# Patient Record
Sex: MALE | Race: WHITE | NOT HISPANIC OR LATINO
[De-identification: names, ages, dates, MRNs, and addresses within clinical notes are randomized per-mention and may not be internally consistent; named-entity substitution may affect disease eponyms.]

---

## 2017-09-08 PROBLEM — Z00.00 ENCOUNTER FOR PREVENTIVE HEALTH EXAMINATION: Status: ACTIVE | Noted: 2017-09-08

## 2017-09-20 ENCOUNTER — APPOINTMENT (OUTPATIENT)
Dept: UROLOGY | Facility: CLINIC | Age: 52
End: 2017-09-20
Payer: MEDICAID

## 2017-09-20 VITALS
BODY MASS INDEX: 26.64 KG/M2 | OXYGEN SATURATION: 98 % | HEIGHT: 73 IN | SYSTOLIC BLOOD PRESSURE: 130 MMHG | DIASTOLIC BLOOD PRESSURE: 74 MMHG | WEIGHT: 201 LBS | HEART RATE: 82 BPM

## 2017-09-20 DIAGNOSIS — N52.9 MALE ERECTILE DYSFUNCTION, UNSPECIFIED: ICD-10-CM

## 2017-09-20 DIAGNOSIS — R35.1 NOCTURIA: ICD-10-CM

## 2017-09-20 DIAGNOSIS — R39.12 POOR URINARY STREAM: ICD-10-CM

## 2017-09-20 PROCEDURE — 36415 COLL VENOUS BLD VENIPUNCTURE: CPT

## 2017-09-20 PROCEDURE — 99204 OFFICE O/P NEW MOD 45 MIN: CPT

## 2017-09-20 RX ORDER — ACETAMINOPHEN 325 MG/1
325 TABLET ORAL
Refills: 0 | Status: ACTIVE | COMMUNITY

## 2017-09-20 RX ORDER — MELOXICAM 15 MG/1
15 TABLET ORAL
Refills: 0 | Status: ACTIVE | COMMUNITY

## 2017-09-22 LAB
ANION GAP SERPL CALC-SCNC: 21 MMOL/L
APPEARANCE: CLEAR
BACTERIA: NEGATIVE
BILIRUBIN URINE: NEGATIVE
BLOOD URINE: NEGATIVE
BUN SERPL-MCNC: 16 MG/DL
CALCIUM SERPL-MCNC: 10.1 MG/DL
CHLORIDE SERPL-SCNC: 98 MMOL/L
CHOLEST SERPL-MCNC: 150 MG/DL
CHOLEST/HDLC SERPL: 3.2 RATIO
CO2 SERPL-SCNC: 20 MMOL/L
COLOR: YELLOW
CORE LAB FLUID CYTOLOGY: NORMAL
CREAT SERPL-MCNC: 0.61 MG/DL
GLUCOSE QUALITATIVE U: 100 MG/DL
GLUCOSE SERPL-MCNC: 102 MG/DL
HBA1C MFR BLD HPLC: 6.2 %
HDLC SERPL-MCNC: 47 MG/DL
KETONES URINE: NEGATIVE
LDLC SERPL CALC-MCNC: 78 MG/DL
LEUKOCYTE ESTERASE URINE: NEGATIVE
LH SERPL-ACNC: 18.1 IU/L
MICROSCOPIC-UA: NORMAL
NITRITE URINE: NEGATIVE
PH URINE: 5.5
POTASSIUM SERPL-SCNC: 5.7 MMOL/L
PROTEIN URINE: NEGATIVE MG/DL
RED BLOOD CELLS URINE: 2 /HPF
SODIUM SERPL-SCNC: 139 MMOL/L
SPECIFIC GRAVITY URINE: 1.02
SQUAMOUS EPITHELIAL CELLS: 0 /HPF
TESTOST SERPL-MCNC: 245.3 NG/DL
TRIGL SERPL-MCNC: 127 MG/DL
UROBILINOGEN URINE: NORMAL MG/DL
WHITE BLOOD CELLS URINE: 1 /HPF

## 2017-12-06 PROBLEM — N52.9 ERECTILE DYSFUNCTION, UNSPECIFIED ERECTILE DYSFUNCTION TYPE: Status: ACTIVE | Noted: 2017-12-06

## 2018-11-29 ENCOUNTER — APPOINTMENT (OUTPATIENT)
Dept: ORTHOPEDIC SURGERY | Facility: CLINIC | Age: 53
End: 2018-11-29
Payer: MEDICAID

## 2018-11-29 VITALS
SYSTOLIC BLOOD PRESSURE: 162 MMHG | BODY MASS INDEX: 26.64 KG/M2 | HEART RATE: 83 BPM | DIASTOLIC BLOOD PRESSURE: 82 MMHG | HEIGHT: 73 IN | WEIGHT: 201 LBS

## 2018-11-29 DIAGNOSIS — M25.642 STIFFNESS OF LEFT HAND, NOT ELSEWHERE CLASSIFIED: ICD-10-CM

## 2018-11-29 DIAGNOSIS — M19.032 PRIMARY OSTEOARTHRITIS, LEFT WRIST: ICD-10-CM

## 2018-11-29 PROCEDURE — 99203 OFFICE O/P NEW LOW 30 MIN: CPT

## 2018-11-29 PROCEDURE — 73110 X-RAY EXAM OF WRIST: CPT | Mod: LT

## 2020-07-01 ENCOUNTER — OUTPATIENT (OUTPATIENT)
Dept: OUTPATIENT SERVICES | Facility: HOSPITAL | Age: 55
LOS: 1 days | End: 2020-07-01
Payer: MEDICAID

## 2020-07-27 ENCOUNTER — INPATIENT (INPATIENT)
Facility: HOSPITAL | Age: 55
LOS: 0 days | Discharge: LEFT BEFORE DISCHARGE | End: 2020-07-27
Attending: STUDENT IN AN ORGANIZED HEALTH CARE EDUCATION/TRAINING PROGRAM | Admitting: STUDENT IN AN ORGANIZED HEALTH CARE EDUCATION/TRAINING PROGRAM
Payer: MEDICAID

## 2020-07-27 VITALS
HEART RATE: 110 BPM | SYSTOLIC BLOOD PRESSURE: 160 MMHG | TEMPERATURE: 98 F | OXYGEN SATURATION: 97 % | RESPIRATION RATE: 17 BRPM | DIASTOLIC BLOOD PRESSURE: 96 MMHG

## 2020-07-27 VITALS
TEMPERATURE: 98 F | SYSTOLIC BLOOD PRESSURE: 160 MMHG | RESPIRATION RATE: 18 BRPM | OXYGEN SATURATION: 97 % | DIASTOLIC BLOOD PRESSURE: 81 MMHG | HEART RATE: 93 BPM

## 2020-07-27 DIAGNOSIS — K92.2 GASTROINTESTINAL HEMORRHAGE, UNSPECIFIED: ICD-10-CM

## 2020-07-27 LAB
ALBUMIN SERPL ELPH-MCNC: 4.1 G/DL — SIGNIFICANT CHANGE UP (ref 3.3–5)
ALP SERPL-CCNC: 128 U/L — HIGH (ref 40–120)
ALT FLD-CCNC: 15 U/L — SIGNIFICANT CHANGE UP (ref 4–41)
ANION GAP SERPL CALC-SCNC: 12 MMO/L — SIGNIFICANT CHANGE UP (ref 7–14)
ANISOCYTOSIS BLD QL: SLIGHT — SIGNIFICANT CHANGE UP
APTT BLD: 32.2 SEC — SIGNIFICANT CHANGE UP (ref 27–36.3)
AST SERPL-CCNC: 16 U/L — SIGNIFICANT CHANGE UP (ref 4–40)
BASOPHILS # BLD AUTO: 0.67 K/UL — HIGH (ref 0–0.2)
BASOPHILS NFR BLD AUTO: 4.1 % — HIGH (ref 0–2)
BASOPHILS NFR SPEC: 1.8 % — SIGNIFICANT CHANGE UP (ref 0–2)
BILIRUB SERPL-MCNC: 0.7 MG/DL — SIGNIFICANT CHANGE UP (ref 0.2–1.2)
BLASTS # FLD: 0 % — SIGNIFICANT CHANGE UP (ref 0–0)
BLD GP AB SCN SERPL QL: NEGATIVE — SIGNIFICANT CHANGE UP
BUN SERPL-MCNC: 19 MG/DL — SIGNIFICANT CHANGE UP (ref 7–23)
CALCIUM SERPL-MCNC: 9.3 MG/DL — SIGNIFICANT CHANGE UP (ref 8.4–10.5)
CHLORIDE SERPL-SCNC: 97 MMOL/L — LOW (ref 98–107)
CO2 SERPL-SCNC: 26 MMOL/L — SIGNIFICANT CHANGE UP (ref 22–31)
CREAT SERPL-MCNC: 0.52 MG/DL — SIGNIFICANT CHANGE UP (ref 0.5–1.3)
DACRYOCYTES BLD QL SMEAR: SLIGHT — SIGNIFICANT CHANGE UP
ELLIPTOCYTES BLD QL SMEAR: SLIGHT — SIGNIFICANT CHANGE UP
EOSINOPHIL # BLD AUTO: 0.6 K/UL — HIGH (ref 0–0.5)
EOSINOPHIL NFR BLD AUTO: 3.7 % — SIGNIFICANT CHANGE UP (ref 0–6)
EOSINOPHIL NFR FLD: 1.8 % — SIGNIFICANT CHANGE UP (ref 0–6)
GIANT PLATELETS BLD QL SMEAR: PRESENT — SIGNIFICANT CHANGE UP
GLUCOSE SERPL-MCNC: 501 MG/DL — CRITICAL HIGH (ref 70–99)
HBA1C BLD-MCNC: 11.1 % — HIGH (ref 4–5.6)
HCT VFR BLD CALC: 44.9 % — SIGNIFICANT CHANGE UP (ref 39–50)
HGB BLD-MCNC: 13.8 G/DL — SIGNIFICANT CHANGE UP (ref 13–17)
IMM GRANULOCYTES NFR BLD AUTO: 9.1 % — HIGH (ref 0–1.5)
INR BLD: 1.08 — SIGNIFICANT CHANGE UP (ref 0.88–1.17)
LYMPHOCYTES # BLD AUTO: 1.47 K/UL — SIGNIFICANT CHANGE UP (ref 1–3.3)
LYMPHOCYTES # BLD AUTO: 9 % — LOW (ref 13–44)
LYMPHOCYTES NFR SPEC AUTO: 6.2 % — LOW (ref 13–44)
MACROCYTES BLD QL: SIGNIFICANT CHANGE UP
MCHC RBC-ENTMCNC: 27.2 PG — SIGNIFICANT CHANGE UP (ref 27–34)
MCHC RBC-ENTMCNC: 30.7 % — LOW (ref 32–36)
MCV RBC AUTO: 88.6 FL — SIGNIFICANT CHANGE UP (ref 80–100)
METAMYELOCYTES # FLD: 1.8 % — HIGH (ref 0–1)
MICROCYTES BLD QL: SLIGHT — SIGNIFICANT CHANGE UP
MONOCYTES # BLD AUTO: 1.02 K/UL — HIGH (ref 0–0.9)
MONOCYTES NFR BLD AUTO: 6.2 % — SIGNIFICANT CHANGE UP (ref 2–14)
MONOCYTES NFR BLD: 2.7 % — SIGNIFICANT CHANGE UP (ref 2–9)
MYELOCYTES NFR BLD: 0 % — SIGNIFICANT CHANGE UP (ref 0–0)
NEUTROPHIL AB SER-ACNC: 79.4 % — HIGH (ref 43–77)
NEUTROPHILS # BLD AUTO: 11.13 K/UL — HIGH (ref 1.8–7.4)
NEUTROPHILS NFR BLD AUTO: 67.9 % — SIGNIFICANT CHANGE UP (ref 43–77)
NEUTS BAND # BLD: 1.8 % — SIGNIFICANT CHANGE UP (ref 0–6)
NRBC # BLD: 11 /100WBC — SIGNIFICANT CHANGE UP
NRBC # FLD: 0.89 K/UL — SIGNIFICANT CHANGE UP (ref 0–0)
NRBC FLD-RTO: 5.4 — SIGNIFICANT CHANGE UP
OB PNL STL: POSITIVE — SIGNIFICANT CHANGE UP
OTHER - HEMATOLOGY %: 0 — SIGNIFICANT CHANGE UP
PLATELET # BLD AUTO: 238 K/UL — SIGNIFICANT CHANGE UP (ref 150–400)
PLATELET COUNT - ESTIMATE: NORMAL — SIGNIFICANT CHANGE UP
PMV BLD: SIGNIFICANT CHANGE UP FL (ref 7–13)
POIKILOCYTOSIS BLD QL AUTO: SIGNIFICANT CHANGE UP
POLYCHROMASIA BLD QL SMEAR: SLIGHT — SIGNIFICANT CHANGE UP
POTASSIUM SERPL-MCNC: 4 MMOL/L — SIGNIFICANT CHANGE UP (ref 3.5–5.3)
POTASSIUM SERPL-SCNC: 4 MMOL/L — SIGNIFICANT CHANGE UP (ref 3.5–5.3)
PROMYELOCYTES # FLD: 0 % — SIGNIFICANT CHANGE UP (ref 0–0)
PROT SERPL-MCNC: 6.5 G/DL — SIGNIFICANT CHANGE UP (ref 6–8.3)
PROTHROM AB SERPL-ACNC: 12.4 SEC — SIGNIFICANT CHANGE UP (ref 9.8–13.1)
RBC # BLD: 5.07 M/UL — SIGNIFICANT CHANGE UP (ref 4.2–5.8)
RBC # FLD: 24.6 % — HIGH (ref 10.3–14.5)
REVIEW TO FOLLOW: YES — SIGNIFICANT CHANGE UP
RH IG SCN BLD-IMP: POSITIVE — SIGNIFICANT CHANGE UP
SODIUM SERPL-SCNC: 135 MMOL/L — SIGNIFICANT CHANGE UP (ref 135–145)
SPHEROCYTES BLD QL SMEAR: SLIGHT — SIGNIFICANT CHANGE UP
VARIANT LYMPHS # BLD: 4.5 % — SIGNIFICANT CHANGE UP
WBC # BLD: 16.38 K/UL — HIGH (ref 3.8–10.5)
WBC # FLD AUTO: 16.38 K/UL — HIGH (ref 3.8–10.5)

## 2020-07-27 PROCEDURE — 99285 EMERGENCY DEPT VISIT HI MDM: CPT | Mod: 25

## 2020-07-27 PROCEDURE — 76705 ECHO EXAM OF ABDOMEN: CPT | Mod: 26

## 2020-07-27 RX ORDER — NICOTINE POLACRILEX 2 MG
4 GUM BUCCAL ONCE
Refills: 0 | Status: DISCONTINUED | OUTPATIENT
Start: 2020-07-27 | End: 2020-07-27

## 2020-07-27 RX ORDER — INSULIN LISPRO 100/ML
8 VIAL (ML) SUBCUTANEOUS ONCE
Refills: 0 | Status: COMPLETED | OUTPATIENT
Start: 2020-07-27 | End: 2020-07-27

## 2020-07-27 RX ORDER — SODIUM CHLORIDE 9 MG/ML
1000 INJECTION INTRAMUSCULAR; INTRAVENOUS; SUBCUTANEOUS ONCE
Refills: 0 | Status: COMPLETED | OUTPATIENT
Start: 2020-07-27 | End: 2020-07-27

## 2020-07-27 RX ORDER — INSULIN LISPRO 100/ML
VIAL (ML) SUBCUTANEOUS EVERY 6 HOURS
Refills: 0 | Status: DISCONTINUED | OUTPATIENT
Start: 2020-07-27 | End: 2020-07-27

## 2020-07-27 RX ORDER — SODIUM CHLORIDE 9 MG/ML
1000 INJECTION, SOLUTION INTRAVENOUS
Refills: 0 | Status: DISCONTINUED | OUTPATIENT
Start: 2020-07-27 | End: 2020-07-27

## 2020-07-27 RX ORDER — DEXTROSE 50 % IN WATER 50 %
25 SYRINGE (ML) INTRAVENOUS ONCE
Refills: 0 | Status: DISCONTINUED | OUTPATIENT
Start: 2020-07-27 | End: 2020-07-27

## 2020-07-27 RX ORDER — DEXTROSE 50 % IN WATER 50 %
12.5 SYRINGE (ML) INTRAVENOUS ONCE
Refills: 0 | Status: DISCONTINUED | OUTPATIENT
Start: 2020-07-27 | End: 2020-07-27

## 2020-07-27 RX ORDER — GLUCAGON INJECTION, SOLUTION 0.5 MG/.1ML
1 INJECTION, SOLUTION SUBCUTANEOUS ONCE
Refills: 0 | Status: DISCONTINUED | OUTPATIENT
Start: 2020-07-27 | End: 2020-07-27

## 2020-07-27 RX ORDER — NICOTINE POLACRILEX 2 MG
1 GUM BUCCAL DAILY
Refills: 0 | Status: DISCONTINUED | OUTPATIENT
Start: 2020-07-27 | End: 2020-07-27

## 2020-07-27 RX ORDER — DEXTROSE 50 % IN WATER 50 %
15 SYRINGE (ML) INTRAVENOUS ONCE
Refills: 0 | Status: DISCONTINUED | OUTPATIENT
Start: 2020-07-27 | End: 2020-07-27

## 2020-07-27 RX ADMIN — SODIUM CHLORIDE 1000 MILLILITER(S): 9 INJECTION INTRAMUSCULAR; INTRAVENOUS; SUBCUTANEOUS at 15:14

## 2020-07-27 RX ADMIN — Medication 8 UNIT(S): at 15:30

## 2020-07-27 RX ADMIN — SODIUM CHLORIDE 1000 MILLILITER(S): 9 INJECTION INTRAMUSCULAR; INTRAVENOUS; SUBCUTANEOUS at 16:14

## 2020-07-27 NOTE — ED PROVIDER NOTE - PHYSICAL EXAMINATION
General: Well developed, well nourished  HEENT: Normocephalic and atraumatic, No conjunctival pallor. Trachea midline.   Cardiac: Normal S1 and S2 w/ RRR. No MRG.  Pulmonary: CTA bilaterally. No increased WOB.   Abdominal: Soft, NT. Mild distension  Neurologic: No focal sensory or motor deficits.  Musculoskeletal: No limited ROM.  Vascular: Warm and well perfused  Skin: Color appropriate for race.   Psychiatric: Appropriate mood and affect. No apparent risk to self or others.  Dong Armenta M.D. PGY-3 General: Well developed, well nourished  HEENT: Normocephalic and atraumatic, No conjunctival pallor. Trachea midline.   Cardiac: Normal S1 and S2 w/ RRR. No MRG.  Pulmonary: CTA bilaterally. No increased WOB.   Abdominal: Soft, NT. Mild distension  Rectal: No hemorrhoids, no gross blood in vault.   Neurologic: No focal sensory or motor deficits.  Musculoskeletal: No limited ROM.  Vascular: Warm and well perfused  Skin: Color appropriate for race.   Psychiatric: Appropriate mood and affect. No apparent risk to self or others.  Dong Armenta M.D. PGY-3

## 2020-07-27 NOTE — ED PROVIDER NOTE - ATTENDING CONTRIBUTION TO CARE
I performed a face-to-face evaluation of the patient and performed a history and physical examination. I agree with the history and physical examination.    54 M, 2 days ago BRBPR, no melena, significant weight loss. Heavy smoker. Sent from PCP for w/u and concern for cancer. Takes NSAID. Consider UGIB 2/2 NSAIDS vs. cirrhosis or GI malignancy. Labs, PPI, IVF, admit.

## 2020-07-27 NOTE — ED PROVIDER NOTE - CLINICAL SUMMARY MEDICAL DECISION MAKING FREE TEXT BOX
Albert: 54 M, 2 days ago BRBPR, no melena, significant weight loss. Heavy smoker. Sent from PCP for w/u and concern for cancer. Takes NSAID. Consider UGIB 2/2 NSAIDS vs. cirrhosis or GI malignancy. Labs, PPI, IVF, admit.

## 2020-07-27 NOTE — ED ADULT NURSE NOTE - OBJECTIVE STATEMENT
Pt presents to ED from home A&Ox4, skin warm dry unremarkable. Pt relates 1 epsiode of bright red stool 2 days ago, then no BM since. Pt was seen at PMD office and sent to ED for further eval. Pt denies abdominal pain, chest pain, shortness of breath, N/V/D, cough, fever or chills. Pt has hx of HTN, and DM, and is compliant with medications. Pt denies any complaints of pain or any other symptoms.

## 2020-07-27 NOTE — ED PROVIDER NOTE - PROGRESS NOTE DETAILS
Pt admitted to medicine for further workup. Pt asked to be ama'd as he wanted to smoke cigarettes. Discussed alternative options. Pt agreed to stay with nicotine patch and lozenges. When medicine team came down to admit pt his was no longer in his room with IV on his stretched and was unable to be found. Presumably eloped. On previous AMA discussion I discussed importance of staying in hospital due to possibility of life threatening GI bleed and associated morbidity/mortality

## 2020-07-27 NOTE — ED ADULT NURSE NOTE - CHIEF COMPLAINT
Addended by: KING MEGA BAUTISTA on: 1/10/2020 09:52 AM     Modules accepted: Level of Service     The patient is a 54y Male complaining of

## 2020-07-27 NOTE — ED ADULT NURSE NOTE - NSIMPLEMENTINTERV_GEN_ALL_ED
Implemented All Universal Safety Interventions:  [place] to call system. Call bell, personal items and telephone within reach. Instruct patient to call for assistance. Room bathroom lighting operational. Non-slip footwear when patient is off stretcher. Physically safe environment: no spills, clutter or unnecessary equipment. Stretcher in lowest position, wheels locked, appropriate side rails in place.

## 2020-07-27 NOTE — CONSULT NOTE ADULT - ASSESSMENT
Impression:  1) BRBPR - with hemoglobin of 13, HD stable but 18 pound weight loss DDx includes malignancy, hemorrhoids, diverticulosis, colitis, anal fissure    Recommendations:   - monitor hemoglobin   - transfuse as needed   - two active IVs at all times   - active type and screen   - send iron studies including retic count, LDH, haptoglobin    Carolin Henson  Gastroenterology Fellow  Pager x [phone] or [phone]  (After 5 pm or on weekends please page GI on call) Impression:  1) BRBPR - with hemoglobin of 13, HD stable but 18 pound weight loss DDx includes malignancy, hemorrhoids, diverticulosis, colitis, anal fissure    Recommendations:   - monitor hemoglobin   - transfuse as needed   - two active IVs at all times   - active type and screen   - send iron studies including retic count, LDH, haptoglobin  - can schedule for outpatient EGD/colon with Dr. Ray Henson  Gastroenterology Fellow  Pager x [phone] or [phone]  (After 5 pm or on weekends please page GI on call)

## 2020-07-27 NOTE — CONSULT NOTE ADULT - SUBJECTIVE AND OBJECTIVE BOX
Chief Complaint:  Patient is a 54y old  Male who presents with a chief complaint of     HPI:  The patient is a 54M presents with rectal bleeding. Pt states on Saturday he had a BM with a lot of bright red blood in the toilet and he has not had BM since. States did not notice any dark/tarry stools. Otherwise feels in USOH. Takes baby aspirin and meloxicam daily for arthritis. Went to PMD today who noted 18lbs weight loss in past month and referred him to ER.     In the ED the VSS labs significant for a hemoglobin of 13.    Allergies:  No Known Allergies      Home Medications:    Hospital Medications:  sodium chloride 0.9% Bolus 1000 milliLiter(s) IV Bolus once      PMHX/PSHX:  Arthritis  DM (diabetes mellitus)  HTN (hypertension)      Family history:      Social History:     ROS:     General:  No wt loss, fevers, chills, night sweats, fatigue,   Eyes:  Good vision, no reported pain  ENT:  No sore throat, pain, runny nose, dysphagia  CV:  No pain, palpitations, hypo/hypertension  Resp:  No dyspnea, cough, tachypnea, wheezing  GI:  See HPI  :  No pain, bleeding, incontinence, nocturia  Muscle:  No pain, weakness  Neuro:  No weakness, tingling, memory problems  Psych:  No fatigue, insomnia, mood problems, depression  Endocrine:  No polyuria, polydipsia, cold/heat intolerance  Heme:  No petechiae, ecchymosis, easy bruisability  Skin:  No rash, edema      PHYSICAL EXAM:     GENERAL:  NAD  CHEST:  Full & symmetric excursion  HEART:  Regular rhythm, no abdominal bruit, no edema  ABDOMEN:  Soft, non-tender, non-distended, normoactive bowel sounds,  no masses , no hepatosplenomegaly  EXTREMITIES:  no cyanosis,clubbing or edema  SKIN:  No rash/erythema/ecchymoses/petechiae/wounds/abscess/warm/dry  NEURO:  Alert, oriented    Vital Signs:  Vital Signs Last 24 Hrs  T(C): 36.7 (27 Jul 2020 13:04), Max: 36.7 (27 Jul 2020 13:04)  T(F): 98 (27 Jul 2020 13:04), Max: 98 (27 Jul 2020 13:04)  HR: 110 (27 Jul 2020 13:04) (110 - 110)  BP: 160/96 (27 Jul 2020 13:04) (160/96 - 160/96)  BP(mean): --  RR: 17 (27 Jul 2020 13:04) (17 - 17)  SpO2: 97% (27 Jul 2020 13:04) (97% - 97%)  Daily     Daily     LABS:                        13.8   16.38 )-----------( 238      ( 27 Jul 2020 14:15 )             44.9             PT/INR - ( 27 Jul 2020 14:15 )   PT: 12.4 SEC;   INR: 1.08          PTT - ( 27 Jul 2020 14:15 )  PTT:32.2 SEC        Imaging: Chief Complaint:  Patient is a 54y old  Male who presents with a chief complaint of     HPI:  The patient is a 54M presents with rectal bleeding. Pt states on Saturday he had a BM with a lot of bright red blood in the toilet and he has not had BM since. States did not notice any dark/tarry stools. Otherwise feels in USOH. Takes baby aspirin and meloxicam daily for arthritis. Went to PMD today who noted 18lbs weight loss in past month and referred him to ER.     In the ED the VSS labs significant for a hemoglobin of 13.    Allergies:  No Known Allergies      Home Medications:    Hospital Medications:  sodium chloride 0.9% Bolus 1000 milliLiter(s) IV Bolus once      PMHX/PSHX:  Arthritis  DM (diabetes mellitus)  HTN (hypertension)      Family history:      Social History:     ROS:     General:  No wt loss, fevers, chills, night sweats, fatigue,   Eyes:  Good vision, no reported pain  ENT:  No sore throat, pain, runny nose, dysphagia  CV:  No pain, palpitations, hypo/hypertension  Resp:  No dyspnea, cough, tachypnea, wheezing  GI:  See HPI  :  No pain, bleeding, incontinence, nocturia  Muscle:  No pain, weakness  Neuro:  No weakness, tingling, memory problems  Psych:  No fatigue, insomnia, mood problems, depression  Endocrine:  No polyuria, polydipsia, cold/heat intolerance  Heme:  No petechiae, ecchymosis, easy bruisability  Skin:  No rash, edema      PHYSICAL EXAM:     GENERAL:  NAD  CHEST:  Full & symmetric excursion  HEART:  Regular rhythm, no abdominal bruit, no edema  ABDOMEN:  Soft, non-tender, non-distended, normoactive bowel sounds,  no masses , no hepatosplenomegaly  EXTREMITIES:  no cyanosis,clubbing or edema  SKIN:  No rash/erythema/ecchymoses/petechiae/wounds/abscess/warm/dry  NEURO:  Alert, oriented  Rectal: brown stool    Vital Signs:  Vital Signs Last 24 Hrs  T(C): 36.7 (27 Jul 2020 13:04), Max: 36.7 (27 Jul 2020 13:04)  T(F): 98 (27 Jul 2020 13:04), Max: 98 (27 Jul 2020 13:04)  HR: 110 (27 Jul 2020 13:04) (110 - 110)  BP: 160/96 (27 Jul 2020 13:04) (160/96 - 160/96)  BP(mean): --  RR: 17 (27 Jul 2020 13:04) (17 - 17)  SpO2: 97% (27 Jul 2020 13:04) (97% - 97%)  Daily     Daily     LABS:                        13.8   16.38 )-----------( 238      ( 27 Jul 2020 14:15 )             44.9             PT/INR - ( 27 Jul 2020 14:15 )   PT: 12.4 SEC;   INR: 1.08          PTT - ( 27 Jul 2020 14:15 )  PTT:32.2 SEC        Imaging:

## 2020-07-27 NOTE — ED PROVIDER NOTE - OBJECTIVE STATEMENT
54M presents with rectal bleeding. Pt states on Saturday he had a BM with a lot of bright red blood in the toilet. Has not had BM since. States did not notice any dark/tarry stools. Otherwise feels in USOH. Takes baby aspirin and meloxicam daily for arthritis. Went to PMD today who noted 18lbs weight loss in past month and referred him to ER.   PMH: IDDM, HTN, arthritis.  40+ pack yr smoker.

## 2020-07-27 NOTE — ED ADULT NURSE REASSESSMENT NOTE - NS ED NURSE REASSESS COMMENT FT1
Med resident approached rn stating pt IV cath located on bed, on further eval pt IV cath found on patient bed, RACHELE Armenta aware, RACHELE states pt was requesting to leave 2/2 being unable to leave department to smoke, ADM MD aware, RACHELE aware, as per RACHELE pt was A&Ox3, had capacity to leave.

## 2020-07-27 NOTE — ED PROVIDER NOTE - NS ED ROS FT
REVIEW OF SYSTEMS:  General:  no fever, no chills  HEENT: no headache, no vision changes  Cardiac: no chest pain, no palpitations  Respiratory: no cough, no shortness of breath  Gastrointestinal: +BRBPR. no abdominal pain, no nausea, no vomiting, no diarrhea  Genitourinary: no hematuria, no dysuria, no urinary frequency  Extremities: no extremity swelling, no extremity pain  Neuro: no focal weakness, no numbness/tingling of the extremities, no decreased sensation  Heme: no easy bleeding, no easy bruising  Skin: no jaundice,  no rashes, no lesions  All other ROS as documented in HPI  -Dong Armenta, PGY-3

## 2020-07-28 LAB — SARS-COV-2 RNA SPEC QL NAA+PROBE: SIGNIFICANT CHANGE UP

## 2020-07-29 ENCOUNTER — INPATIENT (INPATIENT)
Facility: HOSPITAL | Age: 55
LOS: 1 days | Discharge: HOME | End: 2020-07-31
Attending: HOSPITALIST | Admitting: HOSPITALIST
Payer: MEDICAID

## 2020-07-29 VITALS
OXYGEN SATURATION: 94 % | HEART RATE: 95 BPM | RESPIRATION RATE: 16 BRPM | TEMPERATURE: 98 F | DIASTOLIC BLOOD PRESSURE: 91 MMHG | SYSTOLIC BLOOD PRESSURE: 140 MMHG

## 2020-07-29 DIAGNOSIS — Z02.9 ENCOUNTER FOR ADMINISTRATIVE EXAMINATIONS, UNSPECIFIED: ICD-10-CM

## 2020-07-29 DIAGNOSIS — K62.5 HEMORRHAGE OF ANUS AND RECTUM: ICD-10-CM

## 2020-07-29 DIAGNOSIS — F17.200 NICOTINE DEPENDENCE, UNSPECIFIED, UNCOMPLICATED: ICD-10-CM

## 2020-07-29 DIAGNOSIS — Z98.890 OTHER SPECIFIED POSTPROCEDURAL STATES: Chronic | ICD-10-CM

## 2020-07-29 DIAGNOSIS — D72.829 ELEVATED WHITE BLOOD CELL COUNT, UNSPECIFIED: ICD-10-CM

## 2020-07-29 DIAGNOSIS — D73.5 INFARCTION OF SPLEEN: ICD-10-CM

## 2020-07-29 DIAGNOSIS — Z29.9 ENCOUNTER FOR PROPHYLACTIC MEASURES, UNSPECIFIED: ICD-10-CM

## 2020-07-29 DIAGNOSIS — I10 ESSENTIAL (PRIMARY) HYPERTENSION: ICD-10-CM

## 2020-07-29 DIAGNOSIS — E11.65 TYPE 2 DIABETES MELLITUS WITH HYPERGLYCEMIA: ICD-10-CM

## 2020-07-29 DIAGNOSIS — R65.10 SYSTEMIC INFLAMMATORY RESPONSE SYNDROME (SIRS) OF NON-INFECTIOUS ORIGIN WITHOUT ACUTE ORGAN DYSFUNCTION: ICD-10-CM

## 2020-07-29 PROBLEM — E11.9 TYPE 2 DIABETES MELLITUS WITHOUT COMPLICATIONS: Chronic | Status: ACTIVE | Noted: 2020-07-27

## 2020-07-29 PROBLEM — M19.90 UNSPECIFIED OSTEOARTHRITIS, UNSPECIFIED SITE: Chronic | Status: ACTIVE | Noted: 2020-07-27

## 2020-07-29 LAB
ALBUMIN SERPL ELPH-MCNC: 4.7 G/DL — SIGNIFICANT CHANGE UP (ref 3.3–5)
ALP SERPL-CCNC: 149 U/L — HIGH (ref 40–120)
ALT FLD-CCNC: 14 U/L — SIGNIFICANT CHANGE UP (ref 4–41)
ANION GAP SERPL CALC-SCNC: 14 MMO/L — SIGNIFICANT CHANGE UP (ref 7–14)
APPEARANCE UR: CLEAR — SIGNIFICANT CHANGE UP
APTT BLD: 31.1 SEC — SIGNIFICANT CHANGE UP (ref 27–36.3)
AST SERPL-CCNC: 28 U/L — SIGNIFICANT CHANGE UP (ref 4–40)
B-OH-BUTYR SERPL-SCNC: < 0 MMOL/L — LOW (ref 0–0.4)
BASE EXCESS BLDV CALC-SCNC: 0.8 MMOL/L — SIGNIFICANT CHANGE UP
BASOPHILS # BLD AUTO: 0.83 K/UL — HIGH (ref 0–0.2)
BASOPHILS NFR BLD AUTO: 4.1 % — HIGH (ref 0–2)
BILIRUB SERPL-MCNC: 0.9 MG/DL — SIGNIFICANT CHANGE UP (ref 0.2–1.2)
BILIRUB UR-MCNC: NEGATIVE — SIGNIFICANT CHANGE UP
BLD GP AB SCN SERPL QL: NEGATIVE — SIGNIFICANT CHANGE UP
BLOOD GAS VENOUS - CREATININE: 0.49 MG/DL — LOW (ref 0.5–1.3)
BLOOD GAS VENOUS - FIO2: 21 — SIGNIFICANT CHANGE UP
BLOOD UR QL VISUAL: NEGATIVE — SIGNIFICANT CHANGE UP
BUN SERPL-MCNC: 15 MG/DL — SIGNIFICANT CHANGE UP (ref 7–23)
CALCIUM SERPL-MCNC: 9.9 MG/DL — SIGNIFICANT CHANGE UP (ref 8.4–10.5)
CHLORIDE BLDV-SCNC: 106 MMOL/L — SIGNIFICANT CHANGE UP (ref 96–108)
CHLORIDE SERPL-SCNC: 99 MMOL/L — SIGNIFICANT CHANGE UP (ref 98–107)
CO2 SERPL-SCNC: 24 MMOL/L — SIGNIFICANT CHANGE UP (ref 22–31)
COLOR SPEC: SIGNIFICANT CHANGE UP
CREAT SERPL-MCNC: 0.47 MG/DL — LOW (ref 0.5–1.3)
EOSINOPHIL # BLD AUTO: 0.8 K/UL — HIGH (ref 0–0.5)
EOSINOPHIL NFR BLD AUTO: 4 % — SIGNIFICANT CHANGE UP (ref 0–6)
GAS PNL BLDV: 135 MMOL/L — LOW (ref 136–146)
GLUCOSE BLDC GLUCOMTR-MCNC: 481 MG/DL — CRITICAL HIGH (ref 70–99)
GLUCOSE BLDV-MCNC: 364 MG/DL — HIGH (ref 70–99)
GLUCOSE SERPL-MCNC: 364 MG/DL — HIGH (ref 70–99)
GLUCOSE UR-MCNC: >1000 — HIGH
HCO3 BLDV-SCNC: 23 MMOL/L — SIGNIFICANT CHANGE UP (ref 20–27)
HCT VFR BLD CALC: 47.7 % — SIGNIFICANT CHANGE UP (ref 39–50)
HCT VFR BLDV CALC: 46.3 % — SIGNIFICANT CHANGE UP (ref 39–51)
HGB BLD-MCNC: 14.5 G/DL — SIGNIFICANT CHANGE UP (ref 13–17)
HGB BLDV-MCNC: 15.1 G/DL — SIGNIFICANT CHANGE UP (ref 13–17)
HIV COMBO RESULT: SIGNIFICANT CHANGE UP
HIV1+2 AB SPEC QL: SIGNIFICANT CHANGE UP
IMM GRANULOCYTES NFR BLD AUTO: 9.6 % — HIGH (ref 0–1.5)
INR BLD: 0.99 — SIGNIFICANT CHANGE UP (ref 0.88–1.17)
KETONES UR-MCNC: NEGATIVE — SIGNIFICANT CHANGE UP
LACTATE BLDV-MCNC: 2.3 MMOL/L — HIGH (ref 0.5–2)
LEUKOCYTE ESTERASE UR-ACNC: NEGATIVE — SIGNIFICANT CHANGE UP
LYMPHOCYTES # BLD AUTO: 1.8 K/UL — SIGNIFICANT CHANGE UP (ref 1–3.3)
LYMPHOCYTES # BLD AUTO: 8.9 % — LOW (ref 13–44)
MANUAL SMEAR VERIFICATION: SIGNIFICANT CHANGE UP
MCHC RBC-ENTMCNC: 27.2 PG — SIGNIFICANT CHANGE UP (ref 27–34)
MCHC RBC-ENTMCNC: 30.4 % — LOW (ref 32–36)
MCV RBC AUTO: 89.3 FL — SIGNIFICANT CHANGE UP (ref 80–100)
MONOCYTES # BLD AUTO: 1.18 K/UL — HIGH (ref 0–0.9)
MONOCYTES NFR BLD AUTO: 5.9 % — SIGNIFICANT CHANGE UP (ref 2–14)
NEUTROPHILS # BLD AUTO: 13.58 K/UL — HIGH (ref 1.8–7.4)
NEUTROPHILS NFR BLD AUTO: 67.5 % — SIGNIFICANT CHANGE UP (ref 43–77)
NITRITE UR-MCNC: NEGATIVE — SIGNIFICANT CHANGE UP
NRBC # FLD: 1.17 K/UL — SIGNIFICANT CHANGE UP (ref 0–0)
NRBC FLD-RTO: 5.8 — SIGNIFICANT CHANGE UP
OB PNL STL: NEGATIVE — SIGNIFICANT CHANGE UP
PCO2 BLDV: 52 MMHG — HIGH (ref 41–51)
PH BLDV: 7.32 PH — SIGNIFICANT CHANGE UP (ref 7.32–7.43)
PH UR: 5.5 — SIGNIFICANT CHANGE UP (ref 5–8)
PLATELET # BLD AUTO: 263 K/UL — SIGNIFICANT CHANGE UP (ref 150–400)
PMV BLD: SIGNIFICANT CHANGE UP FL (ref 7–13)
PO2 BLDV: 27 MMHG — LOW (ref 35–40)
POTASSIUM BLDV-SCNC: 4.2 MMOL/L — SIGNIFICANT CHANGE UP (ref 3.4–4.5)
POTASSIUM SERPL-MCNC: 4.5 MMOL/L — SIGNIFICANT CHANGE UP (ref 3.5–5.3)
POTASSIUM SERPL-SCNC: 4.5 MMOL/L — SIGNIFICANT CHANGE UP (ref 3.5–5.3)
PROT SERPL-MCNC: 7.3 G/DL — SIGNIFICANT CHANGE UP (ref 6–8.3)
PROT UR-MCNC: NEGATIVE — SIGNIFICANT CHANGE UP
PROTHROM AB SERPL-ACNC: 11.3 SEC — SIGNIFICANT CHANGE UP (ref 9.8–13.1)
RBC # BLD: 5.34 M/UL — SIGNIFICANT CHANGE UP (ref 4.2–5.8)
RBC # FLD: 24.8 % — HIGH (ref 10.3–14.5)
RH IG SCN BLD-IMP: POSITIVE — SIGNIFICANT CHANGE UP
SAO2 % BLDV: 52.2 % — LOW (ref 60–85)
SODIUM SERPL-SCNC: 137 MMOL/L — SIGNIFICANT CHANGE UP (ref 135–145)
SP GR SPEC: 1.03 — SIGNIFICANT CHANGE UP (ref 1–1.04)
UROBILINOGEN FLD QL: NORMAL — SIGNIFICANT CHANGE UP
WBC # BLD: 20.13 K/UL — HIGH (ref 3.8–10.5)
WBC # FLD AUTO: 20.13 K/UL — HIGH (ref 3.8–10.5)

## 2020-07-29 PROCEDURE — 74178 CT ABD&PLV WO CNTR FLWD CNTR: CPT | Mod: 26

## 2020-07-29 PROCEDURE — 99223 1ST HOSP IP/OBS HIGH 75: CPT

## 2020-07-29 PROCEDURE — 99285 EMERGENCY DEPT VISIT HI MDM: CPT

## 2020-07-29 PROCEDURE — 99223 1ST HOSP IP/OBS HIGH 75: CPT | Mod: GC

## 2020-07-29 PROCEDURE — 71046 X-RAY EXAM CHEST 2 VIEWS: CPT | Mod: 26

## 2020-07-29 RX ORDER — SODIUM CHLORIDE 9 MG/ML
1000 INJECTION, SOLUTION INTRAVENOUS
Refills: 0 | Status: DISCONTINUED | OUTPATIENT
Start: 2020-07-29 | End: 2020-07-31

## 2020-07-29 RX ORDER — GABAPENTIN 400 MG/1
300 CAPSULE ORAL AT BEDTIME
Refills: 0 | Status: DISCONTINUED | OUTPATIENT
Start: 2020-07-29 | End: 2020-07-31

## 2020-07-29 RX ORDER — INSULIN GLARGINE 100 [IU]/ML
15 INJECTION, SOLUTION SUBCUTANEOUS ONCE
Refills: 0 | Status: COMPLETED | OUTPATIENT
Start: 2020-07-29 | End: 2020-07-29

## 2020-07-29 RX ORDER — DEXTROSE 50 % IN WATER 50 %
12.5 SYRINGE (ML) INTRAVENOUS ONCE
Refills: 0 | Status: DISCONTINUED | OUTPATIENT
Start: 2020-07-29 | End: 2020-07-31

## 2020-07-29 RX ORDER — DEXTROSE 50 % IN WATER 50 %
15 SYRINGE (ML) INTRAVENOUS ONCE
Refills: 0 | Status: DISCONTINUED | OUTPATIENT
Start: 2020-07-29 | End: 2020-07-31

## 2020-07-29 RX ORDER — MELOXICAM 15 MG/1
0 TABLET ORAL
Qty: 30 | Refills: 0 | DISCHARGE

## 2020-07-29 RX ORDER — DEXTROSE 50 % IN WATER 50 %
25 SYRINGE (ML) INTRAVENOUS ONCE
Refills: 0 | Status: DISCONTINUED | OUTPATIENT
Start: 2020-07-29 | End: 2020-07-31

## 2020-07-29 RX ORDER — LISINOPRIL 2.5 MG/1
20 TABLET ORAL DAILY
Refills: 0 | Status: DISCONTINUED | OUTPATIENT
Start: 2020-07-29 | End: 2020-07-31

## 2020-07-29 RX ORDER — ASPIRIN/CALCIUM CARB/MAGNESIUM 324 MG
0 TABLET ORAL
Qty: 30 | Refills: 0 | DISCHARGE

## 2020-07-29 RX ORDER — INSULIN GLARGINE 100 [IU]/ML
0 INJECTION, SOLUTION SUBCUTANEOUS
Qty: 30 | Refills: 0 | DISCHARGE

## 2020-07-29 RX ORDER — INSULIN LISPRO 100/ML
VIAL (ML) SUBCUTANEOUS AT BEDTIME
Refills: 0 | Status: DISCONTINUED | OUTPATIENT
Start: 2020-07-29 | End: 2020-07-31

## 2020-07-29 RX ORDER — LISINOPRIL/HYDROCHLOROTHIAZIDE 10-12.5 MG
0 TABLET ORAL
Qty: 30 | Refills: 0 | DISCHARGE

## 2020-07-29 RX ORDER — NICOTINE POLACRILEX 2 MG
1 GUM BUCCAL DAILY
Refills: 0 | Status: DISCONTINUED | OUTPATIENT
Start: 2020-07-29 | End: 2020-07-31

## 2020-07-29 RX ORDER — INSULIN GLARGINE 100 [IU]/ML
15 INJECTION, SOLUTION SUBCUTANEOUS AT BEDTIME
Refills: 0 | Status: DISCONTINUED | OUTPATIENT
Start: 2020-07-30 | End: 2020-07-30

## 2020-07-29 RX ORDER — GLUCAGON INJECTION, SOLUTION 0.5 MG/.1ML
1 INJECTION, SOLUTION SUBCUTANEOUS ONCE
Refills: 0 | Status: DISCONTINUED | OUTPATIENT
Start: 2020-07-29 | End: 2020-07-31

## 2020-07-29 RX ORDER — SODIUM CHLORIDE 9 MG/ML
3 INJECTION INTRAMUSCULAR; INTRAVENOUS; SUBCUTANEOUS EVERY 8 HOURS
Refills: 0 | Status: DISCONTINUED | OUTPATIENT
Start: 2020-07-29 | End: 2020-07-31

## 2020-07-29 RX ORDER — INSULIN LISPRO 100/ML
VIAL (ML) SUBCUTANEOUS
Refills: 0 | Status: DISCONTINUED | OUTPATIENT
Start: 2020-07-29 | End: 2020-07-31

## 2020-07-29 RX ORDER — SODIUM CHLORIDE 9 MG/ML
1000 INJECTION, SOLUTION INTRAVENOUS ONCE
Refills: 0 | Status: COMPLETED | OUTPATIENT
Start: 2020-07-29 | End: 2020-07-29

## 2020-07-29 RX ADMIN — SODIUM CHLORIDE 1000 MILLILITER(S): 9 INJECTION, SOLUTION INTRAVENOUS at 12:39

## 2020-07-29 RX ADMIN — Medication 4: at 23:55

## 2020-07-29 RX ADMIN — GABAPENTIN 300 MILLIGRAM(S): 400 CAPSULE ORAL at 23:55

## 2020-07-29 RX ADMIN — SODIUM CHLORIDE 3 MILLILITER(S): 9 INJECTION INTRAMUSCULAR; INTRAVENOUS; SUBCUTANEOUS at 21:59

## 2020-07-29 RX ADMIN — Medication 1 PATCH: at 23:55

## 2020-07-29 NOTE — ED PROVIDER NOTE - ATTENDING CONTRIBUTION TO CARE
54M DM HTN was seen here for BRBPR, hgb was stable, occult pos; GI saw pt, recc scope, pt eloped.  Pt had another episode of BRBPR this morning, ready to stay for eval.  Pt does endorse 18lbs weight loss over 2 weeks.  Appetite is normal.  FS is also 375.  Pt took his insulin long acting last night; recent dx of DM req insulin.  Pt also c/o foul odor to urine; will check labs, r/o DKA, check urine eval for infection, admit.  Nontender abd.  Rectal exam brown stool with blood flecks.  Pt in no distress.  Pt left previously due to smoking, pt prepared today and is wearing a nicotine patch. 54M DM HTN was seen here for BRBPR, hgb was stable, occult pos; GI saw pt, recc scope, pt eloped.  Pt had another episode of BRBPR this morning, ready to stay for eval.  Pt does endorse 18lbs weight loss over 2 weeks.  Appetite is normal.  FS is also 375.  Pt took his insulin long acting last night; recent dx of DM req insulin.  Pt also c/o foul odor to urine; will check labs, r/o DKA, check urine eval for infection, admit.  Nontender abd.  Rectal exam brown stool with blood flecks.  Pt in no distress.  Pt left previously due to smoking, pt prepared today and is wearing a nicotine patch.  VS:  unremarkable    GEN - NAD;  malaise;   A+O x3   HEAD - NC/AT     ENT - PEERL, EOMI, mucous membranes   dry, no discharge      NECK: Neck supple, non-tender without lymphadenopathy, no masses, no JVD  PULM - CTA b/l,  symmetric breath sounds  COR -  normal heart sounds    ABD - , ND, NT, soft,  Rectal exam by DIXON Shaver in my presence - brown stool with flecks of blood.  BACK - no CVA tenderness, nontender spine     EXTREMS - no edema, no deformity, warm and well perfused    SKIN - no rash    or bruising      NEUROLOGIC - alert, face symmetric, speech fluent, sensation nl, motor no focal deficit.

## 2020-07-29 NOTE — H&P ADULT - NSHPLABSRESULTS_GEN_ALL_CORE
14.5   20.13 )-----------( 263      ( 29 Jul 2020 11:30 )             47.7     07-29    137  |  99  |  15  ----------------------------<  364<H>  4.5   |  24  |  0.47<L>    Ca    9.9      29 Jul 2020 11:30    TPro  7.3  /  Alb  4.7  /  TBili  0.9  /  DBili  x   /  AST  28  /  ALT  14  /  AlkPhos  149<H>  07-29    < from: CT Abdomen and Pelvis w/wo IV Cont (07.29.20 @ 15:47) >    No evidence for GI bleed.  Splenomegaly with a small splenic infarct.    < end of copied text > CXR clear lungs, no pleural effusions - my reading     CT ABDOMEN AND PELVIS WAW IC   PROCEDURE DATE: Jul 29 2020   INTERPRETATION: CLINICAL INFORMATION: Rectal bleeding with leukocytosis.   FINDINGS:   LOWER CHEST: Within normal limits.   LIVER: The caudate lobe is enlarged. The hepatic contour is smooth.   BILE DUCTS: Normal caliber.   GALLBLADDER: Within normal limits.   SPLEEN: Splenomegaly with a linear hyperdensity along the medial aspect. 20 cm in AP dimension.   PANCREAS: Within normal limits.   ADRENALS: Within normal limits.   KIDNEYS/URETERS: Subcentimeter hypodense foci within the right kidney too small to characterize.   BLADDER: Within normal limits.   REPRODUCTIVE ORGANS: Prostate within normal limits.   BOWEL: No bowel obstruction. Appendix is normal. Small bowel lipoma measuring approximately 2.1 x 1.6 cm (5:68)   PERITONEUM: No ascites.   VESSELS: Atherosclerotic changes.   RETROPERITONEUM/LYMPH NODES: No lymphadenopathy.   ABDOMINAL WALL: Within normal limits.   BONES: Degenerative changes.     IMPRESSION:   No evidence for GI bleed.   Splenomegaly with a small splenic infarct.           14.5   20.13 )-----------( 263      ( 29 Jul 2020 11:30 )             47.7     07-29    137  |  99  |  15  ----------------------------<  364<H>  4.5   |  24  |  0.47<L>    Ca    9.9      29 Jul 2020 11:30    TPro  7.3  /  Alb  4.7  /  TBili  0.9  /  DBili  x   /  AST  28  /  ALT  14  /  AlkPhos  149<H>  07-29

## 2020-07-29 NOTE — ED PROVIDER NOTE - OBJECTIVE STATEMENT
54 y.o male smoker pmhx of HTN and DM recently started on Basaglar last month coming in with episode of rectal bleeding on Saturday that occurred again on Monday, seen in the ED at that time , hemoglobin stable, had positive occult blood and was recommended for admission but ama'ed from hospital. Returning today states had an episode when he wiped today during BM had bright blood. He denies any fever or abdominal pain. Endorses foul smelling urine, no dysuria. No abdominal surgeries.   Pt had colonoscopy 1.5 years ago showing polyp. Saw PMD and was told had 18 pound weight loss over 2 weeks. States has normal appetite and feels in otherwise state of health.

## 2020-07-29 NOTE — H&P ADULT - PROBLEM SELECTOR PLAN 2
Patient meets SIRS criteria on admission: leukocytosis and HR >90  patient is afebrile and no signs of infection. Leukocytosis is concern for CML.   Monitor WBC. Will hold off abx Patient meets SIRS criteria on admission: leukocytosis, WBC 20K, and HR >90s  Leukocytosis is concern for CML  Monitor WBC  Will hold off abx  Monitor for fever  UA not c/w UTI Patient meets SIRS criteria on admission: leukocytosis, WBC 20K, and HR >90s  concern for CML  Monitor WBC  Will hold off abx  Monitor for fever  UA not c/w UTI  CXR - no evidence of PNA

## 2020-07-29 NOTE — ED ADULT NURSE NOTE - CHIEF COMPLAINT QUOTE
Pt c/o rectal bleeding since Saturday. Pt was seen at [facility] Monday for similar symptoms and left AMA. Pt states bleeding continues. Pt denies dizziness/ sob/ cp/ AC usage.     When reviewing chart pt noted to have blood glucose over 500. Pt denied PMH

## 2020-07-29 NOTE — H&P ADULT - PROBLEM SELECTOR PLAN 10
Discussed the importance of smoking cessation  nicotine patch ordered smoking cessation counseling provided at bedside   nicotine patch ordered

## 2020-07-29 NOTE — H&P ADULT - CONSTITUTIONAL
[facility], [place]  Progress Note - Maria 1 Service   Date of Admission:  3/6/2019    Assessment & Plan   Clarke Moreira is a 66 year old male with PMH of afib on warfarin, HFpEF, T2DM, and recent dental work (2/26/19) who is admitted on 3/6/2019 with severe sepsis and MICHELLE. Likely 2/2 sinusitis and possible contribution of excessive diuresis/poor PO intake.  Infection resolved.  Remains inpatient due to arrhythmia and intermittent hypotension.       Today:  EP consulted, comfortable with current rates  No additional intervention at this time: continue PT  14 day zio patch at discharge and follow up with Dr. Willoughby in [facility] clinic  Increased K to 40 BID  Nearing ready for discharge    Afib RVR: Resolved  Afib s/p ablation 2008 on warfarin: Now with improved rate control and improved hemodynamic stability.  Esau in HR are thought to be due to deconditioning and are improving with physical therapy.  Also likely not as high as they appear on the monitor as patient would be more symptomatic if sustained HRs of 200BPM.     - warfarin dosing per pharmacy  - Consulted EP  - metoprolol succinate to 100mg   - 14 day zio patch at discharge  - Follow up with Dr. Willoughby to review  - PT/OT consults, strength and ADLs, and lymphedema    Orthostatic hypotension: Improved  - Reduced home BP meds  - Continue with leg wraps    Left temple lesion:   Hx BCC: Site of prior skin biopsy/exisino.  Per patient has become chronic, never fully heals and bleeds easily.  Path from 2014 consistent with BCC.  Concerning for incomplete resection/marjolin's ulcer?    - Outpatient follow up with PCP or dermatology    Severe sepsis: Resolved  Recent dental procedures with tooth extraction 2/26/19:  Left Maxillary sinusitis: Sepsis resolved.  Suspect oral/sinusitus as source given timing and CT findings.  Completed course of augmentin.    MICHELLE: Creatinine >4 on admission.  Resolved with fluids.  Likely over  "diuresis with sepsis.      Chronic Issues:  Hx HFpEF:  Follows in [facility] clinic  Hx HTN:   - Torsemide 75mg BID  - Holding spironolactone and lisinopril  - Potassium 40 AM BID  Diabetic foot ulcers:  Hx PAD:  S/p bedside debridement by podiatry.    - Daily dressing changes, keep clean/dry  - WBAT w/ protective footwear for transfers and PT  - Follow up in podiatry clinic 1-2 weeks after discharge   WBAT  - Offloading boots  Prior CVA:  CT head, MRI/MRA brain with no acute infarct  THONG:  CPAP used at home, patient declines CPAP here in hospital, does not like our masks  DMII:  Last A1c 6.1 on 2/26/19.  Will plan to resume PTA metformin at discharge  Hypothyroidism: PTA levothyroxine  Depression/anxiety: NTD  Urinary retention: Continue PTA tamsulosin     Diet: Consistent Carbohydrate Diet 5480-6645 Calories: Moderate Consistent CHO (4-6 CHO units/meal)    Fluids: IVF boluses PRN  Lines: PIV  DVT Prophylaxis: PTA Warfarin, pharmacy to dose  Bazzi Catheter: not present  Code Status: DNR/DNI  patient would like to discuss pressors if he needs this, if he is unable to discuss pressors because he is so altered/hypotensive, then he would NOT want pressors and the medical team should \"just let me go\"    Disposition Plan   Expected discharge: TCU pending EP evaluation and BPs/HRs stable for 24 hours.    Entered: Jesús Olivas MD 03/17/2019, 5:31 AM     Jesús Olivas MD  Internal Medicine PGY1   ______________________________________________________________________    Interval History    No overnight events  Nursing notes reviewed  Episode of Afib w/rvr yesterday with HRs to 200  Resolved on transfer to bed  Had some lightheadedness and sweating that resolved with reduced HR  Otherwise 4-point ROS negative.      Data reviewed today: I reviewed all medications, new labs and imaging results over the last 24 hours.    Physical Exam   Vital Signs: Temp: 96.3  F (35.7  C) Temp src: Oral BP: 122/75 Pulse: 90 Heart Rate: " 88 Resp: 18 SpO2: 100 % O2 Device: Nasal cannula Oxygen Delivery: 2 LPM  Weight: 378 lbs 12 oz    GEN: Adult male, resting supine in bed. NAD  HEENT: No discharge visible at the nares, left paranasal tenderness has resolved  CV: Normal rate, irregularly irregular, NMRG. Warm, well-perfused extremities, unable to appreciate JVD  RESP: CTAB from the anterior, possibly slightly decreased at the bases, normal WOB  MSK: Bilateral legs wrapped, edema improved from prior with wraps  Skin: Warm, dry. Left temple lesion with depressed center and raised borders unchanged     detailed exam

## 2020-07-29 NOTE — ED ADULT NURSE REASSESSMENT NOTE - NS ED NURSE REASSESS COMMENT FT1
Received report from CALLIE Pat. Patient A&OX4, ambulatory. Patient vital signs as noted. Patient offers no complaints at this time. patient admitted, report to be given to [facility] when ready. Patient in no acute distress, respirations even and unlabored. Will continue to monitor.

## 2020-07-29 NOTE — ED PROVIDER NOTE - CLINICAL SUMMARY MEDICAL DECISION MAKING FREE TEXT BOX
54 y.o male smoker pmhx of HTN and DM recently started on Basaglar last month coming in with episode of rectal bleeding on Saturday that occurred again on Monday, seen in the ED at that time , hemoglobin stable, had positive occult blood and was recommended for admission but ama'ed from hospital. Returning today states had an episode when he wiped today during BM had bright blood. Will check labs to eval hemoglobin, type and screen, labs to r.o DKA, ua, reassess.

## 2020-07-29 NOTE — H&P ADULT - PROBLEM SELECTOR PLAN 5
cont lisinopril/HCTZ patient is afebrile and no signs of infection  Monitor WBC. Will hold off abx Patient is non compliant with medications and diet. GX=217; Home -500 range;  Advised the importance of diet and medication compliance.   -cont Basaglar as lantus 15 units  -IHSS  -A1c = 11.1  Humalog 5u TID pre-meal   -Endocrinology c/s in AM

## 2020-07-29 NOTE — H&P ADULT - NEGATIVE GASTROINTESTINAL SYMPTOMS
no nausea/no vomiting/no constipation/no diarrhea no melena/no nausea/no abdominal pain/no vomiting/no diarrhea/no constipation

## 2020-07-29 NOTE — H&P ADULT - PROBLEM SELECTOR PLAN 1
Admit to medicine  check cbc, bmp, a1c, flp, tsh  H/H stable  GI consult appreciated  Clear liquid diet  Plan for EGD/Colonoscopy on Friday  Hold asa Admit to medicine  check cbc, bmp, a1c, flp, tsh  H/H stable  GI consult appreciated  Clear liquid diet  Plan for EGD/Colonoscopy on Friday  Hold asa  Discussed with Dr. Eisenberg

## 2020-07-29 NOTE — H&P ADULT - PROBLEM SELECTOR PLAN 4
Patient is non compliant with medications and diet.   Advised the importance of diet and medication compliance.   Patient also started insulin recently. He was instructed to take Basaglar 16 units daily. He states he takes it once or twice a week upto 20 units if sugars are elevated.  cont basaglar as lantus  ISS  hold oral hypoglycemics Plan as above

## 2020-07-29 NOTE — ED ADULT NURSE NOTE - CHPI ED NUR SYMPTOMS NEG
no diaphoresis/no vomiting/no congestion/no dizziness/no nausea/no chest pain/no chills/no syncope/no back pain/no fever

## 2020-07-29 NOTE — H&P ADULT - PROBLEM SELECTOR PROBLEM 6
Discharge planning issues Need for prophylactic measure Type 2 diabetes mellitus with hyperglycemia, unspecified whether long term insulin use HTN (hypertension)

## 2020-07-29 NOTE — CONSULT NOTE ADULT - ASSESSMENT
Impression:  # Hematochezia: Hb stable (increased from 13 to 14.5). Suspect bleeding related to hemorrhoids. Differential diagnosis includes diverticulosis, angioectasias, colon cancer, inflammatory bowel disease. Patient also with significant weight loss concerning for potential malignancy    Recommendations:  - EGD/Colonoscopy with Dr. Bell Friday  - Moviprep to be ordered Thursday night  - Clear liquid diet  - Trend CBC and transfuse for < 7.0  - Supportive care per primary team    Cate Farr MD  Gastroenterology Fellow  [phone]  Available on Microsoft Teams  Please page on call fellow on weekends and after 5pm on weekdays: [phone]

## 2020-07-29 NOTE — H&P ADULT - NSICDXFAMILYHX_GEN_ALL_CORE_FT
FAMILY HISTORY:  Family history of heart disease, Early family history of heart disease in Father, and siblings

## 2020-07-29 NOTE — ED ADULT NURSE NOTE - OBJECTIVE STATEMENT
54 year old male AMA from ED 7/27/2020 for rectal bleed, patient returned to ED for the same complaint. Patient states that he left because he was not prepared to stay, did not have his nicotine (patches) or phone . Patient reports having continued rectal bleeding but is prepared to stay with his supplies. Patient denies chest pain, dizziness, lightheadedness, or trouble breathing except during HS and when he keeps his mask on for too long. Patient denies any pain, will continue to monitor.

## 2020-07-29 NOTE — ED ADULT TRIAGE NOTE - CHIEF COMPLAINT QUOTE
Pt c/o rectal bleeding since Saturday. Pt was seen at [facility] Monday for similar symptoms and left AMA. Pt states bleeding continues. Pt denies dizziness/ sob/ cp Pt c/o rectal bleeding since Saturday. Pt was seen at [facility] Monday for similar symptoms and left AMA. Pt states bleeding continues. Pt denies dizziness/ sob/ cp/ AC usage.     When reviewing chart pt noted to have blood glucose over 500. Pt denied PMH

## 2020-07-29 NOTE — H&P ADULT - PROBLEM SELECTOR PROBLEM 4
Splenic infarct Type 2 diabetes mellitus with hyperglycemia, unspecified whether long term insulin use

## 2020-07-29 NOTE — H&P ADULT - NSHPSOCIALHISTORY_GEN_ALL_CORE
Denies alcohol or drug use  Smokes 2 PPD x for man y years    works as a osei Denies alcohol or drug use  Smokes  tobacco, 2 PPD x 30 years  Works as a osei

## 2020-07-29 NOTE — H&P ADULT - ASSESSMENT
53 y/o M smoker, PMHx of DM, HTN presents with BRBPR since Saturday 55 y/o M smoker, PMHx of DM, HTN presents with BRBPR since Saturday, r/o CML

## 2020-07-29 NOTE — H&P ADULT - PROBLEM SELECTOR PROBLEM 5
Need for prophylactic measure HTN (hypertension) Leukocytosis Type 2 diabetes mellitus with hyperglycemia, unspecified whether long term insulin use

## 2020-07-29 NOTE — ED PROVIDER NOTE - PROGRESS NOTE DETAILS
richard casper; pt with leukocytosis , CT abdomen ordered. richard casper: pt stable, seen by GI who recommend admission for inpatient scope. pt sitting comfortably, no complaints at this time. no BMs during his stay. Spoke with hospitalist who accepted pt, requested xray chest and will follow results. pt agrees with admission.

## 2020-07-29 NOTE — H&P ADULT - PROBLEM SELECTOR PLAN 8
Discussed the importance of smoking cessation  nicotine patch ordered LE stockings and encourage ambulation for VTE px given patient with GIB

## 2020-07-29 NOTE — H&P ADULT - PROBLEM SELECTOR PLAN 3
patient is afebrile and no signs of infection  Monitor WBC. Will hold off abx Patient noted to have leukocytosis with increased immature granulocytes.   Patient's CT abdomen also incidentally found:  Splenomegaly with a linear hyperdensity along the medial aspect. 20 cm in AP dimension.  Hem/Onc consulted for further evaluation        Hem/Onc cons Patient noted to have leukocytosis with increased immature granulocytes.   Patient's CT abdomen also incidentally found:  Splenomegaly with a linear hyperdensity along the medial aspect. 20 cm in AP dimension.  Hem/Onc consulted for further evaluation Patient noted to have leukocytosis with ~10% immature granulocytes.   Patient's CT abdomen also incidentally found:  Splenomegaly with a linear hyperdensity along the medial aspect. 20 cm in AP dimension.  -Hem/Onc consulted for further evaluation Noted to have leukocytosis with ~10% immature granulocytes.   CT abdomen: Splenomegaly with a linear hyperdensity along the medial aspect. 20 cm in AP dimension.  -Hem/Onc consulted for further evaluation and potential BM

## 2020-07-29 NOTE — H&P ADULT - PROBLEM SELECTOR PROBLEM 2
Type 2 diabetes mellitus with hyperglycemia, unspecified whether long term insulin use Splenic infarct SIRS (systemic inflammatory response syndrome)

## 2020-07-29 NOTE — H&P ADULT - PROBLEM SELECTOR PLAN 6
LE stockings and encourage ambulation for VTE px given patient with GIB Patient is non compliant with medications and diet.   Advised the importance of diet and medication compliance.   Patient also started insulin recently. He was instructed to take Basaglar 16 units daily. He states he takes it once or twice a week upto 20 units if sugars are elevated.  cont basaglar as lantus  ISS  hold oral hypoglycemics Patient is non compliant with medications and diet.   Advised the importance of diet and medication compliance.   Patient also started insulin recently. He was instructed to take Basaglar 16 units daily. He states he takes it once or twice a week upto 20 units if sugars are elevated.  cont basaglar as lantus 15 units  ISS  hold oral hypoglycemics cont lisinopril  DASH Diet  will hold HCTZ for now

## 2020-07-29 NOTE — H&P ADULT - PROBLEM SELECTOR PLAN 7
1.  Name of PCP:  2.  PCP Contacted on Admission: [ ] Y    [ ] N    3.  PCP contacted at Discharge: [ ] Y    [ ] N    [ ] N/A  4.  Post-Discharge Appointment Date and Location:  5.  Summary of Handoff given to PCP: cont lisinopril  DASH Diet  will hold HCTZ for now smoking cessation counseling provided at bedside   nicotine patch ordered

## 2020-07-29 NOTE — H&P ADULT - HISTORY OF PRESENT ILLNESS
53 y/o M smoker, PMHx of DM, HTN presents with BRBPR since Saturday. Patient states he had three episodes of BRBPR since Saturday. He initially presented to the ED on Monday with the same complaints. He was recommended admission, but AMA'ed. He returned today because he saw blood again after BM. Denies fever, chills, cough, falls, LOC, chest pain, SOB, nausea, vomiting ,melena, LE edema, diarrhea, constipation or calf tenderness. He also states he lost about 18 lbs when he was seen at his PMD office recently. He normally weighs around 210, but was 185 lbs when he was PCP office. 55 y/o M smoker, PMHx of DM, HTN, psoriatic arthritis presents with BRBPR since Saturday. Patient states he had three episodes of BRBPR since Saturday. He initially presented to the ED on Monday with the same complaints. He was recommended admission, but AMA'ed. He returned today because he saw blood again after BM. Denies fever, chills, cough, falls, LOC, chest pain, SOB, nausea, vomiting ,melena, LE edema, diarrhea, constipation or calf tenderness. He also states he lost about 18 lbs when he was seen at his PMD office recently. He normally weighs around 210, but was 185 lbs when he was PCP office. He states he also takes meloxicam PRN, but was not taking many doses recently. 53 y/o Male smoker, MHx of DM Type 2, HTN, psoriatic arthritis presents with BRBPR since Saturday. Patient states he had three episodes of BRBPR since Saturday. He initially presented to the ED on Monday with the same complaints. He was recommended admission, but AMA'ed. He returned today because he saw blood again after BM. Reports no fever, chills, cough, falls, LOC, chest pain, SOB, nausea, vomiting, LE edema, diarrhea, constipation or calf tenderness. He also states he lost about 18 lbs when he was seen at his PMD office recently. He normally weighs around 210, but was 185 lbs when he was PCP office. He states he also takes meloxicam PRN, but was not taking many doses recently. 55 y/o Male smoker, MHx of DM Type 2, HTN, psoriatic arthritis presents with BRBPR since Saturday. Patient states he had three episodes of BRBPR since Saturday. He initially presented to the ED on Monday with the same complaints. He was recommended admission, but AMA'ed. He returned today because he saw blood again after BM. Reports no fever, chills, cough, falls, LOC, chest pain, SOB, nausea, vomiting, LE edema, diarrhea, constipation or calf tenderness. He also states he lost about 18 lbs when he was seen at his PMD office recently. He normally weighs around 210, but was 185 lbs when he was PCP office. He states he also takes meloxicam PRN, but was not taking many doses recently.  Of note, patient started insulin recently, says that was instructed to take Basaglar 16 units daily. He states that takes it once or twice a week up to 20 units if "sugars are elevated". Checks blood glucose once a week and says that is usually in 400-500s range;

## 2020-07-29 NOTE — H&P ADULT - NEUROLOGICAL DETAILS
alert and oriented x 3/cranial nerves intact normal strength/alert and oriented x 3/cranial nerves intact

## 2020-07-29 NOTE — CONSULT NOTE ADULT - SUBJECTIVE AND OBJECTIVE BOX
Chief Complaint:  Patient is a 54y old  Male who presents with a chief complaint of hematochezia    HPI:  54 year old man with hx of Hypertension, DM presents with continuing hematochezia. Patient recently came to the ED 2 days ago for hematochezia. At that time was reportedly recommended for admission, but refused. Today he returns for continuing hematochezia. Patient reports since his initial episode 2 days ago he has had multiple blood bowel movements, but with decreasing amounts of blood. He reports light brown stool surrounded by blood. He denies abdominal pain, fevers, chills, chest pain, shortness of breath, nausea, vomiting, diarrhea, melena, hematemesis and cough. He takes baby aspirin and meloxicam daily for arthritis. Reports a 20lb weight loss within the past month    Allergies:  No Known Allergies    Home Medications:  Reviewed    Hospital Medications:  Reviewed      PMHX/PSHX:  Arthritis  DM (diabetes mellitus)  HTN (hypertension)  No significant past surgical history    Family history:  No pertinent family history in first degree relatives    Social History:     ROS:   General:  No fevers, chills or  ENT:  No sore throat or dysphagia  CV:  No pain or palpitations  Resp:  No dyspnea, cough, wheezing  GI:  No pain, No nausea, No vomiting,  + rectal bleeding, No tarry stools  Skin:  No rash or edema      PHYSICAL EXAM:   GENERAL:  NAD, Appears stated age  HEENT:  NC/AT,  conjunctivae clear and pink, sclera -anicteric  CHEST:  CTA B/L, Normal effort  HEART:  RRR S1/S2, No murmurs  ABDOMEN:  Soft, non-tender, non-distended, BS+  EXTEREMITIES:  No cyanosis  SKIN:  Warm & Dry.  NEURO:  Alert, oriented    Vital Signs:  Vital Signs Last 24 Hrs  T(C): 37 (29 Jul 2020 15:37), Max: 37 (29 Jul 2020 15:37)  T(F): 98.6 (29 Jul 2020 15:37), Max: 98.6 (29 Jul 2020 15:37)  HR: 97 (29 Jul 2020 15:37) (94 - 97)  BP: 157/82 (29 Jul 2020 15:37) (133/76 - 157/82)  BP(mean): --  RR: 16 (29 Jul 2020 15:37) (16 - 16)  SpO2: 96% (29 Jul 2020 15:37) (94% - 96%)  Daily     Daily     LABS:                        14.5   20.13 )-----------( 263      ( 29 Jul 2020 11:30 )             47.7     Mean Cell Volume: 89.3 fL (07-29-20 @ 11:30)    07-29    137  |  99  |  15  ----------------------------<  364<H>  4.5   |  24  |  0.47<L>    Ca    9.9      29 Jul 2020 11:30    TPro  7.3  /  Alb  4.7  /  TBili  0.9  /  DBili  x   /  AST  28  /  ALT  14  /  AlkPhos  149<H>  07-29    LIVER FUNCTIONS - ( 29 Jul 2020 11:30 )  Alb: 4.7 g/dL / Pro: 7.3 g/dL / ALK PHOS: 149 u/L / ALT: 14 u/L / AST: 28 u/L / GGT: x           PT/INR - ( 29 Jul 2020 11:30 )   PT: 11.3 SEC;   INR: 0.99          PTT - ( 29 Jul 2020 11:30 )  PTT:31.1 SEC                            14.5   20.13 )-----------( 263      ( 29 Jul 2020 11:30 )             47.7                         13.8   16.38 )-----------( 238      ( 27 Jul 2020 14:15 )             44.9     Imaging:

## 2020-07-30 ENCOUNTER — RESULT REVIEW (OUTPATIENT)
Age: 55
End: 2020-07-30

## 2020-07-30 ENCOUNTER — TRANSCRIPTION ENCOUNTER (OUTPATIENT)
Age: 55
End: 2020-07-30

## 2020-07-30 DIAGNOSIS — E11.42 TYPE 2 DIABETES MELLITUS WITH DIABETIC POLYNEUROPATHY: ICD-10-CM

## 2020-07-30 DIAGNOSIS — I10 ESSENTIAL (PRIMARY) HYPERTENSION: ICD-10-CM

## 2020-07-30 LAB
ANION GAP SERPL CALC-SCNC: 14 MMO/L — SIGNIFICANT CHANGE UP (ref 7–14)
BASOPHILS # BLD AUTO: 0.59 K/UL — HIGH (ref 0–0.2)
BASOPHILS NFR BLD AUTO: 3.9 % — HIGH (ref 0–2)
BUN SERPL-MCNC: 16 MG/DL — SIGNIFICANT CHANGE UP (ref 7–23)
CALCIUM SERPL-MCNC: 9.1 MG/DL — SIGNIFICANT CHANGE UP (ref 8.4–10.5)
CHLORIDE SERPL-SCNC: 97 MMOL/L — LOW (ref 98–107)
CHOLEST SERPL-MCNC: 118 MG/DL — LOW (ref 120–199)
CO2 SERPL-SCNC: 24 MMOL/L — SIGNIFICANT CHANGE UP (ref 22–31)
CREAT SERPL-MCNC: 0.45 MG/DL — LOW (ref 0.5–1.3)
EOSINOPHIL # BLD AUTO: 0.81 K/UL — HIGH (ref 0–0.5)
EOSINOPHIL NFR BLD AUTO: 5.4 % — SIGNIFICANT CHANGE UP (ref 0–6)
GLUCOSE BLDC GLUCOMTR-MCNC: 230 MG/DL — HIGH (ref 70–99)
GLUCOSE BLDC GLUCOMTR-MCNC: 250 MG/DL — HIGH (ref 70–99)
GLUCOSE BLDC GLUCOMTR-MCNC: 291 MG/DL — HIGH (ref 70–99)
GLUCOSE BLDC GLUCOMTR-MCNC: 356 MG/DL — HIGH (ref 70–99)
GLUCOSE SERPL-MCNC: 286 MG/DL — HIGH (ref 70–99)
HBA1C BLD-MCNC: 11.2 % — HIGH (ref 4–5.6)
HCT VFR BLD CALC: 41 % — SIGNIFICANT CHANGE UP (ref 39–50)
HCV AB S/CO SERPL IA: 0.13 S/CO — SIGNIFICANT CHANGE UP (ref 0–0.99)
HCV AB SERPL-IMP: SIGNIFICANT CHANGE UP
HDLC SERPL-MCNC: 30 MG/DL — LOW (ref 35–55)
HGB BLD-MCNC: 12.5 G/DL — LOW (ref 13–17)
IMM GRANULOCYTES NFR BLD AUTO: 9.6 % — HIGH (ref 0–1.5)
LIPID PNL WITH DIRECT LDL SERPL: 69 MG/DL — SIGNIFICANT CHANGE UP
LYMPHOCYTES # BLD AUTO: 1.81 K/UL — SIGNIFICANT CHANGE UP (ref 1–3.3)
LYMPHOCYTES # BLD AUTO: 12 % — LOW (ref 13–44)
MAGNESIUM SERPL-MCNC: 1.9 MG/DL — SIGNIFICANT CHANGE UP (ref 1.6–2.6)
MCHC RBC-ENTMCNC: 27.3 PG — SIGNIFICANT CHANGE UP (ref 27–34)
MCHC RBC-ENTMCNC: 30.5 % — LOW (ref 32–36)
MCV RBC AUTO: 89.5 FL — SIGNIFICANT CHANGE UP (ref 80–100)
MONOCYTES # BLD AUTO: 0.85 K/UL — SIGNIFICANT CHANGE UP (ref 0–0.9)
MONOCYTES NFR BLD AUTO: 5.7 % — SIGNIFICANT CHANGE UP (ref 2–14)
NEUTROPHILS # BLD AUTO: 9.52 K/UL — HIGH (ref 1.8–7.4)
NEUTROPHILS NFR BLD AUTO: 63.4 % — SIGNIFICANT CHANGE UP (ref 43–77)
NRBC # FLD: 0.69 K/UL — SIGNIFICANT CHANGE UP (ref 0–0)
NRBC FLD-RTO: 4.6 — SIGNIFICANT CHANGE UP
PHOSPHATE SERPL-MCNC: 3.3 MG/DL — SIGNIFICANT CHANGE UP (ref 2.5–4.5)
PLATELET # BLD AUTO: 230 K/UL — SIGNIFICANT CHANGE UP (ref 150–400)
PMV BLD: SIGNIFICANT CHANGE UP FL (ref 7–13)
POTASSIUM SERPL-MCNC: 3.8 MMOL/L — SIGNIFICANT CHANGE UP (ref 3.5–5.3)
POTASSIUM SERPL-SCNC: 3.8 MMOL/L — SIGNIFICANT CHANGE UP (ref 3.5–5.3)
RBC # BLD: 4.58 M/UL — SIGNIFICANT CHANGE UP (ref 4.2–5.8)
RBC # FLD: 24.6 % — HIGH (ref 10.3–14.5)
SARS-COV-2 IGG SERPL QL IA: NEGATIVE — SIGNIFICANT CHANGE UP
SARS-COV-2 IGM SERPL IA-ACNC: <0.1 INDEX — SIGNIFICANT CHANGE UP
SARS-COV-2 RNA SPEC QL NAA+PROBE: SIGNIFICANT CHANGE UP
SODIUM SERPL-SCNC: 135 MMOL/L — SIGNIFICANT CHANGE UP (ref 135–145)
TRIGL SERPL-MCNC: 126 MG/DL — SIGNIFICANT CHANGE UP (ref 10–149)
TSH SERPL-MCNC: 3.27 UIU/ML — SIGNIFICANT CHANGE UP (ref 0.27–4.2)
WBC # BLD: 15.03 K/UL — HIGH (ref 3.8–10.5)
WBC # FLD AUTO: 15.03 K/UL — HIGH (ref 3.8–10.5)

## 2020-07-30 PROCEDURE — 88305 TISSUE EXAM BY PATHOLOGIST: CPT | Mod: 26

## 2020-07-30 PROCEDURE — 99223 1ST HOSP IP/OBS HIGH 75: CPT

## 2020-07-30 PROCEDURE — 88341 IMHCHEM/IMCYTCHM EA ADD ANTB: CPT | Mod: 26,59

## 2020-07-30 PROCEDURE — 99233 SBSQ HOSP IP/OBS HIGH 50: CPT | Mod: GC

## 2020-07-30 PROCEDURE — 85097 BONE MARROW INTERPRETATION: CPT

## 2020-07-30 PROCEDURE — 88342 IMHCHEM/IMCYTCHM 1ST ANTB: CPT | Mod: 26,59

## 2020-07-30 PROCEDURE — 88189 FLOWCYTOMETRY/READ 16 & >: CPT

## 2020-07-30 PROCEDURE — 88313 SPECIAL STAINS GROUP 2: CPT | Mod: 26

## 2020-07-30 PROCEDURE — 88360 TUMOR IMMUNOHISTOCHEM/MANUAL: CPT | Mod: 26

## 2020-07-30 PROCEDURE — 99232 SBSQ HOSP IP/OBS MODERATE 35: CPT | Mod: GC

## 2020-07-30 RX ORDER — LANOLIN ALCOHOL/MO/W.PET/CERES
6 CREAM (GRAM) TOPICAL AT BEDTIME
Refills: 0 | Status: DISCONTINUED | OUTPATIENT
Start: 2020-07-30 | End: 2020-07-31

## 2020-07-30 RX ORDER — INSULIN GLARGINE 100 [IU]/ML
17 INJECTION, SOLUTION SUBCUTANEOUS AT BEDTIME
Refills: 0 | Status: DISCONTINUED | OUTPATIENT
Start: 2020-07-30 | End: 2020-07-31

## 2020-07-30 RX ORDER — LIDOCAINE HCL 20 MG/ML
20 VIAL (ML) INJECTION ONCE
Refills: 0 | Status: COMPLETED | OUTPATIENT
Start: 2020-07-30 | End: 2020-07-30

## 2020-07-30 RX ORDER — INSULIN LISPRO 100/ML
5 VIAL (ML) SUBCUTANEOUS ONCE
Refills: 0 | Status: COMPLETED | OUTPATIENT
Start: 2020-07-30 | End: 2020-07-30

## 2020-07-30 RX ORDER — INSULIN LISPRO 100/ML
5 VIAL (ML) SUBCUTANEOUS
Refills: 0 | Status: DISCONTINUED | OUTPATIENT
Start: 2020-07-30 | End: 2020-07-30

## 2020-07-30 RX ORDER — SOD SULF/SODIUM/NAHCO3/KCL/PEG
1000 SOLUTION, RECONSTITUTED, ORAL ORAL EVERY 4 HOURS
Refills: 0 | Status: COMPLETED | OUTPATIENT
Start: 2020-07-30 | End: 2020-07-30

## 2020-07-30 RX ORDER — INSULIN LISPRO 100/ML
6 VIAL (ML) SUBCUTANEOUS
Refills: 0 | Status: DISCONTINUED | OUTPATIENT
Start: 2020-07-30 | End: 2020-07-31

## 2020-07-30 RX ORDER — HEPARIN SODIUM 5000 [USP'U]/ML
5000 INJECTION INTRAVENOUS; SUBCUTANEOUS ONCE
Refills: 0 | Status: COMPLETED | OUTPATIENT
Start: 2020-07-30 | End: 2020-07-30

## 2020-07-30 RX ORDER — ACETAMINOPHEN 500 MG
650 TABLET ORAL EVERY 6 HOURS
Refills: 0 | Status: DISCONTINUED | OUTPATIENT
Start: 2020-07-30 | End: 2020-07-31

## 2020-07-30 RX ADMIN — Medication 5 UNIT(S): at 02:35

## 2020-07-30 RX ADMIN — SODIUM CHLORIDE 3 MILLILITER(S): 9 INJECTION INTRAMUSCULAR; INTRAVENOUS; SUBCUTANEOUS at 13:10

## 2020-07-30 RX ADMIN — Medication 3: at 09:08

## 2020-07-30 RX ADMIN — INSULIN GLARGINE 17 UNIT(S): 100 INJECTION, SOLUTION SUBCUTANEOUS at 22:06

## 2020-07-30 RX ADMIN — Medication 1000 MILLILITER(S): at 22:08

## 2020-07-30 RX ADMIN — Medication 2: at 18:17

## 2020-07-30 RX ADMIN — SODIUM CHLORIDE 3 MILLILITER(S): 9 INJECTION INTRAMUSCULAR; INTRAVENOUS; SUBCUTANEOUS at 22:09

## 2020-07-30 RX ADMIN — HEPARIN SODIUM 5000 UNIT(S): 5000 INJECTION INTRAVENOUS; SUBCUTANEOUS at 16:19

## 2020-07-30 RX ADMIN — Medication 1 PATCH: at 18:19

## 2020-07-30 RX ADMIN — Medication 650 MILLIGRAM(S): at 19:10

## 2020-07-30 RX ADMIN — LISINOPRIL 20 MILLIGRAM(S): 2.5 TABLET ORAL at 06:42

## 2020-07-30 RX ADMIN — GABAPENTIN 300 MILLIGRAM(S): 400 CAPSULE ORAL at 22:05

## 2020-07-30 RX ADMIN — Medication 6 UNIT(S): at 18:17

## 2020-07-30 RX ADMIN — Medication 5 UNIT(S): at 09:07

## 2020-07-30 RX ADMIN — Medication 1 PATCH: at 06:43

## 2020-07-30 RX ADMIN — Medication 5 UNIT(S): at 12:53

## 2020-07-30 RX ADMIN — SODIUM CHLORIDE 3 MILLILITER(S): 9 INJECTION INTRAMUSCULAR; INTRAVENOUS; SUBCUTANEOUS at 06:42

## 2020-07-30 RX ADMIN — Medication 20 MILLILITER(S): at 16:21

## 2020-07-30 RX ADMIN — INSULIN GLARGINE 15 UNIT(S): 100 INJECTION, SOLUTION SUBCUTANEOUS at 00:32

## 2020-07-30 RX ADMIN — Medication 2: at 12:53

## 2020-07-30 RX ADMIN — Medication 1000 MILLILITER(S): at 18:16

## 2020-07-30 RX ADMIN — Medication 6 MILLIGRAM(S): at 22:07

## 2020-07-30 RX ADMIN — Medication 1 PATCH: at 12:54

## 2020-07-30 RX ADMIN — Medication 650 MILLIGRAM(S): at 18:17

## 2020-07-30 NOTE — DISCHARGE NOTE PROVIDER - NSDCFUSCHEDAPPT_GEN_ALL_CORE_FT
DINAH TILLEY ; 09/16/2020 ; NPP Gastro [address] [facility]  DINAH TILLEY ; 11/02/2020 ; NPP Med Endocr [address] [facility] DINAH TILLEY ; 09/16/2020 ; NPP Gastro [address] [facility]  DINAH TILLEY ; 11/02/2020 ; NPP Med Endocr [address] [facility]

## 2020-07-30 NOTE — PROGRESS NOTE ADULT - PROBLEM SELECTOR PLAN 1
Patient complaining of painless brbpr since Saturday, came to ED on Monday but left AMA to get nicotine patches. CBC stable.  - GI to perform colonscopy tomorrow  - CLD, NPO at midnight  - Moviprep at 6pm  - Hold ASA  - Perform SUSY  - Daily cbc Patient complaining of painless brbpr since Saturday, came to ED on Monday but left AMA to get nicotine patches. CBC stable.  - GI to perform colonscopy/EGD tomorrow  - CLD, NPO at midnight  - Moviprep at 6pm  - Hold ASA  - Daily cbc

## 2020-07-30 NOTE — DISCHARGE NOTE PROVIDER - NSDCMRMEDTOKEN_GEN_ALL_CORE_FT
ASPIRIN LOW  CHW 81M  orally once a day  BASAGLAR     INJ 100UNIT: 16 subcutaneous once a day but non-compliant  gabapentin 300 mg oral capsule: 1 cap(s) orally once a day (at bedtime)  GLIPIZIDE ER TAB 5M  orally once a day  LISINOP/HCTZ TAB 20-25M  orally once a day  MELOXICAM    TAB 7.5M  orally once a day ASPIRIN LOW  CHW 81M  orally once a day  BASAGLAR     INJ 100UNIT: 16 subcutaneous once a day but non-compliant  gabapentin 300 mg oral capsule: 1 cap(s) orally once a day (at bedtime)  LISINOP/HCTZ TAB 20-25M  orally once a day  MELOXICAM    TAB 7.5M  orally once a day ASPIRIN LOW  CHW 81M  orally once a day  Basaglar KwikPen 100 units/mL subcutaneous solution: 20 unit(s) subcutaneous once a day (at bedtime)   gabapentin 300 mg oral capsule: 1 cap(s) orally once a day (at bedtime)  HumaLOG KwikPen 200 units/mL (Concentrated) subcutaneous solution: 5 unit(s) subcutaneous 3 times a day (before meals)   LISINOP/HCTZ TAB 20-25M  orally once a day  MELOXICAM    TAB 7.5M  orally once a day  nicotine 21 mg/24 hr transdermal film, extended release: 1 patch transdermal once a day

## 2020-07-30 NOTE — PROGRESS NOTE ADULT - SUBJECTIVE AND OBJECTIVE BOX
~~~~~~~~~~~~~~~~~~~~~~~~~~~~~~~~~~  Ashish Shay, PGY1  Pager [phone] (NS) [phone] ([facility])  After 7: Night Float Pager  ~~~~~~~~~~~~~~~~~~~~~~~~~~~~~~~~~~    PROGRESS NOTE:     Patient is a 54y old  Male who presents with a chief complaint of BRBPR (2020 07:40)      SUBJECTIVE / OVERNIGHT EVENTS: No acute events overnight. Patient reports no further BMs since yesterday. Denies fever, chills, melena and abdominal pain. Asking for stool softener.    MEDICATIONS  (STANDING):  dextrose 5%. 1000 milliLiter(s) (50 mL/Hr) IV Continuous <Continuous>  dextrose 50% Injectable 12.5 Gram(s) IV Push once  dextrose 50% Injectable 25 Gram(s) IV Push once  dextrose 50% Injectable 25 Gram(s) IV Push once  gabapentin 300 milliGRAM(s) Oral at bedtime  insulin glargine Injectable (LANTUS) 15 Unit(s) SubCutaneous at bedtime  insulin lispro (HumaLOG) corrective regimen sliding scale   SubCutaneous three times a day before meals  insulin lispro (HumaLOG) corrective regimen sliding scale   SubCutaneous at bedtime  insulin lispro Injectable (HumaLOG) 5 Unit(s) SubCutaneous three times a day before meals  lisinopril 20 milliGRAM(s) Oral daily  nicotine - 21 mG/24Hr(s) Patch 1 Patch Transdermal daily  polyethylene glycol/electrolyte Solution 1000 milliLiter(s) Oral every 4 hours  sodium chloride 0.9% lock flush 3 milliLiter(s) IV Push every 8 hours    MEDICATIONS  (PRN):  dextrose 40% Gel 15 Gram(s) Oral once PRN Blood Glucose LESS THAN 70 milliGRAM(s)/deciliter  glucagon  Injectable 1 milliGRAM(s) IntraMuscular once PRN Glucose LESS THAN 70 milligrams/deciliter  melatonin 6 milliGRAM(s) Oral at bedtime PRN Sleep      CAPILLARY BLOOD GLUCOSE      POCT Blood Glucose.: 291 mg/dL (2020 08:49)  POCT Blood Glucose.: 356 mg/dL (2020 02:29)  POCT Blood Glucose.: 481 mg/dL (2020 23:36)  POCT Blood Glucose.: 375 mg/dL (2020 10:35)    I&O's Summary      PHYSICAL EXAM:  Vital Signs Last 24 Hrs  T(C): 36.8 (2020 06:38), Max: 37.2 (2020 20:27)  T(F): 98.3 (2020 06:38), Max: 98.9 (2020 20:27)  HR: 79 (2020 06:38) (79 - 97)  BP: 133/73 (2020 06:38) (133/73 - 157/82)  BP(mean): --  RR: 17 (2020 06:38) (16 - 18)  SpO2: 97% (2020 06:38) (94% - 97%)    CONSTITUTIONAL: No acute distress  RESPIRATORY: Normal respiratory effort; lungs are clear to auscultation bilaterally  CARDIOVASCULAR: Regular rate and rhythm, normal S1 and S2, no murmur/rub/gallop; Peripheral pulses are 2+ bilaterally  ABDOMEN: Nontender to palpation, normoactive bowel sounds, no rebound/guarding  MUSCLOSKELETAL: no clubbing or cyanosis of digits; no joint swelling or tenderness to palpation  PSYCH: A+O to person, place, and time; affect appropriate    LABS:                        12.5   15.03 )-----------( 230      ( 2020 05:00 )             41.0     07-30    135  |  97<L>  |  16  ----------------------------<  286<H>  3.8   |  24  |  0.45<L>    Ca    9.1      2020 05:00  Phos  3.3     07-30  Mg     1.9     07-30    TPro  7.3  /  Alb  4.7  /  TBili  0.9  /  DBili  x   /  AST  28  /  ALT  14  /  AlkPhos  149<H>  07-29    PT/INR - ( 2020 11:30 )   PT: 11.3 SEC;   INR: 0.99          PTT - ( 2020 11:30 )  PTT:31.1 SEC      Urinalysis Basic - ( 2020 17:23 )    Color: LIGHT YELLOW / Appearance: CLEAR / S.029 / pH: 5.5  Gluc: >1000 / Ketone: NEGATIVE  / Bili: NEGATIVE / Urobili: NORMAL   Blood: NEGATIVE / Protein: NEGATIVE / Nitrite: NEGATIVE   Leuk Esterase: NEGATIVE / RBC: x / WBC x   Sq Epi: x / Non Sq Epi: x / Bacteria: x

## 2020-07-30 NOTE — CONSULT NOTE ADULT - SUBJECTIVE AND OBJECTIVE BOX
HPI:  53 y/o Male smoker, MHx of DM Type 2, HTN, psoriatic arthritis presents with BRBPR since Saturday. Patient states he had three episodes of BRBPR since Saturday. He initially presented to the ED on Monday with the same complaints. He was recommended admission, but AMA'ed. He returned today because he saw blood again after BM. Reports no fever, chills, cough, falls, LOC, chest pain, SOB, nausea, vomiting, LE edema, diarrhea, constipation or calf tenderness. He also states he lost about 18 lbs when he was seen at his PMD office recently. He normally weighs around 210, but was 185 lbs when he was PCP office. He states he also takes meloxicam PRN, but was not taking many doses recently.  Of note, patient started insulin recently, says that was instructed to take Basaglar 16 units daily. He states that takes it once or twice a week up to 20 units if "sugars are elevated". Checks blood glucose once a week and says that is usually in 400-500s range.    Endocrine history:  DM2 x 10-15 years. Used to take metformin but this was stopped by his PCP, he is not sure why, denies side effects on it.  Recently he has been taking Glipizide xL unknown dose but ran out 1 week ago.  He is also prescribed Basaglar pen 16 units qhs, but reports self increasing dose to 20 units and sometimes taking it daily and sometimes taking it once per week when he doesn't feel like taking it. He states there is no exact reason for his noncompliance other than that he doesn't have a wife or girlfriend to tell him to take it. He reports he is bad with compliance when it comes to his medications but was very compliant with illicit drugs when he was addicted in the past (but is no longer using these).  Glucose range at home is 400-500, lowest is 370.  He states "the insulin doesn't work."  His diet is poor and very high is carbohydrates.  Breakfast - pop tarts (he gets the unfrosted ones) or sugary cereal because unsweetened "tastes gross."   Lunch - fast food  Dinner - pasta and veggies. Will only eat white past, will not eat whole wheat pasta.  Drinks mostly water and milk. Denies juice intake.  + eye problem, blurry vision, unsure if retinopathy.  + lower extremity neuropathy.  Has not followed with endocrinologist before.    PAST MEDICAL & SURGICAL HISTORY:  Arthritis  DM (diabetes mellitus)  HTN (hypertension)  H/O eye surgery      FAMILY HISTORY:  Family history of heart disease: Early family history of heart disease in Father, and siblings      Social History: former drug addict  + smoker    Outpatient Medications:  · 	BASAGLAR     INJ 100UNIT: Last Dose Taken:  , 16 subcutaneous once a day but non-compliant (takes 20 units, but often misses doses).  · 	ASPIRIN LOW  CHW 81MG: Last Dose Taken:  , 1  orally once a day  · 	GLIPIZIDE ER TAB 5MG: Last Dose Taken:  , 1  orally once a day  · 	LISINOP/HCTZ TAB 20-25MG: Last Dose Taken:  , 1  orally once a day  · 	MELOXICAM    TAB 7.5MG: Last Dose Taken:  , 1  orally once a day  · 	gabapentin 300 mg oral capsule: Last Dose Taken:  , 1 cap(s) orally once a day (at bedtime)    MEDICATIONS  (STANDING):  dextrose 5%. 1000 milliLiter(s) (50 mL/Hr) IV Continuous <Continuous>  dextrose 50% Injectable 12.5 Gram(s) IV Push once  dextrose 50% Injectable 25 Gram(s) IV Push once  dextrose 50% Injectable 25 Gram(s) IV Push once  gabapentin 300 milliGRAM(s) Oral at bedtime  insulin glargine Injectable (LANTUS) 15 Unit(s) SubCutaneous at bedtime  insulin lispro (HumaLOG) corrective regimen sliding scale   SubCutaneous three times a day before meals  insulin lispro (HumaLOG) corrective regimen sliding scale   SubCutaneous at bedtime  insulin lispro Injectable (HumaLOG) 5 Unit(s) SubCutaneous three times a day before meals  lisinopril 20 milliGRAM(s) Oral daily  nicotine - 21 mG/24Hr(s) Patch 1 Patch Transdermal daily  polyethylene glycol/electrolyte Solution 1000 milliLiter(s) Oral every 4 hours  sodium chloride 0.9% lock flush 3 milliLiter(s) IV Push every 8 hours    MEDICATIONS  (PRN):  dextrose 40% Gel 15 Gram(s) Oral once PRN Blood Glucose LESS THAN 70 milliGRAM(s)/deciliter  glucagon  Injectable 1 milliGRAM(s) IntraMuscular once PRN Glucose LESS THAN 70 milligrams/deciliter  melatonin 6 milliGRAM(s) Oral at bedtime PRN Sleep      Allergies    No Known Allergies    Intolerances      Review of Systems:  Constitutional: No fever  Eyes: No blurry vision  Neuro: No tremors  HEENT: No pain  Cardiovascular: No chest pain, palpitations  Respiratory: No SOB, no cough  GI: BRBPR on admission  : No dysuria  Skin: no rash  Psych: no depression  Endocrine: no polyuria, polydipsia  Hem/lymph: no swelling  Osteoporosis: no fractures    ALL OTHER SYSTEMS REVIEWED AND NEGATIVE    PHYSICAL EXAM:  VITALS: T(C): 36.8 (07-30-20 @ 11:00)  T(F): 98.2 (07-30-20 @ 11:00), Max: 98.9 (07-29-20 @ 20:27)  HR: 70 (07-30-20 @ 11:00) (70 - 97)  BP: 130/59 (07-30-20 @ 11:00) (130/59 - 157/82)  RR:  (16 - 18)  SpO2:  (95% - 99%)  Wt(kg): --  GENERAL: NAD, well-groomed, well-developed  EYES: No proptosis, no lid lag, anicteric  HEENT:  Atraumatic, Normocephalic, moist mucous membranes  THYROID: Normal size, no palpable nodules  RESPIRATORY: Clear to auscultation bilaterally; No rales, rhonchi, wheezing  CARDIOVASCULAR: Regular rate and rhythm; No murmurs; no peripheral edema  GI: Soft, nontender, non distended, normal bowel sounds  SKIN: Dry, intact, No rashes or lesions  MUSCULOSKELETAL: Full range of motion, normal strength  NEURO: sensation intact, extraocular movements intact, no tremor  PSYCH: Alert and oriented x 3, normal affect, normal mood  CUSHING'S SIGNS: no striae      CAPILLARY BLOOD GLUCOSE      POCT Blood Glucose.: 250 mg/dL (30 Jul 2020 12:18)  POCT Blood Glucose.: 291 mg/dL (30 Jul 2020 08:49)  POCT Blood Glucose.: 356 mg/dL (30 Jul 2020 02:29)  POCT Blood Glucose.: 481 mg/dL (29 Jul 2020 23:36)                            12.5   15.03 )-----------( 230      ( 30 Jul 2020 05:00 )             41.0       07-30    135  |  97<L>  |  16  ----------------------------<  286<H>  3.8   |  24  |  0.45<L>    EGFR if : 147  EGFR if non : 127    Ca    9.1      07-30  Mg     1.9     07-30  Phos  3.3     07-30    TPro  7.3  /  Alb  4.7  /  TBili  0.9  /  DBili  x   /  AST  28  /  ALT  14  /  AlkPhos  149<H>  07-29      Thyroid Function Tests:  07-30 @ 05:00 TSH 3.27 FreeT4 -- T3 -- Anti TPO -- Anti Thyroglobulin Ab -- TSI --      A1C with Estimated Average Glucose: 11.2 % (07-30-20 @ 05:00)  A1C with Estimated Average Glucose: 11.1 % (07-27-20 @ 14:06)      07-30 Chol 118<L> LDL 69 HDL 30<L> Trig 126    Radiology: HPI:  55 y/o Male smoker, MHx of DM Type 2, HTN, psoriatic arthritis presents with BRBPR since Saturday. Patient states he had three episodes of BRBPR since Saturday. He initially presented to the ED on Monday with the same complaints. He was recommended admission, but AMA'ed. He returned today because he saw blood again after BM. Reports no fever, chills, cough, falls, LOC, chest pain, SOB, nausea, vomiting, LE edema, diarrhea, constipation or calf tenderness. He also states he lost about 18 lbs when he was seen at his PMD office recently. He normally weighs around 210, but was 185 lbs when he was PCP office. He states he also takes meloxicam PRN, but was not taking many doses recently.  Of note, patient started insulin recently, says that was instructed to take Basaglar 16 units daily. He states that takes it once or twice a week up to 20 units if "sugars are elevated". Checks blood glucose once a week and says that is usually in 400-500s range.    Endocrine history:  DM2 x 10-15 years. Used to take metformin but this was stopped by his PCP, he is not sure why, denies side effects on it.  Recently he has been taking Glipizide xL unknown dose but ran out 1 week ago.  He is also prescribed Basaglar pen 16 units qhs, but reports self increasing dose to 20 units and sometimes taking it daily and sometimes taking it once per week when he doesn't feel like taking it. He states there is no exact reason for his noncompliance other than that he doesn't have a wife or girlfriend to tell him to take it. He reports he is bad with compliance when it comes to his medications but was very compliant with illicit drugs when he was addicted in the past (but is no longer using these).  Glucose range at home is 400-500, lowest is 370.  He states "the insulin doesn't work."  His diet is poor and very high is carbohydrates.  Breakfast - pop tarts (he gets the unfrosted ones) or sugary cereal because unsweetened "tastes gross."   Lunch - fast food  Dinner - pasta and veggies. Will only eat white past, will not eat whole wheat pasta.  Drinks mostly water and milk. Denies juice intake.  He reports cutting down on candy intake and used to eat one [employer] cake per week.  + eye problem, blurry vision, unsure if retinopathy.  + lower extremity neuropathy.  Has not followed with endocrinologist before.    PAST MEDICAL & SURGICAL HISTORY:  Arthritis  DM (diabetes mellitus)  HTN (hypertension)  H/O eye surgery      FAMILY HISTORY:  Family history of heart disease: Early family history of heart disease in Father, and siblings      Social History: former drug addict  + smoker    Outpatient Medications:  · 	BASAGLAR     INJ 100UNIT: Last Dose Taken:  , 16 subcutaneous once a day but non-compliant (takes 20 units, but often misses doses).  · 	ASPIRIN LOW  CHW 81MG: Last Dose Taken:  , 1  orally once a day  · 	GLIPIZIDE ER TAB 5MG: Last Dose Taken:  , 1  orally once a day  · 	LISINOP/HCTZ TAB 20-25MG: Last Dose Taken:  , 1  orally once a day  · 	MELOXICAM    TAB 7.5MG: Last Dose Taken:  , 1  orally once a day  · 	gabapentin 300 mg oral capsule: Last Dose Taken:  , 1 cap(s) orally once a day (at bedtime)    MEDICATIONS  (STANDING):  dextrose 5%. 1000 milliLiter(s) (50 mL/Hr) IV Continuous <Continuous>  dextrose 50% Injectable 12.5 Gram(s) IV Push once  dextrose 50% Injectable 25 Gram(s) IV Push once  dextrose 50% Injectable 25 Gram(s) IV Push once  gabapentin 300 milliGRAM(s) Oral at bedtime  insulin glargine Injectable (LANTUS) 15 Unit(s) SubCutaneous at bedtime  insulin lispro (HumaLOG) corrective regimen sliding scale   SubCutaneous three times a day before meals  insulin lispro (HumaLOG) corrective regimen sliding scale   SubCutaneous at bedtime  insulin lispro Injectable (HumaLOG) 5 Unit(s) SubCutaneous three times a day before meals  lisinopril 20 milliGRAM(s) Oral daily  nicotine - 21 mG/24Hr(s) Patch 1 Patch Transdermal daily  polyethylene glycol/electrolyte Solution 1000 milliLiter(s) Oral every 4 hours  sodium chloride 0.9% lock flush 3 milliLiter(s) IV Push every 8 hours    MEDICATIONS  (PRN):  dextrose 40% Gel 15 Gram(s) Oral once PRN Blood Glucose LESS THAN 70 milliGRAM(s)/deciliter  glucagon  Injectable 1 milliGRAM(s) IntraMuscular once PRN Glucose LESS THAN 70 milligrams/deciliter  melatonin 6 milliGRAM(s) Oral at bedtime PRN Sleep      Allergies    No Known Allergies    Intolerances      Review of Systems:  Constitutional: No fever  Eyes: No blurry vision  Neuro: No tremors  HEENT: No pain  Cardiovascular: No chest pain, palpitations  Respiratory: No SOB, no cough  GI: BRBPR on admission  : No dysuria  Skin: no rash  Psych: no depression  Endocrine: no polyuria, polydipsia  Hem/lymph: no swelling  Osteoporosis: no fractures    ALL OTHER SYSTEMS REVIEWED AND NEGATIVE    PHYSICAL EXAM:  VITALS: T(C): 36.8 (07-30-20 @ 11:00)  T(F): 98.2 (07-30-20 @ 11:00), Max: 98.9 (07-29-20 @ 20:27)  HR: 70 (07-30-20 @ 11:00) (70 - 97)  BP: 130/59 (07-30-20 @ 11:00) (130/59 - 157/82)  RR:  (16 - 18)  SpO2:  (95% - 99%)  Wt(kg): --  GENERAL: NAD, well-groomed, well-developed  EYES: No proptosis, no lid lag, anicteric  HEENT:  Atraumatic, Normocephalic, moist mucous membranes  THYROID: Normal size, no palpable nodules  RESPIRATORY: Clear to auscultation bilaterally; No rales, rhonchi, wheezing  CARDIOVASCULAR: Regular rate and rhythm; No murmurs; no peripheral edema  GI: Soft, nontender, non distended, normal bowel sounds  SKIN: Dry, intact, No rashes or lesions  MUSCULOSKELETAL: Full range of motion, normal strength  NEURO: sensation intact, extraocular movements intact, no tremor  PSYCH: Alert and oriented x 3, normal affect, normal mood  CUSHING'S SIGNS: no striae      CAPILLARY BLOOD GLUCOSE      POCT Blood Glucose.: 250 mg/dL (30 Jul 2020 12:18)  POCT Blood Glucose.: 291 mg/dL (30 Jul 2020 08:49)  POCT Blood Glucose.: 356 mg/dL (30 Jul 2020 02:29)  POCT Blood Glucose.: 481 mg/dL (29 Jul 2020 23:36)                            12.5   15.03 )-----------( 230      ( 30 Jul 2020 05:00 )             41.0       07-30    135  |  97<L>  |  16  ----------------------------<  286<H>  3.8   |  24  |  0.45<L>    EGFR if : 147  EGFR if non : 127    Ca    9.1      07-30  Mg     1.9     07-30  Phos  3.3     07-30    TPro  7.3  /  Alb  4.7  /  TBili  0.9  /  DBili  x   /  AST  28  /  ALT  14  /  AlkPhos  149<H>  07-29      Thyroid Function Tests:  07-30 @ 05:00 TSH 3.27 FreeT4 -- T3 -- Anti TPO -- Anti Thyroglobulin Ab -- TSI --      A1C with Estimated Average Glucose: 11.2 % (07-30-20 @ 05:00)  A1C with Estimated Average Glucose: 11.1 % (07-27-20 @ 14:06)      07-30 Chol 118<L> LDL 69 HDL 30<L> Trig 126    Radiology:

## 2020-07-30 NOTE — DISCHARGE NOTE PROVIDER - CARE PROVIDER_API CALL
Blanca Dumont  HEMATOLOGY  [address] [place] [address]  Phone: [phone]  Fax: [phone]  Follow Up Time:     Noe Montalvo  GASTROENTEROLOGY  [address] [place] [address]  Phone: [phone]  Fax: [phone]  Follow Up Time: Blanca Dumont  HEMATOLOGY  [address] [place] [address]  Phone: [phone]  Fax: [phone]  Follow Up Time:     Noe Montalvo  GASTROENTEROLOGY  [address] [place] [address]  Phone: [phone]  Fax: [phone]  Follow Up Time:     SALVATORE MCINTYRE  Geriatric Medicine-Internal Medicine  Phone: [phone]  Fax: [phone]  Follow Up Time:

## 2020-07-30 NOTE — DISCHARGE NOTE PROVIDER - NSDCCPCAREPLAN_GEN_ALL_CORE_FT
PRINCIPAL DISCHARGE DIAGNOSIS  Diagnosis: Rectal bleeding  Assessment and Plan of Treatment: You came to the hospital with bloody stools. You were seen by the gastroenterologists and had and endoscopy/colonoscopy done. Please follow up with the gastroenterologists after discharge.      SECONDARY DISCHARGE DIAGNOSES  Diagnosis: Type 2 diabetes mellitus with hyperglycemia, unspecified whether long term insulin use  Assessment and Plan of Treatment: You had elevated blood sugars. You were seen by the endocrinologists (diabetes specialits). Please continue using insulin and checking your blood sugars at home. Please follow up with endocrine.    Diagnosis: Leukocytosis  Assessment and Plan of Treatment: You had an elevated white blood cell count. You had a bone marrow biopsy done here. Please follow up with the hematologist for the results.

## 2020-07-30 NOTE — PROGRESS NOTE ADULT - SUBJECTIVE AND OBJECTIVE BOX
Chief Complaint:  Patient is a 54y old  Male who presents with a chief complaint of BRBPR (2020 21:57)    Interval Events:   No acute overnight events  No nausea, vomiting, diarrhea, melena, hematochezia, hematemesis    Allergies:  No Known Allergies    Hospital Medications:  dextrose 40% Gel 15 Gram(s) Oral once PRN  dextrose 5%. 1000 milliLiter(s) IV Continuous <Continuous>  dextrose 50% Injectable 12.5 Gram(s) IV Push once  dextrose 50% Injectable 25 Gram(s) IV Push once  dextrose 50% Injectable 25 Gram(s) IV Push once  gabapentin 300 milliGRAM(s) Oral at bedtime  glucagon  Injectable 1 milliGRAM(s) IntraMuscular once PRN  insulin glargine Injectable (LANTUS) 15 Unit(s) SubCutaneous at bedtime  insulin lispro (HumaLOG) corrective regimen sliding scale   SubCutaneous three times a day before meals  insulin lispro (HumaLOG) corrective regimen sliding scale   SubCutaneous at bedtime  insulin lispro Injectable (HumaLOG) 5 Unit(s) SubCutaneous three times a day before meals  lisinopril 20 milliGRAM(s) Oral daily  melatonin 6 milliGRAM(s) Oral at bedtime PRN  nicotine - 21 mG/24Hr(s) Patch 1 Patch Transdermal daily  sodium chloride 0.9% lock flush 3 milliLiter(s) IV Push every 8 hours    PMHX/PSHX:  Arthritis  DM (diabetes mellitus)  HTN (hypertension)  H/O eye surgery  No significant past surgical history    ROS:   General:  No fevers, chills  ENT:  No sore throat or dysphagia  CV:  No pain or palpitations  Resp:  No dyspnea, cough or  wheezing  GI:  No pain, No nausea, No vomiting, No diarrhea, No rectal bleeding, No tarry stools,  Skin:  No rash or edema    PHYSICAL EXAM:   Vital Signs:  Vital Signs Last 24 Hrs  T(C): 36.8 (2020 06:38), Max: 37.2 (2020 20:27)  T(F): 98.3 (2020 06:38), Max: 98.9 (2020 20:27)  HR: 79 (2020 06:38) (79 - 97)  BP: 133/73 (2020 06:38) (133/73 - 157/82)  BP(mean): --  RR: 17 (2020 06:38) (16 - 18)  SpO2: 97% (2020 06:38) (94% - 97%)  Daily Height in cm: 185.42 (2020 04:22)    Daily     GENERAL:  NAD, Appears stated age  HEENT:  NC/AT,  conjunctivae clear and pink, sclera -anicteric  CHEST:  Normal Effort, Breath sounds clear  HEART:  RRR, S1 + S2, no murmurs  ABDOMEN:  Soft, non-tender, non-distended, BS+  EXTEREMITIES:  no cyanosis  SKIN:  Warm & Dry.  NEURO:  Alert, oriented    LABS:                        12.5   15.03 )-----------( 230      ( 2020 05:00 )             41.0     Mean Cell Volume: 89.5 fL (- @ 05:00)        135  |  97<L>  |  16  ----------------------------<  286<H>  3.8   |  24  |  0.45<L>    Ca    9.1      2020 05:00  Phos  3.3     30  Mg     1.9         TPro  7.3  /  Alb  4.7  /  TBili  0.9  /  DBili  x   /  AST  28  /  ALT  14  /  AlkPhos  149<H>  29    LIVER FUNCTIONS - ( 2020 11:30 )  Alb: 4.7 g/dL / Pro: 7.3 g/dL / ALK PHOS: 149 u/L / ALT: 14 u/L / AST: 28 u/L / GGT: x           PT/INR - ( 2020 11:30 )   PT: 11.3 SEC;   INR: 0.99          PTT - ( 2020 11:30 )  PTT:31.1 SEC  Urinalysis Basic - ( 2020 17:23 )    Color: LIGHT YELLOW / Appearance: CLEAR / S.029 / pH: 5.5  Gluc: >1000 / Ketone: NEGATIVE  / Bili: NEGATIVE / Urobili: NORMAL   Blood: NEGATIVE / Protein: NEGATIVE / Nitrite: NEGATIVE   Leuk Esterase: NEGATIVE / RBC: x / WBC x   Sq Epi: x / Non Sq Epi: x / Bacteria: x                        12.5   15.03 )-----------( 230      ( 2020 05:00 )             41.0                         14.5   20.13 )-----------( 263      ( 2020 11:30 )             47.7                         13.8   16.38 )-----------( 238      ( 2020 14:15 )             44.9       Imaging:

## 2020-07-30 NOTE — DISCHARGE NOTE PROVIDER - PROVIDER TOKENS
PROVIDER:[TOKEN:[[id number]]],PROVIDER:[TOKEN:[[id number]]] PROVIDER:[TOKEN:[[id number]]],PROVIDER:[TOKEN:[[id number]]],PROVIDER:[TOKEN:[[id number]]]

## 2020-07-30 NOTE — PROGRESS NOTE ADULT - ASSESSMENT
Impression:  # Hematochezia: Hb stable (increased from 13 to 14.5). Suspect bleeding related to hemorrhoids. Differential diagnosis includes diverticulosis, angioectasias, colon cancer, inflammatory bowel disease. Patient also with significant weight loss concerning for potential malignancy    Recommendations:  - EGD/Colonoscopy with Dr. Bell Friday  - Moviprep ordered for 6PM  - Clear liquid diet today, NPO @ MN  - Trend CBC and transfuse for < 7.0  - Supportive care per primary team    Cate Farr MD  Gastroenterology Fellow  [phone]  Available on Microsoft Teams  Please page on call fellow on weekends and after 5pm on weekdays: [phone]

## 2020-07-30 NOTE — CONSULT NOTE ADULT - ASSESSMENT
55 y/o Male smoker, MHx of DM Type 2, HTN, psoriatic arthritis presents with BRBPR since Saturday, a/f GI eval, however found to have leukocytosis with left shift (immature granulocytes, metamyelocytes), inc eos and basophils in setting of splenomegaly, weight loss, concerning for CML. Hematology consulted for eval:     #leukocytosis   -clinical history and cbc w/ diff presentation concerning for possible CML  -will review peripheral smear    -bone marrow bx pending    -no blasts seen on diff, wbc count today 20 and will continue to monitor      #anemia  -mild normocytic anemia in setting of GIB. pending GI eval   -check iron studies     d/w primary team     Justin Parker Heme/onc PGY4   [id number] 55 y/o Male smoker, MHx of DM Type 2, HTN, psoriatic arthritis presents with BRBPR since Saturday, a/f GI eval, however found to have leukocytosis (neutrophil predominant with immature granulocytes, metamyelocytes), inc eos and basophils in setting of splenomegaly, weight loss, concerning for CML. Hematology consulted for eval:     #leukocytosis   -clinical history and cbc w/ diff presentation concerning for possible CML  -will review peripheral smear    -bone marrow bx today    #anemia  -mild normocytic anemia in setting of GIB. pending GI eval   -check iron studies     d/w primary team     Justin Parker Heme/onc PGY4   [id number]

## 2020-07-30 NOTE — PROGRESS NOTE ADULT - PROBLEM SELECTOR PLAN 2
Patient meets SIRS criteria on admission: leukocytosis, WBC 20K, and HR >90s  concern for CML, as well as CT findings of splenic infarct. No infectious symptoms. Possible leukocytosis from splenic infarct? UA with glucosuria but without bacteria or wbcs.  - Monitor off abx  - Daily cbc, trend wbc  - CXR - no evidence of PNA

## 2020-07-30 NOTE — DISCHARGE NOTE PROVIDER - NSFOLLOWUPCLINICS_GEN_ALL_ED_FT
[facility] Endocrinology  Endocrinology  [address] [place] [address]  Phone: [phone]  Fax:   Follow Up Time:

## 2020-07-30 NOTE — PROGRESS NOTE ADULT - PROBLEM SELECTOR PLAN 3
Noted to have leukocytosis with ~10% immature granulocytes. CT abd/pelvis showing Splenomegaly with a linear hyperdensity along the medial aspect. 20 cm in AP dimension.  - Hem/Onc consulted for further evaluation and potential BM Noted to have leukocytosis with ~10% immature granulocytes. CT abd/pelvis showing Splenomegaly with a linear hyperdensity along the medial aspect ?infarct vs shadow. 20 cm in AP dimension. Plan was to have outpt BM bx at [facility] in 1-2 weeks.  - Hem/Onc consulted, appreciate recs

## 2020-07-30 NOTE — DISCHARGE NOTE PROVIDER - CARE PROVIDERS DIRECT ADDRESSES
,[email or web address],DirectAddress_Unknown ,[email or web address],DirectAddress_Unknown,DirectAddress_Unknown

## 2020-07-30 NOTE — PATIENT PROFILE ADULT - NSTOBACCOWITHDRW_GEN_A_CORE_SD
Chief Complaint   Patient presents with     Ear Problem     ear irrigation     Patient identified using two patient identifiers.  Ear exam showing wax occlusion completed by RN.  Solution: warm water was placed in the right ear(s) via irrigation tool: elephant ear.    RN checked before and after and stated it was good and clear.    We received a verbal order for Dr Mae to do ear irrigation.    Marisa Berg MA 9/4/2018    
depression

## 2020-07-30 NOTE — CONSULT NOTE ADULT - SUBJECTIVE AND OBJECTIVE BOX
HPI:  55 y/o Male smoker, MHx of DM Type 2, HTN, psoriatic arthritis presents with BRBPR since Saturday. Patient states he had three episodes of BRBPR since Saturday. He initially presented to the ED on Monday with the same complaints. He was recommended admission, but AMA'ed. He returned today because he saw blood again after BM. Reports no fever, chills, cough, falls, LOC, chest pain, SOB, nausea, vomiting, LE edema, diarrhea, constipation or calf tenderness. He also states he lost about 18 lbs when he was seen at his PMD office recently. He normally weighs around 210, but was 185 lbs when he was PCP office. He states he also takes meloxicam PRN, but was not taking many doses recently.  Of note, patient started insulin recently, says that was instructed to take Basaglar 16 units daily. He states that takes it once or twice a week up to 20 units if "sugars are elevated". Checks blood glucose once a week and says that is usually in 400-500s range; (29 Jul 2020 21:57)      Allergies  No Known Allergies    Intolerances    MEDICATIONS  (STANDING):  dextrose 5%. 1000 milliLiter(s) (50 mL/Hr) IV Continuous <Continuous>  dextrose 50% Injectable 12.5 Gram(s) IV Push once  dextrose 50% Injectable 25 Gram(s) IV Push once  dextrose 50% Injectable 25 Gram(s) IV Push once  gabapentin 300 milliGRAM(s) Oral at bedtime  insulin glargine Injectable (LANTUS) 15 Unit(s) SubCutaneous at bedtime  insulin lispro (HumaLOG) corrective regimen sliding scale   SubCutaneous three times a day before meals  insulin lispro (HumaLOG) corrective regimen sliding scale   SubCutaneous at bedtime  insulin lispro Injectable (HumaLOG) 5 Unit(s) SubCutaneous three times a day before meals  lisinopril 20 milliGRAM(s) Oral daily  nicotine - 21 mG/24Hr(s) Patch 1 Patch Transdermal daily  polyethylene glycol/electrolyte Solution 1000 milliLiter(s) Oral every 4 hours  sodium chloride 0.9% lock flush 3 milliLiter(s) IV Push every 8 hours    MEDICATIONS  (PRN):  dextrose 40% Gel 15 Gram(s) Oral once PRN Blood Glucose LESS THAN 70 milliGRAM(s)/deciliter  glucagon  Injectable 1 milliGRAM(s) IntraMuscular once PRN Glucose LESS THAN 70 milligrams/deciliter  melatonin 6 milliGRAM(s) Oral at bedtime PRN Sleep      PAST MEDICAL & SURGICAL HISTORY:  Arthritis  DM (diabetes mellitus)  HTN (hypertension)  H/O eye surgery      FAMILY HISTORY:  Family history of heart disease: Early family history of heart disease in Father, and siblings      SOCIAL HISTORY: No EtOH, no tobacco    REVIEW OF SYSTEMS:    CONSTITUTIONAL: No weakness, fevers or chills. (+) weight loss  EYES/ENT: No visual changes;  No vertigo or throat pain   NECK: No pain or stiffness  RESPIRATORY: No cough, wheezing, hemoptysis; No shortness of breath  CARDIOVASCULAR: No chest pain or palpitations  GASTROINTESTINAL: (+) BRBPR   GENITOURINARY: No dysuria, frequency or hematuria  NEUROLOGICAL: No numbness or weakness  SKIN: No itching, burning, rashes, or lesions       Height (cm): 185.4 (07-30 @ 04:22)  Weight (kg): 86.3 (07-30 @ 04:22)  BMI (kg/m2): 25.1 (07-30 @ 04:22)  BSA (m2): 2.11 (07-30 @ 04:22)    T(F): 98.2 (07-30-20 @ 11:00), Max: 98.9 (07-29-20 @ 20:27)  HR: 70 (07-30-20 @ 11:00)  BP: 130/59 (07-30-20 @ 11:00)  RR: 16 (07-30-20 @ 11:00)  SpO2: 99% (07-30-20 @ 11:00)  Wt(kg): --    GENERAL: NAD, well-developed  HEAD:  Atraumatic, Normocephalic  EYES: EOMI, conjunctiva and sclera clear  NECK: Supple,   CHEST/LUNG: Clear to auscultation bilaterally; No wheeze  HEART: Regular rate and rhythm; No murmurs, rubs, or gallops  ABDOMEN: Soft, Nontender, Nondistended; Bowel sounds present. (+) splenomegaly   EXTREMITIES:   No clubbing, cyanosis, or edema  NEUROLOGY: non-focal  SKIN: No rashes or lesions                          12.5   15.03 )-----------( 230      ( 30 Jul 2020 05:00 )             41.0       07-30    135  |  97<L>  |  16  ----------------------------<  286<H>  3.8   |  24  |  0.45<L>    Ca    9.1      30 Jul 2020 05:00  Phos  3.3     07-30  Mg     1.9     07-30    TPro  7.3  /  Alb  4.7  /  TBili  0.9  /  DBili  x   /  AST  28  /  ALT  14  /  AlkPhos  149<H>  07-29      Phosphorus Level, Serum: 3.3 mg/dL (07-30 @ 05:00)  Magnesium, Serum: 1.9 mg/dL (07-30 @ 05:00)      < from: Xray Chest 2 Views PA/Lat (07.29.20 @ 19:03) >  enlarged and there is no pleural effusion or pneumothorax.  There is no acute bone pathology.        COMPARISON: No prior exams available.        IMPRESSION: Normalchest      < end of copied text >    < from: CT Abdomen and Pelvis w/wo IV Cont (07.29.20 @ 15:47) >  PROCEDURE:  CT of the Abdomen and Pelvis was performed with and without intravenous contrast.  Precontrast, Arterial and Delayed phases were performed.  Intravenous contrast: 90 ml Omnipaque 350. 10 ml discarded.  Oral contrast: None.  Sagittal and coronal reformats were performed.    FINDINGS:  LOWER CHEST: Within normal limits.    LIVER: The caudate lobe is enlarged. The hepatic contour is smooth.  BILE DUCTS: Normal caliber.  GALLBLADDER: Within normal limits.  SPLEEN: Splenomegaly with a linear hyperdensity along the medial aspect. 20 cm in AP dimension.  PANCREAS: Within normal limits.  ADRENALS: Within normal limits.  KIDNEYS/URETERS: Subcentimeter hypodense foci within the right kidney too small to characterize.    BLADDER: Within normal limits.  REPRODUCTIVE ORGANS: Prostate within normal limits.    BOWEL: No bowel obstruction. Appendix is normal. Small bowel lipoma measuring approximately 2.1 x 1.6 cm (5:68)  PERITONEUM: No ascites.  VESSELS: Atherosclerotic changes.  RETROPERITONEUM/LYMPH NODES: No lymphadenopathy.  ABDOMINAL WALL: Within normal limits.  BONES: Degenerative changes.    IMPRESSION:    No evidence for GI bleed.  Splenomegaly with a small splenic infarct.        < end of copied text >

## 2020-07-30 NOTE — CONSULT NOTE ADULT - ASSESSMENT
54M uncontrolled DM2 HbA1c 11.2% with severe hyperglycemia at home and upon admission. Patient reports poor adherence to medications including his basal insulin. DM is complicated by neuropathy and possible retinopathy. Admitted for BRBPR.    1) DM2 with hyperglycemia  Inpatient BG target 100-180 mg/dl  Increase Lantus to 17 units qhs and ok to give full dose for NPO past midnight tonight.  Increase Humalog to 6/6/6 premeal   Continue low Humalog scale premeal and low bedtime scale  His home diet is very poor - spent time counselling on this and advice given on dietary substitutions, but patient reporting he doesn't like a lot of foods and can't eat them, which means he will likely continue on a high carb diet.  RD consult  Reviewed morbidities/organ dysfunctions association with long term uncontrolled DM.  He will need basal bolus insulin plan for dc and stop glipizide.  Reinforced importance of compliance. He has Basaglar pen already and will prescribe rapid acting pen for dc, doses to be determined.  Recommend outpatient endocrine follow up [phone], discussed telehealth option as he states he misses many doctor appointments.    2) DM neuropathy  continue with gabapentin 300mg qhs    3) Tobacco cessation recommended  on nicotine patch    4) HTN  Bp goal < 130/80  continue Lisinopril

## 2020-07-30 NOTE — CONSULT NOTE ADULT - ATTENDING COMMENTS
Hematochezia and weight loss. Will perform colonoscopy and EGD
55 yo male with leukocytosis and increased Basophils.  R/o MPD.  Agree with BCR/ABL and JAK2 peripheral blood.  BMB.
Maykel Christian MD  Division of Endocrinology  Pager: [phone]    If after 6PM or before 9AM, or on weekends/holidays, please call endocrine answering service for assistance ([phone]).  For nonurgent matters email [email or web address] for assistance.

## 2020-07-30 NOTE — PROGRESS NOTE ADULT - PROBLEM SELECTOR PLAN 5
Patient is non compliant with medications and diet. XJ=510; Home -500 range;  Advised the importance of diet and medication compliance.   -cont Basaglar as lantus 15 units  -IHSS  -A1c = 11.1  Humalog 5u TID pre-meal   -Endocrinology c/s in AM Patient is non compliant with medications and diet. VZ=018; Home -500 range;  Advised the importance of diet and medication compliance.   -cont Basaglar as lantus 15 units  -IHSS  -A1c = 11.1  Humalog 5u TID pre-meal   -Endocrinology consult Patient is non compliant with medications and diet. FF=803; Home -500 range;  Advised the importance of diet and medication compliance.   -cont Basaglar as lantus 15 units  -IH  -A1c = 11.1  Humalog 5u TID pre-meal   -Endocrinology consult, appreciate recommendations

## 2020-07-30 NOTE — DISCHARGE NOTE PROVIDER - HOSPITAL COURSE
55 y/o Male smoker, MHx of DM Type 2, HTN, psoriatic arthritis presents with BRBPR since Saturday. Patient states he had three episodes of BRBPR since Saturday. He initially presented to the ED on Monday with the same complaints. He was recommended admission, but AMA'ed. He returned today because he saw blood again after BM. Reports no fever, chills, cough, falls, LOC, chest pain, SOB, nausea, vomiting, LE edema, diarrhea, constipation or calf tenderness. He also states he lost about 18 lbs when he was seen at his PMD office recently. He normally weighs around 210, but was 185 lbs when he was PCP office. He states he also takes meloxicam PRN, but was not taking many doses recently.        Of note, patient started insulin recently, says that was instructed to take Basaglar 16 units daily. He states that takes it once or twice a week up to 20 units if "sugars are elevated". Checks blood glucose once a week and says that is usually in 400-500s range;         Hospital Course: Gastroenterology, Endocrine and Hematology were consulted for assistance with BRBPR, diabetes, and possible CML respectively. Hematology performed bone marrow biopsy on hospital day one with findings of xxxx. GI performed EGD and colonscopy on hospital day 2 with findings of xxxx. Endocrine provided recommendations on inpatient and outpatient diabetes management. 53 y/o Male smoker, MHx of DM Type 2, HTN, psoriatic arthritis presents with BRBPR since Saturday. Patient states he had three episodes of BRBPR since Saturday. He initially presented to the ED on Monday with the same complaints. He was recommended admission, but AMA'ed. He returned today because he saw blood again after BM. Reports no fever, chills, cough, falls, LOC, chest pain, SOB, nausea, vomiting, LE edema, diarrhea, constipation or calf tenderness. He also states he lost about 18 lbs when he was seen at his PMD office recently. He normally weighs around 210, but was 185 lbs when he was PCP office. He states he also takes meloxicam PRN, but was not taking many doses recently.        Of note, patient started insulin recently, says that was instructed to take Basaglar 16 units daily. He states that takes it once or twice a week up to 20 units if "sugars are elevated". Checks blood glucose once a week and says that is usually in 400-500s range;         Hospital Course: Gastroenterology, Endocrine and Hematology were consulted for assistance with BRBPR, diabetes, and possible CML respectively. Hematology performed bone marrow biopsy on hospital day one with findings of xxxx. GI performed EGD and colonoscopy on hospital day 2 with findings of xxxx. Endocrine provided recommendations on inpatient and outpatient diabetes management. 53 y/o Male smoker, MHx of DM Type 2, HTN, psoriatic arthritis presents with BRBPR since Saturday. Patient states he had three episodes of BRBPR since Saturday. He initially presented to the ED on Monday with the same complaints. He was recommended admission, but AMA'ed. He returned today because he saw blood again after BM. Reports no fever, chills, cough, falls, LOC, chest pain, SOB, nausea, vomiting, LE edema, diarrhea, constipation or calf tenderness. He also states he lost about 18 lbs when he was seen at his PMD office recently. He normally weighs around 210, but was 185 lbs when he was PCP office. He states he also takes meloxicam PRN, but was not taking many doses recently.        Of note, patient started insulin recently, says that was instructed to take Basaglar 16 units daily. He states that takes it once or twice a week up to 20 units if "sugars are elevated". Checks blood glucose once a week and says that is usually in 400-500s range;         Hospital Course: Gastroenterology, Endocrine and Hematology were consulted for assistance with BRBPR, diabetes, and possible CML respectively. Hematology performed bone marrow biopsy on hospital day one with plans to follow-up with Dr. Blanca Dumont as outpatient. GI performed EGD and colonoscopy on hospital day 2, pending endoscopy report. Endocrine provided recommendations on inpatient and outpatient diabetes management. They would like patient to stop glipizide and use basal insulin and short acting insulin with meals. 55 y/o Male smoker, MHx of DM Type 2, HTN, psoriatic arthritis presents with BRBPR since Saturday. Patient states he had three episodes of BRBPR since Saturday. He initially presented to the ED on Monday with the same complaints. He was recommended admission, but AMA'ed. He returned today because he saw blood again after BM. Reports no fever, chills, cough, falls, LOC, chest pain, SOB, nausea, vomiting, LE edema, diarrhea, constipation or calf tenderness. He also states he lost about 18 lbs when he was seen at his PMD office recently. He normally weighs around 210, but was 185 lbs when he was PCP office. He states he also takes meloxicam PRN, but was not taking many doses recently.        Of note, patient started insulin recently, says that was instructed to take Basaglar 16 units daily. He states that takes it once or twice a week up to 20 units if "sugars are elevated". Checks blood glucose once a week and says that is usually in 400-500s range;         Hospital Course: Gastroenterology, Endocrine and Hematology were consulted for assistance with BRBPR, diabetes, and possible CML respectively. Hematology performed bone marrow biopsy on hospital day one with plans to follow-up with Dr. Blanca Dumont as outpatient. GI performed EGD and colonoscopy on hospital day 2, pending final report. Endocrine provided recommendations on inpatient and outpatient diabetes management. They would like patient to stop glipizide and use basal insulin and short acting insulin with meals. 55 y/o Male smoker, MHx of DM Type 2, HTN, psoriatic arthritis presents with BRBPR since Saturday. Patient states he had three episodes of BRBPR since Saturday. He initially presented to the ED on Monday with the same complaints. He was recommended admission, but left against medical advise. He returned today because he saw blood again after BM. Reports no fever, chills, cough, falls, LOC, chest pain, SOB, nausea, vomiting, LE edema, diarrhea, constipation or calf tenderness. He also states he lost about 18 lbs when he was seen at his PMD office recently. He normally weighs around 210, but was 185 lbs when he was PCP office. He states he also takes meloxicam PRN, but was not taking many doses recently.        Of note, patient started insulin recently, says that was instructed to take Basaglar 16 units daily. He states that takes it once or twice a week up to 20 units if "sugars are elevated". Checks blood glucose once a week and says that is usually in 400-500s range;         Hospital Course: Gastroenterology, Endocrine and Hematology were consulted for assistance with BRBPR, diabetes, and possible CML respectively. Hematology performed bone marrow biopsy on hospital day one with plans to follow-up with Dr. Blanca Dumont as outpatient. GI performed EGD and colonoscopy on hospital day 2. Patient was stable for discharge from GI perspective. Endocrine provided recommendations on inpatient and outpatient diabetes management. They would like patient to stop glipizide and use basal insulin and short acting insulin with meals. 55 y/o Male smoker, MHx of DM Type 2, HTN, psoriatic arthritis presents with BRBPR since Saturday. Patient states he had three episodes of BRBPR since Saturday. He initially presented to the ED on Monday with the same complaints. He was recommended admission, but left against medical advise. He returned today because he saw blood again after BM. Reports no fever, chills, cough, falls, LOC, chest pain, SOB, nausea, vomiting, LE edema, diarrhea, constipation or calf tenderness. He also states he lost about 18 lbs when he was seen at his PMD office recently. He normally weighs around 210, but was 185 lbs when he was PCP office. He states he also takes meloxicam PRN, but was not taking many doses recently.        Of note, patient started insulin recently, says that was instructed to take Basaglar 16 units daily. He states that takes it once or twice a week up to 20 units if "sugars are elevated". Checks blood glucose once a week and says that is usually in 400-500s range;         Hospital Course: Gastroenterology, Endocrine and Hematology were consulted for assistance with BRBPR, diabetes, and possible CML respectively. Hematology performed bone marrow biopsy on hospital day one with plans to follow-up with Dr. Blanca Dumont as outpatient. GI performed EGD and colonoscopy on hospital day 2. EGD showed duodenitis and colonoscopy showed polyps and internal hemorrhoids. Patient was stable for discharge from GI perspective. Endocrine provided recommendations on inpatient and outpatient diabetes management. They would like patient to stop glipizide and use basal insulin and short acting insulin with meals.

## 2020-07-30 NOTE — DISCHARGE NOTE PROVIDER - NSDCCPTREATMENT_GEN_ALL_CORE_FT
PRINCIPAL PROCEDURE  Procedure: Colonoscopy  Findings and Treatment: You underwent a colonoscopy with the stomach doctors in which they looked in your colon with a camera. They visualized xxxxx.      SECONDARY PROCEDURE  Procedure: Bone marrow biopsy  Findings and Treatment: You underwent a bone marrow biopsy with the Hematology team to further evaluate discrepencies in your lab. It showed xxxx. Please continue to follow with Hematology to guide further treatment.    Procedure: EGD  Findings and Treatment: You had an endoscopy with GI where they took a look at your esophagus and stomach with a camera. They saw xxxx. PRINCIPAL PROCEDURE  Procedure: Colonoscopy  Findings and Treatment: You underwent a colonoscopy with the stomach doctors in which they looked in your colon with a camera. They visualized xxxxx.      SECONDARY PROCEDURE  Procedure: Bone marrow biopsy  Findings and Treatment: You underwent a bone marrow biopsy with the Hematology team to further evaluate discrepencies in your lab.  Please follow up with Hematology to discuss the findings.    Procedure: EGD  Findings and Treatment: You had an endoscopy with GI where they took a look at your esophagus and stomach with a camera. They saw xxxx. PRINCIPAL PROCEDURE  Procedure: Colonoscopy  Findings and Treatment: You underwent a colonoscopy with the stomach doctors in which they looked in your colon with a camera. They visualized hemorrhoids and a polyp that was removed. Please follow-up with them to discuss findings.      SECONDARY PROCEDURE  Procedure: Bone marrow biopsy  Findings and Treatment: You underwent a bone marrow biopsy with the Hematology team to further evaluate discrepencies in your lab.  Please follow up with Hematology to discuss the findings.    Procedure: EGD  Findings and Treatment: You had an endoscopy with GI where they took a look at your esophagus and stomach with a camera. Please follow-up with them to discuss findings.

## 2020-07-31 ENCOUNTER — TRANSCRIPTION ENCOUNTER (OUTPATIENT)
Age: 55
End: 2020-07-31

## 2020-07-31 ENCOUNTER — RESULT REVIEW (OUTPATIENT)
Age: 55
End: 2020-07-31

## 2020-07-31 VITALS
HEART RATE: 96 BPM | DIASTOLIC BLOOD PRESSURE: 76 MMHG | TEMPERATURE: 98 F | SYSTOLIC BLOOD PRESSURE: 126 MMHG | OXYGEN SATURATION: 96 % | RESPIRATION RATE: 18 BRPM

## 2020-07-31 LAB
ALBUMIN SERPL ELPH-MCNC: 3.6 G/DL — SIGNIFICANT CHANGE UP (ref 3.3–5)
ALP SERPL-CCNC: 99 U/L — SIGNIFICANT CHANGE UP (ref 40–120)
ALT FLD-CCNC: 11 U/L — SIGNIFICANT CHANGE UP (ref 4–41)
ANION GAP SERPL CALC-SCNC: 11 MMO/L — SIGNIFICANT CHANGE UP (ref 7–14)
AST SERPL-CCNC: 23 U/L — SIGNIFICANT CHANGE UP (ref 4–40)
BASOPHILS # BLD AUTO: 0.57 K/UL — HIGH (ref 0–0.2)
BASOPHILS NFR BLD AUTO: 4.1 % — HIGH (ref 0–2)
BILIRUB SERPL-MCNC: 0.7 MG/DL — SIGNIFICANT CHANGE UP (ref 0.2–1.2)
BUN SERPL-MCNC: 7 MG/DL — SIGNIFICANT CHANGE UP (ref 7–23)
CALCIUM SERPL-MCNC: 8.7 MG/DL — SIGNIFICANT CHANGE UP (ref 8.4–10.5)
CHLORIDE SERPL-SCNC: 102 MMOL/L — SIGNIFICANT CHANGE UP (ref 98–107)
CO2 SERPL-SCNC: 24 MMOL/L — SIGNIFICANT CHANGE UP (ref 22–31)
CREAT SERPL-MCNC: 0.36 MG/DL — LOW (ref 0.5–1.3)
CULTURE RESULTS: NO GROWTH — SIGNIFICANT CHANGE UP
EOSINOPHIL # BLD AUTO: 0.76 K/UL — HIGH (ref 0–0.5)
EOSINOPHIL NFR BLD AUTO: 5.5 % — SIGNIFICANT CHANGE UP (ref 0–6)
FERRITIN SERPL-MCNC: 203.6 NG/ML — SIGNIFICANT CHANGE UP (ref 30–400)
GLUCOSE BLDC GLUCOMTR-MCNC: 207 MG/DL — HIGH (ref 70–99)
GLUCOSE BLDC GLUCOMTR-MCNC: 209 MG/DL — HIGH (ref 70–99)
GLUCOSE BLDC GLUCOMTR-MCNC: 211 MG/DL — HIGH (ref 70–99)
GLUCOSE BLDC GLUCOMTR-MCNC: 223 MG/DL — HIGH (ref 70–99)
GLUCOSE BLDC GLUCOMTR-MCNC: 437 MG/DL — HIGH (ref 70–99)
GLUCOSE SERPL-MCNC: 181 MG/DL — HIGH (ref 70–99)
HCT VFR BLD CALC: 41.5 % — SIGNIFICANT CHANGE UP (ref 39–50)
HGB BLD-MCNC: 12.8 G/DL — LOW (ref 13–17)
IMM GRANULOCYTES NFR BLD AUTO: 9.9 % — HIGH (ref 0–1.5)
IRON SATN MFR SERPL: 343 UG/DL — SIGNIFICANT CHANGE UP (ref 155–535)
IRON SATN MFR SERPL: 80 UG/DL — SIGNIFICANT CHANGE UP (ref 45–165)
LYMPHOCYTES # BLD AUTO: 1.73 K/UL — SIGNIFICANT CHANGE UP (ref 1–3.3)
LYMPHOCYTES # BLD AUTO: 12.5 % — LOW (ref 13–44)
MAGNESIUM SERPL-MCNC: 2 MG/DL — SIGNIFICANT CHANGE UP (ref 1.6–2.6)
MCHC RBC-ENTMCNC: 27.8 PG — SIGNIFICANT CHANGE UP (ref 27–34)
MCHC RBC-ENTMCNC: 30.8 % — LOW (ref 32–36)
MCV RBC AUTO: 90.2 FL — SIGNIFICANT CHANGE UP (ref 80–100)
MONOCYTES # BLD AUTO: 0.71 K/UL — SIGNIFICANT CHANGE UP (ref 0–0.9)
MONOCYTES NFR BLD AUTO: 5.1 % — SIGNIFICANT CHANGE UP (ref 2–14)
NEUTROPHILS # BLD AUTO: 8.72 K/UL — HIGH (ref 1.8–7.4)
NEUTROPHILS NFR BLD AUTO: 62.9 % — SIGNIFICANT CHANGE UP (ref 43–77)
NRBC # FLD: 0.52 K/UL — SIGNIFICANT CHANGE UP (ref 0–0)
NRBC FLD-RTO: 3.8 — SIGNIFICANT CHANGE UP
PHOSPHATE SERPL-MCNC: 3.4 MG/DL — SIGNIFICANT CHANGE UP (ref 2.5–4.5)
PLATELET # BLD AUTO: 243 K/UL — SIGNIFICANT CHANGE UP (ref 150–400)
PMV BLD: SIGNIFICANT CHANGE UP FL (ref 7–13)
POTASSIUM SERPL-MCNC: 3.8 MMOL/L — SIGNIFICANT CHANGE UP (ref 3.5–5.3)
POTASSIUM SERPL-SCNC: 3.8 MMOL/L — SIGNIFICANT CHANGE UP (ref 3.5–5.3)
PROT SERPL-MCNC: 5.8 G/DL — LOW (ref 6–8.3)
RBC # BLD: 4.6 M/UL — SIGNIFICANT CHANGE UP (ref 4.2–5.8)
RBC # FLD: 24.6 % — HIGH (ref 10.3–14.5)
SODIUM SERPL-SCNC: 137 MMOL/L — SIGNIFICANT CHANGE UP (ref 135–145)
SPECIMEN SOURCE: SIGNIFICANT CHANGE UP
UIBC SERPL-MCNC: 262.7 UG/DL — SIGNIFICANT CHANGE UP (ref 110–370)
WBC # BLD: 13.86 K/UL — HIGH (ref 3.8–10.5)
WBC # FLD AUTO: 13.86 K/UL — HIGH (ref 3.8–10.5)

## 2020-07-31 PROCEDURE — 88312 SPECIAL STAINS GROUP 1: CPT | Mod: 26

## 2020-07-31 PROCEDURE — 99239 HOSP IP/OBS DSCHRG MGMT >30: CPT

## 2020-07-31 PROCEDURE — 43239 EGD BIOPSY SINGLE/MULTIPLE: CPT | Mod: GC

## 2020-07-31 PROCEDURE — 45385 COLONOSCOPY W/LESION REMOVAL: CPT | Mod: GC

## 2020-07-31 PROCEDURE — 88305 TISSUE EXAM BY PATHOLOGIST: CPT | Mod: 26

## 2020-07-31 RX ORDER — INSULIN GLARGINE 100 [IU]/ML
30 INJECTION, SOLUTION SUBCUTANEOUS
Qty: 0 | Refills: 0 | DISCHARGE
Start: 2020-07-31 | End: 2020-08-29

## 2020-07-31 RX ORDER — INSULIN GLARGINE 100 [IU]/ML
20 INJECTION, SOLUTION SUBCUTANEOUS
Qty: 2 | Refills: 0
Start: 2020-07-31 | End: 2020-08-29

## 2020-07-31 RX ORDER — INSULIN GLARGINE 100 [IU]/ML
20 INJECTION, SOLUTION SUBCUTANEOUS AT BEDTIME
Refills: 0 | Status: DISCONTINUED | OUTPATIENT
Start: 2020-07-31 | End: 2020-07-31

## 2020-07-31 RX ORDER — INSULIN GLARGINE 100 [IU]/ML
20 INJECTION, SOLUTION SUBCUTANEOUS
Qty: 0 | Refills: 0 | DISCHARGE

## 2020-07-31 RX ORDER — INSULIN LISPRO 100/ML
20 VIAL (ML) SUBCUTANEOUS
Qty: 0 | Refills: 0 | DISCHARGE
Start: 2020-07-31 | End: 2020-08-29

## 2020-07-31 RX ORDER — NICOTINE POLACRILEX 2 MG
1 GUM BUCCAL
Qty: 1 | Refills: 0
Start: 2020-07-31 | End: 2020-08-29

## 2020-07-31 RX ORDER — INSULIN LISPRO 100/ML
5 VIAL (ML) SUBCUTANEOUS
Qty: 2 | Refills: 0
Start: 2020-07-31 | End: 2020-08-29

## 2020-07-31 RX ADMIN — Medication 1 PATCH: at 06:19

## 2020-07-31 RX ADMIN — Medication 4: at 21:57

## 2020-07-31 RX ADMIN — Medication 6 UNIT(S): at 18:33

## 2020-07-31 RX ADMIN — Medication 1 PATCH: at 18:37

## 2020-07-31 RX ADMIN — SODIUM CHLORIDE 3 MILLILITER(S): 9 INJECTION INTRAMUSCULAR; INTRAVENOUS; SUBCUTANEOUS at 06:17

## 2020-07-31 RX ADMIN — Medication 1 PATCH: at 17:28

## 2020-07-31 RX ADMIN — Medication 2: at 18:33

## 2020-07-31 RX ADMIN — LISINOPRIL 20 MILLIGRAM(S): 2.5 TABLET ORAL at 06:19

## 2020-07-31 RX ADMIN — GABAPENTIN 300 MILLIGRAM(S): 400 CAPSULE ORAL at 21:58

## 2020-07-31 RX ADMIN — Medication 1 PATCH: at 06:18

## 2020-07-31 RX ADMIN — Medication 2: at 06:23

## 2020-07-31 RX ADMIN — INSULIN GLARGINE 20 UNIT(S): 100 INJECTION, SOLUTION SUBCUTANEOUS at 21:58

## 2020-07-31 NOTE — DIETITIAN INITIAL EVALUATION ADULT. - PERTINENT LABORATORY DATA
07-31 @ 05:50: Na 137, BUN 7, Cr 0.36<L>, <H>, K+ 3.8, Phos 3.4, Mg 2.0, Alk Phos 99, ALT/SGPT 11, AST/SGOT 23, HbA1c --11.2

## 2020-07-31 NOTE — PROGRESS NOTE ADULT - PROBLEM SELECTOR PLAN 3
Noted to have leukocytosis with ~10% immature granulocytes. CT abd/pelvis showing Splenomegaly with a linear hyperdensity along the medial aspect ?infarct vs shadow. 20 cm in AP dimension. Plan was to have outpt BM bx at [facility] in 1-2 weeks. S/p BM biopsy with hematology 7/31  - Hem/Onc consulted, appreciate recs  - F/U BM biopsy

## 2020-07-31 NOTE — DIETITIAN INITIAL EVALUATION ADULT. - PROBLEM SELECTOR PLAN 5
Patient is non compliant with medications and diet. AB=074; Home -500 range;  Advised the importance of diet and medication compliance.   -cont Basaglar as lantus 15 units  -IHSS  -A1c = 11.1  Humalog 5u TID pre-meal   -Endocrinology c/s in AM

## 2020-07-31 NOTE — PROGRESS NOTE ADULT - ATTENDING COMMENTS
Patient seen and examined with the GI fellow. I agree with the above assessment and plan. Thank you for allowing us to care for your patient.    Pt with hematochezia. Plan for EGD/colon tomorrow.
Patient seen and examined.  Case discussed with house staff.  Agree with above as edited.   Patient is a 54yoM with insulin dependent uncontrolled DM type II, HTN a/w hematochezia.  GI bleed - appreciate GI eval. Going for EGD/Colon today. Also with documented weight loss. H/H stable  Leukocytosis - was being evaluated for CML outpatient. Now s/p BMBx yesterday. Monitor CBC and f/u BMBx  Uncontrolled DM II. - A1C >11%. Endocrine consult appreciated. On lantus and glipizide at home. Will d/c glipizide. Basal/premeal/ISS.  Diabetic PN - c/w gabapentin as ordered  Case d/w Case management, social work and nursing on IDRs  d/c planning pending endoscopic results.  D/C time: 40 minutes
Patient seen and examined.  Case discussed with house staff.  Agree with above as edited.   Patient is a 54yoM with insulin dependent uncontrolled DM type II, HTN a/w hematochezia.  GI bleed - appreciate GI eval. Also with documented weight loss. Plan for EGD/colon tomorrow.  Leukocytosis - was being evaluated for CML outpatient with plans for BMBx. Heme consult appreciated. Monitor CBC and f/u BMBx  Uncontrolled DM II. - A1C >11%. Endocrine consult appreciated. On lantus and glipizide at home. Will d/c glipizide. Basal/premeal/ISS.  Diabetic PN - c/w gabapentin  Case d/w Case management, social work and nursing on IDRs

## 2020-07-31 NOTE — DIETITIAN INITIAL EVALUATION ADULT. - PROBLEM SELECTOR PLAN 2
Patient meets SIRS criteria on admission: leukocytosis, WBC 20K, and HR >90s  concern for CML  Monitor WBC  Will hold off abx  Monitor for fever  UA not c/w UTI  CXR - no evidence of PNA

## 2020-07-31 NOTE — PROGRESS NOTE ADULT - PROBLEM SELECTOR PLAN 5
Patient is non compliant with medications and diet. IB=853; Home -500 range. Advised the importance of diet and medication compliance. Uncontrolled, A1c 11.1.  - Lantus qhs 17 units  - Humalog 6u TID pre-meal   - Endocrinology consult, appreciate recommendations  - Stop glipizide as outpt

## 2020-07-31 NOTE — DIETITIAN INITIAL EVALUATION ADULT. - PROBLEM SELECTOR PLAN 3
Noted to have leukocytosis with ~10% immature granulocytes.   CT abdomen: Splenomegaly with a linear hyperdensity along the medial aspect. 20 cm in AP dimension.  -Hem/Onc consulted for further evaluation and potential BM

## 2020-07-31 NOTE — DIETITIAN INITIAL EVALUATION ADULT. - ADD RECOMMEND
1. Suggest to advance diet to Low Na/CSTCHOSN when medically able.  2. Monitor po intakes, weight, labs. 3. Nutrition remains available.

## 2020-07-31 NOTE — DIETITIAN INITIAL EVALUATION ADULT. - OTHER INFO
55 y/o M smoker, PMHx of DM, HTN presents with BRBPR since Saturday, r/o CML. S/p BM bx with hematology on 7/30.      Pt is NPO for Endoscopy, states "I am hungry". Denies chewing, swallowing difficulties or any nausea, vomiting, diarrhea, constipation. Reports good appetite at present and with good po intakes prior to admission. Chart review notes weight change 210-185# at last Physician's appointment, however Pt feels it might not be accurate and later states weight loss was within ?? 2 weeks. Question accuracy of reported weight hx.     Pt is admitted with high HgA1C level, Pt confirms his Glucose levels are always in the 300's at home, and not taking medications as prescribed. Reviewed some components of therapeutic diet with Pt, however, Pt does not seem ready to make needed changes at present.  HIE tab reviewed, last documented weight was 91 kg in 2018. Admit weight 86 kg.

## 2020-07-31 NOTE — CHART NOTE - NSCHARTNOTEFT_GEN_A_CORE
Chart reviewed. Patient off the floor at endoscopy.  Hyperglycemia noted.  Recommend increase Lantus to 20 units qhs  Continue Humalog 6/6/6 as dose was recently increased and more data is needed.  Continue low scale premeal and low bedtime scale.  Will follow.    Maykel Christian MD  Division of Endocrinology  Pager: [phone]    If after 6PM or before 9AM, or on weekends/holidays, please call endocrine answering service for assistance ([phone]).  For nonurgent matters email [email or web address] for assistance.

## 2020-07-31 NOTE — PROGRESS NOTE ADULT - ASSESSMENT
53 y/o M smoker, PMHx of DM, HTN presents with BRBPR since Saturday, r/o CML. S/p BM bx with hematology on 7/30. Plan for EGD and colonoscopy 7/31.

## 2020-07-31 NOTE — DIETITIAN INITIAL EVALUATION ADULT. - PERTINENT MEDS FT
MEDICATIONS  (STANDING):  dextrose 5%. 1000 milliLiter(s) (50 mL/Hr) IV Continuous <Continuous>  dextrose 50% Injectable 12.5 Gram(s) IV Push once  dextrose 50% Injectable 25 Gram(s) IV Push once  dextrose 50% Injectable 25 Gram(s) IV Push once  gabapentin 300 milliGRAM(s) Oral at bedtime  insulin glargine Injectable (LANTUS) 17 Unit(s) SubCutaneous at bedtime  insulin lispro (HumaLOG) corrective regimen sliding scale   SubCutaneous three times a day before meals  insulin lispro (HumaLOG) corrective regimen sliding scale   SubCutaneous at bedtime  insulin lispro Injectable (HumaLOG) 6 Unit(s) SubCutaneous three times a day before meals  lisinopril 20 milliGRAM(s) Oral daily  nicotine - 21 mG/24Hr(s) Patch 1 Patch Transdermal daily  sodium chloride 0.9% lock flush 3 milliLiter(s) IV Push every 8 hours

## 2020-07-31 NOTE — PROGRESS NOTE ADULT - SUBJECTIVE AND OBJECTIVE BOX
~~~~~~~~~~~~~~~~~~~~~~~~~~~~~~~~~~  Ashish Shay, PGY1  Pager [phone] (NS) [phone] ([facility])  After 7: Night Float Pager  ~~~~~~~~~~~~~~~~~~~~~~~~~~~~~~~~~~    PROGRESS NOTE:     Patient is a 54y old  Male who presents with a chief complaint of BRBPR (2020 16:30)      SUBJECTIVE / OVERNIGHT EVENTS: No acute events overnight Had BM biopsy yesterday. Patient reports soft non-bloody BM. He is endorsing hunger and asking when endoscopy will happen. Denies fever, chills, chest pain, abdominal pain and hematochezia.    MEDICATIONS  (STANDING):  dextrose 5%. 1000 milliLiter(s) (50 mL/Hr) IV Continuous <Continuous>  dextrose 50% Injectable 12.5 Gram(s) IV Push once  dextrose 50% Injectable 25 Gram(s) IV Push once  dextrose 50% Injectable 25 Gram(s) IV Push once  gabapentin 300 milliGRAM(s) Oral at bedtime  insulin glargine Injectable (LANTUS) 17 Unit(s) SubCutaneous at bedtime  insulin lispro (HumaLOG) corrective regimen sliding scale   SubCutaneous three times a day before meals  insulin lispro (HumaLOG) corrective regimen sliding scale   SubCutaneous at bedtime  insulin lispro Injectable (HumaLOG) 6 Unit(s) SubCutaneous three times a day before meals  lisinopril 20 milliGRAM(s) Oral daily  nicotine - 21 mG/24Hr(s) Patch 1 Patch Transdermal daily  sodium chloride 0.9% lock flush 3 milliLiter(s) IV Push every 8 hours    MEDICATIONS  (PRN):  acetaminophen   Tablet .. 650 milliGRAM(s) Oral every 6 hours PRN Temp greater or equal to 38C (100.4F), Mild Pain (1 - 3), Moderate Pain (4 - 6), Severe Pain (7 - 10)  dextrose 40% Gel 15 Gram(s) Oral once PRN Blood Glucose LESS THAN 70 milliGRAM(s)/deciliter  glucagon  Injectable 1 milliGRAM(s) IntraMuscular once PRN Glucose LESS THAN 70 milligrams/deciliter  melatonin 6 milliGRAM(s) Oral at bedtime PRN Sleep      CAPILLARY BLOOD GLUCOSE      POCT Blood Glucose.: 207 mg/dL (2020 06:17)  POCT Blood Glucose.: 230 mg/dL (2020 22:00)  POCT Blood Glucose.: 223 mg/dL (2020 17:39)  POCT Blood Glucose.: 250 mg/dL (2020 12:18)  POCT Blood Glucose.: 291 mg/dL (2020 08:49)    I&O's Summary      PHYSICAL EXAM:  Vital Signs Last 24 Hrs  T(C): 36.3 (2020 06:15), Max: 36.8 (2020 11:00)  T(F): 97.3 (2020 06:15), Max: 98.2 (2020 11:00)  HR: 69 (2020 06:15) (69 - 75)  BP: 105/55 (2020 06:15) (105/55 - 130/59)  BP(mean): --  RR: 17 (2020 06:15) (16 - 18)  SpO2: 97% (2020 06:15) (95% - 99%)    CONSTITUTIONAL: NAD, well-developed  RESPIRATORY: Normal respiratory effort; lungs are clear to auscultation bilaterally  CARDIOVASCULAR: Regular rate and rhythm, normal S1 and S2, no murmur/rub/gallop; No lower extremity edema; Peripheral pulses are 2+ bilaterally  ABDOMEN: Nontender to palpation, normoactive bowel sounds, no rebound/guarding  MUSCLOSKELETAL: no clubbing or cyanosis of digits; no joint swelling or tenderness to palpation  PSYCH: A+O to person, place, and time; affect appropriate    LABS:                        12.8   13.86 )-----------( 243      ( 2020 05:50 )             41.5     07-31    137  |  102  |  7   ----------------------------<  181<H>  3.8   |  24  |  0.36<L>    Ca    8.7      2020 05:50  Phos  3.4     07-31  Mg     2.0     07-31    TPro  5.8<L>  /  Alb  3.6  /  TBili  0.7  /  DBili  x   /  AST  23  /  ALT  11  /  AlkPhos  99  07-31    PT/INR - ( 2020 11:30 )   PT: 11.3 SEC;   INR: 0.99          PTT - ( 2020 11:30 )  PTT:31.1 SEC      Urinalysis Basic - ( 2020 17:23 )    Color: LIGHT YELLOW / Appearance: CLEAR / S.029 / pH: 5.5  Gluc: >1000 / Ketone: NEGATIVE  / Bili: NEGATIVE / Urobili: NORMAL   Blood: NEGATIVE / Protein: NEGATIVE / Nitrite: NEGATIVE   Leuk Esterase: NEGATIVE / RBC: x / WBC x   Sq Epi: x / Non Sq Epi: x / Bacteria: x        Culture - Urine (collected 2020 07:18)  Source: .Urine  Final Report (2020 06:26):    No growth ~~~~~~~~~~~~~~~~~~~~~~~~~~~~~~~~~~  Ashish Shay, PGY1  Pager [phone] (NS) [phone] ([facility])  After 7: Night Float Pager  ~~~~~~~~~~~~~~~~~~~~~~~~~~~~~~~~~~    PROGRESS NOTE:     Patient is a 54y old  Male who presents with a chief complaint of BRBPR (2020 16:30)      SUBJECTIVE / OVERNIGHT EVENTS: No acute events overnight Had BM biopsy yesterday. Patient reports soft non-bloody BM. He is endorsing hunger and asking when endoscopy will happen. Denies fever, chills, chest pain, abdominal pain and hematochezia.    MEDICATIONS  (STANDING):  dextrose 5%. 1000 milliLiter(s) (50 mL/Hr) IV Continuous <Continuous>  dextrose 50% Injectable 12.5 Gram(s) IV Push once  dextrose 50% Injectable 25 Gram(s) IV Push once  dextrose 50% Injectable 25 Gram(s) IV Push once  gabapentin 300 milliGRAM(s) Oral at bedtime  insulin glargine Injectable (LANTUS) 17 Unit(s) SubCutaneous at bedtime  insulin lispro (HumaLOG) corrective regimen sliding scale   SubCutaneous three times a day before meals  insulin lispro (HumaLOG) corrective regimen sliding scale   SubCutaneous at bedtime  insulin lispro Injectable (HumaLOG) 6 Unit(s) SubCutaneous three times a day before meals  lisinopril 20 milliGRAM(s) Oral daily  nicotine - 21 mG/24Hr(s) Patch 1 Patch Transdermal daily  sodium chloride 0.9% lock flush 3 milliLiter(s) IV Push every 8 hours    MEDICATIONS  (PRN):  acetaminophen   Tablet .. 650 milliGRAM(s) Oral every 6 hours PRN Temp greater or equal to 38C (100.4F), Mild Pain (1 - 3), Moderate Pain (4 - 6), Severe Pain (7 - 10)  dextrose 40% Gel 15 Gram(s) Oral once PRN Blood Glucose LESS THAN 70 milliGRAM(s)/deciliter  glucagon  Injectable 1 milliGRAM(s) IntraMuscular once PRN Glucose LESS THAN 70 milligrams/deciliter  melatonin 6 milliGRAM(s) Oral at bedtime PRN Sleep      CAPILLARY BLOOD GLUCOSE    POCT Blood Glucose.: 207 mg/dL (2020 06:17)  POCT Blood Glucose.: 230 mg/dL (2020 22:00)  POCT Blood Glucose.: 223 mg/dL (2020 17:39)  POCT Blood Glucose.: 250 mg/dL (2020 12:18)  POCT Blood Glucose.: 291 mg/dL (2020 08:49)      PHYSICAL EXAM:  Vital Signs Last 24 Hrs  T(C): 36.3 (2020 06:15), Max: 36.8 (2020 11:00)  T(F): 97.3 (2020 06:15), Max: 98.2 (2020 11:00)  HR: 69 (2020 06:15) (69 - 75)  BP: 105/55 (2020 06:15) (105/55 - 130/59)  BP(mean): --  RR: 17 (2020 06:15) (16 - 18)  SpO2: 97% (2020 06:15) (95% - 99%)    CONSTITUTIONAL: NAD, well-developed  RESPIRATORY: Normal respiratory effort; lungs are clear to auscultation bilaterally  CARDIOVASCULAR: Regular rate and rhythm, normal S1 and S2, no murmur/rub/gallop; No lower extremity edema; Peripheral pulses are 2+ bilaterally  ABDOMEN: Nontender to palpation, normoactive bowel sounds, no rebound/guarding  MUSCLOSKELETAL: no clubbing or cyanosis of digits; no joint swelling or tenderness to palpation  PSYCH: A+O to person, place, and time; affect appropriate    LABS:                        12.8   13.86 )-----------( 243      ( 2020 05:50 )             41.5     07-31    137  |  102  |  7   ----------------------------<  181<H>  3.8   |  24  |  0.36<L>    Ca    8.7      2020 05:50  Phos  3.4     07-31  Mg     2.0     07-31    TPro  5.8<L>  /  Alb  3.6  /  TBili  0.7  /  DBili  x   /  AST  23  /  ALT  11  /  AlkPhos  99  07-31    PT/INR - ( 2020 11:30 )   PT: 11.3 SEC;   INR: 0.99          PTT - ( 2020 11:30 )  PTT:31.1 SEC      Urinalysis Basic - ( 2020 17:23 )    Color: LIGHT YELLOW / Appearance: CLEAR / S.029 / pH: 5.5  Gluc: >1000 / Ketone: NEGATIVE  / Bili: NEGATIVE / Urobili: NORMAL   Blood: NEGATIVE / Protein: NEGATIVE / Nitrite: NEGATIVE   Leuk Esterase: NEGATIVE / RBC: x / WBC x   Sq Epi: x / Non Sq Epi: x / Bacteria: x        Culture - Urine (collected 2020 07:18)  Source: .Urine  Final Report (2020 06:26):    No growth

## 2020-07-31 NOTE — DISCHARGE NOTE NURSING/CASE MANAGEMENT/SOCIAL WORK - PATIENT PORTAL LINK FT
You can access the FollowMyHealth Patient Portal offered by [facility] by registering at the following website: [email or web address]. By joining [facility]’s FollowMyHealth portal, you will also be able to view your health information using other applications (apps) compatible with our system.

## 2020-07-31 NOTE — PROGRESS NOTE ADULT - PROBLEM SELECTOR PLAN 1
Patient complaining of painless brbpr since Saturday, came to ED on Monday but left AMA to get nicotine patches. CBC stable.  - GI to perform colonscopy/EGD today 7/31  - NPO for scope  - Hold ASA  - Daily cbc Patient complaining of painless brbpr since Saturday, came to ED on Monday but left AMA to get nicotine patches. CBC stable.  - GI to perform colonoscopy/EGD today 7/31  - NPO for scope  - Hold ASA  - Daily cbc

## 2020-07-31 NOTE — DIETITIAN INITIAL EVALUATION ADULT. - PROBLEM SELECTOR PLAN 1
Admit to medicine  check cbc, bmp, a1c, flp, tsh  H/H stable  GI consult appreciated  Clear liquid diet  Plan for EGD/Colonoscopy on Friday  Hold asa  Discussed with Dr. Eisenberg

## 2020-08-03 DIAGNOSIS — Z71.89 OTHER SPECIFIED COUNSELING: ICD-10-CM

## 2020-08-04 LAB — TM INTERPRETATION: SIGNIFICANT CHANGE UP

## 2020-08-04 RX ORDER — INSULIN LISPRO 100 U/ML
100 INJECTION, SOLUTION SUBCUTANEOUS
Qty: 1 | Refills: 0 | Status: ACTIVE | COMMUNITY
Start: 2020-08-04

## 2020-08-06 LAB — SURGICAL PATHOLOGY STUDY: SIGNIFICANT CHANGE UP

## 2020-08-13 ENCOUNTER — OUTPATIENT (OUTPATIENT)
Dept: OUTPATIENT SERVICES | Facility: HOSPITAL | Age: 55
LOS: 1 days | Discharge: HOME | End: 2020-08-13

## 2020-08-13 DIAGNOSIS — D47.1 CHRONIC MYELOPROLIFERATIVE DISEASE: ICD-10-CM

## 2020-08-13 DIAGNOSIS — Z98.890 OTHER SPECIFIED POSTPROCEDURAL STATES: Chronic | ICD-10-CM

## 2020-08-14 ENCOUNTER — APPOINTMENT (OUTPATIENT)
Dept: HEMATOLOGY ONCOLOGY | Facility: CLINIC | Age: 55
End: 2020-08-14

## 2020-08-21 ENCOUNTER — APPOINTMENT (OUTPATIENT)
Dept: ENDOCRINOLOGY | Facility: CLINIC | Age: 55
End: 2020-08-21

## 2020-09-16 ENCOUNTER — APPOINTMENT (OUTPATIENT)
Dept: GASTROENTEROLOGY | Facility: CLINIC | Age: 55
End: 2020-09-16

## 2020-11-02 ENCOUNTER — APPOINTMENT (OUTPATIENT)
Dept: ENDOCRINOLOGY | Facility: CLINIC | Age: 55
End: 2020-11-02

## 2021-01-01 PROCEDURE — G9005: CPT

## 2021-08-17 ENCOUNTER — APPOINTMENT (OUTPATIENT)
Dept: ENDOCRINOLOGY | Facility: CLINIC | Age: 56
End: 2021-08-17

## 2022-01-01 ENCOUNTER — APPOINTMENT (OUTPATIENT)
Dept: HEMATOLOGY ONCOLOGY | Facility: CLINIC | Age: 57
End: 2022-01-01
Payer: MEDICAID

## 2022-01-01 ENCOUNTER — APPOINTMENT (OUTPATIENT)
Dept: CARDIOLOGY | Facility: CLINIC | Age: 57
End: 2022-01-01

## 2022-01-01 ENCOUNTER — RESULT REVIEW (OUTPATIENT)
Age: 57
End: 2022-01-01

## 2022-01-01 ENCOUNTER — OUTPATIENT (OUTPATIENT)
Dept: OUTPATIENT SERVICES | Facility: HOSPITAL | Age: 57
LOS: 1 days | Discharge: HOME | End: 2022-01-01

## 2022-01-01 ENCOUNTER — LABORATORY RESULT (OUTPATIENT)
Age: 57
End: 2022-01-01

## 2022-01-01 ENCOUNTER — APPOINTMENT (OUTPATIENT)
Dept: NUCLEAR MEDICINE | Facility: HOSPITAL | Age: 57
End: 2022-01-01

## 2022-01-01 ENCOUNTER — APPOINTMENT (OUTPATIENT)
Dept: ULTRASOUND IMAGING | Facility: IMAGING CENTER | Age: 57
End: 2022-01-01

## 2022-01-01 ENCOUNTER — TRANSCRIPTION ENCOUNTER (OUTPATIENT)
Age: 57
End: 2022-01-01

## 2022-01-01 ENCOUNTER — NON-APPOINTMENT (OUTPATIENT)
Age: 57
End: 2022-01-01

## 2022-01-01 ENCOUNTER — EMERGENCY (EMERGENCY)
Facility: HOSPITAL | Age: 57
LOS: 1 days | Discharge: HOME | End: 2022-01-01
Attending: EMERGENCY MEDICINE | Admitting: EMERGENCY MEDICINE
Payer: MEDICAID

## 2022-01-01 ENCOUNTER — APPOINTMENT (OUTPATIENT)
Dept: INFUSION THERAPY | Facility: HOSPITAL | Age: 57
End: 2022-01-01

## 2022-01-01 ENCOUNTER — APPOINTMENT (OUTPATIENT)
Dept: HEMATOLOGY ONCOLOGY | Facility: CLINIC | Age: 57
End: 2022-01-01

## 2022-01-01 ENCOUNTER — INPATIENT (INPATIENT)
Facility: HOSPITAL | Age: 57
LOS: 1 days | Discharge: HOME | DRG: 842 | End: 2022-10-27
Attending: INTERNAL MEDICINE | Admitting: INTERNAL MEDICINE
Payer: MEDICAID

## 2022-01-01 ENCOUNTER — INPATIENT (INPATIENT)
Facility: HOSPITAL | Age: 57
LOS: 0 days | End: 2022-12-31
Attending: HOSPITALIST | Admitting: HOSPITALIST
Payer: MEDICAID

## 2022-01-01 ENCOUNTER — EMERGENCY (EMERGENCY)
Facility: HOSPITAL | Age: 57
LOS: 1 days | Discharge: HOME | End: 2022-01-01
Attending: STUDENT IN AN ORGANIZED HEALTH CARE EDUCATION/TRAINING PROGRAM | Admitting: STUDENT IN AN ORGANIZED HEALTH CARE EDUCATION/TRAINING PROGRAM

## 2022-01-01 ENCOUNTER — EMERGENCY (EMERGENCY)
Facility: HOSPITAL | Age: 57
LOS: 0 days | Discharge: ANOTHER HOSPITAL | End: 2022-12-30
Attending: STUDENT IN AN ORGANIZED HEALTH CARE EDUCATION/TRAINING PROGRAM
Payer: MEDICAID

## 2022-01-01 ENCOUNTER — OUTPATIENT (OUTPATIENT)
Dept: OUTPATIENT SERVICES | Facility: HOSPITAL | Age: 57
LOS: 1 days | End: 2022-01-01
Payer: MEDICAID

## 2022-01-01 ENCOUNTER — APPOINTMENT (OUTPATIENT)
Dept: VASCULAR SURGERY | Facility: CLINIC | Age: 57
End: 2022-01-01

## 2022-01-01 ENCOUNTER — APPOINTMENT (OUTPATIENT)
Dept: VASCULAR SURGERY | Facility: CLINIC | Age: 57
End: 2022-01-01
Payer: MEDICAID

## 2022-01-01 ENCOUNTER — APPOINTMENT (OUTPATIENT)
Dept: PULMONOLOGY | Facility: CLINIC | Age: 57
End: 2022-01-01

## 2022-01-01 ENCOUNTER — INPATIENT (INPATIENT)
Facility: HOSPITAL | Age: 57
LOS: 0 days | Discharge: LEFT BEFORE DISCHARGE | End: 2022-12-25
Attending: STUDENT IN AN ORGANIZED HEALTH CARE EDUCATION/TRAINING PROGRAM | Admitting: STUDENT IN AN ORGANIZED HEALTH CARE EDUCATION/TRAINING PROGRAM
Payer: MEDICAID

## 2022-01-01 ENCOUNTER — APPOINTMENT (OUTPATIENT)
Dept: INTERNAL MEDICINE | Facility: CLINIC | Age: 57
End: 2022-01-01

## 2022-01-01 ENCOUNTER — APPOINTMENT (OUTPATIENT)
Dept: CV DIAGNOSITCS | Facility: HOSPITAL | Age: 57
End: 2022-01-01

## 2022-01-01 ENCOUNTER — INPATIENT (INPATIENT)
Facility: HOSPITAL | Age: 57
LOS: 3 days | Discharge: HOME HEALTH | End: 2022-02-22
Attending: INTERNAL MEDICINE | Admitting: INTERNAL MEDICINE
Payer: MEDICAID

## 2022-01-01 ENCOUNTER — APPOINTMENT (OUTPATIENT)
Dept: NEUROLOGY | Facility: CLINIC | Age: 57
End: 2022-01-01

## 2022-01-01 ENCOUNTER — INPATIENT (INPATIENT)
Facility: HOSPITAL | Age: 57
LOS: 0 days | Discharge: HOME | End: 2022-10-25
Attending: INTERNAL MEDICINE | Admitting: INTERNAL MEDICINE

## 2022-01-01 ENCOUNTER — APPOINTMENT (OUTPATIENT)
Dept: CT IMAGING | Facility: IMAGING CENTER | Age: 57
End: 2022-01-01

## 2022-01-01 VITALS
WEIGHT: 187.61 LBS | OXYGEN SATURATION: 98 % | RESPIRATION RATE: 16 BRPM | DIASTOLIC BLOOD PRESSURE: 73 MMHG | BODY MASS INDEX: 25.44 KG/M2 | TEMPERATURE: 97.2 F | HEART RATE: 80 BPM | SYSTOLIC BLOOD PRESSURE: 126 MMHG

## 2022-01-01 VITALS
WEIGHT: 192 LBS | DIASTOLIC BLOOD PRESSURE: 73 MMHG | HEART RATE: 100 BPM | OXYGEN SATURATION: 94 % | SYSTOLIC BLOOD PRESSURE: 128 MMHG | RESPIRATION RATE: 16 BRPM | TEMPERATURE: 96.8 F | BODY MASS INDEX: 26.06 KG/M2

## 2022-01-01 VITALS
DIASTOLIC BLOOD PRESSURE: 88 MMHG | RESPIRATION RATE: 18 BRPM | HEART RATE: 82 BPM | OXYGEN SATURATION: 100 % | SYSTOLIC BLOOD PRESSURE: 123 MMHG | TEMPERATURE: 98 F

## 2022-01-01 VITALS
SYSTOLIC BLOOD PRESSURE: 123 MMHG | WEIGHT: 190.04 LBS | HEART RATE: 103 BPM | OXYGEN SATURATION: 95 % | DIASTOLIC BLOOD PRESSURE: 65 MMHG | HEIGHT: 72 IN | RESPIRATION RATE: 17 BRPM

## 2022-01-01 VITALS
TEMPERATURE: 97 F | SYSTOLIC BLOOD PRESSURE: 155 MMHG | HEART RATE: 98 BPM | HEIGHT: 73 IN | DIASTOLIC BLOOD PRESSURE: 80 MMHG | RESPIRATION RATE: 18 BRPM | OXYGEN SATURATION: 100 %

## 2022-01-01 VITALS
RESPIRATION RATE: 18 BRPM | HEART RATE: 100 BPM | DIASTOLIC BLOOD PRESSURE: 83 MMHG | OXYGEN SATURATION: 92 % | TEMPERATURE: 100 F | SYSTOLIC BLOOD PRESSURE: 165 MMHG

## 2022-01-01 VITALS
HEART RATE: 88 BPM | DIASTOLIC BLOOD PRESSURE: 68 MMHG | TEMPERATURE: 98 F | RESPIRATION RATE: 17 BRPM | SYSTOLIC BLOOD PRESSURE: 127 MMHG | OXYGEN SATURATION: 97 %

## 2022-01-01 VITALS
TEMPERATURE: 98 F | RESPIRATION RATE: 16 BRPM | OXYGEN SATURATION: 100 % | HEART RATE: 77 BPM | SYSTOLIC BLOOD PRESSURE: 134 MMHG | DIASTOLIC BLOOD PRESSURE: 61 MMHG

## 2022-01-01 VITALS
RESPIRATION RATE: 22 BRPM | SYSTOLIC BLOOD PRESSURE: 108 MMHG | TEMPERATURE: 99 F | HEART RATE: 106 BPM | OXYGEN SATURATION: 95 % | HEIGHT: 72 IN | DIASTOLIC BLOOD PRESSURE: 64 MMHG

## 2022-01-01 VITALS
TEMPERATURE: 96.7 F | DIASTOLIC BLOOD PRESSURE: 78 MMHG | WEIGHT: 193.98 LBS | OXYGEN SATURATION: 95 % | HEART RATE: 94 BPM | RESPIRATION RATE: 16 BRPM | BODY MASS INDEX: 26.31 KG/M2 | SYSTOLIC BLOOD PRESSURE: 133 MMHG

## 2022-01-01 VITALS
OXYGEN SATURATION: 95 % | TEMPERATURE: 98 F | DIASTOLIC BLOOD PRESSURE: 51 MMHG | HEART RATE: 72 BPM | SYSTOLIC BLOOD PRESSURE: 92 MMHG | RESPIRATION RATE: 17 BRPM

## 2022-01-01 VITALS
HEART RATE: 101 BPM | SYSTOLIC BLOOD PRESSURE: 161 MMHG | HEIGHT: 73 IN | TEMPERATURE: 98 F | DIASTOLIC BLOOD PRESSURE: 92 MMHG | OXYGEN SATURATION: 97 % | RESPIRATION RATE: 18 BRPM

## 2022-01-01 VITALS
SYSTOLIC BLOOD PRESSURE: 123 MMHG | HEART RATE: 81 BPM | RESPIRATION RATE: 20 BRPM | DIASTOLIC BLOOD PRESSURE: 54 MMHG | OXYGEN SATURATION: 100 % | WEIGHT: 190.04 LBS | SYSTOLIC BLOOD PRESSURE: 106 MMHG | TEMPERATURE: 97 F | HEART RATE: 103 BPM | HEIGHT: 72 IN | OXYGEN SATURATION: 95 % | DIASTOLIC BLOOD PRESSURE: 65 MMHG | HEIGHT: 72 IN | RESPIRATION RATE: 17 BRPM

## 2022-01-01 VITALS
HEART RATE: 81 BPM | OXYGEN SATURATION: 100 % | TEMPERATURE: 98 F | RESPIRATION RATE: 16 BRPM | DIASTOLIC BLOOD PRESSURE: 87 MMHG | HEIGHT: 73 IN | SYSTOLIC BLOOD PRESSURE: 145 MMHG

## 2022-01-01 VITALS
HEART RATE: 102 BPM | TEMPERATURE: 98 F | SYSTOLIC BLOOD PRESSURE: 138 MMHG | RESPIRATION RATE: 18 BRPM | OXYGEN SATURATION: 94 % | DIASTOLIC BLOOD PRESSURE: 84 MMHG

## 2022-01-01 VITALS
BODY MASS INDEX: 24.31 KG/M2 | OXYGEN SATURATION: 97 % | WEIGHT: 181.44 LBS | TEMPERATURE: 97.7 F | HEART RATE: 84 BPM | HEIGHT: 72.48 IN | DIASTOLIC BLOOD PRESSURE: 68 MMHG | SYSTOLIC BLOOD PRESSURE: 110 MMHG | RESPIRATION RATE: 16 BRPM

## 2022-01-01 VITALS
DIASTOLIC BLOOD PRESSURE: 80 MMHG | WEIGHT: 185.19 LBS | HEART RATE: 99 BPM | SYSTOLIC BLOOD PRESSURE: 126 MMHG | OXYGEN SATURATION: 94 % | TEMPERATURE: 97.5 F | RESPIRATION RATE: 17 BRPM | BODY MASS INDEX: 25.12 KG/M2

## 2022-01-01 VITALS
HEART RATE: 94 BPM | OXYGEN SATURATION: 97 % | WEIGHT: 193.76 LBS | SYSTOLIC BLOOD PRESSURE: 143 MMHG | TEMPERATURE: 97.5 F | BODY MASS INDEX: 26.28 KG/M2 | RESPIRATION RATE: 16 BRPM | DIASTOLIC BLOOD PRESSURE: 83 MMHG

## 2022-01-01 VITALS
TEMPERATURE: 98 F | DIASTOLIC BLOOD PRESSURE: 68 MMHG | RESPIRATION RATE: 18 BRPM | OXYGEN SATURATION: 96 % | SYSTOLIC BLOOD PRESSURE: 134 MMHG | HEART RATE: 102 BPM

## 2022-01-01 VITALS
HEIGHT: 71 IN | OXYGEN SATURATION: 92 % | SYSTOLIC BLOOD PRESSURE: 115 MMHG | DIASTOLIC BLOOD PRESSURE: 70 MMHG | TEMPERATURE: 98 F | HEART RATE: 101 BPM | RESPIRATION RATE: 16 BRPM

## 2022-01-01 VITALS
DIASTOLIC BLOOD PRESSURE: 75 MMHG | TEMPERATURE: 97.2 F | SYSTOLIC BLOOD PRESSURE: 124 MMHG | HEART RATE: 98 BPM | WEIGHT: 190 LBS | HEIGHT: 72 IN | BODY MASS INDEX: 25.73 KG/M2

## 2022-01-01 VITALS
DIASTOLIC BLOOD PRESSURE: 81 MMHG | RESPIRATION RATE: 18 BRPM | OXYGEN SATURATION: 94 % | WEIGHT: 182.98 LBS | SYSTOLIC BLOOD PRESSURE: 125 MMHG | HEART RATE: 96 BPM | HEIGHT: 72 IN | TEMPERATURE: 97.2 F | BODY MASS INDEX: 24.78 KG/M2

## 2022-01-01 VITALS
HEIGHT: 71.97 IN | HEART RATE: 85 BPM | WEIGHT: 192.02 LBS | TEMPERATURE: 97.3 F | OXYGEN SATURATION: 93 % | SYSTOLIC BLOOD PRESSURE: 106 MMHG | DIASTOLIC BLOOD PRESSURE: 69 MMHG | BODY MASS INDEX: 26.01 KG/M2 | RESPIRATION RATE: 16 BRPM

## 2022-01-01 VITALS
WEIGHT: 196.21 LBS | OXYGEN SATURATION: 97 % | HEART RATE: 97 BPM | DIASTOLIC BLOOD PRESSURE: 76 MMHG | RESPIRATION RATE: 17 BRPM | SYSTOLIC BLOOD PRESSURE: 150 MMHG | TEMPERATURE: 97.3 F | BODY MASS INDEX: 26.61 KG/M2

## 2022-01-01 VITALS
HEART RATE: 94 BPM | OXYGEN SATURATION: 98 % | WEIGHT: 192.88 LBS | TEMPERATURE: 97.1 F | BODY MASS INDEX: 25.82 KG/M2 | DIASTOLIC BLOOD PRESSURE: 73 MMHG | RESPIRATION RATE: 16 BRPM | SYSTOLIC BLOOD PRESSURE: 117 MMHG

## 2022-01-01 VITALS
SYSTOLIC BLOOD PRESSURE: 104 MMHG | TEMPERATURE: 99 F | HEART RATE: 105 BPM | RESPIRATION RATE: 20 BRPM | OXYGEN SATURATION: 92 % | DIASTOLIC BLOOD PRESSURE: 53 MMHG

## 2022-01-01 VITALS
DIASTOLIC BLOOD PRESSURE: 69 MMHG | HEIGHT: 72 IN | SYSTOLIC BLOOD PRESSURE: 124 MMHG | OXYGEN SATURATION: 100 % | RESPIRATION RATE: 16 BRPM | HEART RATE: 96 BPM | TEMPERATURE: 98 F

## 2022-01-01 VITALS
WEIGHT: 100.31 LBS | DIASTOLIC BLOOD PRESSURE: 67 MMHG | OXYGEN SATURATION: 98 % | HEIGHT: 72 IN | RESPIRATION RATE: 20 BRPM | HEART RATE: 104 BPM | SYSTOLIC BLOOD PRESSURE: 118 MMHG

## 2022-01-01 VITALS
DIASTOLIC BLOOD PRESSURE: 70 MMHG | HEIGHT: 73 IN | OXYGEN SATURATION: 97 % | HEART RATE: 96 BPM | RESPIRATION RATE: 17 BRPM | TEMPERATURE: 98 F | SYSTOLIC BLOOD PRESSURE: 139 MMHG

## 2022-01-01 VITALS
DIASTOLIC BLOOD PRESSURE: 78 MMHG | RESPIRATION RATE: 21 BRPM | HEART RATE: 96 BPM | SYSTOLIC BLOOD PRESSURE: 135 MMHG | TEMPERATURE: 98 F | OXYGEN SATURATION: 98 %

## 2022-01-01 VITALS
DIASTOLIC BLOOD PRESSURE: 66 MMHG | SYSTOLIC BLOOD PRESSURE: 112 MMHG | OXYGEN SATURATION: 96 % | HEART RATE: 93 BPM | TEMPERATURE: 98 F | RESPIRATION RATE: 18 BRPM

## 2022-01-01 VITALS — OXYGEN SATURATION: 95 %

## 2022-01-01 DIAGNOSIS — R42 DIZZINESS AND GIDDINESS: ICD-10-CM

## 2022-01-01 DIAGNOSIS — E78.5 HYPERLIPIDEMIA, UNSPECIFIED: ICD-10-CM

## 2022-01-01 DIAGNOSIS — E11.9 TYPE 2 DIABETES MELLITUS WITHOUT COMPLICATIONS: ICD-10-CM

## 2022-01-01 DIAGNOSIS — Z01.818 ENCOUNTER FOR OTHER PREPROCEDURAL EXAMINATION: ICD-10-CM

## 2022-01-01 DIAGNOSIS — I50.22 CHRONIC SYSTOLIC (CONGESTIVE) HEART FAILURE: ICD-10-CM

## 2022-01-01 DIAGNOSIS — Z87.39 PERSONAL HISTORY OF OTHER DISEASES OF THE MUSCULOSKELETAL SYSTEM AND CONNECTIVE TISSUE: ICD-10-CM

## 2022-01-01 DIAGNOSIS — D47.1 CHRONIC MYELOPROLIFERATIVE DISEASE: ICD-10-CM

## 2022-01-01 DIAGNOSIS — Z98.890 OTHER SPECIFIED POSTPROCEDURAL STATES: Chronic | ICD-10-CM

## 2022-01-01 DIAGNOSIS — Z29.9 ENCOUNTER FOR PROPHYLACTIC MEASURES, UNSPECIFIED: ICD-10-CM

## 2022-01-01 DIAGNOSIS — K81.0 ACUTE CHOLECYSTITIS: ICD-10-CM

## 2022-01-01 DIAGNOSIS — C94.6 MYELODYSPLASTIC DISEASE, NOT ELSEWHERE CLASSIFIED: ICD-10-CM

## 2022-01-01 DIAGNOSIS — I74.09 OTHER ARTERIAL EMBOLISM AND THROMBOSIS OF ABDOMINAL AORTA: ICD-10-CM

## 2022-01-01 DIAGNOSIS — I10 ESSENTIAL (PRIMARY) HYPERTENSION: ICD-10-CM

## 2022-01-01 DIAGNOSIS — Z83.3 FAMILY HISTORY OF DIABETES MELLITUS: ICD-10-CM

## 2022-01-01 DIAGNOSIS — F17.200 NICOTINE DEPENDENCE, UNSPECIFIED, UNCOMPLICATED: ICD-10-CM

## 2022-01-01 DIAGNOSIS — I73.9 PERIPHERAL VASCULAR DISEASE, UNSPECIFIED: ICD-10-CM

## 2022-01-01 DIAGNOSIS — R16.1 SPLENOMEGALY, NOT ELSEWHERE CLASSIFIED: ICD-10-CM

## 2022-01-01 DIAGNOSIS — Z00.8 ENCOUNTER FOR OTHER GENERAL EXAMINATION: ICD-10-CM

## 2022-01-01 DIAGNOSIS — D46.9 MYELODYSPLASTIC SYNDROME, UNSPECIFIED: ICD-10-CM

## 2022-01-01 DIAGNOSIS — C95.90 LEUKEMIA, UNSPECIFIED NOT HAVING ACHIEVED REMISSION: ICD-10-CM

## 2022-01-01 DIAGNOSIS — R06.02 SHORTNESS OF BREATH: ICD-10-CM

## 2022-01-01 DIAGNOSIS — Z51.11 ENCOUNTER FOR ANTINEOPLASTIC CHEMOTHERAPY: ICD-10-CM

## 2022-01-01 DIAGNOSIS — E11.649 TYPE 2 DIABETES MELLITUS WITH HYPOGLYCEMIA WITHOUT COMA: ICD-10-CM

## 2022-01-01 DIAGNOSIS — D64.3 OTHER SIDEROBLASTIC ANEMIAS: ICD-10-CM

## 2022-01-01 DIAGNOSIS — B99.9 UNSPECIFIED INFECTIOUS DISEASE: ICD-10-CM

## 2022-01-01 DIAGNOSIS — C95.00 ACUTE LEUKEMIA OF UNSPECIFIED CELL TYPE NOT HAVING ACHIEVED REMISSION: ICD-10-CM

## 2022-01-01 DIAGNOSIS — I50.9 HEART FAILURE, UNSPECIFIED: ICD-10-CM

## 2022-01-01 DIAGNOSIS — D72.829 ELEVATED WHITE BLOOD CELL COUNT, UNSPECIFIED: ICD-10-CM

## 2022-01-01 DIAGNOSIS — Z87.891 PERSONAL HISTORY OF NICOTINE DEPENDENCE: ICD-10-CM

## 2022-01-01 DIAGNOSIS — M19.90 UNSPECIFIED OSTEOARTHRITIS, UNSPECIFIED SITE: ICD-10-CM

## 2022-01-01 DIAGNOSIS — R00.0 TACHYCARDIA, UNSPECIFIED: ICD-10-CM

## 2022-01-01 DIAGNOSIS — Z86.39 PERSONAL HISTORY OF OTHER ENDOCRINE, NUTRITIONAL AND METABOLIC DISEASE: ICD-10-CM

## 2022-01-01 DIAGNOSIS — I44.7 LEFT BUNDLE-BRANCH BLOCK, UNSPECIFIED: ICD-10-CM

## 2022-01-01 DIAGNOSIS — Z11.52 ENCOUNTER FOR SCREENING FOR COVID-19: ICD-10-CM

## 2022-01-01 DIAGNOSIS — R60.0 LOCALIZED EDEMA: ICD-10-CM

## 2022-01-01 DIAGNOSIS — R10.11 RIGHT UPPER QUADRANT PAIN: ICD-10-CM

## 2022-01-01 DIAGNOSIS — E11.9 TYPE 2 DIABETES MELLITUS W/OUT COMPLICATIONS: ICD-10-CM

## 2022-01-01 DIAGNOSIS — R11.2 NAUSEA WITH VOMITING, UNSPECIFIED: ICD-10-CM

## 2022-01-01 DIAGNOSIS — Z86.79 PERSONAL HISTORY OF OTHER DISEASES OF THE CIRCULATORY SYSTEM: ICD-10-CM

## 2022-01-01 DIAGNOSIS — Z20.822 CONTACT WITH AND (SUSPECTED) EXPOSURE TO COVID-19: ICD-10-CM

## 2022-01-01 DIAGNOSIS — B35.3 TINEA PEDIS: ICD-10-CM

## 2022-01-01 DIAGNOSIS — R10.9 UNSPECIFIED ABDOMINAL PAIN: ICD-10-CM

## 2022-01-01 DIAGNOSIS — D75.839 THROMBOCYTOSIS, UNSPECIFIED: ICD-10-CM

## 2022-01-01 DIAGNOSIS — Z45.2 ENCOUNTER FOR ADJUSTMENT AND MANAGEMENT OF VASCULAR ACCESS DEVICE: ICD-10-CM

## 2022-01-01 DIAGNOSIS — K81.9 CHOLECYSTITIS, UNSPECIFIED: ICD-10-CM

## 2022-01-01 DIAGNOSIS — Z79.84 LONG TERM (CURRENT) USE OF ORAL HYPOGLYCEMIC DRUGS: ICD-10-CM

## 2022-01-01 LAB
A1C WITH ESTIMATED AVERAGE GLUCOSE RESULT: 4 % — SIGNIFICANT CHANGE UP (ref 4–5.6)
A1C WITH ESTIMATED AVERAGE GLUCOSE RESULT: 4 % — SIGNIFICANT CHANGE UP (ref 4–5.6)
A1C WITH ESTIMATED AVERAGE GLUCOSE RESULT: 4.1 % — SIGNIFICANT CHANGE UP (ref 4–5.6)
A1C WITH ESTIMATED AVERAGE GLUCOSE RESULT: 5.2 % — SIGNIFICANT CHANGE UP (ref 4–5.6)
ALBUMIN SERPL ELPH-MCNC: 2.4 G/DL — LOW (ref 3.3–5)
ALBUMIN SERPL ELPH-MCNC: 2.4 G/DL — LOW (ref 3.3–5)
ALBUMIN SERPL ELPH-MCNC: 2.5 G/DL — LOW (ref 3.3–5)
ALBUMIN SERPL ELPH-MCNC: 2.6 G/DL — LOW (ref 3.3–5)
ALBUMIN SERPL ELPH-MCNC: 3 G/DL — LOW (ref 3.3–5)
ALBUMIN SERPL ELPH-MCNC: 3.2 G/DL — LOW (ref 3.3–5)
ALBUMIN SERPL ELPH-MCNC: 3.3 G/DL — SIGNIFICANT CHANGE UP (ref 3.3–5)
ALBUMIN SERPL ELPH-MCNC: 3.4 G/DL
ALBUMIN SERPL ELPH-MCNC: 3.4 G/DL — SIGNIFICANT CHANGE UP (ref 3.3–5)
ALBUMIN SERPL ELPH-MCNC: 3.4 G/DL — SIGNIFICANT CHANGE UP (ref 3.3–5)
ALBUMIN SERPL ELPH-MCNC: 3.5 G/DL — SIGNIFICANT CHANGE UP (ref 3.3–5)
ALBUMIN SERPL ELPH-MCNC: 3.6 G/DL — SIGNIFICANT CHANGE UP (ref 3.3–5)
ALBUMIN SERPL ELPH-MCNC: 3.7 G/DL
ALBUMIN SERPL ELPH-MCNC: 3.7 G/DL — SIGNIFICANT CHANGE UP (ref 3.3–5)
ALBUMIN SERPL ELPH-MCNC: 3.7 G/DL — SIGNIFICANT CHANGE UP (ref 3.3–5)
ALBUMIN SERPL ELPH-MCNC: 3.8 G/DL
ALBUMIN SERPL ELPH-MCNC: 3.8 G/DL — SIGNIFICANT CHANGE UP (ref 3.3–5)
ALBUMIN SERPL ELPH-MCNC: 3.9 G/DL — SIGNIFICANT CHANGE UP (ref 3.3–5)
ALBUMIN SERPL ELPH-MCNC: 4 G/DL — SIGNIFICANT CHANGE UP (ref 3.3–5)
ALBUMIN SERPL ELPH-MCNC: 4.1 G/DL
ALBUMIN SERPL ELPH-MCNC: 4.1 G/DL — SIGNIFICANT CHANGE UP (ref 3.3–5)
ALBUMIN SERPL ELPH-MCNC: 4.4 G/DL
ALP BLD-CCNC: 158 U/L
ALP BLD-CCNC: 207 U/L
ALP BLD-CCNC: 286 U/L
ALP BLD-CCNC: 293 U/L
ALP BLD-CCNC: 313 U/L
ALP SERPL-CCNC: 161 U/L — HIGH (ref 40–120)
ALP SERPL-CCNC: 180 U/L — HIGH (ref 40–120)
ALP SERPL-CCNC: 184 U/L — HIGH (ref 40–120)
ALP SERPL-CCNC: 203 U/L — HIGH (ref 40–120)
ALP SERPL-CCNC: 210 U/L — HIGH (ref 40–120)
ALP SERPL-CCNC: 219 U/L — HIGH (ref 40–120)
ALP SERPL-CCNC: 224 U/L — HIGH (ref 40–120)
ALP SERPL-CCNC: 235 U/L — HIGH (ref 40–120)
ALP SERPL-CCNC: 236 U/L — HIGH (ref 40–120)
ALP SERPL-CCNC: 245 U/L — HIGH (ref 40–120)
ALP SERPL-CCNC: 250 U/L — HIGH (ref 40–120)
ALP SERPL-CCNC: 251 U/L — HIGH (ref 40–120)
ALP SERPL-CCNC: 253 U/L — HIGH (ref 40–120)
ALP SERPL-CCNC: 267 U/L — HIGH (ref 40–120)
ALP SERPL-CCNC: 270 U/L — HIGH (ref 40–120)
ALP SERPL-CCNC: 278 U/L — HIGH (ref 40–120)
ALP SERPL-CCNC: 283 U/L — HIGH (ref 40–120)
ALP SERPL-CCNC: 296 U/L — HIGH (ref 40–120)
ALP SERPL-CCNC: 317 U/L — HIGH (ref 40–120)
ALT FLD-CCNC: 12 U/L — SIGNIFICANT CHANGE UP (ref 4–41)
ALT FLD-CCNC: 13 U/L — SIGNIFICANT CHANGE UP (ref 10–45)
ALT FLD-CCNC: 13 U/L — SIGNIFICANT CHANGE UP (ref 10–45)
ALT FLD-CCNC: 13 U/L — SIGNIFICANT CHANGE UP (ref 4–41)
ALT FLD-CCNC: 14 U/L — SIGNIFICANT CHANGE UP (ref 10–45)
ALT FLD-CCNC: 14 U/L — SIGNIFICANT CHANGE UP (ref 4–41)
ALT FLD-CCNC: 15 U/L — SIGNIFICANT CHANGE UP (ref 4–41)
ALT FLD-CCNC: 16 U/L — SIGNIFICANT CHANGE UP (ref 4–41)
ALT FLD-CCNC: 18 U/L — SIGNIFICANT CHANGE UP (ref 12–78)
ALT FLD-CCNC: 19 U/L — SIGNIFICANT CHANGE UP (ref 10–45)
ALT FLD-CCNC: 19 U/L — SIGNIFICANT CHANGE UP (ref 4–41)
ALT FLD-CCNC: 21 U/L — SIGNIFICANT CHANGE UP (ref 4–41)
ALT FLD-CCNC: 5 U/L — SIGNIFICANT CHANGE UP (ref 4–41)
ALT FLD-CCNC: 7 U/L — SIGNIFICANT CHANGE UP (ref 4–41)
ALT FLD-CCNC: 8 U/L — SIGNIFICANT CHANGE UP (ref 4–41)
ALT FLD-CCNC: 8 U/L — SIGNIFICANT CHANGE UP (ref 4–41)
ALT FLD-CCNC: <5 U/L — LOW (ref 4–41)
ALT SERPL-CCNC: 12 U/L
ALT SERPL-CCNC: 12 U/L
ALT SERPL-CCNC: 13 U/L
ALT SERPL-CCNC: 13 U/L
ALT SERPL-CCNC: 19 U/L
ANION GAP SERPL CALC-SCNC: 10 MMOL/L
ANION GAP SERPL CALC-SCNC: 10 MMOL/L — SIGNIFICANT CHANGE UP (ref 7–14)
ANION GAP SERPL CALC-SCNC: 11 MMOL/L — SIGNIFICANT CHANGE UP (ref 5–17)
ANION GAP SERPL CALC-SCNC: 11 MMOL/L — SIGNIFICANT CHANGE UP (ref 7–14)
ANION GAP SERPL CALC-SCNC: 12 MMOL/L
ANION GAP SERPL CALC-SCNC: 12 MMOL/L — SIGNIFICANT CHANGE UP (ref 5–17)
ANION GAP SERPL CALC-SCNC: 12 MMOL/L — SIGNIFICANT CHANGE UP (ref 5–17)
ANION GAP SERPL CALC-SCNC: 12 MMOL/L — SIGNIFICANT CHANGE UP (ref 7–14)
ANION GAP SERPL CALC-SCNC: 13 MMOL/L — SIGNIFICANT CHANGE UP (ref 7–14)
ANION GAP SERPL CALC-SCNC: 14 MMOL/L — SIGNIFICANT CHANGE UP (ref 7–14)
ANION GAP SERPL CALC-SCNC: 14 MMOL/L — SIGNIFICANT CHANGE UP (ref 7–14)
ANION GAP SERPL CALC-SCNC: 15 MMOL/L — HIGH (ref 7–14)
ANION GAP SERPL CALC-SCNC: 15 MMOL/L — HIGH (ref 7–14)
ANION GAP SERPL CALC-SCNC: 15 MMOL/L — SIGNIFICANT CHANGE UP (ref 5–17)
ANION GAP SERPL CALC-SCNC: 16 MMOL/L — HIGH (ref 7–14)
ANION GAP SERPL CALC-SCNC: 17 MMOL/L — HIGH (ref 7–14)
ANION GAP SERPL CALC-SCNC: 17 MMOL/L — HIGH (ref 7–14)
ANION GAP SERPL CALC-SCNC: 18 MMOL/L
ANION GAP SERPL CALC-SCNC: 7 MMOL/L — SIGNIFICANT CHANGE UP (ref 7–14)
ANION GAP SERPL CALC-SCNC: 8 MMOL/L — SIGNIFICANT CHANGE UP (ref 7–14)
ANION GAP SERPL CALC-SCNC: 8 MMOL/L — SIGNIFICANT CHANGE UP (ref 7–14)
ANION GAP SERPL CALC-SCNC: 9 MMOL/L — SIGNIFICANT CHANGE UP (ref 5–17)
ANION GAP SERPL CALC-SCNC: 9 MMOL/L — SIGNIFICANT CHANGE UP (ref 7–14)
ANION GAP SERPL CALC-SCNC: 9 MMOL/L — SIGNIFICANT CHANGE UP (ref 7–14)
ANISOCYTOSIS BLD QL: SIGNIFICANT CHANGE UP
ANISOCYTOSIS BLD QL: SLIGHT — SIGNIFICANT CHANGE UP
APPEARANCE UR: CLEAR — SIGNIFICANT CHANGE UP
APTT BLD: 32.3 SEC — SIGNIFICANT CHANGE UP (ref 27–36.3)
APTT BLD: 35.6 SEC
APTT BLD: 37 SEC — HIGH (ref 27–36.3)
APTT BLD: 37.6 SEC — HIGH (ref 27–36.3)
APTT BLD: 38.5 SEC — HIGH (ref 27–36.3)
APTT BLD: 39.8 SEC — HIGH (ref 27–36.3)
APTT BLD: 39.9 SEC — HIGH (ref 27.5–35.5)
AST SERPL-CCNC: 120 U/L — HIGH (ref 15–37)
AST SERPL-CCNC: 20 U/L — SIGNIFICANT CHANGE UP (ref 4–40)
AST SERPL-CCNC: 22 U/L — SIGNIFICANT CHANGE UP (ref 4–40)
AST SERPL-CCNC: 22 U/L — SIGNIFICANT CHANGE UP (ref 4–40)
AST SERPL-CCNC: 30 U/L — SIGNIFICANT CHANGE UP (ref 4–40)
AST SERPL-CCNC: 35 U/L
AST SERPL-CCNC: 42 U/L
AST SERPL-CCNC: 44 U/L — HIGH (ref 4–40)
AST SERPL-CCNC: 45 U/L — HIGH (ref 4–40)
AST SERPL-CCNC: 46 U/L
AST SERPL-CCNC: 46 U/L — HIGH (ref 4–40)
AST SERPL-CCNC: 48 U/L — HIGH (ref 4–40)
AST SERPL-CCNC: 52 U/L — HIGH (ref 4–40)
AST SERPL-CCNC: 53 U/L — HIGH (ref 10–40)
AST SERPL-CCNC: 56 U/L — HIGH (ref 10–40)
AST SERPL-CCNC: 57 U/L
AST SERPL-CCNC: 60 U/L — HIGH (ref 10–40)
AST SERPL-CCNC: 63 U/L — HIGH (ref 10–40)
AST SERPL-CCNC: 64 U/L — HIGH (ref 4–40)
AST SERPL-CCNC: 71 U/L
AST SERPL-CCNC: 72 U/L — HIGH (ref 4–40)
AST SERPL-CCNC: 85 U/L — HIGH (ref 4–40)
AST SERPL-CCNC: 88 U/L — HIGH (ref 4–40)
AST SERPL-CCNC: 89 U/L — HIGH (ref 4–40)
B PERT DNA SPEC QL NAA+PROBE: SIGNIFICANT CHANGE UP
B PERT+PARAPERT DNA PNL SPEC NAA+PROBE: SIGNIFICANT CHANGE UP
B-OH-BUTYR SERPL-SCNC: 0.2 MMOL/L — SIGNIFICANT CHANGE UP (ref 0–0.4)
BACTERIA # UR AUTO: ABNORMAL
BACTERIA # UR AUTO: NEGATIVE — SIGNIFICANT CHANGE UP
BASE EXCESS BLDV CALC-SCNC: -5.6 MMOL/L — LOW (ref -2–3)
BASE EXCESS BLDV CALC-SCNC: -7.5 MMOL/L — LOW (ref -2–3)
BASE EXCESS BLDV CALC-SCNC: 1.9 MMOL/L — SIGNIFICANT CHANGE UP (ref -2–3)
BASE EXCESS BLDV CALC-SCNC: 3.9 MMOL/L — HIGH (ref -2–3)
BASE EXCESS BLDV CALC-SCNC: 4.6 MMOL/L — HIGH (ref -2–3)
BASO STIPL BLD QL SMEAR: PRESENT — SIGNIFICANT CHANGE UP
BASO STIPL BLD QL SMEAR: PRESENT — SIGNIFICANT CHANGE UP
BASOPHILS # BLD AUTO: 0 K/UL — SIGNIFICANT CHANGE UP (ref 0–0.2)
BASOPHILS # BLD AUTO: 0.15 K/UL — SIGNIFICANT CHANGE UP (ref 0–0.2)
BASOPHILS # BLD AUTO: 0.15 K/UL — SIGNIFICANT CHANGE UP (ref 0–0.2)
BASOPHILS # BLD AUTO: 0.31 K/UL — HIGH (ref 0–0.2)
BASOPHILS # BLD AUTO: 0.44 K/UL — HIGH (ref 0–0.2)
BASOPHILS # BLD AUTO: 1.19 K/UL — HIGH (ref 0–0.2)
BASOPHILS # BLD AUTO: 1.63 K/UL — HIGH (ref 0–0.2)
BASOPHILS # BLD AUTO: 12.1 K/UL — HIGH (ref 0–0.2)
BASOPHILS # BLD AUTO: 12.8 K/UL — HIGH (ref 0–0.2)
BASOPHILS # BLD AUTO: 13.44 K/UL — HIGH (ref 0–0.2)
BASOPHILS # BLD AUTO: 2.15 K/UL — HIGH (ref 0–0.2)
BASOPHILS # BLD AUTO: 2.2 K/UL — HIGH (ref 0–0.2)
BASOPHILS # BLD AUTO: 2.2 K/UL — HIGH (ref 0–0.2)
BASOPHILS # BLD AUTO: 2.28 K/UL — HIGH (ref 0–0.2)
BASOPHILS # BLD AUTO: 2.35 K/UL — HIGH (ref 0–0.2)
BASOPHILS # BLD AUTO: 2.64 K/UL — HIGH (ref 0–0.2)
BASOPHILS # BLD AUTO: 3.09 K/UL — HIGH (ref 0–0.2)
BASOPHILS # BLD AUTO: 3.16 K/UL — HIGH (ref 0–0.2)
BASOPHILS # BLD AUTO: 3.51 K/UL — HIGH (ref 0–0.2)
BASOPHILS # BLD AUTO: 4.02 K/UL — HIGH (ref 0–0.2)
BASOPHILS # BLD AUTO: 5.82 K/UL — HIGH (ref 0–0.2)
BASOPHILS # BLD AUTO: 7.01 K/UL — HIGH (ref 0–0.2)
BASOPHILS NFR BLD AUTO: 0 % — SIGNIFICANT CHANGE UP (ref 0–2)
BASOPHILS NFR BLD AUTO: 0.5 % — SIGNIFICANT CHANGE UP (ref 0–2)
BASOPHILS NFR BLD AUTO: 1 % — SIGNIFICANT CHANGE UP (ref 0–2)
BASOPHILS NFR BLD AUTO: 1 % — SIGNIFICANT CHANGE UP (ref 0–2)
BASOPHILS NFR BLD AUTO: 2 % — SIGNIFICANT CHANGE UP (ref 0–2)
BASOPHILS NFR BLD AUTO: 2.2 % — HIGH (ref 0–2)
BASOPHILS NFR BLD AUTO: 3.1 % — HIGH (ref 0–2)
BASOPHILS NFR BLD AUTO: 4.1 % — HIGH (ref 0–2)
BASOPHILS NFR BLD AUTO: 4.5 % — HIGH (ref 0–2)
BASOPHILS NFR BLD AUTO: 4.6 % — HIGH (ref 0–2)
BASOPHILS NFR BLD AUTO: 5.1 % — HIGH (ref 0–2)
BASOPHILS NFR BLD AUTO: 5.2 % — HIGH (ref 0–2)
BASOPHILS NFR BLD AUTO: 5.8 % — HIGH (ref 0–2)
BASOPHILS NFR BLD AUTO: 6.4 % — HIGH (ref 0–2)
BASOPHILS NFR BLD AUTO: 7 % — HIGH (ref 0–2)
BASOPHILS NFR BLD AUTO: 7.5 % — HIGH (ref 0–2)
BASOPHILS NFR BLD AUTO: 7.9 % — HIGH (ref 0–2)
BASOPHILS NFR BLD AUTO: 8.3 % — HIGH (ref 0–2)
BASOPHILS NFR BLD AUTO: 9 % — HIGH (ref 0–2)
BCR/ABL BY RT - PCR QUANTITATIVE: SIGNIFICANT CHANGE UP
BILIRUB DIRECT SERPL-MCNC: 1.3 MG/DL — HIGH (ref 0–0.3)
BILIRUB DIRECT SERPL-MCNC: 2.6 MG/DL — HIGH (ref 0–0.3)
BILIRUB INDIRECT FLD-MCNC: 0.6 MG/DL — SIGNIFICANT CHANGE UP (ref 0–1)
BILIRUB SERPL-MCNC: 0.8 MG/DL
BILIRUB SERPL-MCNC: 0.8 MG/DL — SIGNIFICANT CHANGE UP (ref 0.2–1.2)
BILIRUB SERPL-MCNC: 1 MG/DL — SIGNIFICANT CHANGE UP (ref 0.2–1.2)
BILIRUB SERPL-MCNC: 1.1 MG/DL
BILIRUB SERPL-MCNC: 1.1 MG/DL — SIGNIFICANT CHANGE UP (ref 0.2–1.2)
BILIRUB SERPL-MCNC: 1.2 MG/DL
BILIRUB SERPL-MCNC: 1.6 MG/DL
BILIRUB SERPL-MCNC: 1.7 MG/DL — HIGH (ref 0.2–1.2)
BILIRUB SERPL-MCNC: 1.8 MG/DL — HIGH (ref 0.2–1.2)
BILIRUB SERPL-MCNC: 2 MG/DL — HIGH (ref 0.2–1.2)
BILIRUB SERPL-MCNC: 2.3 MG/DL — HIGH (ref 0.2–1.2)
BILIRUB SERPL-MCNC: 2.4 MG/DL — HIGH (ref 0.2–1.2)
BILIRUB SERPL-MCNC: 2.5 MG/DL — HIGH (ref 0.2–1.2)
BILIRUB SERPL-MCNC: 3 MG/DL — HIGH (ref 0.2–1.2)
BILIRUB SERPL-MCNC: 3.2 MG/DL — HIGH (ref 0.2–1.2)
BILIRUB SERPL-MCNC: 3.3 MG/DL — HIGH (ref 0.2–1.2)
BILIRUB SERPL-MCNC: 3.4 MG/DL
BILIRUB SERPL-MCNC: 5.6 MG/DL — HIGH (ref 0.2–1.2)
BILIRUB SERPL-MCNC: 5.7 MG/DL — HIGH (ref 0.2–1.2)
BILIRUB SERPL-MCNC: 6.3 MG/DL — HIGH (ref 0.2–1.2)
BILIRUB SERPL-MCNC: 6.4 MG/DL — HIGH (ref 0.2–1.2)
BILIRUB UR-MCNC: ABNORMAL
BIZARRE PLATELETS BLD QL SMEAR: PRESENT — SIGNIFICANT CHANGE UP
BLASTS # FLD: 1 % — HIGH (ref 0–0)
BLASTS # FLD: 14 % — HIGH (ref 0–0)
BLASTS # FLD: 19 % — CRITICAL HIGH (ref 0–0)
BLASTS # FLD: 2 % — HIGH (ref 0–0)
BLASTS # FLD: 2.2 % — CRITICAL HIGH (ref 0–0)
BLASTS # FLD: 21 % — HIGH (ref 0–0)
BLASTS # FLD: 3 % — HIGH (ref 0–0)
BLASTS # FLD: 4 % — HIGH (ref 0–0)
BLASTS # FLD: 4.5 % — HIGH (ref 0–0)
BLASTS # FLD: 4.5 % — HIGH (ref 0–0)
BLASTS # FLD: 5.5 % — HIGH (ref 0–0)
BLASTS # FLD: 7 % — HIGH (ref 0–0)
BLASTS # FLD: 8 % — HIGH (ref 0–0)
BLASTS # FLD: 9 % — HIGH (ref 0–0)
BLD GP AB SCN SERPL QL: NEGATIVE — SIGNIFICANT CHANGE UP
BLD GP AB SCN SERPL QL: SIGNIFICANT CHANGE UP
BLOOD GAS VENOUS COMPREHENSIVE RESULT: SIGNIFICANT CHANGE UP
BORDETELLA PARAPERTUSSIS (RAPRVP): SIGNIFICANT CHANGE UP
BUN SERPL-MCNC: 10 MG/DL
BUN SERPL-MCNC: 10 MG/DL — SIGNIFICANT CHANGE UP (ref 7–23)
BUN SERPL-MCNC: 11 MG/DL — SIGNIFICANT CHANGE UP (ref 7–23)
BUN SERPL-MCNC: 12 MG/DL
BUN SERPL-MCNC: 13 MG/DL
BUN SERPL-MCNC: 13 MG/DL — SIGNIFICANT CHANGE UP (ref 7–23)
BUN SERPL-MCNC: 14 MG/DL
BUN SERPL-MCNC: 16 MG/DL — SIGNIFICANT CHANGE UP (ref 7–23)
BUN SERPL-MCNC: 16 MG/DL — SIGNIFICANT CHANGE UP (ref 7–23)
BUN SERPL-MCNC: 18 MG/DL — SIGNIFICANT CHANGE UP (ref 7–23)
BUN SERPL-MCNC: 21 MG/DL — SIGNIFICANT CHANGE UP (ref 7–23)
BUN SERPL-MCNC: 22 MG/DL — SIGNIFICANT CHANGE UP (ref 7–23)
BUN SERPL-MCNC: 23 MG/DL — SIGNIFICANT CHANGE UP (ref 7–23)
BUN SERPL-MCNC: 44 MG/DL — HIGH (ref 7–23)
BUN SERPL-MCNC: 45 MG/DL — HIGH (ref 7–23)
BUN SERPL-MCNC: 49 MG/DL — HIGH (ref 7–23)
BUN SERPL-MCNC: 51 MG/DL — HIGH (ref 7–23)
BUN SERPL-MCNC: 56 MG/DL — HIGH (ref 7–23)
BUN SERPL-MCNC: 8 MG/DL
C PNEUM DNA SPEC QL NAA+PROBE: SIGNIFICANT CHANGE UP
CALCIUM SERPL-MCNC: 10 MG/DL
CALCIUM SERPL-MCNC: 10.1 MG/DL — SIGNIFICANT CHANGE UP (ref 8.4–10.5)
CALCIUM SERPL-MCNC: 7.9 MG/DL — LOW (ref 8.4–10.5)
CALCIUM SERPL-MCNC: 7.9 MG/DL — LOW (ref 8.4–10.5)
CALCIUM SERPL-MCNC: 8 MG/DL — LOW (ref 8.4–10.5)
CALCIUM SERPL-MCNC: 8.2 MG/DL — LOW (ref 8.4–10.5)
CALCIUM SERPL-MCNC: 8.2 MG/DL — LOW (ref 8.4–10.5)
CALCIUM SERPL-MCNC: 8.4 MG/DL — SIGNIFICANT CHANGE UP (ref 8.4–10.5)
CALCIUM SERPL-MCNC: 8.6 MG/DL — SIGNIFICANT CHANGE UP (ref 8.5–10.1)
CALCIUM SERPL-MCNC: 8.8 MG/DL — SIGNIFICANT CHANGE UP (ref 8.4–10.5)
CALCIUM SERPL-MCNC: 8.9 MG/DL
CALCIUM SERPL-MCNC: 8.9 MG/DL — SIGNIFICANT CHANGE UP (ref 8.4–10.5)
CALCIUM SERPL-MCNC: 9 MG/DL
CALCIUM SERPL-MCNC: 9 MG/DL — SIGNIFICANT CHANGE UP (ref 8.4–10.5)
CALCIUM SERPL-MCNC: 9.1 MG/DL
CALCIUM SERPL-MCNC: 9.1 MG/DL — SIGNIFICANT CHANGE UP (ref 8.4–10.5)
CALCIUM SERPL-MCNC: 9.3 MG/DL — SIGNIFICANT CHANGE UP (ref 8.4–10.5)
CALCIUM SERPL-MCNC: 9.3 MG/DL — SIGNIFICANT CHANGE UP (ref 8.4–10.5)
CALCIUM SERPL-MCNC: 9.4 MG/DL
CALCIUM SERPL-MCNC: 9.4 MG/DL — SIGNIFICANT CHANGE UP (ref 8.4–10.5)
CALCIUM SERPL-MCNC: 9.4 MG/DL — SIGNIFICANT CHANGE UP (ref 8.4–10.5)
CALCIUM SERPL-MCNC: 9.5 MG/DL — SIGNIFICANT CHANGE UP (ref 8.4–10.5)
CALCIUM SERPL-MCNC: 9.7 MG/DL — SIGNIFICANT CHANGE UP (ref 8.4–10.5)
CALCIUM SERPL-MCNC: 9.9 MG/DL — SIGNIFICANT CHANGE UP (ref 8.4–10.5)
CHLORIDE BLDV-SCNC: 100 MMOL/L — SIGNIFICANT CHANGE UP (ref 96–108)
CHLORIDE BLDV-SCNC: 93 MMOL/L — LOW (ref 96–108)
CHLORIDE BLDV-SCNC: 94 MMOL/L — LOW (ref 96–108)
CHLORIDE BLDV-SCNC: 96 MMOL/L — SIGNIFICANT CHANGE UP (ref 96–108)
CHLORIDE BLDV-SCNC: 97 MMOL/L — SIGNIFICANT CHANGE UP (ref 96–108)
CHLORIDE SERPL-SCNC: 100 MMOL/L — SIGNIFICANT CHANGE UP (ref 96–108)
CHLORIDE SERPL-SCNC: 101 MMOL/L
CHLORIDE SERPL-SCNC: 101 MMOL/L
CHLORIDE SERPL-SCNC: 102 MMOL/L — SIGNIFICANT CHANGE UP (ref 98–107)
CHLORIDE SERPL-SCNC: 89 MMOL/L — LOW (ref 98–107)
CHLORIDE SERPL-SCNC: 92 MMOL/L — LOW (ref 96–108)
CHLORIDE SERPL-SCNC: 92 MMOL/L — LOW (ref 98–107)
CHLORIDE SERPL-SCNC: 94 MMOL/L — LOW (ref 98–107)
CHLORIDE SERPL-SCNC: 95 MMOL/L — LOW (ref 96–108)
CHLORIDE SERPL-SCNC: 95 MMOL/L — LOW (ref 98–107)
CHLORIDE SERPL-SCNC: 95 MMOL/L — LOW (ref 98–107)
CHLORIDE SERPL-SCNC: 96 MMOL/L — LOW (ref 98–107)
CHLORIDE SERPL-SCNC: 97 MMOL/L
CHLORIDE SERPL-SCNC: 97 MMOL/L — SIGNIFICANT CHANGE UP (ref 96–108)
CHLORIDE SERPL-SCNC: 98 MMOL/L
CHLORIDE SERPL-SCNC: 98 MMOL/L — SIGNIFICANT CHANGE UP (ref 98–107)
CHLORIDE SERPL-SCNC: 99 MMOL/L
CHLORIDE SERPL-SCNC: 99 MMOL/L — SIGNIFICANT CHANGE UP (ref 96–108)
CHOLEST SERPL-MCNC: 89 MG/DL — SIGNIFICANT CHANGE UP
CHROM ANALY INTERPHASE BLD FISH-IMP: SIGNIFICANT CHANGE UP
CHROM ANALY OVERALL INTERP SPEC-IMP: SIGNIFICANT CHANGE UP
CK SERPL-CCNC: 156 U/L — SIGNIFICANT CHANGE UP (ref 26–308)
CO2 BLDV-SCNC: 21.5 MMOL/L — LOW (ref 22–26)
CO2 BLDV-SCNC: 23.4 MMOL/L — SIGNIFICANT CHANGE UP (ref 22–26)
CO2 BLDV-SCNC: 31.9 MMOL/L — HIGH (ref 22–26)
CO2 BLDV-SCNC: 32.2 MMOL/L — HIGH (ref 22–26)
CO2 BLDV-SCNC: 32.5 MMOL/L — HIGH (ref 22–26)
CO2 SERPL-SCNC: 18 MMOL/L — LOW (ref 22–31)
CO2 SERPL-SCNC: 18 MMOL/L — LOW (ref 22–31)
CO2 SERPL-SCNC: 19 MMOL/L — LOW (ref 22–31)
CO2 SERPL-SCNC: 19 MMOL/L — LOW (ref 22–31)
CO2 SERPL-SCNC: 20 MMOL/L — LOW (ref 22–31)
CO2 SERPL-SCNC: 20 MMOL/L — LOW (ref 22–31)
CO2 SERPL-SCNC: 22 MMOL/L — SIGNIFICANT CHANGE UP (ref 22–31)
CO2 SERPL-SCNC: 23 MMOL/L
CO2 SERPL-SCNC: 23 MMOL/L — SIGNIFICANT CHANGE UP (ref 22–31)
CO2 SERPL-SCNC: 24 MMOL/L
CO2 SERPL-SCNC: 24 MMOL/L
CO2 SERPL-SCNC: 24 MMOL/L — SIGNIFICANT CHANGE UP (ref 22–31)
CO2 SERPL-SCNC: 25 MMOL/L
CO2 SERPL-SCNC: 25 MMOL/L — SIGNIFICANT CHANGE UP (ref 22–31)
CO2 SERPL-SCNC: 26 MMOL/L — SIGNIFICANT CHANGE UP (ref 22–31)
CO2 SERPL-SCNC: 26 MMOL/L — SIGNIFICANT CHANGE UP (ref 22–31)
CO2 SERPL-SCNC: 27 MMOL/L — SIGNIFICANT CHANGE UP (ref 22–31)
CO2 SERPL-SCNC: 27 MMOL/L — SIGNIFICANT CHANGE UP (ref 22–31)
CO2 SERPL-SCNC: 28 MMOL/L
CO2 SERPL-SCNC: 28 MMOL/L — SIGNIFICANT CHANGE UP (ref 22–31)
CO2 SERPL-SCNC: 29 MMOL/L — SIGNIFICANT CHANGE UP (ref 22–31)
COLOR SPEC: ABNORMAL
COLOR SPEC: ABNORMAL
COLOR SPEC: YELLOW — SIGNIFICANT CHANGE UP
CREAT SERPL-MCNC: 0.33 MG/DL
CREAT SERPL-MCNC: 0.34 MG/DL — LOW (ref 0.5–1.3)
CREAT SERPL-MCNC: 0.35 MG/DL
CREAT SERPL-MCNC: 0.35 MG/DL — LOW (ref 0.5–1.3)
CREAT SERPL-MCNC: 0.37 MG/DL — LOW (ref 0.5–1.3)
CREAT SERPL-MCNC: 0.38 MG/DL — LOW (ref 0.5–1.3)
CREAT SERPL-MCNC: 0.4 MG/DL
CREAT SERPL-MCNC: 0.41 MG/DL — LOW (ref 0.5–1.3)
CREAT SERPL-MCNC: 0.41 MG/DL — LOW (ref 0.5–1.3)
CREAT SERPL-MCNC: 0.42 MG/DL — LOW (ref 0.5–1.3)
CREAT SERPL-MCNC: 0.44 MG/DL
CREAT SERPL-MCNC: 0.46 MG/DL — LOW (ref 0.5–1.3)
CREAT SERPL-MCNC: 0.48 MG/DL
CREAT SERPL-MCNC: 0.48 MG/DL — LOW (ref 0.5–1.3)
CREAT SERPL-MCNC: 0.49 MG/DL — LOW (ref 0.5–1.3)
CREAT SERPL-MCNC: 0.5 MG/DL — SIGNIFICANT CHANGE UP (ref 0.5–1.3)
CREAT SERPL-MCNC: 0.52 MG/DL — SIGNIFICANT CHANGE UP (ref 0.5–1.3)
CREAT SERPL-MCNC: 0.52 MG/DL — SIGNIFICANT CHANGE UP (ref 0.5–1.3)
CREAT SERPL-MCNC: 0.54 MG/DL — SIGNIFICANT CHANGE UP (ref 0.5–1.3)
CREAT SERPL-MCNC: 0.64 MG/DL — SIGNIFICANT CHANGE UP (ref 0.5–1.3)
CREAT SERPL-MCNC: 0.81 MG/DL — SIGNIFICANT CHANGE UP (ref 0.5–1.3)
CREAT SERPL-MCNC: 0.82 MG/DL — SIGNIFICANT CHANGE UP (ref 0.5–1.3)
CREAT SERPL-MCNC: 0.82 MG/DL — SIGNIFICANT CHANGE UP (ref 0.5–1.3)
CREAT SERPL-MCNC: 0.85 MG/DL — SIGNIFICANT CHANGE UP (ref 0.5–1.3)
CREAT SERPL-MCNC: 0.85 MG/DL — SIGNIFICANT CHANGE UP (ref 0.5–1.3)
CREAT SERPL-MCNC: 0.93 MG/DL — SIGNIFICANT CHANGE UP (ref 0.5–1.3)
CULTURE RESULTS: NO GROWTH — SIGNIFICANT CHANGE UP
CULTURE RESULTS: NO GROWTH — SIGNIFICANT CHANGE UP
CULTURE RESULTS: SIGNIFICANT CHANGE UP
CULTURE RESULTS: SIGNIFICANT CHANGE UP
D DIMER BLD IA.RAPID-MCNC: 247 NG/ML DDU — HIGH
D DIMER BLD IA.RAPID-MCNC: 247 NG/ML DDU — HIGH
DACRYOCYTES BLD QL SMEAR: SLIGHT — SIGNIFICANT CHANGE UP
DAT POLY-SP REAG RBC QL: NEGATIVE — SIGNIFICANT CHANGE UP
DIFF PNL FLD: NEGATIVE — SIGNIFICANT CHANGE UP
EGFR: 100 ML/MIN/1.73M2 — SIGNIFICANT CHANGE UP
EGFR: 100 ML/MIN/1.73M2 — SIGNIFICANT CHANGE UP
EGFR: 101 ML/MIN/1.73M2 — SIGNIFICANT CHANGE UP
EGFR: 101 ML/MIN/1.73M2 — SIGNIFICANT CHANGE UP
EGFR: 102 ML/MIN/1.73M2 — SIGNIFICANT CHANGE UP
EGFR: 109 ML/MIN/1.73M2 — SIGNIFICANT CHANGE UP
EGFR: 115 ML/MIN/1.73M2 — SIGNIFICANT CHANGE UP
EGFR: 116 ML/MIN/1.73M2 — SIGNIFICANT CHANGE UP
EGFR: 117 ML/MIN/1.73M2 — SIGNIFICANT CHANGE UP
EGFR: 118 ML/MIN/1.73M2 — SIGNIFICANT CHANGE UP
EGFR: 119 ML/MIN/1.73M2 — SIGNIFICANT CHANGE UP
EGFR: 120 ML/MIN/1.73M2
EGFR: 120 ML/MIN/1.73M2 — SIGNIFICANT CHANGE UP
EGFR: 121 ML/MIN/1.73M2 — SIGNIFICANT CHANGE UP
EGFR: 123 ML/MIN/1.73M2
EGFR: 126 ML/MIN/1.73M2
EGFR: 126 ML/MIN/1.73M2 — SIGNIFICANT CHANGE UP
EGFR: 126 ML/MIN/1.73M2 — SIGNIFICANT CHANGE UP
EGFR: 132 ML/MIN/1.73M2
EGFR: 133 ML/MIN/1.73M2 — SIGNIFICANT CHANGE UP
EGFR: 133 ML/MIN/1.73M2 — SIGNIFICANT CHANGE UP
EGFR: 134 ML/MIN/1.73M2
EGFR: 95 ML/MIN/1.73M2 — SIGNIFICANT CHANGE UP
ELLIPTOCYTES BLD QL SMEAR: SLIGHT — SIGNIFICANT CHANGE UP
EOSINOPHIL # BLD AUTO: 0 K/UL — SIGNIFICANT CHANGE UP (ref 0–0.5)
EOSINOPHIL # BLD AUTO: 0.33 K/UL — SIGNIFICANT CHANGE UP (ref 0–0.5)
EOSINOPHIL # BLD AUTO: 0.36 K/UL — SIGNIFICANT CHANGE UP (ref 0–0.5)
EOSINOPHIL # BLD AUTO: 0.46 K/UL — SIGNIFICANT CHANGE UP (ref 0–0.5)
EOSINOPHIL # BLD AUTO: 0.6 K/UL — HIGH (ref 0–0.5)
EOSINOPHIL # BLD AUTO: 0.66 K/UL — HIGH (ref 0–0.5)
EOSINOPHIL # BLD AUTO: 0.7 K/UL — HIGH (ref 0–0.5)
EOSINOPHIL # BLD AUTO: 0.76 K/UL — HIGH (ref 0–0.5)
EOSINOPHIL # BLD AUTO: 1.05 K/UL — HIGH (ref 0–0.5)
EOSINOPHIL # BLD AUTO: 1.42 K/UL — HIGH (ref 0–0.5)
EOSINOPHIL # BLD AUTO: 1.66 K/UL — HIGH (ref 0–0.5)
EOSINOPHIL # BLD AUTO: 1.73 K/UL — HIGH (ref 0–0.5)
EOSINOPHIL # BLD AUTO: 1.74 K/UL — HIGH (ref 0–0.5)
EOSINOPHIL # BLD AUTO: 1.88 K/UL — HIGH (ref 0–0.5)
EOSINOPHIL # BLD AUTO: 1.95 K/UL — HIGH (ref 0–0.5)
EOSINOPHIL # BLD AUTO: 2.24 K/UL — HIGH (ref 0–0.5)
EOSINOPHIL # BLD AUTO: 2.38 K/UL — HIGH (ref 0–0.5)
EOSINOPHIL # BLD AUTO: 2.55 K/UL — HIGH (ref 0–0.5)
EOSINOPHIL # BLD AUTO: 2.63 K/UL — HIGH (ref 0–0.5)
EOSINOPHIL # BLD AUTO: 2.69 K/UL — HIGH (ref 0–0.5)
EOSINOPHIL # BLD AUTO: 2.74 K/UL — HIGH (ref 0–0.5)
EOSINOPHIL # BLD AUTO: 3.07 K/UL — HIGH (ref 0–0.5)
EOSINOPHIL # BLD AUTO: 3.17 K/UL — HIGH (ref 0–0.5)
EOSINOPHIL # BLD AUTO: 3.32 K/UL — HIGH (ref 0–0.5)
EOSINOPHIL # BLD AUTO: 3.5 K/UL — HIGH (ref 0–0.5)
EOSINOPHIL # BLD AUTO: 4.2 K/UL — HIGH (ref 0–0.5)
EOSINOPHIL # BLD AUTO: 4.28 K/UL — HIGH (ref 0–0.5)
EOSINOPHIL # BLD AUTO: 4.4 K/UL — HIGH (ref 0–0.5)
EOSINOPHIL # BLD AUTO: 4.97 K/UL — HIGH (ref 0–0.5)
EOSINOPHIL # BLD AUTO: 6.32 K/UL — HIGH (ref 0–0.5)
EOSINOPHIL # BLD AUTO: 6.68 K/UL — HIGH (ref 0–0.5)
EOSINOPHIL # BLD AUTO: 7.32 K/UL — HIGH (ref 0–0.5)
EOSINOPHIL # BLD AUTO: 8.17 K/UL — HIGH (ref 0–0.5)
EOSINOPHIL # BLD AUTO: 9.11 K/UL — HIGH (ref 0–0.5)
EOSINOPHIL NFR BLD AUTO: 0 % — SIGNIFICANT CHANGE UP (ref 0–6)
EOSINOPHIL NFR BLD AUTO: 0.3 % — SIGNIFICANT CHANGE UP (ref 0–6)
EOSINOPHIL NFR BLD AUTO: 0.3 % — SIGNIFICANT CHANGE UP (ref 0–6)
EOSINOPHIL NFR BLD AUTO: 1 % — SIGNIFICANT CHANGE UP (ref 0–6)
EOSINOPHIL NFR BLD AUTO: 1 % — SIGNIFICANT CHANGE UP (ref 0–6)
EOSINOPHIL NFR BLD AUTO: 10 % — HIGH (ref 0–6)
EOSINOPHIL NFR BLD AUTO: 16.5 % — HIGH (ref 0–6)
EOSINOPHIL NFR BLD AUTO: 2 % — SIGNIFICANT CHANGE UP (ref 0–6)
EOSINOPHIL NFR BLD AUTO: 2.1 % — SIGNIFICANT CHANGE UP (ref 0–6)
EOSINOPHIL NFR BLD AUTO: 2.3 % — SIGNIFICANT CHANGE UP (ref 0–6)
EOSINOPHIL NFR BLD AUTO: 2.3 % — SIGNIFICANT CHANGE UP (ref 0–6)
EOSINOPHIL NFR BLD AUTO: 2.4 % — SIGNIFICANT CHANGE UP (ref 0–6)
EOSINOPHIL NFR BLD AUTO: 2.8 % — SIGNIFICANT CHANGE UP (ref 0–6)
EOSINOPHIL NFR BLD AUTO: 3 % — SIGNIFICANT CHANGE UP (ref 0–6)
EOSINOPHIL NFR BLD AUTO: 3 % — SIGNIFICANT CHANGE UP (ref 0–6)
EOSINOPHIL NFR BLD AUTO: 4 % — SIGNIFICANT CHANGE UP (ref 0–6)
EOSINOPHIL NFR BLD AUTO: 4 % — SIGNIFICANT CHANGE UP (ref 0–6)
EOSINOPHIL NFR BLD AUTO: 5 % — SIGNIFICANT CHANGE UP (ref 0–6)
EOSINOPHIL NFR BLD AUTO: 5.4 % — SIGNIFICANT CHANGE UP (ref 0–6)
EOSINOPHIL NFR BLD AUTO: 5.5 % — SIGNIFICANT CHANGE UP (ref 0–6)
EOSINOPHIL NFR BLD AUTO: 6 % — SIGNIFICANT CHANGE UP (ref 0–6)
EOSINOPHIL NFR BLD AUTO: 6 % — SIGNIFICANT CHANGE UP (ref 0–6)
EOSINOPHIL NFR BLD AUTO: 6.1 % — HIGH (ref 0–6)
EOSINOPHIL NFR BLD AUTO: 6.1 % — HIGH (ref 0–6)
EOSINOPHIL NFR BLD AUTO: 6.3 % — HIGH (ref 0–6)
EOSINOPHIL NFR BLD AUTO: 7 % — HIGH (ref 0–6)
EOSINOPHIL NFR BLD AUTO: 7.5 % — HIGH (ref 0–6)
EOSINOPHIL NFR BLD AUTO: 8 % — HIGH (ref 0–6)
EOSINOPHIL NFR BLD AUTO: 8.6 % — HIGH (ref 0–6)
EOSINOPHIL NFR BLD AUTO: 9 % — HIGH (ref 0–6)
EPI CELLS # UR: 0 /HPF — SIGNIFICANT CHANGE UP (ref 0–5)
EPI CELLS # UR: NEGATIVE — SIGNIFICANT CHANGE UP
EPO SERPL-MCNC: 15.6 MIU/ML
EPO SERPL-MCNC: 55.1 MIU/ML
ESTIMATED AVERAGE GLUCOSE: 103 — SIGNIFICANT CHANGE UP
ESTIMATED AVERAGE GLUCOSE: 68 MG/DL — SIGNIFICANT CHANGE UP (ref 68–114)
ESTIMATED AVERAGE GLUCOSE: 68 — SIGNIFICANT CHANGE UP
ESTIMATED AVERAGE GLUCOSE: 71 MG/DL — SIGNIFICANT CHANGE UP (ref 68–114)
FERRITIN SERPL-MCNC: 268 NG/ML — SIGNIFICANT CHANGE UP (ref 30–400)
FERRITIN SERPL-MCNC: 573 NG/ML — HIGH (ref 30–400)
FERRITIN SERPL-MCNC: 75 NG/ML
FIBRINOGEN PPP-MCNC: 479 MG/DL — SIGNIFICANT CHANGE UP (ref 330–520)
FIBRINOGEN PPP-MCNC: 543 MG/DL — HIGH (ref 330–520)
FIBRINOGEN PPP-MCNC: 547 MG/DL — HIGH (ref 330–520)
FLUAV AG NPH QL: SIGNIFICANT CHANGE UP
FLUAV SUBTYP SPEC NAA+PROBE: SIGNIFICANT CHANGE UP
FLUBV AG NPH QL: SIGNIFICANT CHANGE UP
FLUBV RNA SPEC QL NAA+PROBE: SIGNIFICANT CHANGE UP
FOLATE SERPL-MCNC: 14.9 NG/ML — SIGNIFICANT CHANGE UP (ref 3.1–17.5)
FOLATE SERPL-MCNC: 17.6 NG/ML
FOLATE SERPL-MCNC: 7 NG/ML — SIGNIFICANT CHANGE UP (ref 3.1–17.5)
FRUCTOSAMINE SERPL-MCNC: 307 UMOL/L — HIGH (ref 205–285)
G6PD RBC-CCNC: SIGNIFICANT CHANGE UP
G6PD RBC-CCNC: SIGNIFICANT CHANGE UP
GAS PNL BLDV: 127 MMOL/L — LOW (ref 136–145)
GAS PNL BLDV: 127 MMOL/L — LOW (ref 136–145)
GAS PNL BLDV: 128 MMOL/L — LOW (ref 136–145)
GAS PNL BLDV: 130 MMOL/L — LOW (ref 136–145)
GAS PNL BLDV: 133 MMOL/L — LOW (ref 136–145)
GAS PNL BLDV: SIGNIFICANT CHANGE UP
GGT SERPL-CCNC: 143 U/L — HIGH (ref 8–61)
GIANT PLATELETS BLD QL SMEAR: PRESENT — SIGNIFICANT CHANGE UP
GLUCOSE BLDC GLUCOMTR-MCNC: 117 MG/DL — HIGH (ref 70–99)
GLUCOSE BLDC GLUCOMTR-MCNC: 122 MG/DL — HIGH (ref 70–99)
GLUCOSE BLDC GLUCOMTR-MCNC: 124 MG/DL — HIGH (ref 70–99)
GLUCOSE BLDC GLUCOMTR-MCNC: 128 MG/DL — HIGH (ref 70–99)
GLUCOSE BLDC GLUCOMTR-MCNC: 132 MG/DL — HIGH (ref 70–99)
GLUCOSE BLDC GLUCOMTR-MCNC: 133 MG/DL — HIGH (ref 70–99)
GLUCOSE BLDC GLUCOMTR-MCNC: 134 MG/DL — HIGH (ref 70–99)
GLUCOSE BLDC GLUCOMTR-MCNC: 136 MG/DL — HIGH (ref 70–99)
GLUCOSE BLDC GLUCOMTR-MCNC: 138 MG/DL — HIGH (ref 70–99)
GLUCOSE BLDC GLUCOMTR-MCNC: 140 MG/DL — HIGH (ref 70–99)
GLUCOSE BLDC GLUCOMTR-MCNC: 155 MG/DL — HIGH (ref 70–99)
GLUCOSE BLDC GLUCOMTR-MCNC: 157 MG/DL — HIGH (ref 70–99)
GLUCOSE BLDC GLUCOMTR-MCNC: 169 MG/DL — HIGH (ref 70–99)
GLUCOSE BLDC GLUCOMTR-MCNC: 181 MG/DL — HIGH (ref 70–99)
GLUCOSE BLDC GLUCOMTR-MCNC: 185 MG/DL — HIGH (ref 70–99)
GLUCOSE BLDC GLUCOMTR-MCNC: 185 MG/DL — HIGH (ref 70–99)
GLUCOSE BLDC GLUCOMTR-MCNC: 218 MG/DL — HIGH (ref 70–99)
GLUCOSE BLDC GLUCOMTR-MCNC: 219 MG/DL — HIGH (ref 70–99)
GLUCOSE BLDC GLUCOMTR-MCNC: 236 MG/DL — HIGH (ref 70–99)
GLUCOSE BLDC GLUCOMTR-MCNC: 239 MG/DL — HIGH (ref 70–99)
GLUCOSE BLDC GLUCOMTR-MCNC: 242 MG/DL — HIGH (ref 70–99)
GLUCOSE BLDC GLUCOMTR-MCNC: 268 MG/DL — HIGH (ref 70–99)
GLUCOSE BLDC GLUCOMTR-MCNC: 273 MG/DL — HIGH (ref 70–99)
GLUCOSE BLDC GLUCOMTR-MCNC: 275 MG/DL — HIGH (ref 70–99)
GLUCOSE BLDC GLUCOMTR-MCNC: 276 MG/DL — HIGH (ref 70–99)
GLUCOSE BLDC GLUCOMTR-MCNC: 279 MG/DL — HIGH (ref 70–99)
GLUCOSE BLDC GLUCOMTR-MCNC: 290 MG/DL — HIGH (ref 70–99)
GLUCOSE BLDC GLUCOMTR-MCNC: 294 MG/DL — HIGH (ref 70–99)
GLUCOSE BLDC GLUCOMTR-MCNC: 297 MG/DL — HIGH (ref 70–99)
GLUCOSE BLDC GLUCOMTR-MCNC: 306 MG/DL — HIGH (ref 70–99)
GLUCOSE BLDC GLUCOMTR-MCNC: 308 MG/DL — HIGH (ref 70–99)
GLUCOSE BLDC GLUCOMTR-MCNC: 311 MG/DL — HIGH (ref 70–99)
GLUCOSE BLDC GLUCOMTR-MCNC: 319 MG/DL — HIGH (ref 70–99)
GLUCOSE BLDC GLUCOMTR-MCNC: 322 MG/DL — HIGH (ref 70–99)
GLUCOSE BLDV-MCNC: 177 MG/DL — HIGH (ref 70–99)
GLUCOSE BLDV-MCNC: 184 MG/DL — HIGH (ref 70–99)
GLUCOSE BLDV-MCNC: 278 MG/DL — HIGH (ref 70–99)
GLUCOSE BLDV-MCNC: 287 MG/DL — HIGH (ref 70–99)
GLUCOSE BLDV-MCNC: 288 MG/DL — HIGH (ref 70–99)
GLUCOSE SERPL-MCNC: 113 MG/DL — HIGH (ref 70–99)
GLUCOSE SERPL-MCNC: 121 MG/DL — HIGH (ref 70–99)
GLUCOSE SERPL-MCNC: 126 MG/DL — HIGH (ref 70–99)
GLUCOSE SERPL-MCNC: 137 MG/DL — HIGH (ref 70–99)
GLUCOSE SERPL-MCNC: 141 MG/DL — HIGH (ref 70–99)
GLUCOSE SERPL-MCNC: 145 MG/DL — HIGH (ref 70–99)
GLUCOSE SERPL-MCNC: 168 MG/DL
GLUCOSE SERPL-MCNC: 172 MG/DL — HIGH (ref 70–99)
GLUCOSE SERPL-MCNC: 175 MG/DL — HIGH (ref 70–99)
GLUCOSE SERPL-MCNC: 215 MG/DL — HIGH (ref 70–99)
GLUCOSE SERPL-MCNC: 222 MG/DL — HIGH (ref 70–99)
GLUCOSE SERPL-MCNC: 243 MG/DL
GLUCOSE SERPL-MCNC: 246 MG/DL — HIGH (ref 70–99)
GLUCOSE SERPL-MCNC: 254 MG/DL
GLUCOSE SERPL-MCNC: 256 MG/DL
GLUCOSE SERPL-MCNC: 259 MG/DL — HIGH (ref 70–99)
GLUCOSE SERPL-MCNC: 272 MG/DL
GLUCOSE SERPL-MCNC: 278 MG/DL — HIGH (ref 70–99)
GLUCOSE SERPL-MCNC: 279 MG/DL — HIGH (ref 70–99)
GLUCOSE SERPL-MCNC: 279 MG/DL — HIGH (ref 70–99)
GLUCOSE SERPL-MCNC: 296 MG/DL — HIGH (ref 70–99)
GLUCOSE SERPL-MCNC: 299 MG/DL — HIGH (ref 70–99)
GLUCOSE SERPL-MCNC: 303 MG/DL — HIGH (ref 70–99)
GLUCOSE SERPL-MCNC: 320 MG/DL — HIGH (ref 70–99)
GLUCOSE SERPL-MCNC: 321 MG/DL — HIGH (ref 70–99)
GLUCOSE SERPL-MCNC: 328 MG/DL — HIGH (ref 70–99)
GLUCOSE SERPL-MCNC: 346 MG/DL — HIGH (ref 70–99)
GLUCOSE SERPL-MCNC: 98 MG/DL — SIGNIFICANT CHANGE UP (ref 70–99)
GLUCOSE UR QL: NEGATIVE MG/DL — SIGNIFICANT CHANGE UP
GLUCOSE UR QL: NEGATIVE — SIGNIFICANT CHANGE UP
GLUCOSE UR QL: NEGATIVE — SIGNIFICANT CHANGE UP
HADV DNA SPEC QL NAA+PROBE: SIGNIFICANT CHANGE UP
HAPTOGLOB SERPL-MCNC: 73 MG/DL
HAPTOGLOB SERPL-MCNC: 97 MG/DL
HAPTOGLOB SERPL-MCNC: <20 MG/DL
HAPTOGLOB SERPL-MCNC: <20 MG/DL — LOW (ref 34–200)
HAV IGM SER QL: NONREACTIVE
HAV IGM SER-ACNC: SIGNIFICANT CHANGE UP
HBV CORE AB SER-ACNC: SIGNIFICANT CHANGE UP
HBV CORE IGG+IGM SER QL: NONREACTIVE
HBV CORE IGM SER QL: NONREACTIVE
HBV CORE IGM SER-ACNC: SIGNIFICANT CHANGE UP
HBV SURFACE AB SER-ACNC: REACTIVE
HBV SURFACE AG SER QL: NONREACTIVE
HBV SURFACE AG SER-ACNC: SIGNIFICANT CHANGE UP
HBV SURFACE AG SER-ACNC: SIGNIFICANT CHANGE UP
HCO3 BLDV-SCNC: 20 MMOL/L — LOW (ref 22–29)
HCO3 BLDV-SCNC: 22 MMOL/L — SIGNIFICANT CHANGE UP (ref 22–29)
HCO3 BLDV-SCNC: 30 MMOL/L — HIGH (ref 22–29)
HCO3 BLDV-SCNC: 31 MMOL/L — HIGH (ref 22–29)
HCO3 BLDV-SCNC: 31 MMOL/L — HIGH (ref 22–29)
HCOV 229E RNA SPEC QL NAA+PROBE: SIGNIFICANT CHANGE UP
HCOV HKU1 RNA SPEC QL NAA+PROBE: SIGNIFICANT CHANGE UP
HCOV NL63 RNA SPEC QL NAA+PROBE: SIGNIFICANT CHANGE UP
HCOV OC43 RNA SPEC QL NAA+PROBE: SIGNIFICANT CHANGE UP
HCT VFR BLD CALC: 21.5 % — LOW (ref 39–50)
HCT VFR BLD CALC: 23 % — LOW (ref 39–50)
HCT VFR BLD CALC: 24.1 % — LOW (ref 39–50)
HCT VFR BLD CALC: 24.5 % — LOW (ref 39–50)
HCT VFR BLD CALC: 25.3 % — LOW (ref 39–50)
HCT VFR BLD CALC: 25.5 % — LOW (ref 39–50)
HCT VFR BLD CALC: 25.6 % — LOW (ref 39–50)
HCT VFR BLD CALC: 26.5 % — LOW (ref 39–50)
HCT VFR BLD CALC: 26.9 % — LOW (ref 39–50)
HCT VFR BLD CALC: 27.3 % — LOW (ref 39–50)
HCT VFR BLD CALC: 27.9 % — LOW (ref 39–50)
HCT VFR BLD CALC: 28.5 % — LOW (ref 39–50)
HCT VFR BLD CALC: 28.6 % — LOW (ref 39–50)
HCT VFR BLD CALC: 30.5 % — LOW (ref 39–50)
HCT VFR BLD CALC: 30.9 % — LOW (ref 39–50)
HCT VFR BLD CALC: 30.9 % — LOW (ref 39–50)
HCT VFR BLD CALC: 31.4 % — LOW (ref 39–50)
HCT VFR BLD CALC: 31.8 % — LOW (ref 39–50)
HCT VFR BLD CALC: 33.8 % — LOW (ref 39–50)
HCT VFR BLD CALC: 33.8 % — LOW (ref 39–50)
HCT VFR BLD CALC: 34.2 % — LOW (ref 39–50)
HCT VFR BLD CALC: 34.4 % — LOW (ref 39–50)
HCT VFR BLD CALC: 35 % — LOW (ref 39–50)
HCT VFR BLD CALC: 35.7 % — LOW (ref 39–50)
HCT VFR BLD CALC: 35.8 % — LOW (ref 39–50)
HCT VFR BLD CALC: 38.8 % — LOW (ref 39–50)
HCT VFR BLD CALC: 40.4 % — SIGNIFICANT CHANGE UP (ref 39–50)
HCT VFR BLD CALC: 40.9 % — SIGNIFICANT CHANGE UP (ref 39–50)
HCT VFR BLD CALC: 42.5 % — SIGNIFICANT CHANGE UP (ref 39–50)
HCT VFR BLD CALC: 43.3 % — SIGNIFICANT CHANGE UP (ref 39–50)
HCT VFR BLD CALC: 43.6 % — SIGNIFICANT CHANGE UP (ref 39–50)
HCT VFR BLD CALC: 44.3 % — SIGNIFICANT CHANGE UP (ref 39–50)
HCT VFR BLD CALC: 44.4 % — SIGNIFICANT CHANGE UP (ref 39–50)
HCT VFR BLD CALC: 44.9 % — SIGNIFICANT CHANGE UP (ref 39–50)
HCT VFR BLD CALC: 46.2 % — SIGNIFICANT CHANGE UP (ref 39–50)
HCT VFR BLD CALC: 47 % — SIGNIFICANT CHANGE UP (ref 39–50)
HCT VFR BLD CALC: 49.6 % — SIGNIFICANT CHANGE UP (ref 39–50)
HCT VFR BLD CALC: 49.7 % — SIGNIFICANT CHANGE UP (ref 39–50)
HCT VFR BLDA CALC: 24 % — LOW (ref 39–51)
HCT VFR BLDA CALC: 26 % — LOW (ref 39–51)
HCT VFR BLDA CALC: 26 % — LOW (ref 39–51)
HCT VFR BLDA CALC: 32 % — LOW (ref 39–51)
HCT VFR BLDA CALC: 44 % — SIGNIFICANT CHANGE UP (ref 39–51)
HCV AB S/CO SERPL IA: 0.09 S/CO — SIGNIFICANT CHANGE UP (ref 0–0.99)
HCV AB S/CO SERPL IA: 0.09 S/CO — SIGNIFICANT CHANGE UP (ref 0–0.99)
HCV AB SER QL: NONREACTIVE
HCV AB SERPL-IMP: SIGNIFICANT CHANGE UP
HCV AB SERPL-IMP: SIGNIFICANT CHANGE UP
HCV S/CO RATIO: 0.09 S/CO
HDLC SERPL-MCNC: 16 MG/DL — LOW
HGB BLD CALC-MCNC: 10.7 G/DL — LOW (ref 13–17)
HGB BLD CALC-MCNC: 14.5 G/DL — SIGNIFICANT CHANGE UP (ref 13–17)
HGB BLD CALC-MCNC: 8.1 G/DL — LOW (ref 13–17)
HGB BLD CALC-MCNC: 8.5 G/DL — LOW (ref 13–17)
HGB BLD CALC-MCNC: 8.5 G/DL — LOW (ref 13–17)
HGB BLD-MCNC: 10 G/DL — LOW (ref 13–17)
HGB BLD-MCNC: 10.2 G/DL — LOW (ref 13–17)
HGB BLD-MCNC: 10.2 G/DL — LOW (ref 13–17)
HGB BLD-MCNC: 10.3 G/DL — LOW (ref 13–17)
HGB BLD-MCNC: 10.6 G/DL — LOW (ref 13–17)
HGB BLD-MCNC: 10.9 G/DL — LOW (ref 13–17)
HGB BLD-MCNC: 10.9 G/DL — LOW (ref 13–17)
HGB BLD-MCNC: 11.1 G/DL — LOW (ref 13–17)
HGB BLD-MCNC: 11.7 G/DL — LOW (ref 13–17)
HGB BLD-MCNC: 12.2 G/DL — LOW (ref 13–17)
HGB BLD-MCNC: 12.6 G/DL — LOW (ref 13–17)
HGB BLD-MCNC: 12.6 G/DL — LOW (ref 13–17)
HGB BLD-MCNC: 12.7 G/DL — LOW (ref 13–17)
HGB BLD-MCNC: 12.9 G/DL — LOW (ref 13–17)
HGB BLD-MCNC: 13.2 G/DL — SIGNIFICANT CHANGE UP (ref 13–17)
HGB BLD-MCNC: 13.4 G/DL — SIGNIFICANT CHANGE UP (ref 13–17)
HGB BLD-MCNC: 13.4 G/DL — SIGNIFICANT CHANGE UP (ref 13–17)
HGB BLD-MCNC: 13.5 G/DL — SIGNIFICANT CHANGE UP (ref 13–17)
HGB BLD-MCNC: 13.7 G/DL — SIGNIFICANT CHANGE UP (ref 13–17)
HGB BLD-MCNC: 14.9 G/DL — SIGNIFICANT CHANGE UP (ref 13–17)
HGB BLD-MCNC: 15.2 G/DL — SIGNIFICANT CHANGE UP (ref 13–17)
HGB BLD-MCNC: 6.9 G/DL — CRITICAL LOW (ref 13–17)
HGB BLD-MCNC: 7.4 G/DL — LOW (ref 13–17)
HGB BLD-MCNC: 7.5 G/DL — LOW (ref 13–17)
HGB BLD-MCNC: 7.5 G/DL — LOW (ref 13–17)
HGB BLD-MCNC: 7.6 G/DL — LOW (ref 13–17)
HGB BLD-MCNC: 7.7 G/DL — LOW (ref 13–17)
HGB BLD-MCNC: 7.9 G/DL — LOW (ref 13–17)
HGB BLD-MCNC: 7.9 G/DL — LOW (ref 13–17)
HGB BLD-MCNC: 8.1 G/DL — LOW (ref 13–17)
HGB BLD-MCNC: 8.5 G/DL — LOW (ref 13–17)
HGB BLD-MCNC: 8.7 G/DL — LOW (ref 13–17)
HGB BLD-MCNC: 8.8 G/DL — LOW (ref 13–17)
HGB BLD-MCNC: 9.3 G/DL — LOW (ref 13–17)
HGB BLD-MCNC: 9.4 G/DL — LOW (ref 13–17)
HGB BLD-MCNC: 9.8 G/DL — LOW (ref 13–17)
HGB BLD-MCNC: 9.9 G/DL — LOW (ref 13–17)
HGB BLD-MCNC: 9.9 G/DL — LOW (ref 13–17)
HIV 1+2 AB+HIV1 P24 AG SERPL QL IA: SIGNIFICANT CHANGE UP
HIV1+2 AB SPEC QL IA.RAPID: NONREACTIVE
HLX FLT3 FINAL REPORT: SIGNIFICANT CHANGE UP
HMPV RNA SPEC QL NAA+PROBE: SIGNIFICANT CHANGE UP
HOWELL-JOLLY BOD BLD QL SMEAR: PRESENT — SIGNIFICANT CHANGE UP
HPIV1 RNA SPEC QL NAA+PROBE: SIGNIFICANT CHANGE UP
HPIV2 RNA SPEC QL NAA+PROBE: SIGNIFICANT CHANGE UP
HPIV3 RNA SPEC QL NAA+PROBE: SIGNIFICANT CHANGE UP
HPIV4 RNA SPEC QL NAA+PROBE: SIGNIFICANT CHANGE UP
HYALINE CASTS # UR AUTO: 2 /LPF — SIGNIFICANT CHANGE UP (ref 0–7)
HYPOCHROMIA BLD QL: SIGNIFICANT CHANGE UP
HYPOCHROMIA BLD QL: SLIGHT — SIGNIFICANT CHANGE UP
HYPOGRAN NEUTS BLD QL SMEAR: PRESENT — SIGNIFICANT CHANGE UP
HYPOGRANULAR PLT: PRESENT
IANC: 14.01 K/UL — HIGH (ref 1.8–7.4)
IANC: 22.08 K/UL — HIGH (ref 1.8–7.4)
IANC: 25.81 K/UL — HIGH (ref 1.8–7.4)
IANC: 28.74 K/UL — HIGH (ref 1.5–8.5)
IANC: 31.98 K/UL — HIGH (ref 1.5–8.5)
IANC: 36.75 K/UL — HIGH (ref 1.5–8.5)
IANC: 37.98 K/UL — HIGH (ref 1.5–8.5)
IANC: 40.81 K/UL — HIGH (ref 1.8–7.4)
IANC: 40.86 K/UL — HIGH (ref 1.5–8.5)
IANC: 48.7 K/UL — HIGH (ref 1.8–7.4)
IANC: 56.29 K/UL — HIGH (ref 1.8–7.4)
IANC: 58.99 K/UL — HIGH (ref 1.8–7.4)
IANC: 7.35 K/UL — SIGNIFICANT CHANGE UP (ref 1.5–8.5)
IANC: 73.16 K/UL — HIGH (ref 1.8–7.4)
IANC: 75.09 K/UL — HIGH (ref 1.8–7.4)
IMM GRANULOCYTES NFR BLD AUTO: 16.3 % — HIGH (ref 0–1.5)
IMM GRANULOCYTES NFR BLD AUTO: 17.9 % — HIGH (ref 0–1.5)
IMM GRANULOCYTES NFR BLD AUTO: 22.4 % — HIGH (ref 0–1.5)
IMM GRANULOCYTES NFR BLD AUTO: 24.5 % — HIGH (ref 0–0.9)
IMM GRANULOCYTES NFR BLD AUTO: 30 % — HIGH (ref 0–0.9)
IMM GRANULOCYTES NFR BLD AUTO: 31.5 % — HIGH (ref 0–0.9)
IMM GRANULOCYTES NFR BLD AUTO: 4.7 % — HIGH (ref 0–0.9)
IMM GRANULOCYTES NFR BLD AUTO: 5.4 % — HIGH (ref 0–0.9)
INR BLD: 1.13 RATIO — SIGNIFICANT CHANGE UP (ref 0.88–1.16)
INR BLD: 1.2 RATIO — HIGH (ref 0.88–1.16)
INR BLD: 1.25 RATIO — HIGH (ref 0.88–1.16)
INR BLD: 1.29 RATIO — HIGH (ref 0.88–1.16)
INR BLD: 1.29 RATIO — HIGH (ref 0.88–1.16)
INR BLD: 1.45 RATIO — HIGH (ref 0.88–1.16)
INR BLD: 2.5 RATIO — HIGH (ref 0.88–1.16)
INR PPP: 1.08 RATIO
IRON SATN MFR SERPL: 259 UG/DL — HIGH (ref 45–165)
IRON SATN MFR SERPL: 42 UG/DL — LOW (ref 45–165)
IRON SATN MFR SERPL: 5 %
IRON SATN MFR SERPL: 68 % — HIGH (ref 14–50)
IRON SATN MFR SERPL: 8 % — LOW (ref 14–50)
IRON SERPL-MCNC: 26 UG/DL
KETONES UR-MCNC: NEGATIVE — SIGNIFICANT CHANGE UP
LACTATE BLDV-MCNC: 1.9 MMOL/L — SIGNIFICANT CHANGE UP (ref 0.5–2)
LACTATE BLDV-MCNC: 2.1 MMOL/L — HIGH (ref 0.5–2)
LACTATE BLDV-MCNC: 2.8 MMOL/L — HIGH (ref 0.5–2)
LACTATE BLDV-MCNC: 6.4 MMOL/L — CRITICAL HIGH (ref 0.5–2)
LACTATE BLDV-MCNC: 8.1 MMOL/L — CRITICAL HIGH (ref 0.5–2)
LACTATE SERPL-SCNC: 7.5 MMOL/L — CRITICAL HIGH (ref 0.7–2)
LACTATE SERPL-SCNC: 7.6 MMOL/L — CRITICAL HIGH (ref 0.5–2)
LACTATE SERPL-SCNC: 8.4 MMOL/L — CRITICAL HIGH (ref 0.7–2)
LDH SERPL L TO P-CCNC: 1236 U/L — HIGH (ref 135–225)
LDH SERPL L TO P-CCNC: 1369 U/L — HIGH (ref 135–225)
LDH SERPL L TO P-CCNC: 1538 U/L — HIGH (ref 50–242)
LDH SERPL L TO P-CCNC: 1736 U/L — HIGH (ref 50–242)
LDH SERPL L TO P-CCNC: 1831 U/L — HIGH (ref 135–225)
LDH SERPL L TO P-CCNC: 1943 U/L — HIGH (ref 50–242)
LDH SERPL L TO P-CCNC: 1977 U/L — HIGH (ref 50–242)
LDH SERPL L TO P-CCNC: 3028 U/L — HIGH (ref 135–225)
LDH SERPL L TO P-CCNC: 3197 U/L — HIGH (ref 135–225)
LDH SERPL L TO P-CCNC: 3293 U/L — HIGH (ref 135–225)
LDH SERPL L TO P-CCNC: 891 U/L — HIGH (ref 135–225)
LDH SERPL-CCNC: 1315 U/L
LDH SERPL-CCNC: 1866 U/L
LDH SERPL-CCNC: 1911 U/L
LDH SERPL-CCNC: 700 U/L
LDH SERPL-CCNC: 847 U/L
LEUKOCYTE ESTERASE UR-ACNC: NEGATIVE — SIGNIFICANT CHANGE UP
LG PLATELETS BLD QL AUTO: SIGNIFICANT CHANGE UP
LG PLATELETS BLD QL AUTO: SLIGHT — SIGNIFICANT CHANGE UP
LIDOCAIN IGE QN: 32 U/L — LOW (ref 73–393)
LIDOCAIN IGE QN: 7 U/L — SIGNIFICANT CHANGE UP (ref 7–60)
LIDOCAIN IGE QN: 7 U/L — SIGNIFICANT CHANGE UP (ref 7–60)
LIDOCAIN IGE QN: 9 U/L — SIGNIFICANT CHANGE UP (ref 7–60)
LIPID PNL WITH DIRECT LDL SERPL: 45 MG/DL — SIGNIFICANT CHANGE UP
LYMPHOCYTES # BLD AUTO: 10 % — LOW (ref 13–44)
LYMPHOCYTES # BLD AUTO: 10.52 K/UL — HIGH (ref 1–3.3)
LYMPHOCYTES # BLD AUTO: 11 % — LOW (ref 13–44)
LYMPHOCYTES # BLD AUTO: 11 K/UL — HIGH (ref 1–3.3)
LYMPHOCYTES # BLD AUTO: 11.18 K/UL — HIGH (ref 1–3.3)
LYMPHOCYTES # BLD AUTO: 11.3 % — LOW (ref 13–44)
LYMPHOCYTES # BLD AUTO: 11.8 % — LOW (ref 13–44)
LYMPHOCYTES # BLD AUTO: 12.14 K/UL — HIGH (ref 1–3.3)
LYMPHOCYTES # BLD AUTO: 12.64 K/UL — HIGH (ref 1–3.3)
LYMPHOCYTES # BLD AUTO: 12.8 K/UL — HIGH (ref 1–3.3)
LYMPHOCYTES # BLD AUTO: 13 % — SIGNIFICANT CHANGE UP (ref 13–44)
LYMPHOCYTES # BLD AUTO: 13.18 K/UL — HIGH (ref 1–3.3)
LYMPHOCYTES # BLD AUTO: 13.89 K/UL — HIGH (ref 1–3.3)
LYMPHOCYTES # BLD AUTO: 13.9 % — SIGNIFICANT CHANGE UP (ref 13–44)
LYMPHOCYTES # BLD AUTO: 14.33 K/UL — HIGH (ref 1–3.3)
LYMPHOCYTES # BLD AUTO: 15.1 % — SIGNIFICANT CHANGE UP (ref 13–44)
LYMPHOCYTES # BLD AUTO: 16.1 % — SIGNIFICANT CHANGE UP (ref 13–44)
LYMPHOCYTES # BLD AUTO: 16.3 % — SIGNIFICANT CHANGE UP (ref 13–44)
LYMPHOCYTES # BLD AUTO: 16.77 K/UL — HIGH (ref 1–3.3)
LYMPHOCYTES # BLD AUTO: 17 % — SIGNIFICANT CHANGE UP (ref 13–44)
LYMPHOCYTES # BLD AUTO: 18 % — SIGNIFICANT CHANGE UP (ref 13–44)
LYMPHOCYTES # BLD AUTO: 18 % — SIGNIFICANT CHANGE UP (ref 13–44)
LYMPHOCYTES # BLD AUTO: 18.24 K/UL — HIGH (ref 1–3.3)
LYMPHOCYTES # BLD AUTO: 19 % — SIGNIFICANT CHANGE UP (ref 13–44)
LYMPHOCYTES # BLD AUTO: 19.18 K/UL — HIGH (ref 1–3.3)
LYMPHOCYTES # BLD AUTO: 19.3 % — SIGNIFICANT CHANGE UP (ref 13–44)
LYMPHOCYTES # BLD AUTO: 19.8 % — SIGNIFICANT CHANGE UP (ref 13–44)
LYMPHOCYTES # BLD AUTO: 2.01 K/UL — SIGNIFICANT CHANGE UP (ref 1–3.3)
LYMPHOCYTES # BLD AUTO: 2.14 K/UL — SIGNIFICANT CHANGE UP (ref 1–3.3)
LYMPHOCYTES # BLD AUTO: 2.4 K/UL — SIGNIFICANT CHANGE UP (ref 1–3.3)
LYMPHOCYTES # BLD AUTO: 2.7 K/UL — SIGNIFICANT CHANGE UP (ref 1–3.3)
LYMPHOCYTES # BLD AUTO: 27 % — SIGNIFICANT CHANGE UP (ref 13–44)
LYMPHOCYTES # BLD AUTO: 28 % — SIGNIFICANT CHANGE UP (ref 13–44)
LYMPHOCYTES # BLD AUTO: 3.59 K/UL — HIGH (ref 1–3.3)
LYMPHOCYTES # BLD AUTO: 3.59 K/UL — HIGH (ref 1–3.3)
LYMPHOCYTES # BLD AUTO: 3.64 K/UL — HIGH (ref 1–3.3)
LYMPHOCYTES # BLD AUTO: 3.9 K/UL — HIGH (ref 1–3.3)
LYMPHOCYTES # BLD AUTO: 4 % — LOW (ref 13–44)
LYMPHOCYTES # BLD AUTO: 4.1 K/UL — HIGH (ref 1–3.3)
LYMPHOCYTES # BLD AUTO: 4.17 K/UL — HIGH (ref 1–3.3)
LYMPHOCYTES # BLD AUTO: 4.18 K/UL — HIGH (ref 1–3.3)
LYMPHOCYTES # BLD AUTO: 4.39 K/UL — HIGH (ref 1–3.3)
LYMPHOCYTES # BLD AUTO: 4.91 K/UL — HIGH (ref 1–3.3)
LYMPHOCYTES # BLD AUTO: 5 % — LOW (ref 13–44)
LYMPHOCYTES # BLD AUTO: 5.11 K/UL — HIGH (ref 1–3.3)
LYMPHOCYTES # BLD AUTO: 5.28 K/UL — HIGH (ref 1–3.3)
LYMPHOCYTES # BLD AUTO: 5.3 % — LOW (ref 13–44)
LYMPHOCYTES # BLD AUTO: 5.38 K/UL — HIGH (ref 1–3.3)
LYMPHOCYTES # BLD AUTO: 5.5 K/UL — HIGH (ref 1–3.3)
LYMPHOCYTES # BLD AUTO: 6.13 K/UL — HIGH (ref 1–3.3)
LYMPHOCYTES # BLD AUTO: 6.53 K/UL — HIGH (ref 1–3.3)
LYMPHOCYTES # BLD AUTO: 6.58 K/UL — HIGH (ref 1–3.3)
LYMPHOCYTES # BLD AUTO: 6.94 K/UL — HIGH (ref 1–3.3)
LYMPHOCYTES # BLD AUTO: 7 % — LOW (ref 13–44)
LYMPHOCYTES # BLD AUTO: 7.11 K/UL — HIGH (ref 1–3.3)
LYMPHOCYTES # BLD AUTO: 7.3 % — LOW (ref 13–44)
LYMPHOCYTES # BLD AUTO: 8 % — LOW (ref 13–44)
LYMPHOCYTES # BLD AUTO: 8.2 % — LOW (ref 13–44)
LYMPHOCYTES # BLD AUTO: 8.5 % — LOW (ref 13–44)
LYMPHOCYTES # BLD AUTO: 8.8 % — LOW (ref 13–44)
LYMPHOCYTES # BLD AUTO: 9.4 % — LOW (ref 13–44)
LYMPHOCYTES # SPEC AUTO: 6.3 % — HIGH (ref 0–0)
M PNEUMO DNA SPEC QL NAA+PROBE: SIGNIFICANT CHANGE UP
MACROCYTES BLD QL: SIGNIFICANT CHANGE UP
MACROCYTES BLD QL: SLIGHT — SIGNIFICANT CHANGE UP
MAGNESIUM SERPL-MCNC: 1.7 MG/DL — SIGNIFICANT CHANGE UP (ref 1.6–2.6)
MAGNESIUM SERPL-MCNC: 1.7 MG/DL — SIGNIFICANT CHANGE UP (ref 1.6–2.6)
MAGNESIUM SERPL-MCNC: 1.8 MG/DL — SIGNIFICANT CHANGE UP (ref 1.6–2.6)
MAGNESIUM SERPL-MCNC: 1.9 MG/DL — SIGNIFICANT CHANGE UP (ref 1.6–2.6)
MAGNESIUM SERPL-MCNC: 2 MG/DL — SIGNIFICANT CHANGE UP (ref 1.6–2.6)
MAGNESIUM SERPL-MCNC: 2.1 MG/DL — SIGNIFICANT CHANGE UP (ref 1.6–2.6)
MAGNESIUM SERPL-MCNC: 2.4 MG/DL — SIGNIFICANT CHANGE UP (ref 1.6–2.6)
MANUAL DIF COMMENT BLD-IMP: SIGNIFICANT CHANGE UP
MANUAL SMEAR VERIFICATION: SIGNIFICANT CHANGE UP
MCHC RBC-ENTMCNC: 28.6 PG — SIGNIFICANT CHANGE UP (ref 27–34)
MCHC RBC-ENTMCNC: 28.6 PG — SIGNIFICANT CHANGE UP (ref 27–34)
MCHC RBC-ENTMCNC: 28.7 GM/DL — LOW (ref 32–36)
MCHC RBC-ENTMCNC: 28.7 PG — SIGNIFICANT CHANGE UP (ref 27–34)
MCHC RBC-ENTMCNC: 28.9 PG — SIGNIFICANT CHANGE UP (ref 27–34)
MCHC RBC-ENTMCNC: 29 GM/DL — LOW (ref 32–36)
MCHC RBC-ENTMCNC: 29 PG — SIGNIFICANT CHANGE UP (ref 27–34)
MCHC RBC-ENTMCNC: 29.1 G/DL — LOW (ref 32–36)
MCHC RBC-ENTMCNC: 29.1 GM/DL — LOW (ref 32–36)
MCHC RBC-ENTMCNC: 29.1 PG — SIGNIFICANT CHANGE UP (ref 27–34)
MCHC RBC-ENTMCNC: 29.2 GM/DL — LOW (ref 32–36)
MCHC RBC-ENTMCNC: 29.4 GM/DL — LOW (ref 32–36)
MCHC RBC-ENTMCNC: 29.4 GM/DL — LOW (ref 32–36)
MCHC RBC-ENTMCNC: 29.5 PG — SIGNIFICANT CHANGE UP (ref 27–34)
MCHC RBC-ENTMCNC: 29.6 G/DL — LOW (ref 32–36)
MCHC RBC-ENTMCNC: 29.6 GM/DL — LOW (ref 32–36)
MCHC RBC-ENTMCNC: 29.6 GM/DL — LOW (ref 32–36)
MCHC RBC-ENTMCNC: 29.6 PG — SIGNIFICANT CHANGE UP (ref 27–34)
MCHC RBC-ENTMCNC: 29.7 GM/DL — LOW (ref 32–36)
MCHC RBC-ENTMCNC: 29.7 GM/DL — LOW (ref 32–36)
MCHC RBC-ENTMCNC: 29.8 G/DL — LOW (ref 32–36)
MCHC RBC-ENTMCNC: 29.9 PG — SIGNIFICANT CHANGE UP (ref 27–34)
MCHC RBC-ENTMCNC: 30 G/DL — LOW (ref 32–36)
MCHC RBC-ENTMCNC: 30.2 G/DL — LOW (ref 32–36)
MCHC RBC-ENTMCNC: 30.2 G/DL — LOW (ref 32–36)
MCHC RBC-ENTMCNC: 30.2 GM/DL — LOW (ref 32–36)
MCHC RBC-ENTMCNC: 30.4 G/DL — LOW (ref 32–36)
MCHC RBC-ENTMCNC: 30.6 G/DL — LOW (ref 32–36)
MCHC RBC-ENTMCNC: 30.7 PG — SIGNIFICANT CHANGE UP (ref 27–34)
MCHC RBC-ENTMCNC: 30.8 G/DL — LOW (ref 32–36)
MCHC RBC-ENTMCNC: 30.8 GM/DL — LOW (ref 32–36)
MCHC RBC-ENTMCNC: 30.8 PG — SIGNIFICANT CHANGE UP (ref 27–34)
MCHC RBC-ENTMCNC: 30.8 PG — SIGNIFICANT CHANGE UP (ref 27–34)
MCHC RBC-ENTMCNC: 30.9 PG — SIGNIFICANT CHANGE UP (ref 27–34)
MCHC RBC-ENTMCNC: 31 G/DL — LOW (ref 32–36)
MCHC RBC-ENTMCNC: 31.1 GM/DL — LOW (ref 32–36)
MCHC RBC-ENTMCNC: 31.4 G/DL — LOW (ref 32–36)
MCHC RBC-ENTMCNC: 31.5 PG — SIGNIFICANT CHANGE UP (ref 27–34)
MCHC RBC-ENTMCNC: 31.5 PG — SIGNIFICANT CHANGE UP (ref 27–34)
MCHC RBC-ENTMCNC: 31.7 GM/DL — LOW (ref 32–36)
MCHC RBC-ENTMCNC: 31.9 G/DL — LOW (ref 32–36)
MCHC RBC-ENTMCNC: 31.9 PG — SIGNIFICANT CHANGE UP (ref 27–34)
MCHC RBC-ENTMCNC: 32 G/DL — SIGNIFICANT CHANGE UP (ref 32–36)
MCHC RBC-ENTMCNC: 32.1 GM/DL — SIGNIFICANT CHANGE UP (ref 32–36)
MCHC RBC-ENTMCNC: 32.6 GM/DL — SIGNIFICANT CHANGE UP (ref 32–36)
MCHC RBC-ENTMCNC: 32.8 G/DL — SIGNIFICANT CHANGE UP (ref 32–36)
MCHC RBC-ENTMCNC: 32.8 G/DL — SIGNIFICANT CHANGE UP (ref 32–36)
MCHC RBC-ENTMCNC: 32.8 GM/DL — SIGNIFICANT CHANGE UP (ref 32–36)
MCHC RBC-ENTMCNC: 33.1 GM/DL — SIGNIFICANT CHANGE UP (ref 32–36)
MCHC RBC-ENTMCNC: 33.1 PG — SIGNIFICANT CHANGE UP (ref 27–34)
MCHC RBC-ENTMCNC: 33.3 G/DL — SIGNIFICANT CHANGE UP (ref 32–36)
MCHC RBC-ENTMCNC: 33.7 PG — SIGNIFICANT CHANGE UP (ref 27–34)
MCHC RBC-ENTMCNC: 34 PG — SIGNIFICANT CHANGE UP (ref 27–34)
MCHC RBC-ENTMCNC: 34.7 PG — HIGH (ref 27–34)
MCHC RBC-ENTMCNC: 34.9 PG — HIGH (ref 27–34)
MCHC RBC-ENTMCNC: 35 PG — HIGH (ref 27–34)
MCHC RBC-ENTMCNC: 35.2 PG — HIGH (ref 27–34)
MCHC RBC-ENTMCNC: 35.5 PG — HIGH (ref 27–34)
MCHC RBC-ENTMCNC: 35.5 PG — HIGH (ref 27–34)
MCHC RBC-ENTMCNC: 35.6 PG — HIGH (ref 27–34)
MCHC RBC-ENTMCNC: 36.2 PG — HIGH (ref 27–34)
MCHC RBC-ENTMCNC: 36.2 PG — HIGH (ref 27–34)
MCHC RBC-ENTMCNC: 36.4 PG — HIGH (ref 27–34)
MCHC RBC-ENTMCNC: 37.1 PG — HIGH (ref 27–34)
MCHC RBC-ENTMCNC: 37.2 PG — HIGH (ref 27–34)
MCHC RBC-ENTMCNC: 38.4 PG — HIGH (ref 27–34)
MCHC RBC-ENTMCNC: 39 PG — HIGH (ref 27–34)
MCHC RBC-ENTMCNC: 39.3 PG — HIGH (ref 27–34)
MCHC RBC-ENTMCNC: 39.4 PG — HIGH (ref 27–34)
MCHC RBC-ENTMCNC: 39.7 PG — HIGH (ref 27–34)
MCHC RBC-ENTMCNC: 40.1 PG — HIGH (ref 27–34)
MCHC RBC-ENTMCNC: 40.1 PG — HIGH (ref 27–34)
MCV RBC AUTO: 100 FL — SIGNIFICANT CHANGE UP (ref 80–100)
MCV RBC AUTO: 100 FL — SIGNIFICANT CHANGE UP (ref 80–100)
MCV RBC AUTO: 101 FL — HIGH (ref 80–100)
MCV RBC AUTO: 102.3 FL — HIGH (ref 80–100)
MCV RBC AUTO: 102.7 FL — HIGH (ref 80–100)
MCV RBC AUTO: 104 FL — HIGH (ref 80–100)
MCV RBC AUTO: 104.4 FL — HIGH (ref 80–100)
MCV RBC AUTO: 107.5 FL — HIGH (ref 80–100)
MCV RBC AUTO: 108.6 FL — HIGH (ref 80–100)
MCV RBC AUTO: 109.2 FL — HIGH (ref 80–100)
MCV RBC AUTO: 109.5 FL — HIGH (ref 80–100)
MCV RBC AUTO: 109.6 FL — HIGH (ref 80–100)
MCV RBC AUTO: 113.9 FL — HIGH (ref 80–100)
MCV RBC AUTO: 115.3 FL — HIGH (ref 80–100)
MCV RBC AUTO: 116.1 FL — HIGH (ref 80–100)
MCV RBC AUTO: 119.2 FL — HIGH (ref 80–100)
MCV RBC AUTO: 119.6 FL — HIGH (ref 80–100)
MCV RBC AUTO: 119.7 FL — HIGH (ref 80–100)
MCV RBC AUTO: 119.8 FL — HIGH (ref 80–100)
MCV RBC AUTO: 119.9 FL — HIGH (ref 80–100)
MCV RBC AUTO: 120.9 FL — HIGH (ref 80–100)
MCV RBC AUTO: 121 FL — HIGH (ref 80–100)
MCV RBC AUTO: 121.2 FL — HIGH (ref 80–100)
MCV RBC AUTO: 121.3 FL — HIGH (ref 80–100)
MCV RBC AUTO: 121.5 FL — HIGH (ref 80–100)
MCV RBC AUTO: 121.8 FL — HIGH (ref 80–100)
MCV RBC AUTO: 122.3 FL — HIGH (ref 80–100)
MCV RBC AUTO: 123 FL — HIGH (ref 80–100)
MCV RBC AUTO: 130.5 FL — HIGH (ref 80–100)
MCV RBC AUTO: 97.4 FL — SIGNIFICANT CHANGE UP (ref 80–100)
MCV RBC AUTO: 97.5 FL — SIGNIFICANT CHANGE UP (ref 80–100)
MCV RBC AUTO: 98.5 FL — SIGNIFICANT CHANGE UP (ref 80–100)
MCV RBC AUTO: 98.6 FL — SIGNIFICANT CHANGE UP (ref 80–100)
MCV RBC AUTO: 98.9 FL — SIGNIFICANT CHANGE UP (ref 80–100)
MCV RBC AUTO: 99.2 FL — SIGNIFICANT CHANGE UP (ref 80–100)
MCV RBC AUTO: 99.4 FL — SIGNIFICANT CHANGE UP (ref 80–100)
MCV RBC AUTO: 99.4 FL — SIGNIFICANT CHANGE UP (ref 80–100)
MCV RBC AUTO: 99.8 FL — SIGNIFICANT CHANGE UP (ref 80–100)
METAMYELOCYTES # FLD: 0.5 % — HIGH (ref 0–0)
METAMYELOCYTES # FLD: 0.9 % — SIGNIFICANT CHANGE UP (ref 0–1)
METAMYELOCYTES # FLD: 0.9 % — SIGNIFICANT CHANGE UP (ref 0–1)
METAMYELOCYTES # FLD: 1 % — HIGH (ref 0–0)
METAMYELOCYTES # FLD: 1 % — HIGH (ref 0–0)
METAMYELOCYTES # FLD: 1.1 % — HIGH (ref 0–1)
METAMYELOCYTES # FLD: 2 % — HIGH (ref 0–0)
METAMYELOCYTES # FLD: 2.3 % — HIGH (ref 0–1)
METAMYELOCYTES # FLD: 2.5 % — HIGH (ref 0–0)
METAMYELOCYTES # FLD: 3 % — HIGH (ref 0–0)
METAMYELOCYTES # FLD: 3 % — HIGH (ref 0–0)
METAMYELOCYTES # FLD: 4 % — HIGH (ref 0–0)
METAMYELOCYTES # FLD: 5 % — HIGH (ref 0–0)
METAMYELOCYTES # FLD: 6 % — HIGH (ref 0–0)
MICROCYTES BLD QL: SIGNIFICANT CHANGE UP
MICROCYTES BLD QL: SLIGHT — SIGNIFICANT CHANGE UP
MONOCYTES # BLD AUTO: 0 K/UL — SIGNIFICANT CHANGE UP (ref 0–0.9)
MONOCYTES # BLD AUTO: 0 K/UL — SIGNIFICANT CHANGE UP (ref 0–0.9)
MONOCYTES # BLD AUTO: 0.2 K/UL — SIGNIFICANT CHANGE UP (ref 0–0.9)
MONOCYTES # BLD AUTO: 0.34 K/UL — SIGNIFICANT CHANGE UP (ref 0–0.9)
MONOCYTES # BLD AUTO: 0.38 K/UL — SIGNIFICANT CHANGE UP (ref 0–0.9)
MONOCYTES # BLD AUTO: 0.44 K/UL — SIGNIFICANT CHANGE UP (ref 0–0.9)
MONOCYTES # BLD AUTO: 0.61 K/UL — SIGNIFICANT CHANGE UP (ref 0–0.9)
MONOCYTES # BLD AUTO: 0.63 K/UL — SIGNIFICANT CHANGE UP (ref 0–0.9)
MONOCYTES # BLD AUTO: 0.75 K/UL — SIGNIFICANT CHANGE UP (ref 0–0.9)
MONOCYTES # BLD AUTO: 0.87 K/UL — SIGNIFICANT CHANGE UP (ref 0–0.9)
MONOCYTES # BLD AUTO: 0.95 K/UL — HIGH (ref 0–0.9)
MONOCYTES # BLD AUTO: 1.1 K/UL — HIGH (ref 0–0.9)
MONOCYTES # BLD AUTO: 1.35 K/UL — HIGH (ref 0–0.9)
MONOCYTES # BLD AUTO: 1.39 K/UL — HIGH (ref 0–0.9)
MONOCYTES # BLD AUTO: 13.07 K/UL — HIGH (ref 0–0.9)
MONOCYTES # BLD AUTO: 13.88 K/UL — HIGH (ref 0–0.9)
MONOCYTES # BLD AUTO: 14.63 K/UL — HIGH (ref 0–0.9)
MONOCYTES # BLD AUTO: 14.95 K/UL — HIGH (ref 0–0.9)
MONOCYTES # BLD AUTO: 16.89 K/UL — HIGH (ref 0–0.9)
MONOCYTES # BLD AUTO: 19.42 K/UL — HIGH (ref 0–0.9)
MONOCYTES # BLD AUTO: 2.49 K/UL — HIGH (ref 0–0.9)
MONOCYTES # BLD AUTO: 21.04 K/UL — HIGH (ref 0–0.9)
MONOCYTES # BLD AUTO: 22.49 K/UL — HIGH (ref 0–0.9)
MONOCYTES # BLD AUTO: 3.04 K/UL — HIGH (ref 0–0.9)
MONOCYTES # BLD AUTO: 3.7 K/UL — HIGH (ref 0–0.9)
MONOCYTES # BLD AUTO: 3.83 K/UL — HIGH (ref 0–0.9)
MONOCYTES # BLD AUTO: 4.16 K/UL — HIGH (ref 0–0.9)
MONOCYTES # BLD AUTO: 40.46 K/UL — HIGH (ref 0–0.9)
MONOCYTES # BLD AUTO: 45.79 K/UL — HIGH (ref 0–0.9)
MONOCYTES # BLD AUTO: 6.89 K/UL — HIGH (ref 0–0.9)
MONOCYTES # BLD AUTO: 7.15 K/UL — HIGH (ref 0–0.9)
MONOCYTES # BLD AUTO: 8.07 K/UL — HIGH (ref 0–0.9)
MONOCYTES # BLD AUTO: 8.39 K/UL — HIGH (ref 0–0.9)
MONOCYTES # BLD AUTO: 9.41 K/UL — HIGH (ref 0–0.9)
MONOCYTES NFR BLD AUTO: 0 % — LOW (ref 2–14)
MONOCYTES NFR BLD AUTO: 0 % — LOW (ref 2–14)
MONOCYTES NFR BLD AUTO: 1 % — LOW (ref 2–14)
MONOCYTES NFR BLD AUTO: 1.7 % — LOW (ref 2–14)
MONOCYTES NFR BLD AUTO: 1.8 % — LOW (ref 2–14)
MONOCYTES NFR BLD AUTO: 11 % — SIGNIFICANT CHANGE UP (ref 2–14)
MONOCYTES NFR BLD AUTO: 11.5 % — SIGNIFICANT CHANGE UP (ref 2–14)
MONOCYTES NFR BLD AUTO: 11.5 % — SIGNIFICANT CHANGE UP (ref 2–14)
MONOCYTES NFR BLD AUTO: 16 % — HIGH (ref 2–14)
MONOCYTES NFR BLD AUTO: 16 % — HIGH (ref 2–14)
MONOCYTES NFR BLD AUTO: 2 % — SIGNIFICANT CHANGE UP (ref 2–14)
MONOCYTES NFR BLD AUTO: 28.8 % — HIGH (ref 2–14)
MONOCYTES NFR BLD AUTO: 3 % — SIGNIFICANT CHANGE UP (ref 2–14)
MONOCYTES NFR BLD AUTO: 3.5 % — SIGNIFICANT CHANGE UP (ref 2–14)
MONOCYTES NFR BLD AUTO: 34.4 % — HIGH (ref 2–14)
MONOCYTES NFR BLD AUTO: 4 % — SIGNIFICANT CHANGE UP (ref 2–14)
MONOCYTES NFR BLD AUTO: 4.5 % — SIGNIFICANT CHANGE UP (ref 2–14)
MONOCYTES NFR BLD AUTO: 41.2 % — HIGH (ref 2–14)
MONOCYTES NFR BLD AUTO: 5 % — SIGNIFICANT CHANGE UP (ref 2–14)
MONOCYTES NFR BLD AUTO: 5.5 % — SIGNIFICANT CHANGE UP (ref 2–14)
MONOCYTES NFR BLD AUTO: 5.5 % — SIGNIFICANT CHANGE UP (ref 2–14)
MONOCYTES NFR BLD AUTO: 5.6 % — SIGNIFICANT CHANGE UP (ref 2–14)
MONOCYTES NFR BLD AUTO: 6 % — SIGNIFICANT CHANGE UP (ref 2–14)
MONOCYTES NFR BLD AUTO: 7 % — SIGNIFICANT CHANGE UP (ref 2–14)
MONOCYTES NFR BLD AUTO: 7 % — SIGNIFICANT CHANGE UP (ref 2–14)
MONOCYTES NFR BLD AUTO: 8 % — SIGNIFICANT CHANGE UP (ref 2–14)
MONOCYTES NFR BLD AUTO: 9 % — SIGNIFICANT CHANGE UP (ref 2–14)
MONOCYTES NFR BLD AUTO: 9.5 % — SIGNIFICANT CHANGE UP (ref 2–14)
MYELOCYTES NFR BLD: 0.9 % — HIGH (ref 0–0)
MYELOCYTES NFR BLD: 1 % — HIGH (ref 0–0)
MYELOCYTES NFR BLD: 1 % — HIGH (ref 0–0)
MYELOCYTES NFR BLD: 12.5 % — HIGH (ref 0–0)
MYELOCYTES NFR BLD: 15 % — HIGH (ref 0–0)
MYELOCYTES NFR BLD: 15 % — HIGH (ref 0–0)
MYELOCYTES NFR BLD: 2 % — HIGH (ref 0–0)
MYELOCYTES NFR BLD: 2.5 % — HIGH (ref 0–0)
MYELOCYTES NFR BLD: 21 % — HIGH (ref 0–0)
MYELOCYTES NFR BLD: 3 % — HIGH (ref 0–0)
MYELOCYTES NFR BLD: 3 % — HIGH (ref 0–0)
MYELOCYTES NFR BLD: 4 % — HIGH (ref 0–0)
MYELOCYTES NFR BLD: 4 % — HIGH (ref 0–0)
MYELOCYTES NFR BLD: 5 % — HIGH (ref 0–0)
MYELOCYTES NFR BLD: 6 % — HIGH (ref 0–0)
MYELOCYTES NFR BLD: 6.4 % — HIGH (ref 0–0)
MYELOCYTES NFR BLD: 7 % — HIGH (ref 0–0)
MYELOCYTES NFR BLD: 7 % — HIGH (ref 0–0)
MYELOCYTES NFR BLD: 7.5 % — HIGH (ref 0–0)
MYELOCYTES NFR BLD: 8 % — HIGH (ref 0–0)
MYELOCYTES NFR BLD: 8 % — HIGH (ref 0–0)
MYELOCYTES NFR BLD: 8.6 % — HIGH (ref 0–0)
NEUTROPHILS # BLD AUTO: 10.34 K/UL — HIGH (ref 1.8–7.4)
NEUTROPHILS # BLD AUTO: 11.94 K/UL — HIGH (ref 1.8–7.4)
NEUTROPHILS # BLD AUTO: 12.36 K/UL — HIGH (ref 1.8–7.4)
NEUTROPHILS # BLD AUTO: 13.29 K/UL — HIGH (ref 1.8–7.4)
NEUTROPHILS # BLD AUTO: 13.65 K/UL — HIGH (ref 1.8–7.4)
NEUTROPHILS # BLD AUTO: 15.58 K/UL — HIGH (ref 1.8–7.4)
NEUTROPHILS # BLD AUTO: 15.76 K/UL — HIGH (ref 1.8–7.4)
NEUTROPHILS # BLD AUTO: 22.08 K/UL — HIGH (ref 1.8–7.4)
NEUTROPHILS # BLD AUTO: 26.47 K/UL — HIGH (ref 1.8–7.4)
NEUTROPHILS # BLD AUTO: 28.74 K/UL — HIGH (ref 1.8–7.4)
NEUTROPHILS # BLD AUTO: 3.59 K/UL — SIGNIFICANT CHANGE UP (ref 1.8–7.4)
NEUTROPHILS # BLD AUTO: 30.48 K/UL — HIGH (ref 1.8–7.4)
NEUTROPHILS # BLD AUTO: 31.98 K/UL — HIGH (ref 1.8–7.4)
NEUTROPHILS # BLD AUTO: 33.61 K/UL — HIGH (ref 1.8–7.4)
NEUTROPHILS # BLD AUTO: 35.1 K/UL — HIGH (ref 1.8–7.4)
NEUTROPHILS # BLD AUTO: 40.41 K/UL — HIGH (ref 1.8–7.4)
NEUTROPHILS # BLD AUTO: 40.81 K/UL — HIGH (ref 1.8–7.4)
NEUTROPHILS # BLD AUTO: 40.86 K/UL — HIGH (ref 1.8–7.4)
NEUTROPHILS # BLD AUTO: 41.95 K/UL — HIGH (ref 1.8–7.4)
NEUTROPHILS # BLD AUTO: 44.94 K/UL — HIGH (ref 1.8–7.4)
NEUTROPHILS # BLD AUTO: 48.7 K/UL — HIGH (ref 1.8–7.4)
NEUTROPHILS # BLD AUTO: 5.19 K/UL — SIGNIFICANT CHANGE UP (ref 1.8–7.4)
NEUTROPHILS # BLD AUTO: 58.99 K/UL — HIGH (ref 1.8–7.4)
NEUTROPHILS # BLD AUTO: 59.75 K/UL — HIGH (ref 1.8–7.4)
NEUTROPHILS # BLD AUTO: 69.81 K/UL — HIGH (ref 1.8–7.4)
NEUTROPHILS # BLD AUTO: 73.16 K/UL — HIGH (ref 1.8–7.4)
NEUTROPHILS # BLD AUTO: 74.81 K/UL — HIGH (ref 1.8–7.4)
NEUTROPHILS # BLD AUTO: 78 K/UL — HIGH (ref 1.8–7.4)
NEUTROPHILS # BLD AUTO: 80.23 K/UL — HIGH (ref 1.8–7.4)
NEUTROPHILS # BLD AUTO: 85.96 K/UL — HIGH (ref 1.8–7.4)
NEUTROPHILS # BLD AUTO: 9.07 K/UL — HIGH (ref 1.8–7.4)
NEUTROPHILS # BLD AUTO: 91.93 K/UL — HIGH (ref 1.8–7.4)
NEUTROPHILS # BLD AUTO: 94.73 K/UL — HIGH (ref 1.8–7.4)
NEUTROPHILS # BLD AUTO: 97.13 K/UL — HIGH (ref 1.8–7.4)
NEUTROPHILS NFR BLD AUTO: 11 % — LOW (ref 43–77)
NEUTROPHILS NFR BLD AUTO: 26.9 % — LOW (ref 43–77)
NEUTROPHILS NFR BLD AUTO: 28.3 % — LOW (ref 43–77)
NEUTROPHILS NFR BLD AUTO: 36.7 % — LOW (ref 43–77)
NEUTROPHILS NFR BLD AUTO: 38 % — LOW (ref 43–77)
NEUTROPHILS NFR BLD AUTO: 41.1 % — LOW (ref 43–77)
NEUTROPHILS NFR BLD AUTO: 41.4 % — LOW (ref 43–77)
NEUTROPHILS NFR BLD AUTO: 43.3 % — SIGNIFICANT CHANGE UP (ref 43–77)
NEUTROPHILS NFR BLD AUTO: 45 % — SIGNIFICANT CHANGE UP (ref 43–77)
NEUTROPHILS NFR BLD AUTO: 45 % — SIGNIFICANT CHANGE UP (ref 43–77)
NEUTROPHILS NFR BLD AUTO: 47 % — SIGNIFICANT CHANGE UP (ref 43–77)
NEUTROPHILS NFR BLD AUTO: 47.9 % — SIGNIFICANT CHANGE UP (ref 43–77)
NEUTROPHILS NFR BLD AUTO: 50 % — SIGNIFICANT CHANGE UP (ref 43–77)
NEUTROPHILS NFR BLD AUTO: 51.5 % — SIGNIFICANT CHANGE UP (ref 43–77)
NEUTROPHILS NFR BLD AUTO: 52 % — SIGNIFICANT CHANGE UP (ref 43–77)
NEUTROPHILS NFR BLD AUTO: 52 % — SIGNIFICANT CHANGE UP (ref 43–77)
NEUTROPHILS NFR BLD AUTO: 52.4 % — SIGNIFICANT CHANGE UP (ref 43–77)
NEUTROPHILS NFR BLD AUTO: 53.3 % — SIGNIFICANT CHANGE UP (ref 43–77)
NEUTROPHILS NFR BLD AUTO: 54.4 % — SIGNIFICANT CHANGE UP (ref 43–77)
NEUTROPHILS NFR BLD AUTO: 56 % — SIGNIFICANT CHANGE UP (ref 43–77)
NEUTROPHILS NFR BLD AUTO: 57 % — SIGNIFICANT CHANGE UP (ref 43–77)
NEUTROPHILS NFR BLD AUTO: 57 % — SIGNIFICANT CHANGE UP (ref 43–77)
NEUTROPHILS NFR BLD AUTO: 57.5 % — SIGNIFICANT CHANGE UP (ref 43–77)
NEUTROPHILS NFR BLD AUTO: 57.8 % — SIGNIFICANT CHANGE UP (ref 43–77)
NEUTROPHILS NFR BLD AUTO: 59 % — SIGNIFICANT CHANGE UP (ref 43–77)
NEUTROPHILS NFR BLD AUTO: 61 % — SIGNIFICANT CHANGE UP (ref 43–77)
NEUTROPHILS NFR BLD AUTO: 62 % — SIGNIFICANT CHANGE UP (ref 43–77)
NEUTROPHILS NFR BLD AUTO: 62 % — SIGNIFICANT CHANGE UP (ref 43–77)
NEUTROPHILS NFR BLD AUTO: 63 % — SIGNIFICANT CHANGE UP (ref 43–77)
NEUTROPHILS NFR BLD AUTO: 65 % — SIGNIFICANT CHANGE UP (ref 43–77)
NEUTROPHILS NFR BLD AUTO: 66 % — SIGNIFICANT CHANGE UP (ref 43–77)
NEUTROPHILS NFR BLD AUTO: 68 % — SIGNIFICANT CHANGE UP (ref 43–77)
NEUTROPHILS NFR BLD AUTO: 71 % — SIGNIFICANT CHANGE UP (ref 43–77)
NEUTROPHILS NFR BLD AUTO: 71 % — SIGNIFICANT CHANGE UP (ref 43–77)
NEUTS BAND # BLD: 1 % — SIGNIFICANT CHANGE UP (ref 0–8)
NEUTS BAND # BLD: 1 % — SIGNIFICANT CHANGE UP (ref 0–8)
NEUTS BAND # BLD: 2 % — SIGNIFICANT CHANGE UP (ref 0–8)
NEUTS BAND # BLD: 20 % — HIGH (ref 0–8)
NEUTS BAND # BLD: 3.7 % — SIGNIFICANT CHANGE UP (ref 0–6)
NEUTS BAND # BLD: 4.6 % — SIGNIFICANT CHANGE UP (ref 0–6)
NEUTS BAND # BLD: 6 % — SIGNIFICANT CHANGE UP (ref 0–6)
NEUTS BAND # BLD: 7 % — HIGH (ref 0–6)
NITRITE UR-MCNC: NEGATIVE — SIGNIFICANT CHANGE UP
NON HDL CHOLESTEROL: 73 MG/DL — SIGNIFICANT CHANGE UP
NRBC # BLD: 10 /100 WBCS — SIGNIFICANT CHANGE UP
NRBC # BLD: 10 /100 — HIGH (ref 0–0)
NRBC # BLD: 102 /100 WBCS — HIGH (ref 0–0)
NRBC # BLD: 104 /100 WBCS — HIGH (ref 0–0)
NRBC # BLD: 123 /100 — HIGH (ref 0–0)
NRBC # BLD: 130 /100 — HIGH (ref 0–0)
NRBC # BLD: 149 /100 — HIGH (ref 0–0)
NRBC # BLD: 16 /100 — HIGH (ref 0–0)
NRBC # BLD: 19 /100 WBCS — HIGH (ref 0–0)
NRBC # BLD: 199 /100 WBCS — HIGH (ref 0–0)
NRBC # BLD: 207 /100 WBCS — HIGH (ref 0–0)
NRBC # BLD: 21 /100 — HIGH (ref 0–0)
NRBC # BLD: 222 /100 WBCS — HIGH (ref 0–0)
NRBC # BLD: 23 /100 — HIGH (ref 0–0)
NRBC # BLD: 24 /100 — HIGH (ref 0–0)
NRBC # BLD: 28 /100 — HIGH (ref 0–0)
NRBC # BLD: 29 /100 — HIGH (ref 0–0)
NRBC # BLD: 30 /100 — HIGH (ref 0–0)
NRBC # BLD: 32 /100 — HIGH (ref 0–0)
NRBC # BLD: 34 /100 — HIGH (ref 0–0)
NRBC # BLD: 35 /100 — HIGH (ref 0–0)
NRBC # BLD: 37 /100 WBCS — HIGH (ref 0–0)
NRBC # BLD: 39 /100 — HIGH (ref 0–0)
NRBC # BLD: 4 /100 WBCS — SIGNIFICANT CHANGE UP
NRBC # BLD: 4 /100 — HIGH (ref 0–0)
NRBC # BLD: 43 /100 — HIGH (ref 0–0)
NRBC # BLD: 47 /100 — HIGH (ref 0–0)
NRBC # BLD: 48 /100 — HIGH (ref 0–0)
NRBC # BLD: 5 /100 WBCS — SIGNIFICANT CHANGE UP
NRBC # BLD: 56 /100 — HIGH (ref 0–0)
NRBC # BLD: 6 /100 — HIGH (ref 0–0)
NRBC # BLD: 61 /100 — HIGH (ref 0–0)
NRBC # BLD: 62 /100 — HIGH (ref 0–0)
NRBC # BLD: 62 /100 — HIGH (ref 0–0)
NRBC # BLD: SIGNIFICANT CHANGE UP /100 WBCS (ref 0–0)
NRBC # FLD: 113.59 K/UL — HIGH (ref 0–0)
NRBC # FLD: 113.65 K/UL — HIGH (ref 0–0)
NRBC # FLD: 117.99 K/UL — HIGH (ref 0–0)
NRBC # FLD: 120.07 K/UL — HIGH (ref 0–0)
NRBC # FLD: 155.04 K/UL — HIGH (ref 0–0)
NRBC # FLD: 155.57 K/UL — HIGH (ref 0–0)
NRBC # FLD: 166.84 K/UL — HIGH (ref 0–0)
NRBC # FLD: 2.76 K/UL — HIGH
NRBC # FLD: 3.25 K/UL — HIGH
NRBC # FLD: 34.02 K/UL — HIGH (ref 0–0)
NRBC # FLD: 5.62 K/UL — HIGH
NRBC # FLD: 50.75 K/UL — HIGH (ref 0–0)
NT-PROBNP SERPL-SCNC: 8978 PG/ML — HIGH (ref 0–125)
NT-PROBNP SERPL-SCNC: HIGH PG/ML
OB PNL STL: NEGATIVE — SIGNIFICANT CHANGE UP
ONKOSIGHT MYELOID SEQUENCE: SIGNIFICANT CHANGE UP
OVALOCYTES BLD QL SMEAR: SLIGHT — SIGNIFICANT CHANGE UP
PAPPENHEIMER BOD BLD QL SMEAR: PRESENT — SIGNIFICANT CHANGE UP
PCO2 BLDV: 50 MMHG — SIGNIFICANT CHANGE UP (ref 42–55)
PCO2 BLDV: 52 MMHG — SIGNIFICANT CHANGE UP (ref 42–55)
PCO2 BLDV: 53 MMHG — SIGNIFICANT CHANGE UP (ref 42–55)
PCO2 BLDV: 57 MMHG — HIGH (ref 42–55)
PCO2 BLDV: 61 MMHG — HIGH (ref 42–55)
PH BLDV: 7.21 — LOW (ref 7.32–7.43)
PH BLDV: 7.23 — LOW (ref 7.32–7.43)
PH BLDV: 7.3 — LOW (ref 7.32–7.43)
PH BLDV: 7.34 — SIGNIFICANT CHANGE UP (ref 7.32–7.43)
PH BLDV: 7.37 — SIGNIFICANT CHANGE UP (ref 7.32–7.43)
PH UR: 5 — SIGNIFICANT CHANGE UP (ref 5–8)
PH UR: 5.5 — SIGNIFICANT CHANGE UP (ref 5–8)
PH UR: 6 — SIGNIFICANT CHANGE UP (ref 5–8)
PHOSPHATE SERPL-MCNC: 1.9 MG/DL — LOW (ref 2.5–4.5)
PHOSPHATE SERPL-MCNC: 2.2 MG/DL — LOW (ref 2.5–4.5)
PHOSPHATE SERPL-MCNC: 2.4 MG/DL — LOW (ref 2.5–4.5)
PHOSPHATE SERPL-MCNC: 2.7 MG/DL — SIGNIFICANT CHANGE UP (ref 2.5–4.5)
PHOSPHATE SERPL-MCNC: 3 MG/DL — SIGNIFICANT CHANGE UP (ref 2.5–4.5)
PHOSPHATE SERPL-MCNC: 3.1 MG/DL — SIGNIFICANT CHANGE UP (ref 2.5–4.5)
PHOSPHATE SERPL-MCNC: 3.2 MG/DL — SIGNIFICANT CHANGE UP (ref 2.5–4.5)
PHOSPHATE SERPL-MCNC: 3.2 MG/DL — SIGNIFICANT CHANGE UP (ref 2.5–4.5)
PHOSPHATE SERPL-MCNC: 3.4 MG/DL — SIGNIFICANT CHANGE UP (ref 2.5–4.5)
PHOSPHATE SERPL-MCNC: 3.6 MG/DL — SIGNIFICANT CHANGE UP (ref 2.5–4.5)
PHOSPHATE SERPL-MCNC: 3.6 MG/DL — SIGNIFICANT CHANGE UP (ref 2.5–4.5)
PHOSPHATE SERPL-MCNC: 3.9 MG/DL — SIGNIFICANT CHANGE UP (ref 2.5–4.5)
PHOSPHATE SERPL-MCNC: 3.9 MG/DL — SIGNIFICANT CHANGE UP (ref 2.5–4.5)
PLAT MORPH BLD: ABNORMAL
PLAT MORPH BLD: NORMAL — SIGNIFICANT CHANGE UP
PLATELET # BLD AUTO: 127 K/UL — LOW (ref 150–400)
PLATELET # BLD AUTO: 129 K/UL — LOW (ref 150–400)
PLATELET # BLD AUTO: 132 K/UL — LOW (ref 150–400)
PLATELET # BLD AUTO: 132 K/UL — LOW (ref 150–400)
PLATELET # BLD AUTO: 133 K/UL — LOW (ref 150–400)
PLATELET # BLD AUTO: 136 K/UL — LOW (ref 150–400)
PLATELET # BLD AUTO: 136 K/UL — LOW (ref 150–400)
PLATELET # BLD AUTO: 137 K/UL — LOW (ref 150–400)
PLATELET # BLD AUTO: 141 K/UL — LOW (ref 150–400)
PLATELET # BLD AUTO: 154 K/UL — SIGNIFICANT CHANGE UP (ref 150–400)
PLATELET # BLD AUTO: 160 K/UL — SIGNIFICANT CHANGE UP (ref 150–400)
PLATELET # BLD AUTO: 174 K/UL — SIGNIFICANT CHANGE UP (ref 150–400)
PLATELET # BLD AUTO: 175 K/UL — SIGNIFICANT CHANGE UP (ref 150–400)
PLATELET # BLD AUTO: 178 K/UL — SIGNIFICANT CHANGE UP (ref 150–400)
PLATELET # BLD AUTO: 181 K/UL — SIGNIFICANT CHANGE UP (ref 150–400)
PLATELET # BLD AUTO: 182 K/UL — SIGNIFICANT CHANGE UP (ref 150–400)
PLATELET # BLD AUTO: 184 K/UL — SIGNIFICANT CHANGE UP (ref 150–400)
PLATELET # BLD AUTO: 187 K/UL — SIGNIFICANT CHANGE UP (ref 150–400)
PLATELET # BLD AUTO: 192 K/UL — SIGNIFICANT CHANGE UP (ref 150–400)
PLATELET # BLD AUTO: 202 K/UL — SIGNIFICANT CHANGE UP (ref 150–400)
PLATELET # BLD AUTO: 203 K/UL — SIGNIFICANT CHANGE UP (ref 150–400)
PLATELET # BLD AUTO: 232 K/UL — SIGNIFICANT CHANGE UP (ref 150–400)
PLATELET # BLD AUTO: 251 K/UL — SIGNIFICANT CHANGE UP (ref 150–400)
PLATELET # BLD AUTO: 258 K/UL — SIGNIFICANT CHANGE UP (ref 150–400)
PLATELET # BLD AUTO: 275 K/UL — SIGNIFICANT CHANGE UP (ref 150–400)
PLATELET # BLD AUTO: 290 K/UL — SIGNIFICANT CHANGE UP (ref 150–400)
PLATELET # BLD AUTO: 45 K/UL — LOW (ref 150–400)
PLATELET # BLD AUTO: 469 K/UL — HIGH (ref 150–400)
PLATELET # BLD AUTO: 49 K/UL — LOW (ref 150–400)
PLATELET # BLD AUTO: 517 K/UL — HIGH (ref 150–400)
PLATELET # BLD AUTO: 528 K/UL — HIGH (ref 150–400)
PLATELET # BLD AUTO: 54 K/UL — LOW (ref 150–400)
PLATELET # BLD AUTO: 54 K/UL — LOW (ref 150–400)
PLATELET # BLD AUTO: 56 K/UL — LOW (ref 150–400)
PLATELET # BLD AUTO: 564 K/UL — HIGH (ref 150–400)
PLATELET # BLD AUTO: 601 K/UL — HIGH (ref 150–400)
PLATELET # BLD AUTO: 642 K/UL — HIGH (ref 150–400)
PLATELET # BLD AUTO: 71 K/UL — LOW (ref 150–400)
PLATELET COUNT - ESTIMATE: ABNORMAL
PLATELET COUNT - ESTIMATE: NORMAL — SIGNIFICANT CHANGE UP
PLATELET COUNT - ESTIMATE: NORMAL — SIGNIFICANT CHANGE UP
PO2 BLDV: 33 MMHG — SIGNIFICANT CHANGE UP
PO2 BLDV: 50 MMHG — SIGNIFICANT CHANGE UP
PO2 BLDV: <20 MMHG — SIGNIFICANT CHANGE UP
POIKILOCYTOSIS BLD QL AUTO: SIGNIFICANT CHANGE UP
POIKILOCYTOSIS BLD QL AUTO: SLIGHT — SIGNIFICANT CHANGE UP
POLYCHROMASIA BLD QL SMEAR: SIGNIFICANT CHANGE UP
POLYCHROMASIA BLD QL SMEAR: SLIGHT — SIGNIFICANT CHANGE UP
POTASSIUM BLDV-SCNC: 3.9 MMOL/L — SIGNIFICANT CHANGE UP (ref 3.5–5.1)
POTASSIUM BLDV-SCNC: 4 MMOL/L — SIGNIFICANT CHANGE UP (ref 3.5–5.1)
POTASSIUM BLDV-SCNC: 4.4 MMOL/L — SIGNIFICANT CHANGE UP (ref 3.5–5.1)
POTASSIUM BLDV-SCNC: 4.9 MMOL/L — SIGNIFICANT CHANGE UP (ref 3.5–5.1)
POTASSIUM BLDV-SCNC: 5.3 MMOL/L — HIGH (ref 3.5–5.1)
POTASSIUM SERPL-MCNC: 4 MMOL/L — SIGNIFICANT CHANGE UP (ref 3.5–5.3)
POTASSIUM SERPL-MCNC: 4.1 MMOL/L — SIGNIFICANT CHANGE UP (ref 3.5–5.3)
POTASSIUM SERPL-MCNC: 4.1 MMOL/L — SIGNIFICANT CHANGE UP (ref 3.5–5.3)
POTASSIUM SERPL-MCNC: 4.3 MMOL/L — SIGNIFICANT CHANGE UP (ref 3.5–5.3)
POTASSIUM SERPL-MCNC: 4.4 MMOL/L — SIGNIFICANT CHANGE UP (ref 3.5–5.3)
POTASSIUM SERPL-MCNC: 4.5 MMOL/L — SIGNIFICANT CHANGE UP (ref 3.5–5.3)
POTASSIUM SERPL-MCNC: 4.6 MMOL/L — SIGNIFICANT CHANGE UP (ref 3.5–5.3)
POTASSIUM SERPL-MCNC: 4.7 MMOL/L — SIGNIFICANT CHANGE UP (ref 3.5–5.3)
POTASSIUM SERPL-MCNC: 4.8 MMOL/L — SIGNIFICANT CHANGE UP (ref 3.5–5.3)
POTASSIUM SERPL-MCNC: 4.8 MMOL/L — SIGNIFICANT CHANGE UP (ref 3.5–5.3)
POTASSIUM SERPL-MCNC: 5.1 MMOL/L — SIGNIFICANT CHANGE UP (ref 3.5–5.3)
POTASSIUM SERPL-MCNC: 5.1 MMOL/L — SIGNIFICANT CHANGE UP (ref 3.5–5.3)
POTASSIUM SERPL-MCNC: 5.2 MMOL/L — SIGNIFICANT CHANGE UP (ref 3.5–5.3)
POTASSIUM SERPL-MCNC: 5.2 MMOL/L — SIGNIFICANT CHANGE UP (ref 3.5–5.3)
POTASSIUM SERPL-MCNC: 5.4 MMOL/L — HIGH (ref 3.5–5.3)
POTASSIUM SERPL-MCNC: 5.8 MMOL/L — HIGH (ref 3.5–5.3)
POTASSIUM SERPL-MCNC: 6.4 MMOL/L — CRITICAL HIGH (ref 3.5–5.3)
POTASSIUM SERPL-MCNC: 6.7 MMOL/L — CRITICAL HIGH (ref 3.5–5.3)
POTASSIUM SERPL-MCNC: 6.9 MMOL/L — CRITICAL HIGH (ref 3.5–5.3)
POTASSIUM SERPL-MCNC: 6.9 MMOL/L — CRITICAL HIGH (ref 3.5–5.3)
POTASSIUM SERPL-MCNC: 7 MMOL/L — CRITICAL HIGH (ref 3.5–5.3)
POTASSIUM SERPL-SCNC: 4 MMOL/L — SIGNIFICANT CHANGE UP (ref 3.5–5.3)
POTASSIUM SERPL-SCNC: 4.1 MMOL/L — SIGNIFICANT CHANGE UP (ref 3.5–5.3)
POTASSIUM SERPL-SCNC: 4.1 MMOL/L — SIGNIFICANT CHANGE UP (ref 3.5–5.3)
POTASSIUM SERPL-SCNC: 4.3 MMOL/L — SIGNIFICANT CHANGE UP (ref 3.5–5.3)
POTASSIUM SERPL-SCNC: 4.4 MMOL/L
POTASSIUM SERPL-SCNC: 4.4 MMOL/L — SIGNIFICANT CHANGE UP (ref 3.5–5.3)
POTASSIUM SERPL-SCNC: 4.5 MMOL/L — SIGNIFICANT CHANGE UP (ref 3.5–5.3)
POTASSIUM SERPL-SCNC: 4.6 MMOL/L — SIGNIFICANT CHANGE UP (ref 3.5–5.3)
POTASSIUM SERPL-SCNC: 4.7 MMOL/L — SIGNIFICANT CHANGE UP (ref 3.5–5.3)
POTASSIUM SERPL-SCNC: 4.8 MMOL/L
POTASSIUM SERPL-SCNC: 4.8 MMOL/L
POTASSIUM SERPL-SCNC: 4.8 MMOL/L — SIGNIFICANT CHANGE UP (ref 3.5–5.3)
POTASSIUM SERPL-SCNC: 4.8 MMOL/L — SIGNIFICANT CHANGE UP (ref 3.5–5.3)
POTASSIUM SERPL-SCNC: 5.1 MMOL/L — SIGNIFICANT CHANGE UP (ref 3.5–5.3)
POTASSIUM SERPL-SCNC: 5.1 MMOL/L — SIGNIFICANT CHANGE UP (ref 3.5–5.3)
POTASSIUM SERPL-SCNC: 5.2 MMOL/L
POTASSIUM SERPL-SCNC: 5.2 MMOL/L — SIGNIFICANT CHANGE UP (ref 3.5–5.3)
POTASSIUM SERPL-SCNC: 5.2 MMOL/L — SIGNIFICANT CHANGE UP (ref 3.5–5.3)
POTASSIUM SERPL-SCNC: 5.4 MMOL/L
POTASSIUM SERPL-SCNC: 5.4 MMOL/L — HIGH (ref 3.5–5.3)
POTASSIUM SERPL-SCNC: 5.8 MMOL/L — HIGH (ref 3.5–5.3)
POTASSIUM SERPL-SCNC: 6.4 MMOL/L — CRITICAL HIGH (ref 3.5–5.3)
POTASSIUM SERPL-SCNC: 6.7 MMOL/L — CRITICAL HIGH (ref 3.5–5.3)
POTASSIUM SERPL-SCNC: 6.9 MMOL/L — CRITICAL HIGH (ref 3.5–5.3)
POTASSIUM SERPL-SCNC: 6.9 MMOL/L — CRITICAL HIGH (ref 3.5–5.3)
POTASSIUM SERPL-SCNC: 7 MMOL/L — CRITICAL HIGH (ref 3.5–5.3)
PROMYELOCYTES # FLD: 0.5 % — HIGH (ref 0–0)
PROMYELOCYTES # FLD: 0.5 % — HIGH (ref 0–0)
PROMYELOCYTES # FLD: 0.8 % — HIGH (ref 0–0)
PROMYELOCYTES # FLD: 1 % — HIGH (ref 0–0)
PROMYELOCYTES # FLD: 1 % — HIGH (ref 0–0)
PROMYELOCYTES # FLD: 11 % — HIGH (ref 0–0)
PROMYELOCYTES # FLD: 12 % — HIGH (ref 0–0)
PROMYELOCYTES # FLD: 25 % — HIGH (ref 0–0)
PROMYELOCYTES # FLD: 3 % — HIGH (ref 0–0)
PROT SERPL-MCNC: 3.6 G/DL — LOW (ref 6–8.3)
PROT SERPL-MCNC: 4.9 G/DL — LOW (ref 6–8.3)
PROT SERPL-MCNC: 5 G/DL — LOW (ref 6–8.3)
PROT SERPL-MCNC: 5.3 GM/DL — LOW (ref 6–8.3)
PROT SERPL-MCNC: 5.5 G/DL — LOW (ref 6–8.3)
PROT SERPL-MCNC: 5.7 G/DL
PROT SERPL-MCNC: 5.7 G/DL — LOW (ref 6–8.3)
PROT SERPL-MCNC: 5.8 G/DL
PROT SERPL-MCNC: 5.8 G/DL — LOW (ref 6–8.3)
PROT SERPL-MCNC: 5.9 G/DL
PROT SERPL-MCNC: 5.9 G/DL — LOW (ref 6–8.3)
PROT SERPL-MCNC: 6 G/DL — SIGNIFICANT CHANGE UP (ref 6–8.3)
PROT SERPL-MCNC: 6.2 G/DL — SIGNIFICANT CHANGE UP (ref 6–8.3)
PROT SERPL-MCNC: 6.3 G/DL
PROT SERPL-MCNC: 6.4 G/DL — SIGNIFICANT CHANGE UP (ref 6–8.3)
PROT SERPL-MCNC: 6.5 G/DL
PROT SERPL-MCNC: 6.5 G/DL — SIGNIFICANT CHANGE UP (ref 6–8.3)
PROT SERPL-MCNC: 6.5 G/DL — SIGNIFICANT CHANGE UP (ref 6–8.3)
PROT SERPL-MCNC: 6.8 G/DL — SIGNIFICANT CHANGE UP (ref 6–8.3)
PROT SERPL-MCNC: 7 G/DL — SIGNIFICANT CHANGE UP (ref 6–8.3)
PROT SERPL-MCNC: 7 G/DL — SIGNIFICANT CHANGE UP (ref 6–8.3)
PROT UR-MCNC: 30 MG/DL
PROT UR-MCNC: ABNORMAL
PROT UR-MCNC: ABNORMAL
PROTHROM AB SERPL-ACNC: 13.1 SEC — SIGNIFICANT CHANGE UP (ref 10.5–13.4)
PROTHROM AB SERPL-ACNC: 13.9 SEC — HIGH (ref 10.5–13.4)
PROTHROM AB SERPL-ACNC: 14.5 SEC — HIGH (ref 10.5–13.4)
PROTHROM AB SERPL-ACNC: 15 SEC — HIGH (ref 10.5–13.4)
PROTHROM AB SERPL-ACNC: 15 SEC — HIGH (ref 10.5–13.4)
PROTHROM AB SERPL-ACNC: 16.9 SEC — HIGH (ref 10.5–13.4)
PROTHROM AB SERPL-ACNC: 29.3 SEC — HIGH (ref 10.5–13.4)
PT BLD: 12.7 SEC
RAPID RVP RESULT: SIGNIFICANT CHANGE UP
RBC # BLD: 1.77 M/UL — LOW (ref 4.2–5.8)
RBC # BLD: 1.81 M/UL — LOW (ref 4.2–5.8)
RBC # BLD: 1.87 M/UL — LOW (ref 4.2–5.8)
RBC # BLD: 1.97 M/UL — LOW (ref 4.2–5.8)
RBC # BLD: 2.11 M/UL — LOW (ref 4.2–5.8)
RBC # BLD: 2.11 M/UL — LOW (ref 4.2–5.8)
RBC # BLD: 2.13 M/UL — LOW (ref 4.2–5.8)
RBC # BLD: 2.14 M/UL — LOW (ref 4.2–5.8)
RBC # BLD: 2.19 M/UL — LOW (ref 4.2–5.8)
RBC # BLD: 2.22 M/UL — LOW (ref 4.2–5.8)
RBC # BLD: 2.34 M/UL — LOW (ref 4.2–5.8)
RBC # BLD: 2.35 M/UL — LOW (ref 4.2–5.8)
RBC # BLD: 2.49 M/UL — LOW (ref 4.2–5.8)
RBC # BLD: 2.59 M/UL — LOW (ref 4.2–5.8)
RBC # BLD: 2.62 M/UL — LOW (ref 4.2–5.8)
RBC # BLD: 2.66 M/UL — LOW (ref 4.2–5.8)
RBC # BLD: 2.83 M/UL — LOW (ref 4.2–5.8)
RBC # BLD: 2.94 M/UL — LOW (ref 4.2–5.8)
RBC # BLD: 2.98 M/UL — LOW (ref 4.2–5.8)
RBC # BLD: 3.15 M/UL — LOW (ref 4.2–5.8)
RBC # BLD: 3.17 M/UL — LOW (ref 4.2–5.8)
RBC # BLD: 3.21 M/UL — LOW (ref 4.2–5.8)
RBC # BLD: 3.23 M/UL — LOW (ref 4.2–5.8)
RBC # BLD: 3.26 M/UL — LOW (ref 4.2–5.8)
RBC # BLD: 3.38 M/UL — LOW (ref 4.2–5.8)
RBC # BLD: 3.46 M/UL — LOW (ref 4.2–5.8)
RBC # BLD: 3.54 M/UL — LOW (ref 4.2–5.8)
RBC # BLD: 3.6 M/UL — LOW (ref 4.2–5.8)
RBC # BLD: 3.78 M/UL — LOW (ref 4.2–5.8)
RBC # BLD: 3.87 M/UL — LOW (ref 4.2–5.8)
RBC # BLD: 4.21 M/UL — SIGNIFICANT CHANGE UP (ref 4.2–5.8)
RBC # BLD: 4.39 M/UL — SIGNIFICANT CHANGE UP (ref 4.2–5.8)
RBC # BLD: 4.44 M/UL — SIGNIFICANT CHANGE UP (ref 4.2–5.8)
RBC # BLD: 4.44 M/UL — SIGNIFICANT CHANGE UP (ref 4.2–5.8)
RBC # BLD: 4.61 M/UL — SIGNIFICANT CHANGE UP (ref 4.2–5.8)
RBC # BLD: 4.65 M/UL — SIGNIFICANT CHANGE UP (ref 4.2–5.8)
RBC # BLD: 4.65 M/UL — SIGNIFICANT CHANGE UP (ref 4.2–5.8)
RBC # BLD: 4.74 M/UL — SIGNIFICANT CHANGE UP (ref 4.2–5.8)
RBC # BLD: 4.77 M/UL — SIGNIFICANT CHANGE UP (ref 4.2–5.8)
RBC # BLD: 4.84 M/UL — SIGNIFICANT CHANGE UP (ref 4.2–5.8)
RBC # FLD: 18 % — HIGH (ref 10.3–14.5)
RBC # FLD: 18.5 % — HIGH (ref 10.3–14.5)
RBC # FLD: 18.6 % — HIGH (ref 10.3–14.5)
RBC # FLD: 18.9 % — HIGH (ref 10.3–14.5)
RBC # FLD: 19.9 % — HIGH (ref 10.3–14.5)
RBC # FLD: 20.1 % — HIGH (ref 10.3–14.5)
RBC # FLD: 20.3 % — HIGH (ref 10.3–14.5)
RBC # FLD: 20.4 % — HIGH (ref 10.3–14.5)
RBC # FLD: 20.5 % — HIGH (ref 10.3–14.5)
RBC # FLD: 22.3 % — HIGH (ref 10.3–14.5)
RBC # FLD: 22.5 % — HIGH (ref 10.3–14.5)
RBC # FLD: 22.7 % — HIGH (ref 10.3–14.5)
RBC # FLD: 22.9 % — HIGH (ref 10.3–14.5)
RBC # FLD: 23.4 % — HIGH (ref 10.3–14.5)
RBC # FLD: 23.4 % — HIGH (ref 10.3–14.5)
RBC # FLD: 23.7 % — HIGH (ref 10.3–14.5)
RBC # FLD: 23.8 % — HIGH (ref 10.3–14.5)
RBC # FLD: 24 % — HIGH (ref 10.3–14.5)
RBC # FLD: 24.4 % — HIGH (ref 10.3–14.5)
RBC # FLD: 26.5 % — HIGH (ref 10.3–14.5)
RBC # FLD: 26.7 % — HIGH (ref 10.3–14.5)
RBC # FLD: 27.6 % — HIGH (ref 10.3–14.5)
RBC # FLD: 27.7 % — HIGH (ref 10.3–14.5)
RBC # FLD: 27.7 % — HIGH (ref 10.3–14.5)
RBC # FLD: 28.2 % — HIGH (ref 10.3–14.5)
RBC # FLD: 28.4 % — HIGH (ref 10.3–14.5)
RBC # FLD: 28.8 % — HIGH (ref 10.3–14.5)
RBC # FLD: 28.8 % — HIGH (ref 10.3–14.5)
RBC # FLD: 29.1 % — HIGH (ref 10.3–14.5)
RBC # FLD: 29.4 % — HIGH (ref 10.3–14.5)
RBC # FLD: 31.3 % — HIGH (ref 10.3–14.5)
RBC # FLD: 31.4 % — HIGH (ref 10.3–14.5)
RBC # FLD: 31.7 % — HIGH (ref 10.3–14.5)
RBC # FLD: 31.8 % — HIGH (ref 10.3–14.5)
RBC BLD AUTO: ABNORMAL
RBC BLD AUTO: SIGNIFICANT CHANGE UP
RBC BLD AUTO: SIGNIFICANT CHANGE UP
RBC CASTS # UR COMP ASSIST: 2 /HPF — SIGNIFICANT CHANGE UP (ref 0–4)
RBC CASTS # UR COMP ASSIST: SIGNIFICANT CHANGE UP /HPF (ref 0–4)
RBC CASTS # UR COMP ASSIST: SIGNIFICANT CHANGE UP /HPF (ref 0–4)
RETICS #: 109.9 K/UL — SIGNIFICANT CHANGE UP (ref 25–125)
RETICS #: 110.7 K/UL — SIGNIFICANT CHANGE UP (ref 25–125)
RETICS #: 175.4 K/UL — HIGH (ref 25–125)
RETICS #: 254.4 K/UL — HIGH (ref 25–125)
RETICS/RBC NFR: 3.7 % — HIGH (ref 0.5–2.5)
RETICS/RBC NFR: 4.1 % — HIGH (ref 0.5–2.5)
RETICS/RBC NFR: 5.5 % — HIGH (ref 0.5–2.5)
RETICS/RBC NFR: 6.1 % — HIGH (ref 0.5–2.5)
RH IG SCN BLD-IMP: POSITIVE — SIGNIFICANT CHANGE UP
RSV RNA NPH QL NAA+NON-PROBE: SIGNIFICANT CHANGE UP
RSV RNA NPH QL NAA+NON-PROBE: SIGNIFICANT CHANGE UP
RSV RNA SPEC QL NAA+PROBE: SIGNIFICANT CHANGE UP
RV+EV RNA SPEC QL NAA+PROBE: SIGNIFICANT CHANGE UP
SAO2 % BLDV: 22.3 % — SIGNIFICANT CHANGE UP
SAO2 % BLDV: 29.7 % — SIGNIFICANT CHANGE UP
SAO2 % BLDV: 41.7 % — SIGNIFICANT CHANGE UP
SAO2 % BLDV: 60.8 % — SIGNIFICANT CHANGE UP
SAO2 % BLDV: 83.1 % — SIGNIFICANT CHANGE UP
SARS-COV-2 RNA SPEC QL NAA+PROBE: SIGNIFICANT CHANGE UP
SCHISTOCYTES BLD QL AUTO: SLIGHT — SIGNIFICANT CHANGE UP
SMUDGE CELLS # BLD: PRESENT — SIGNIFICANT CHANGE UP
SODIUM SERPL-SCNC: 127 MMOL/L — LOW (ref 135–145)
SODIUM SERPL-SCNC: 127 MMOL/L — LOW (ref 135–145)
SODIUM SERPL-SCNC: 128 MMOL/L — LOW (ref 135–145)
SODIUM SERPL-SCNC: 128 MMOL/L — LOW (ref 135–145)
SODIUM SERPL-SCNC: 129 MMOL/L — LOW (ref 135–145)
SODIUM SERPL-SCNC: 130 MMOL/L — LOW (ref 135–145)
SODIUM SERPL-SCNC: 132 MMOL/L
SODIUM SERPL-SCNC: 132 MMOL/L — LOW (ref 135–145)
SODIUM SERPL-SCNC: 133 MMOL/L — LOW (ref 135–145)
SODIUM SERPL-SCNC: 134 MMOL/L — LOW (ref 135–145)
SODIUM SERPL-SCNC: 135 MMOL/L
SODIUM SERPL-SCNC: 135 MMOL/L — SIGNIFICANT CHANGE UP (ref 135–145)
SODIUM SERPL-SCNC: 135 MMOL/L — SIGNIFICANT CHANGE UP (ref 135–145)
SODIUM SERPL-SCNC: 136 MMOL/L — SIGNIFICANT CHANGE UP (ref 135–145)
SODIUM SERPL-SCNC: 137 MMOL/L
SODIUM SERPL-SCNC: 139 MMOL/L
SODIUM SERPL-SCNC: 139 MMOL/L — SIGNIFICANT CHANGE UP (ref 135–145)
SODIUM SERPL-SCNC: 141 MMOL/L
SP GR SPEC: 1.01 — SIGNIFICANT CHANGE UP (ref 1.01–1.02)
SP GR SPEC: 1.03 — SIGNIFICANT CHANGE UP (ref 1.01–1.05)
SP GR SPEC: 1.04 — SIGNIFICANT CHANGE UP (ref 1.01–1.05)
SPECIMEN SOURCE: SIGNIFICANT CHANGE UP
STOMATOCYTES BLD QL SMEAR: SIGNIFICANT CHANGE UP
STOMATOCYTES BLD QL SMEAR: SLIGHT — SIGNIFICANT CHANGE UP
TIBC SERPL-MCNC: 380 UG/DL — SIGNIFICANT CHANGE UP (ref 220–430)
TIBC SERPL-MCNC: 477 UG/DL
TIBC SERPL-MCNC: 509 UG/DL — HIGH (ref 220–430)
TM INTERPRETATION: SIGNIFICANT CHANGE UP
TRIGL SERPL-MCNC: 139 MG/DL — SIGNIFICANT CHANGE UP
TROPONIN I, HIGH SENSITIVITY RESULT: 155.4 NG/L — HIGH
TROPONIN T, HIGH SENSITIVITY RESULT: 140 NG/L — CRITICAL HIGH
TROPONIN T, HIGH SENSITIVITY RESULT: 168 NG/L — CRITICAL HIGH
TROPONIN T, HIGH SENSITIVITY RESULT: 193 NG/L — CRITICAL HIGH
TSH SERPL-MCNC: 3.87 UIU/ML — SIGNIFICANT CHANGE UP (ref 0.27–4.2)
UIBC SERPL-MCNC: 121 UG/DL — SIGNIFICANT CHANGE UP (ref 110–370)
UIBC SERPL-MCNC: 452 UG/DL
UIBC SERPL-MCNC: 467 UG/DL — HIGH (ref 110–370)
URATE SERPL-MCNC: 12.5 MG/DL — HIGH (ref 3.4–8.8)
URATE SERPL-MCNC: 13 MG/DL — HIGH (ref 3.4–8.8)
URATE SERPL-MCNC: 13.3 MG/DL — HIGH (ref 3.4–8.8)
URATE SERPL-MCNC: 13.4 MG/DL — HIGH (ref 3.4–8.8)
URATE SERPL-MCNC: 5.7 MG/DL
URATE SERPL-MCNC: 6.8 MG/DL — SIGNIFICANT CHANGE UP (ref 3.4–8.8)
URATE SERPL-MCNC: 7.3 MG/DL — SIGNIFICANT CHANGE UP (ref 3.4–8.8)
URATE SERPL-MCNC: 7.5 MG/DL — SIGNIFICANT CHANGE UP (ref 3.4–8.8)
URATE SERPL-MCNC: 7.7 MG/DL
URATE SERPL-MCNC: 7.7 MG/DL — SIGNIFICANT CHANGE UP (ref 3.4–8.8)
URATE SERPL-MCNC: 8.1 MG/DL — SIGNIFICANT CHANGE UP (ref 3.4–8.8)
URATE SERPL-MCNC: 8.1 MG/DL — SIGNIFICANT CHANGE UP (ref 3.4–8.8)
URATE SERPL-MCNC: 8.6 MG/DL — SIGNIFICANT CHANGE UP (ref 3.4–8.8)
URATE SERPL-MCNC: 8.8 MG/DL
UROBILINOGEN FLD QL: 4 MG/DL
UROBILINOGEN FLD QL: ABNORMAL
UROBILINOGEN FLD QL: SIGNIFICANT CHANGE UP
VARIANT LYMPHS # BLD: 1 % — SIGNIFICANT CHANGE UP (ref 0–6)
VARIANT LYMPHS # BLD: 1 % — SIGNIFICANT CHANGE UP (ref 0–6)
VARIANT LYMPHS # BLD: 15.6 % — HIGH (ref 0–6)
VARIANT LYMPHS # BLD: 2 % — SIGNIFICANT CHANGE UP (ref 0–6)
VARIANT LYMPHS # BLD: 2.3 % — SIGNIFICANT CHANGE UP (ref 0–6)
VARIANT LYMPHS # BLD: 3.5 % — SIGNIFICANT CHANGE UP (ref 0–6)
VARIANT LYMPHS # BLD: 44.1 % — HIGH (ref 0–6)
VARIANT LYMPHS # BLD: 9 % — HIGH (ref 0–6)
VIT B1 SERPL-MCNC: 405.4 NMOL/L — HIGH (ref 66.5–200)
VIT B12 SERPL-MCNC: 2000 PG/ML — HIGH (ref 200–900)
VIT B12 SERPL-MCNC: >2000 PG/ML
WBC # BLD: 110.02 K/UL — CRITICAL HIGH (ref 3.8–10.5)
WBC # BLD: 111.21 K/UL — CRITICAL HIGH (ref 3.8–10.5)
WBC # BLD: 111.31 K/UL — CRITICAL HIGH (ref 3.8–10.5)
WBC # BLD: 113.32 K/UL — CRITICAL HIGH (ref 3.8–10.5)
WBC # BLD: 113.74 K/UL — CRITICAL HIGH (ref 3.8–10.5)
WBC # BLD: 114.9 K/UL — CRITICAL HIGH (ref 3.8–10.5)
WBC # BLD: 117.69 K/UL — CRITICAL HIGH (ref 3.8–10.5)
WBC # BLD: 119.82 K/UL — CRITICAL HIGH (ref 3.8–10.5)
WBC # BLD: 121.4 K/UL — CRITICAL HIGH (ref 3.8–10.5)
WBC # BLD: 127.68 K/UL — CRITICAL HIGH (ref 3.8–10.5)
WBC # BLD: 131.52 K/UL — CRITICAL HIGH (ref 3.8–10.5)
WBC # BLD: 134.49 K/UL — CRITICAL HIGH (ref 3.8–10.5)
WBC # BLD: 136.09 K/UL — CRITICAL HIGH (ref 3.8–10.5)
WBC # BLD: 15.43 K/UL — HIGH (ref 3.8–10.5)
WBC # BLD: 163.33 K/UL — CRITICAL HIGH (ref 3.8–10.5)
WBC # BLD: 173.44 K/UL — CRITICAL HIGH (ref 3.8–10.5)
WBC # BLD: 178.11 K/UL — CRITICAL HIGH (ref 3.8–10.5)
WBC # BLD: 18.62 K/UL — HIGH (ref 3.8–10.5)
WBC # BLD: 182.9 K/UL — CRITICAL HIGH (ref 3.8–10.5)
WBC # BLD: 20.23 K/UL — HIGH (ref 3.8–10.5)
WBC # BLD: 21.09 K/UL — HIGH (ref 3.8–10.5)
WBC # BLD: 21.68 K/UL — HIGH (ref 3.8–10.5)
WBC # BLD: 22.01 K/UL — HIGH (ref 3.8–10.5)
WBC # BLD: 27.22 K/UL — HIGH (ref 3.8–10.5)
WBC # BLD: 37.28 K/UL — HIGH (ref 3.8–10.5)
WBC # BLD: 54.04 K/UL — CRITICAL HIGH (ref 3.8–10.5)
WBC # BLD: 54.84 K/UL — CRITICAL HIGH (ref 3.8–10.5)
WBC # BLD: 55.95 K/UL — CRITICAL HIGH (ref 3.8–10.5)
WBC # BLD: 62.17 K/UL — CRITICAL HIGH (ref 3.8–10.5)
WBC # BLD: 67.22 K/UL — CRITICAL HIGH (ref 3.8–10.5)
WBC # BLD: 7.63 K/UL — SIGNIFICANT CHANGE UP (ref 3.8–10.5)
WBC # BLD: 70.16 K/UL — CRITICAL HIGH (ref 3.8–10.5)
WBC # BLD: 74.83 K/UL — CRITICAL HIGH (ref 3.8–10.5)
WBC # BLD: 75.14 K/UL — CRITICAL HIGH (ref 3.8–10.5)
WBC # BLD: 75.65 K/UL — CRITICAL HIGH (ref 3.8–10.5)
WBC # BLD: 78.1 K/UL — CRITICAL HIGH (ref 3.8–10.5)
WBC # BLD: 79.22 K/UL — CRITICAL HIGH (ref 3.8–10.5)
WBC # BLD: 9.99 K/UL — SIGNIFICANT CHANGE UP (ref 3.8–10.5)
WBC # FLD AUTO: 110.02 K/UL — CRITICAL HIGH (ref 3.8–10.5)
WBC # FLD AUTO: 111.21 K/UL — CRITICAL HIGH (ref 3.8–10.5)
WBC # FLD AUTO: 111.31 K/UL — CRITICAL HIGH (ref 3.8–10.5)
WBC # FLD AUTO: 113.32 K/UL — CRITICAL HIGH (ref 3.8–10.5)
WBC # FLD AUTO: 113.74 K/UL — CRITICAL HIGH (ref 3.8–10.5)
WBC # FLD AUTO: 114.9 K/UL — CRITICAL HIGH (ref 3.8–10.5)
WBC # FLD AUTO: 117.69 K/UL — CRITICAL HIGH (ref 3.8–10.5)
WBC # FLD AUTO: 119.82 K/UL — CRITICAL HIGH (ref 3.8–10.5)
WBC # FLD AUTO: 121.4 K/UL — CRITICAL HIGH (ref 3.8–10.5)
WBC # FLD AUTO: 127.68 K/UL — CRITICAL HIGH (ref 3.8–10.5)
WBC # FLD AUTO: 131.52 K/UL — CRITICAL HIGH (ref 3.8–10.5)
WBC # FLD AUTO: 134.49 K/UL — CRITICAL HIGH (ref 3.8–10.5)
WBC # FLD AUTO: 136.09 K/UL — CRITICAL HIGH (ref 3.8–10.5)
WBC # FLD AUTO: 15.43 K/UL — HIGH (ref 3.8–10.5)
WBC # FLD AUTO: 163.33 K/UL — CRITICAL HIGH (ref 3.8–10.5)
WBC # FLD AUTO: 173.44 K/UL — CRITICAL HIGH (ref 3.8–10.5)
WBC # FLD AUTO: 178.11 K/UL — CRITICAL HIGH (ref 3.8–10.5)
WBC # FLD AUTO: 18.62 K/UL — HIGH (ref 3.8–10.5)
WBC # FLD AUTO: 182.9 K/UL — CRITICAL HIGH (ref 3.8–10.5)
WBC # FLD AUTO: 20.23 K/UL — HIGH (ref 3.8–10.5)
WBC # FLD AUTO: 21.09 K/UL — HIGH (ref 3.8–10.5)
WBC # FLD AUTO: 21.68 K/UL — HIGH (ref 3.8–10.5)
WBC # FLD AUTO: 22.01 K/UL — HIGH (ref 3.8–10.5)
WBC # FLD AUTO: 27.22 K/UL — HIGH (ref 3.8–10.5)
WBC # FLD AUTO: 37.28 K/UL — HIGH (ref 3.8–10.5)
WBC # FLD AUTO: 54.04 K/UL — CRITICAL HIGH (ref 3.8–10.5)
WBC # FLD AUTO: 54.84 K/UL — CRITICAL HIGH (ref 3.8–10.5)
WBC # FLD AUTO: 55.95 K/UL — CRITICAL HIGH (ref 3.8–10.5)
WBC # FLD AUTO: 62.17 K/UL — CRITICAL HIGH (ref 3.8–10.5)
WBC # FLD AUTO: 67.22 K/UL — CRITICAL HIGH (ref 3.8–10.5)
WBC # FLD AUTO: 7.63 K/UL — SIGNIFICANT CHANGE UP (ref 3.8–10.5)
WBC # FLD AUTO: 70.16 K/UL — CRITICAL HIGH (ref 3.8–10.5)
WBC # FLD AUTO: 74.83 K/UL — CRITICAL HIGH (ref 3.8–10.5)
WBC # FLD AUTO: 75.14 K/UL — CRITICAL HIGH (ref 3.8–10.5)
WBC # FLD AUTO: 75.65 K/UL — CRITICAL HIGH (ref 3.8–10.5)
WBC # FLD AUTO: 78.1 K/UL — CRITICAL HIGH (ref 3.8–10.5)
WBC # FLD AUTO: 79.22 K/UL — CRITICAL HIGH (ref 3.8–10.5)
WBC # FLD AUTO: 9.99 K/UL — SIGNIFICANT CHANGE UP (ref 3.8–10.5)
WBC UR QL: 1 /HPF — SIGNIFICANT CHANGE UP (ref 0–5)
WBC UR QL: 1 /HPF — SIGNIFICANT CHANGE UP (ref 0–5)
WBC UR QL: SIGNIFICANT CHANGE UP

## 2022-01-01 PROCEDURE — 99204 OFFICE O/P NEW MOD 45 MIN: CPT

## 2022-01-01 PROCEDURE — 93306 TTE W/DOPPLER COMPLETE: CPT | Mod: 26

## 2022-01-01 PROCEDURE — 93922 UPR/L XTREMITY ART 2 LEVELS: CPT

## 2022-01-01 PROCEDURE — 36600 WITHDRAWAL OF ARTERIAL BLOOD: CPT | Mod: 59

## 2022-01-01 PROCEDURE — ZZZZZ: CPT

## 2022-01-01 PROCEDURE — 99215 OFFICE O/P EST HI 40 MIN: CPT

## 2022-01-01 PROCEDURE — 99285 EMERGENCY DEPT VISIT HI MDM: CPT

## 2022-01-01 PROCEDURE — 94010 BREATHING CAPACITY TEST: CPT

## 2022-01-01 PROCEDURE — 94729 DIFFUSING CAPACITY: CPT

## 2022-01-01 PROCEDURE — 99213 OFFICE O/P EST LOW 20 MIN: CPT

## 2022-01-01 PROCEDURE — 81382 HLA II TYPING 1 LOC HR: CPT

## 2022-01-01 PROCEDURE — 99223 1ST HOSP IP/OBS HIGH 75: CPT

## 2022-01-01 PROCEDURE — 99407 BEHAV CHNG SMOKING > 10 MIN: CPT

## 2022-01-01 PROCEDURE — 77001 FLUOROGUIDE FOR VEIN DEVICE: CPT

## 2022-01-01 PROCEDURE — 99232 SBSQ HOSP IP/OBS MODERATE 35: CPT

## 2022-01-01 PROCEDURE — 70220 X-RAY EXAM OF SINUSES: CPT

## 2022-01-01 PROCEDURE — 99205 OFFICE O/P NEW HI 60 MIN: CPT | Mod: 25

## 2022-01-01 PROCEDURE — C1788: CPT

## 2022-01-01 PROCEDURE — 38222 DX BONE MARROW BX & ASPIR: CPT | Mod: RT

## 2022-01-01 PROCEDURE — 99205 OFFICE O/P NEW HI 60 MIN: CPT

## 2022-01-01 PROCEDURE — C8929: CPT

## 2022-01-01 PROCEDURE — 88189 FLOWCYTOMETRY/READ 16 & >: CPT

## 2022-01-01 PROCEDURE — 74177 CT ABD & PELVIS W/CONTRAST: CPT | Mod: 26,MA

## 2022-01-01 PROCEDURE — 93970 EXTREMITY STUDY: CPT

## 2022-01-01 PROCEDURE — 93010 ELECTROCARDIOGRAM REPORT: CPT

## 2022-01-01 PROCEDURE — C1894: CPT

## 2022-01-01 PROCEDURE — 99222 1ST HOSP IP/OBS MODERATE 55: CPT | Mod: GC

## 2022-01-01 PROCEDURE — 93970 EXTREMITY STUDY: CPT | Mod: 26

## 2022-01-01 PROCEDURE — 75635 CT ANGIO ABDOMINAL ARTERIES: CPT

## 2022-01-01 PROCEDURE — 38221 DX BONE MARROW BIOPSIES: CPT

## 2022-01-01 PROCEDURE — 99284 EMERGENCY DEPT VISIT MOD MDM: CPT

## 2022-01-01 PROCEDURE — 70498 CT ANGIOGRAPHY NECK: CPT | Mod: 26,MG

## 2022-01-01 PROCEDURE — 76705 ECHO EXAM OF ABDOMEN: CPT | Mod: 26

## 2022-01-01 PROCEDURE — 70496 CT ANGIOGRAPHY HEAD: CPT | Mod: 26,MG

## 2022-01-01 PROCEDURE — 99223 1ST HOSP IP/OBS HIGH 75: CPT | Mod: GC

## 2022-01-01 PROCEDURE — 36561 INSERT TUNNELED CV CATH: CPT

## 2022-01-01 PROCEDURE — 86901 BLOOD TYPING SEROLOGIC RH(D): CPT

## 2022-01-01 PROCEDURE — U0005: CPT

## 2022-01-01 PROCEDURE — 76700 US EXAM ABDOM COMPLETE: CPT | Mod: 26

## 2022-01-01 PROCEDURE — 99232 SBSQ HOSP IP/OBS MODERATE 35: CPT | Mod: GC

## 2022-01-01 PROCEDURE — 85060 BLOOD SMEAR INTERPRETATION: CPT

## 2022-01-01 PROCEDURE — 94726 PLETHYSMOGRAPHY LUNG VOLUMES: CPT

## 2022-01-01 PROCEDURE — 81378 HLA I & II TYPING HR: CPT

## 2022-01-01 PROCEDURE — 70220 X-RAY EXAM OF SINUSES: CPT | Mod: 26

## 2022-01-01 PROCEDURE — 81373 HLA I TYPING 1 LOCUS LR: CPT

## 2022-01-01 PROCEDURE — 71045 X-RAY EXAM CHEST 1 VIEW: CPT | Mod: 26

## 2022-01-01 PROCEDURE — 99214 OFFICE O/P EST MOD 30 MIN: CPT

## 2022-01-01 PROCEDURE — 75635 CT ANGIO ABDOMINAL ARTERIES: CPT | Mod: 26

## 2022-01-01 PROCEDURE — 86850 RBC ANTIBODY SCREEN: CPT

## 2022-01-01 PROCEDURE — 99285 EMERGENCY DEPT VISIT HI MDM: CPT | Mod: 25

## 2022-01-01 PROCEDURE — 76937 US GUIDE VASCULAR ACCESS: CPT

## 2022-01-01 PROCEDURE — G1004: CPT

## 2022-01-01 PROCEDURE — 99222 1ST HOSP IP/OBS MODERATE 55: CPT

## 2022-01-01 PROCEDURE — C9803: CPT

## 2022-01-01 PROCEDURE — 76937 US GUIDE VASCULAR ACCESS: CPT | Mod: 26

## 2022-01-01 PROCEDURE — 99233 SBSQ HOSP IP/OBS HIGH 50: CPT | Mod: GC

## 2022-01-01 PROCEDURE — 93000 ELECTROCARDIOGRAM COMPLETE: CPT

## 2022-01-01 PROCEDURE — 78472 GATED HEART PLANAR SINGLE: CPT | Mod: 26

## 2022-01-01 PROCEDURE — 77001 FLUOROGUIDE FOR VEIN DEVICE: CPT | Mod: 26

## 2022-01-01 PROCEDURE — 93978 VASCULAR STUDY: CPT

## 2022-01-01 PROCEDURE — 88291 CYTO/MOLECULAR REPORT: CPT | Mod: 59

## 2022-01-01 PROCEDURE — 99239 HOSP IP/OBS DSCHRG MGMT >30: CPT | Mod: GC

## 2022-01-01 PROCEDURE — 12345: CPT | Mod: NC,GC

## 2022-01-01 PROCEDURE — 78472 GATED HEART PLANAR SINGLE: CPT

## 2022-01-01 PROCEDURE — 71045 X-RAY EXAM CHEST 1 VIEW: CPT | Mod: 26,77

## 2022-01-01 PROCEDURE — 86900 BLOOD TYPING SEROLOGIC ABO: CPT

## 2022-01-01 PROCEDURE — A9560: CPT

## 2022-01-01 PROCEDURE — G0452: CPT | Mod: 26

## 2022-01-01 PROCEDURE — 82803 BLOOD GASES ANY COMBINATION: CPT

## 2022-01-01 PROCEDURE — 71046 X-RAY EXAM CHEST 2 VIEWS: CPT | Mod: 26

## 2022-01-01 PROCEDURE — 76700 US EXAM ABDOM COMPLETE: CPT

## 2022-01-01 PROCEDURE — C1769: CPT

## 2022-01-01 PROCEDURE — 71275 CT ANGIOGRAPHY CHEST: CPT | Mod: 26,MA

## 2022-01-01 PROCEDURE — 99442: CPT

## 2022-01-01 PROCEDURE — U0003: CPT

## 2022-01-01 RX ORDER — GLIPIZIDE 5 MG/1
5 TABLET, EXTENDED RELEASE ORAL
Refills: 0 | Status: COMPLETED | COMMUNITY
End: 2022-01-01

## 2022-01-01 RX ORDER — DEXTROSE 50 % IN WATER 50 %
12.5 SYRINGE (ML) INTRAVENOUS ONCE
Refills: 0 | Status: DISCONTINUED | OUTPATIENT
Start: 2022-01-01 | End: 2022-01-01

## 2022-01-01 RX ORDER — INSULIN LISPRO 100/ML
VIAL (ML) SUBCUTANEOUS AT BEDTIME
Refills: 0 | Status: DISCONTINUED | OUTPATIENT
Start: 2022-01-01 | End: 2022-01-01

## 2022-01-01 RX ORDER — LANOLIN ALCOHOL/MO/W.PET/CERES
3 CREAM (GRAM) TOPICAL AT BEDTIME
Refills: 0 | Status: DISCONTINUED | OUTPATIENT
Start: 2022-01-01 | End: 2022-01-01

## 2022-01-01 RX ORDER — GLUCAGON INJECTION, SOLUTION 0.5 MG/.1ML
1 INJECTION, SOLUTION SUBCUTANEOUS ONCE
Refills: 0 | Status: DISCONTINUED | OUTPATIENT
Start: 2022-01-01 | End: 2022-01-01

## 2022-01-01 RX ORDER — DEXTROSE 50 % IN WATER 50 %
25 SYRINGE (ML) INTRAVENOUS ONCE
Refills: 0 | Status: DISCONTINUED | OUTPATIENT
Start: 2022-01-01 | End: 2022-01-01

## 2022-01-01 RX ORDER — SODIUM CHLORIDE 9 MG/ML
1000 INJECTION INTRAMUSCULAR; INTRAVENOUS; SUBCUTANEOUS ONCE
Refills: 0 | Status: COMPLETED | OUTPATIENT
Start: 2022-01-01 | End: 2022-01-01

## 2022-01-01 RX ORDER — NICOTINE POLACRILEX 2 MG
1 GUM BUCCAL DAILY
Refills: 0 | Status: DISCONTINUED | OUTPATIENT
Start: 2022-01-01 | End: 2022-01-01

## 2022-01-01 RX ORDER — QUETIAPINE FUMARATE 200 MG/1
50 TABLET, FILM COATED ORAL AT BEDTIME
Refills: 0 | Status: COMPLETED | OUTPATIENT
Start: 2022-01-01 | End: 2022-01-01

## 2022-01-01 RX ORDER — ROSUVASTATIN CALCIUM 5 MG/1
1 TABLET ORAL
Qty: 30 | Refills: 0
Start: 2022-01-01 | End: 2022-01-01

## 2022-01-01 RX ORDER — LISINOPRIL 2.5 MG/1
1 TABLET ORAL
Qty: 0 | Refills: 0 | DISCHARGE

## 2022-01-01 RX ORDER — MELOXICAM 15 MG/1
1 TABLET ORAL
Qty: 0 | Refills: 0 | DISCHARGE

## 2022-01-01 RX ORDER — AMMONIUM LACTATE 12 %
12 CREAM (GRAM) TOPICAL
Qty: 385 | Refills: 0 | Status: COMPLETED | COMMUNITY
Start: 2022-01-01

## 2022-01-01 RX ORDER — SODIUM CHLORIDE 9 MG/ML
1000 INJECTION, SOLUTION INTRAVENOUS
Refills: 0 | Status: DISCONTINUED | OUTPATIENT
Start: 2022-01-01 | End: 2022-01-01

## 2022-01-01 RX ORDER — ONDANSETRON 8 MG/1
4 TABLET, FILM COATED ORAL ONCE
Refills: 0 | Status: COMPLETED | OUTPATIENT
Start: 2022-01-01 | End: 2022-01-01

## 2022-01-01 RX ORDER — DEXTROSE 50 % IN WATER 50 %
15 SYRINGE (ML) INTRAVENOUS ONCE
Refills: 0 | Status: DISCONTINUED | OUTPATIENT
Start: 2022-01-01 | End: 2022-01-01

## 2022-01-01 RX ORDER — LISINOPRIL 2.5 MG/1
20 TABLET ORAL DAILY
Refills: 0 | Status: DISCONTINUED | OUTPATIENT
Start: 2022-01-01 | End: 2022-01-01

## 2022-01-01 RX ORDER — LISINOPRIL/HYDROCHLOROTHIAZIDE 10-12.5 MG
1 TABLET ORAL
Qty: 0 | Refills: 0 | DISCHARGE

## 2022-01-01 RX ORDER — FUROSEMIDE 40 MG
40 TABLET ORAL DAILY
Refills: 0 | Status: DISCONTINUED | OUTPATIENT
Start: 2022-01-01 | End: 2022-01-01

## 2022-01-01 RX ORDER — INSULIN HUMAN 100 [IU]/ML
INJECTION, SOLUTION SUBCUTANEOUS EVERY 6 HOURS
Refills: 0 | Status: DISCONTINUED | OUTPATIENT
Start: 2022-01-01 | End: 2022-01-01

## 2022-01-01 RX ORDER — RUXOLITINIB 25 MG/1
1 TABLET ORAL
Qty: 60 | Refills: 3
Start: 2022-01-01 | End: 2023-02-22

## 2022-01-01 RX ORDER — GABAPENTIN 400 MG/1
1 CAPSULE ORAL
Qty: 0 | Refills: 0 | DISCHARGE

## 2022-01-01 RX ORDER — INSULIN HUMAN 100 [IU]/ML
10 INJECTION, SOLUTION SUBCUTANEOUS ONCE
Refills: 0 | Status: DISCONTINUED | OUTPATIENT
Start: 2022-01-01 | End: 2022-01-01

## 2022-01-01 RX ORDER — ALLOPURINOL 100 MG/1
100 TABLET ORAL DAILY
Qty: 30 | Refills: 0 | Status: ACTIVE | COMMUNITY
Start: 2022-01-01 | End: 1900-01-01

## 2022-01-01 RX ORDER — LORAZEPAM 0.5 MG/1
0.5 TABLET ORAL
Qty: 90 | Refills: 0 | Status: ACTIVE | COMMUNITY
Start: 2022-01-01 | End: 1900-01-01

## 2022-01-01 RX ORDER — METFORMIN HYDROCHLORIDE 850 MG/1
1 TABLET ORAL
Qty: 60 | Refills: 0
Start: 2022-01-01 | End: 2022-01-01

## 2022-01-01 RX ORDER — LISINOPRIL 2.5 MG/1
1 TABLET ORAL
Qty: 30 | Refills: 0
Start: 2022-01-01 | End: 2022-01-01

## 2022-01-01 RX ORDER — ASPIRIN/CALCIUM CARB/MAGNESIUM 324 MG
81 TABLET ORAL DAILY
Refills: 0 | Status: DISCONTINUED | OUTPATIENT
Start: 2022-01-01 | End: 2022-01-01

## 2022-01-01 RX ORDER — MORPHINE SULFATE 50 MG/1
2 CAPSULE, EXTENDED RELEASE ORAL ONCE
Refills: 0 | Status: DISCONTINUED | OUTPATIENT
Start: 2022-01-01 | End: 2022-01-01

## 2022-01-01 RX ORDER — PHENYLEPHRINE HCL 10 MG
7 TABLET ORAL
Qty: 28 | Refills: 0 | Status: ACTIVE | COMMUNITY
Start: 2022-01-01

## 2022-01-01 RX ORDER — METFORMIN HYDROCHLORIDE 850 MG/1
1 TABLET ORAL
Qty: 0 | Refills: 0 | DISCHARGE

## 2022-01-01 RX ORDER — SODIUM CHLORIDE 9 MG/ML
1000 INJECTION INTRAMUSCULAR; INTRAVENOUS; SUBCUTANEOUS
Refills: 0 | Status: DISCONTINUED | OUTPATIENT
Start: 2022-01-01 | End: 2022-01-01

## 2022-01-01 RX ORDER — PIPERACILLIN AND TAZOBACTAM 4; .5 G/20ML; G/20ML
3.38 INJECTION, POWDER, LYOPHILIZED, FOR SOLUTION INTRAVENOUS EVERY 8 HOURS
Refills: 0 | Status: DISCONTINUED | OUTPATIENT
Start: 2022-01-01 | End: 2022-01-01

## 2022-01-01 RX ORDER — MORPHINE SULFATE 50 MG/1
4 CAPSULE, EXTENDED RELEASE ORAL ONCE
Refills: 0 | Status: DISCONTINUED | OUTPATIENT
Start: 2022-01-01 | End: 2022-01-01

## 2022-01-01 RX ORDER — INSULIN LISPRO 100/ML
VIAL (ML) SUBCUTANEOUS
Refills: 0 | Status: DISCONTINUED | OUTPATIENT
Start: 2022-01-01 | End: 2022-01-01

## 2022-01-01 RX ORDER — HYDROXYZINE HCL 10 MG
1 TABLET ORAL
Qty: 0 | Refills: 0 | DISCHARGE

## 2022-01-01 RX ORDER — INSULIN LISPRO 100/ML
5 VIAL (ML) SUBCUTANEOUS
Refills: 0 | Status: DISCONTINUED | OUTPATIENT
Start: 2022-01-01 | End: 2022-01-01

## 2022-01-01 RX ORDER — INSULIN LISPRO 100/ML
0 VIAL (ML) SUBCUTANEOUS
Qty: 0 | Refills: 0 | DISCHARGE

## 2022-01-01 RX ORDER — RUXOLITINIB 15 MG/1
15 TABLET ORAL TWICE DAILY
Qty: 60 | Refills: 1 | Status: ACTIVE | COMMUNITY
Start: 2022-01-01 | End: 1900-01-01

## 2022-01-01 RX ORDER — HYDROXYUREA 500 MG/1
1 CAPSULE ORAL
Qty: 0 | Refills: 0 | DISCHARGE

## 2022-01-01 RX ORDER — INSULIN GLARGINE 100 [IU]/ML
15 INJECTION, SOLUTION SUBCUTANEOUS ONCE
Refills: 0 | Status: COMPLETED | OUTPATIENT
Start: 2022-01-01 | End: 2022-01-01

## 2022-01-01 RX ORDER — INSULIN LISPRO 100/ML
5 VIAL (ML) SUBCUTANEOUS
Qty: 5 | Refills: 0
Start: 2022-01-01 | End: 2022-01-01

## 2022-01-01 RX ORDER — MORPHINE SULFATE 50 MG/1
2 CAPSULE, EXTENDED RELEASE ORAL EVERY 4 HOURS
Refills: 0 | Status: DISCONTINUED | OUTPATIENT
Start: 2022-01-01 | End: 2022-01-01

## 2022-01-01 RX ORDER — HYDROXYUREA 500 MG/1
1000 CAPSULE ORAL
Refills: 0 | Status: DISCONTINUED | OUTPATIENT
Start: 2022-01-01 | End: 2022-01-01

## 2022-01-01 RX ORDER — HEPARIN SODIUM 5000 [USP'U]/ML
5000 INJECTION INTRAVENOUS; SUBCUTANEOUS EVERY 12 HOURS
Refills: 0 | Status: DISCONTINUED | OUTPATIENT
Start: 2022-01-01 | End: 2022-01-01

## 2022-01-01 RX ORDER — PEN NEEDLE, DIABETIC 29 G X1/2"
32G X 4 MM NEEDLE, DISPOSABLE MISCELLANEOUS
Qty: 100 | Refills: 0 | Status: ACTIVE | COMMUNITY
Start: 2022-01-01

## 2022-01-01 RX ORDER — HYDROXYUREA 500 MG/1
2 CAPSULE ORAL
Qty: 0 | Refills: 0 | DISCHARGE
Start: 2022-01-01

## 2022-01-01 RX ORDER — SENNA PLUS 8.6 MG/1
2 TABLET ORAL AT BEDTIME
Refills: 0 | Status: DISCONTINUED | OUTPATIENT
Start: 2022-01-01 | End: 2022-01-01

## 2022-01-01 RX ORDER — VANCOMYCIN HCL 1 G
1000 VIAL (EA) INTRAVENOUS ONCE
Refills: 0 | Status: COMPLETED | OUTPATIENT
Start: 2022-01-01 | End: 2022-01-01

## 2022-01-01 RX ORDER — ACETAMINOPHEN 500 MG
650 TABLET ORAL EVERY 6 HOURS
Refills: 0 | Status: DISCONTINUED | OUTPATIENT
Start: 2022-01-01 | End: 2022-01-01

## 2022-01-01 RX ORDER — INFLUENZA VIRUS VACCINE 15; 15; 15; 15 UG/.5ML; UG/.5ML; UG/.5ML; UG/.5ML
0.5 SUSPENSION INTRAMUSCULAR ONCE
Refills: 0 | Status: DISCONTINUED | OUTPATIENT
Start: 2022-01-01 | End: 2022-01-01

## 2022-01-01 RX ORDER — BISOPROLOL FUMARATE 5 MG/1
5 TABLET, FILM COATED ORAL DAILY
Qty: 90 | Refills: 2 | Status: ACTIVE | COMMUNITY
Start: 2022-01-01 | End: 1900-01-01

## 2022-01-01 RX ORDER — ONDANSETRON 8 MG/1
4 TABLET, FILM COATED ORAL EVERY 8 HOURS
Refills: 0 | Status: DISCONTINUED | OUTPATIENT
Start: 2022-01-01 | End: 2022-01-01

## 2022-01-01 RX ORDER — ACETAMINOPHEN 500 MG
975 TABLET ORAL ONCE
Refills: 0 | Status: DISCONTINUED | OUTPATIENT
Start: 2022-01-01 | End: 2022-01-01

## 2022-01-01 RX ORDER — PREDNISOLONE ACETATE 10 MG/ML
1 SUSPENSION/ DROPS OPHTHALMIC
Qty: 5 | Refills: 0 | Status: COMPLETED | COMMUNITY
Start: 2022-01-01

## 2022-01-01 RX ORDER — HYDROXYUREA 500 MG/1
2 CAPSULE ORAL
Qty: 120 | Refills: 0
Start: 2022-01-01 | End: 2022-01-01

## 2022-01-01 RX ORDER — INSULIN LISPRO 100/ML
2 VIAL (ML) SUBCUTANEOUS ONCE
Refills: 0 | Status: COMPLETED | OUTPATIENT
Start: 2022-01-01 | End: 2022-01-01

## 2022-01-01 RX ORDER — PIPERACILLIN AND TAZOBACTAM 4; .5 G/20ML; G/20ML
3.38 INJECTION, POWDER, LYOPHILIZED, FOR SOLUTION INTRAVENOUS ONCE
Refills: 0 | Status: COMPLETED | OUTPATIENT
Start: 2022-01-01 | End: 2022-01-01

## 2022-01-01 RX ORDER — ALLOPURINOL 300 MG
300 TABLET ORAL DAILY
Refills: 0 | Status: DISCONTINUED | OUTPATIENT
Start: 2022-01-01 | End: 2022-01-01

## 2022-01-01 RX ORDER — GABAPENTIN 300 MG/1
300 CAPSULE ORAL DAILY
Refills: 0 | Status: ACTIVE | COMMUNITY

## 2022-01-01 RX ORDER — MECLIZINE HYDROCHLORIDE 25 MG/1
25 TABLET ORAL
Qty: 30 | Refills: 0 | Status: ACTIVE | COMMUNITY
Start: 2022-01-01

## 2022-01-01 RX ORDER — ASPIRIN/CALCIUM CARB/MAGNESIUM 324 MG
324 TABLET ORAL ONCE
Refills: 0 | Status: COMPLETED | OUTPATIENT
Start: 2022-01-01 | End: 2022-01-01

## 2022-01-01 RX ORDER — HYDROXYUREA 500 MG/1
500 CAPSULE ORAL
Refills: 0 | Status: ACTIVE | COMMUNITY

## 2022-01-01 RX ORDER — ATORVASTATIN CALCIUM 80 MG/1
80 TABLET, FILM COATED ORAL AT BEDTIME
Refills: 0 | Status: DISCONTINUED | OUTPATIENT
Start: 2022-01-01 | End: 2022-01-01

## 2022-01-01 RX ORDER — FUROSEMIDE 20 MG/1
20 TABLET ORAL DAILY
Qty: 30 | Refills: 1 | Status: ACTIVE | COMMUNITY
Start: 2022-01-01 | End: 1900-01-01

## 2022-01-01 RX ORDER — HYDROXYUREA 500 MG/1
2 CAPSULE ORAL
Qty: 120 | Refills: 3
Start: 2022-01-01 | End: 2023-02-22

## 2022-01-01 RX ORDER — FENTANYL CITRATE 50 UG/ML
50 INJECTION INTRAVENOUS ONCE
Refills: 0 | Status: COMPLETED | OUTPATIENT
Start: 2022-01-01 | End: 2022-01-01

## 2022-01-01 RX ORDER — CLOTRIMAZOLE AND BETAMETHASONE DIPROPIONATE 10; .5 MG/G; MG/G
1-0.05 CREAM TOPICAL
Qty: 45 | Refills: 0 | Status: ACTIVE | COMMUNITY
Start: 2022-01-01

## 2022-01-01 RX ORDER — ROSUVASTATIN CALCIUM 5 MG/1
1 TABLET ORAL
Qty: 0 | Refills: 0 | DISCHARGE

## 2022-01-01 RX ORDER — LACTULOSE 10 G/15ML
15 SOLUTION ORAL
Qty: 0 | Refills: 0 | DISCHARGE

## 2022-01-01 RX ORDER — QUETIAPINE FUMARATE 200 MG/1
1 TABLET, FILM COATED ORAL
Qty: 0 | Refills: 0 | DISCHARGE

## 2022-01-01 RX ORDER — HYDROXYZINE HYDROCHLORIDE 10 MG/1
10 TABLET ORAL
Qty: 30 | Refills: 0 | Status: COMPLETED | COMMUNITY
Start: 2022-01-01

## 2022-01-01 RX ORDER — TRANEXAMIC ACID 100 MG/ML
5 INJECTION, SOLUTION INTRAVENOUS ONCE
Refills: 0 | Status: COMPLETED | OUTPATIENT
Start: 2022-01-01 | End: 2022-01-01

## 2022-01-01 RX ORDER — ACETAMINOPHEN 500 MG
375 TABLET ORAL ONCE
Refills: 0 | Status: COMPLETED | OUTPATIENT
Start: 2022-01-01 | End: 2022-01-01

## 2022-01-01 RX ORDER — MECLIZINE HCL 12.5 MG
12.5 TABLET ORAL ONCE
Refills: 0 | Status: COMPLETED | OUTPATIENT
Start: 2022-01-01 | End: 2022-01-01

## 2022-01-01 RX ORDER — SALIVA SUBSTITUTE COMB NO.11 351 MG
15 POWDER IN PACKET (EA) MUCOUS MEMBRANE THREE TIMES A DAY
Refills: 0 | Status: DISCONTINUED | OUTPATIENT
Start: 2022-01-01 | End: 2022-01-01

## 2022-01-01 RX ORDER — ATORVASTATIN CALCIUM 80 MG/1
20 TABLET, FILM COATED ORAL AT BEDTIME
Refills: 0 | Status: DISCONTINUED | OUTPATIENT
Start: 2022-01-01 | End: 2022-01-01

## 2022-01-01 RX ORDER — PROCHLORPERAZINE MALEATE 10 MG/1
10 TABLET ORAL EVERY 6 HOURS
Qty: 60 | Refills: 3 | Status: ACTIVE | COMMUNITY
Start: 2022-01-01 | End: 1900-01-01

## 2022-01-01 RX ORDER — QUETIAPINE FUMARATE 25 MG/1
25 TABLET ORAL
Qty: 30 | Refills: 0 | Status: COMPLETED | COMMUNITY
Start: 2022-01-01

## 2022-01-01 RX ORDER — MECLIZINE HCL 12.5 MG
25 TABLET ORAL ONCE
Refills: 0 | Status: COMPLETED | OUTPATIENT
Start: 2022-01-01 | End: 2022-01-01

## 2022-01-01 RX ORDER — INSULIN LISPRO 100 [IU]/ML
INJECTION, SOLUTION SUBCUTANEOUS
Refills: 0 | Status: ACTIVE | COMMUNITY

## 2022-01-01 RX ORDER — HYDROXYUREA 500 MG/1
500 CAPSULE ORAL
Qty: 150 | Refills: 0 | Status: ACTIVE | COMMUNITY
Start: 2022-01-01 | End: 1900-01-01

## 2022-01-01 RX ORDER — LACTULOSE 10 G/15ML
10 SOLUTION ORAL DAILY
Refills: 0 | Status: DISCONTINUED | OUTPATIENT
Start: 2022-01-01 | End: 2022-01-01

## 2022-01-01 RX ORDER — SODIUM,POTASSIUM PHOSPHATES 278-250MG
1 POWDER IN PACKET (EA) ORAL EVERY 6 HOURS
Refills: 0 | Status: COMPLETED | OUTPATIENT
Start: 2022-01-01 | End: 2022-01-01

## 2022-01-01 RX ORDER — HALOBETASOL PROPIONATE 0.5 MG/G
0.05 CREAM TOPICAL
Qty: 50 | Refills: 0 | Status: ACTIVE | COMMUNITY
Start: 2022-01-01

## 2022-01-01 RX ORDER — LIDOCAINE 4 G/100G
1 CREAM TOPICAL ONCE
Refills: 0 | Status: COMPLETED | OUTPATIENT
Start: 2022-01-01 | End: 2022-01-01

## 2022-01-01 RX ORDER — RUXOLITINIB 25 MG/1
1 TABLET ORAL
Qty: 0 | Refills: 0 | DISCHARGE

## 2022-01-01 RX ORDER — RASBURICASE 7.5 MG
6 KIT INTRAVENOUS ONCE
Refills: 0 | Status: COMPLETED | OUTPATIENT
Start: 2022-01-01 | End: 2022-01-01

## 2022-01-01 RX ORDER — POLYETHYLENE GLYCOL 3350 17 G/17G
17 POWDER, FOR SOLUTION ORAL ONCE
Refills: 0 | Status: COMPLETED | OUTPATIENT
Start: 2022-01-01 | End: 2022-01-01

## 2022-01-01 RX ORDER — ENOXAPARIN SODIUM 100 MG/ML
40 INJECTION SUBCUTANEOUS EVERY 24 HOURS
Refills: 0 | Status: DISCONTINUED | OUTPATIENT
Start: 2022-01-01 | End: 2022-01-01

## 2022-01-01 RX ORDER — MECLIZINE HCL 12.5 MG
12.5 TABLET ORAL THREE TIMES A DAY
Refills: 0 | Status: DISCONTINUED | OUTPATIENT
Start: 2022-01-01 | End: 2022-01-01

## 2022-01-01 RX ORDER — INSULIN DEGLUDEC 100 U/ML
12 INJECTION, SOLUTION SUBCUTANEOUS
Qty: 0 | Refills: 0 | DISCHARGE

## 2022-01-01 RX ORDER — PROCHLORPERAZINE MALEATE 5 MG
1 TABLET ORAL
Qty: 0 | Refills: 0 | DISCHARGE

## 2022-01-01 RX ORDER — SODIUM ZIRCONIUM CYCLOSILICATE 10 G/10G
10 POWDER, FOR SUSPENSION ORAL ONCE
Refills: 0 | Status: COMPLETED | OUTPATIENT
Start: 2022-01-01 | End: 2022-01-01

## 2022-01-01 RX ORDER — INSULIN GLARGINE 100 [IU]/ML
15 INJECTION, SOLUTION SUBCUTANEOUS
Qty: 5 | Refills: 0
Start: 2022-01-01 | End: 2022-01-01

## 2022-01-01 RX ORDER — ACETAMINOPHEN 500 MG
650 TABLET ORAL ONCE
Refills: 0 | Status: COMPLETED | OUTPATIENT
Start: 2022-01-01 | End: 2022-01-01

## 2022-01-01 RX ORDER — FUROSEMIDE 40 MG
1 TABLET ORAL
Qty: 0 | Refills: 0 | DISCHARGE

## 2022-01-01 RX ORDER — FUROSEMIDE 40 MG
80 TABLET ORAL ONCE
Refills: 0 | Status: DISCONTINUED | OUTPATIENT
Start: 2022-01-01 | End: 2022-01-01

## 2022-01-01 RX ORDER — AZITHROMYCIN 250 MG/1
250 TABLET, FILM COATED ORAL
Qty: 6 | Refills: 0 | Status: COMPLETED | COMMUNITY
Start: 2022-01-01

## 2022-01-01 RX ORDER — METFORMIN HYDROCHLORIDE 1000 MG/1
1000 TABLET, COATED ORAL
Refills: 0 | Status: ACTIVE | COMMUNITY

## 2022-01-01 RX ORDER — INSULIN GLARGINE 100 [IU]/ML
15 INJECTION, SOLUTION SUBCUTANEOUS AT BEDTIME
Refills: 0 | Status: DISCONTINUED | OUTPATIENT
Start: 2022-01-01 | End: 2022-01-01

## 2022-01-01 RX ORDER — PROCHLORPERAZINE MALEATE 5 MG
10 TABLET ORAL EVERY 6 HOURS
Refills: 0 | Status: DISCONTINUED | OUTPATIENT
Start: 2022-01-01 | End: 2022-01-01

## 2022-01-01 RX ORDER — IPRATROPIUM/ALBUTEROL SULFATE 18-103MCG
3 AEROSOL WITH ADAPTER (GRAM) INHALATION ONCE
Refills: 0 | Status: DISCONTINUED | OUTPATIENT
Start: 2022-01-01 | End: 2022-01-01

## 2022-01-01 RX ORDER — MIDAZOLAM HYDROCHLORIDE 1 MG/ML
2 INJECTION, SOLUTION INTRAMUSCULAR; INTRAVENOUS ONCE
Refills: 0 | Status: COMPLETED | OUTPATIENT
Start: 2022-01-01 | End: 2022-01-01

## 2022-01-01 RX ORDER — RUXOLITINIB 25 MG/1
15 TABLET ORAL
Refills: 0 | Status: DISCONTINUED | OUTPATIENT
Start: 2022-01-01 | End: 2022-01-01

## 2022-01-01 RX ORDER — INSULIN DEGLUDEC INJECTION 100 U/ML
100 INJECTION, SOLUTION SUBCUTANEOUS
Qty: 15 | Refills: 0 | Status: ACTIVE | COMMUNITY
Start: 2022-01-01

## 2022-01-01 RX ORDER — HYDROXYUREA 500 MG/1
1 CAPSULE ORAL
Refills: 0 | Status: DISCONTINUED | OUTPATIENT
Start: 2022-01-01 | End: 2022-01-01

## 2022-01-01 RX ORDER — CALAMINE AND ZINC OXIDE AND PHENOL 160; 10 MG/ML; MG/ML
1 LOTION TOPICAL DAILY
Refills: 0 | Status: DISCONTINUED | OUTPATIENT
Start: 2022-01-01 | End: 2022-01-01

## 2022-01-01 RX ORDER — INSULIN DEGLUDEC INJECTION 100 U/ML
INJECTION, SOLUTION SUBCUTANEOUS
Refills: 0 | Status: ACTIVE | COMMUNITY

## 2022-01-01 RX ORDER — ACETAMINOPHEN 500 MG
2 TABLET ORAL
Qty: 0 | Refills: 0 | DISCHARGE
Start: 2022-01-01

## 2022-01-01 RX ORDER — NICOTINE POLACRILEX 4 MG/1
4 GUM, CHEWING BUCCAL
Qty: 110 | Refills: 0 | Status: ACTIVE | COMMUNITY
Start: 2022-01-01

## 2022-01-01 RX ORDER — NICOTINE 21 MG/24HR
21 PATCH, TRANSDERMAL 24 HOURS TRANSDERMAL
Qty: 28 | Refills: 0 | Status: ACTIVE | COMMUNITY
Start: 2022-01-01

## 2022-01-01 RX ORDER — IBUPROFEN 200 MG
1 TABLET ORAL
Qty: 0 | Refills: 0 | DISCHARGE

## 2022-01-01 RX ORDER — MECLIZINE HCL 12.5 MG
1 TABLET ORAL
Qty: 0 | Refills: 0 | DISCHARGE

## 2022-01-01 RX ORDER — LISINOPRIL 5 MG/1
5 TABLET ORAL
Refills: 0 | Status: DISCONTINUED | COMMUNITY
End: 2022-01-01

## 2022-01-01 RX ORDER — DEXTROSE 50 % IN WATER 50 %
50 SYRINGE (ML) INTRAVENOUS ONCE
Refills: 0 | Status: DISCONTINUED | OUTPATIENT
Start: 2022-01-01 | End: 2022-01-01

## 2022-01-01 RX ORDER — LIDOCAINE HYDROCHLORIDE 20 MG/ML
2 SOLUTION ORAL; TOPICAL
Qty: 3 | Refills: 6 | Status: ACTIVE | COMMUNITY
Start: 2022-01-01 | End: 1900-01-01

## 2022-01-01 RX ORDER — FUROSEMIDE 40 MG
40 TABLET ORAL
Refills: 0 | Status: DISCONTINUED | OUTPATIENT
Start: 2022-01-01 | End: 2022-01-01

## 2022-01-01 RX ORDER — ENOXAPARIN SODIUM 100 MG/ML
40 INJECTION SUBCUTANEOUS DAILY
Refills: 0 | Status: DISCONTINUED | OUTPATIENT
Start: 2022-01-01 | End: 2022-01-01

## 2022-01-01 RX ORDER — CALAMINE AND ZINC OXIDE AND PHENOL 160; 10 MG/ML; MG/ML
1 LOTION TOPICAL
Refills: 0 | Status: DISCONTINUED | OUTPATIENT
Start: 2022-01-01 | End: 2022-01-01

## 2022-01-01 RX ORDER — POLYETHYLENE GLYCOL 3350 17 G/17G
17 POWDER, FOR SOLUTION ORAL DAILY
Refills: 0 | Status: DISCONTINUED | OUTPATIENT
Start: 2022-01-01 | End: 2022-01-01

## 2022-01-01 RX ORDER — DIPHENHYDRAMINE HCL 50 MG
25 CAPSULE ORAL EVERY 4 HOURS
Refills: 0 | Status: DISCONTINUED | OUTPATIENT
Start: 2022-01-01 | End: 2022-01-01

## 2022-01-01 RX ORDER — ACETAMINOPHEN 500 MG
1000 TABLET ORAL ONCE
Refills: 0 | Status: COMPLETED | OUTPATIENT
Start: 2022-01-01 | End: 2022-01-01

## 2022-01-01 RX ORDER — SODIUM ZIRCONIUM CYCLOSILICATE 10 G/10G
10 POWDER, FOR SUSPENSION ORAL ONCE
Refills: 0 | Status: DISCONTINUED | OUTPATIENT
Start: 2022-01-01 | End: 2022-01-01

## 2022-01-01 RX ORDER — ASPIRIN/CALCIUM CARB/MAGNESIUM 324 MG
1 TABLET ORAL
Qty: 0 | Refills: 0 | DISCHARGE

## 2022-01-01 RX ORDER — INSULIN LISPRO 100/ML
VIAL (ML) SUBCUTANEOUS EVERY 6 HOURS
Refills: 0 | Status: DISCONTINUED | OUTPATIENT
Start: 2022-01-01 | End: 2022-01-01

## 2022-01-01 RX ORDER — QUETIAPINE FUMARATE 200 MG/1
25 TABLET, FILM COATED ORAL AT BEDTIME
Refills: 0 | Status: DISCONTINUED | OUTPATIENT
Start: 2022-01-01 | End: 2022-01-01

## 2022-01-01 RX ORDER — ROSUVASTATIN CALCIUM 20 MG/1
20 TABLET, FILM COATED ORAL DAILY
Qty: 90 | Refills: 1 | Status: ACTIVE | COMMUNITY
Start: 2022-01-01 | End: 1900-01-01

## 2022-01-01 RX ORDER — FLASH GLUCOSE SENSOR
KIT MISCELLANEOUS
Qty: 2 | Refills: 0 | Status: ACTIVE | COMMUNITY
Start: 2022-01-01

## 2022-01-01 RX ORDER — PANTOPRAZOLE SODIUM 20 MG/1
40 TABLET, DELAYED RELEASE ORAL EVERY 12 HOURS
Refills: 0 | Status: DISCONTINUED | OUTPATIENT
Start: 2022-01-01 | End: 2022-01-01

## 2022-01-01 RX ORDER — INSULIN GLARGINE 100 [IU]/ML
6 INJECTION, SOLUTION SUBCUTANEOUS AT BEDTIME
Refills: 0 | Status: DISCONTINUED | OUTPATIENT
Start: 2022-01-01 | End: 2022-01-01

## 2022-01-01 RX ORDER — HYDROXYUREA 500 MG/1
2 CAPSULE ORAL
Qty: 0 | Refills: 0 | DISCHARGE

## 2022-01-01 RX ORDER — MECLIZINE HCL 12.5 MG
25 TABLET ORAL EVERY 8 HOURS
Refills: 0 | Status: DISCONTINUED | OUTPATIENT
Start: 2022-01-01 | End: 2022-01-01

## 2022-01-01 RX ORDER — INSULIN LISPRO 100/ML
20 VIAL (ML) SUBCUTANEOUS
Qty: 0 | Refills: 0 | DISCHARGE

## 2022-01-01 RX ORDER — FUROSEMIDE 40 MG
20 TABLET ORAL DAILY
Refills: 0 | Status: DISCONTINUED | OUTPATIENT
Start: 2022-01-01 | End: 2022-01-01

## 2022-01-01 RX ORDER — BISOPROLOL FUMARATE 10 MG/1
1 TABLET, FILM COATED ORAL
Qty: 0 | Refills: 0 | DISCHARGE

## 2022-01-01 RX ADMIN — Medication 2: at 18:03

## 2022-01-01 RX ADMIN — SODIUM CHLORIDE 50 MILLILITER(S): 9 INJECTION INTRAMUSCULAR; INTRAVENOUS; SUBCUTANEOUS at 08:48

## 2022-01-01 RX ADMIN — INSULIN GLARGINE 15 UNIT(S): 100 INJECTION, SOLUTION SUBCUTANEOUS at 23:23

## 2022-01-01 RX ADMIN — SODIUM CHLORIDE 1000 MILLILITER(S): 9 INJECTION INTRAMUSCULAR; INTRAVENOUS; SUBCUTANEOUS at 03:38

## 2022-01-01 RX ADMIN — RASBURICASE 108 MILLIGRAM(S): KIT at 11:27

## 2022-01-01 RX ADMIN — LISINOPRIL 20 MILLIGRAM(S): 2.5 TABLET ORAL at 05:38

## 2022-01-01 RX ADMIN — Medication 300 MILLIGRAM(S): at 12:05

## 2022-01-01 RX ADMIN — MORPHINE SULFATE 4 MILLIGRAM(S): 50 CAPSULE, EXTENDED RELEASE ORAL at 10:10

## 2022-01-01 RX ADMIN — Medication 300 MILLIGRAM(S): at 11:52

## 2022-01-01 RX ADMIN — Medication 12.5 MILLIGRAM(S): at 13:02

## 2022-01-01 RX ADMIN — Medication 3 MILLIGRAM(S): at 21:10

## 2022-01-01 RX ADMIN — RUXOLITINIB 15 MILLIGRAM(S): 25 TABLET ORAL at 20:45

## 2022-01-01 RX ADMIN — Medication 81 MILLIGRAM(S): at 12:48

## 2022-01-01 RX ADMIN — Medication 1000 MILLIGRAM(S): at 02:34

## 2022-01-01 RX ADMIN — CALAMINE AND ZINC OXIDE AND PHENOL 1 APPLICATION(S): 160; 10 LOTION TOPICAL at 18:11

## 2022-01-01 RX ADMIN — SODIUM CHLORIDE 50 MILLILITER(S): 9 INJECTION INTRAMUSCULAR; INTRAVENOUS; SUBCUTANEOUS at 18:25

## 2022-01-01 RX ADMIN — Medication 25 MILLIGRAM(S): at 14:36

## 2022-01-01 RX ADMIN — Medication 40 MILLIGRAM(S): at 23:15

## 2022-01-01 RX ADMIN — Medication 2: at 18:20

## 2022-01-01 RX ADMIN — Medication 3: at 12:18

## 2022-01-01 RX ADMIN — Medication 1: at 08:48

## 2022-01-01 RX ADMIN — Medication 5 UNIT(S): at 09:30

## 2022-01-01 RX ADMIN — HYDROXYUREA 1000 MILLIGRAM(S): 500 CAPSULE ORAL at 17:31

## 2022-01-01 RX ADMIN — Medication 1 PATCH: at 07:50

## 2022-01-01 RX ADMIN — Medication 4: at 17:32

## 2022-01-01 RX ADMIN — Medication 5 UNIT(S): at 09:08

## 2022-01-01 RX ADMIN — SODIUM CHLORIDE 60 MILLILITER(S): 9 INJECTION INTRAMUSCULAR; INTRAVENOUS; SUBCUTANEOUS at 18:27

## 2022-01-01 RX ADMIN — RUXOLITINIB 15 MILLIGRAM(S): 25 TABLET ORAL at 17:31

## 2022-01-01 RX ADMIN — Medication 650 MILLIGRAM(S): at 18:36

## 2022-01-01 RX ADMIN — Medication 300 MILLIGRAM(S): at 18:15

## 2022-01-01 RX ADMIN — ATORVASTATIN CALCIUM 80 MILLIGRAM(S): 80 TABLET, FILM COATED ORAL at 21:40

## 2022-01-01 RX ADMIN — ONDANSETRON 4 MILLIGRAM(S): 8 TABLET, FILM COATED ORAL at 16:36

## 2022-01-01 RX ADMIN — Medication 5 UNIT(S): at 08:48

## 2022-01-01 RX ADMIN — Medication 300 MILLIGRAM(S): at 13:00

## 2022-01-01 RX ADMIN — QUETIAPINE FUMARATE 25 MILLIGRAM(S): 200 TABLET, FILM COATED ORAL at 21:40

## 2022-01-01 RX ADMIN — Medication 1 PATCH: at 12:17

## 2022-01-01 RX ADMIN — Medication 1 PATCH: at 12:32

## 2022-01-01 RX ADMIN — PIPERACILLIN AND TAZOBACTAM 200 GRAM(S): 4; .5 INJECTION, POWDER, LYOPHILIZED, FOR SOLUTION INTRAVENOUS at 00:19

## 2022-01-01 RX ADMIN — Medication 3: at 12:04

## 2022-01-01 RX ADMIN — HYDROXYUREA 1000 MILLIGRAM(S): 500 CAPSULE ORAL at 18:19

## 2022-01-01 RX ADMIN — ONDANSETRON 4 MILLIGRAM(S): 8 TABLET, FILM COATED ORAL at 21:10

## 2022-01-01 RX ADMIN — MORPHINE SULFATE 2 MILLIGRAM(S): 50 CAPSULE, EXTENDED RELEASE ORAL at 16:50

## 2022-01-01 RX ADMIN — INSULIN GLARGINE 6 UNIT(S): 100 INJECTION, SOLUTION SUBCUTANEOUS at 22:10

## 2022-01-01 RX ADMIN — Medication 650 MILLIGRAM(S): at 01:57

## 2022-01-01 RX ADMIN — Medication 12.5 MILLIGRAM(S): at 21:40

## 2022-01-01 RX ADMIN — HYDROXYUREA 1000 MILLIGRAM(S): 500 CAPSULE ORAL at 19:19

## 2022-01-01 RX ADMIN — Medication 1 PACKET(S): at 18:20

## 2022-01-01 RX ADMIN — Medication 81 MILLIGRAM(S): at 11:39

## 2022-01-01 RX ADMIN — Medication 2: at 12:40

## 2022-01-01 RX ADMIN — Medication 81 MILLIGRAM(S): at 12:54

## 2022-01-01 RX ADMIN — HYDROXYUREA 1000 MILLIGRAM(S): 500 CAPSULE ORAL at 06:21

## 2022-01-01 RX ADMIN — Medication 400 MILLIGRAM(S): at 06:05

## 2022-01-01 RX ADMIN — MORPHINE SULFATE 2 MILLIGRAM(S): 50 CAPSULE, EXTENDED RELEASE ORAL at 13:05

## 2022-01-01 RX ADMIN — Medication 5 UNIT(S): at 12:40

## 2022-01-01 RX ADMIN — PIPERACILLIN AND TAZOBACTAM 25 GRAM(S): 4; .5 INJECTION, POWDER, LYOPHILIZED, FOR SOLUTION INTRAVENOUS at 09:25

## 2022-01-01 RX ADMIN — SODIUM CHLORIDE 75 MILLILITER(S): 9 INJECTION INTRAMUSCULAR; INTRAVENOUS; SUBCUTANEOUS at 10:00

## 2022-01-01 RX ADMIN — TRANEXAMIC ACID 5 MILLILITER(S): 100 INJECTION, SOLUTION INTRAVENOUS at 00:51

## 2022-01-01 RX ADMIN — Medication 1 PATCH: at 13:15

## 2022-01-01 RX ADMIN — SODIUM CHLORIDE 125 MILLILITER(S): 9 INJECTION INTRAMUSCULAR; INTRAVENOUS; SUBCUTANEOUS at 21:17

## 2022-01-01 RX ADMIN — Medication 1 APPLICATION(S): at 18:15

## 2022-01-01 RX ADMIN — Medication 3: at 10:29

## 2022-01-01 RX ADMIN — HYDROXYUREA 1000 MILLIGRAM(S): 500 CAPSULE ORAL at 06:29

## 2022-01-01 RX ADMIN — CALAMINE AND ZINC OXIDE AND PHENOL 1 APPLICATION(S): 160; 10 LOTION TOPICAL at 12:55

## 2022-01-01 RX ADMIN — Medication 1 PATCH: at 18:32

## 2022-01-01 RX ADMIN — Medication 1 PATCH: at 12:07

## 2022-01-01 RX ADMIN — Medication 25 MILLIGRAM(S): at 12:48

## 2022-01-01 RX ADMIN — Medication 1: at 21:53

## 2022-01-01 RX ADMIN — ONDANSETRON 4 MILLIGRAM(S): 8 TABLET, FILM COATED ORAL at 14:35

## 2022-01-01 RX ADMIN — MORPHINE SULFATE 4 MILLIGRAM(S): 50 CAPSULE, EXTENDED RELEASE ORAL at 08:57

## 2022-01-01 RX ADMIN — Medication 1 PATCH: at 19:18

## 2022-01-01 RX ADMIN — PIPERACILLIN AND TAZOBACTAM 200 GRAM(S): 4; .5 INJECTION, POWDER, LYOPHILIZED, FOR SOLUTION INTRAVENOUS at 13:07

## 2022-01-01 RX ADMIN — POLYETHYLENE GLYCOL 3350 17 GRAM(S): 17 POWDER, FOR SOLUTION ORAL at 13:29

## 2022-01-01 RX ADMIN — MORPHINE SULFATE 4 MILLIGRAM(S): 50 CAPSULE, EXTENDED RELEASE ORAL at 11:16

## 2022-01-01 RX ADMIN — Medication 15 MILLILITER(S): at 21:52

## 2022-01-01 RX ADMIN — Medication 12.5 MILLIGRAM(S): at 05:34

## 2022-01-01 RX ADMIN — Medication 3: at 08:49

## 2022-01-01 RX ADMIN — INSULIN GLARGINE 15 UNIT(S): 100 INJECTION, SOLUTION SUBCUTANEOUS at 22:07

## 2022-01-01 RX ADMIN — Medication 375 MILLIGRAM(S): at 20:25

## 2022-01-01 RX ADMIN — Medication 5 UNIT(S): at 13:33

## 2022-01-01 RX ADMIN — Medication 650 MILLIGRAM(S): at 07:54

## 2022-01-01 RX ADMIN — Medication 4: at 23:23

## 2022-01-01 RX ADMIN — ENOXAPARIN SODIUM 40 MILLIGRAM(S): 100 INJECTION SUBCUTANEOUS at 13:14

## 2022-01-01 RX ADMIN — Medication 1 PATCH: at 07:20

## 2022-01-01 RX ADMIN — MORPHINE SULFATE 4 MILLIGRAM(S): 50 CAPSULE, EXTENDED RELEASE ORAL at 22:35

## 2022-01-01 RX ADMIN — INSULIN GLARGINE 15 UNIT(S): 100 INJECTION, SOLUTION SUBCUTANEOUS at 22:34

## 2022-01-01 RX ADMIN — HEPARIN SODIUM 5000 UNIT(S): 5000 INJECTION INTRAVENOUS; SUBCUTANEOUS at 19:04

## 2022-01-01 RX ADMIN — Medication 1 PATCH: at 13:22

## 2022-01-01 RX ADMIN — MORPHINE SULFATE 2 MILLIGRAM(S): 50 CAPSULE, EXTENDED RELEASE ORAL at 12:51

## 2022-01-01 RX ADMIN — Medication 1 PATCH: at 05:46

## 2022-01-01 RX ADMIN — MORPHINE SULFATE 2 MILLIGRAM(S): 50 CAPSULE, EXTENDED RELEASE ORAL at 03:38

## 2022-01-01 RX ADMIN — Medication 1 APPLICATION(S): at 05:26

## 2022-01-01 RX ADMIN — MORPHINE SULFATE 4 MILLIGRAM(S): 50 CAPSULE, EXTENDED RELEASE ORAL at 01:11

## 2022-01-01 RX ADMIN — Medication 40 MILLIGRAM(S): at 11:52

## 2022-01-01 RX ADMIN — Medication 1 PACKET(S): at 11:39

## 2022-01-01 RX ADMIN — MORPHINE SULFATE 4 MILLIGRAM(S): 50 CAPSULE, EXTENDED RELEASE ORAL at 23:17

## 2022-01-01 RX ADMIN — PIPERACILLIN AND TAZOBACTAM 25 GRAM(S): 4; .5 INJECTION, POWDER, LYOPHILIZED, FOR SOLUTION INTRAVENOUS at 17:10

## 2022-01-01 RX ADMIN — ENOXAPARIN SODIUM 40 MILLIGRAM(S): 100 INJECTION SUBCUTANEOUS at 11:41

## 2022-01-01 RX ADMIN — SODIUM ZIRCONIUM CYCLOSILICATE 10 GRAM(S): 10 POWDER, FOR SUSPENSION ORAL at 09:02

## 2022-01-01 RX ADMIN — TRANEXAMIC ACID 5 MILLILITER(S): 100 INJECTION, SOLUTION INTRAVENOUS at 19:01

## 2022-01-01 RX ADMIN — SODIUM CHLORIDE 1000 MILLILITER(S): 9 INJECTION INTRAMUSCULAR; INTRAVENOUS; SUBCUTANEOUS at 01:30

## 2022-01-01 RX ADMIN — SODIUM CHLORIDE 1000 MILLILITER(S): 9 INJECTION INTRAMUSCULAR; INTRAVENOUS; SUBCUTANEOUS at 08:57

## 2022-01-01 RX ADMIN — LIDOCAINE 1 PATCH: 4 CREAM TOPICAL at 19:05

## 2022-01-01 RX ADMIN — PANTOPRAZOLE SODIUM 40 MILLIGRAM(S): 20 TABLET, DELAYED RELEASE ORAL at 05:25

## 2022-01-01 RX ADMIN — TRANEXAMIC ACID 5 MILLILITER(S): 100 INJECTION, SOLUTION INTRAVENOUS at 00:08

## 2022-01-01 RX ADMIN — MORPHINE SULFATE 4 MILLIGRAM(S): 50 CAPSULE, EXTENDED RELEASE ORAL at 09:55

## 2022-01-01 RX ADMIN — Medication 4: at 18:32

## 2022-01-01 RX ADMIN — PANTOPRAZOLE SODIUM 40 MILLIGRAM(S): 20 TABLET, DELAYED RELEASE ORAL at 06:20

## 2022-01-01 RX ADMIN — Medication 5 UNIT(S): at 09:20

## 2022-01-01 RX ADMIN — Medication 650 MILLIGRAM(S): at 19:30

## 2022-01-01 RX ADMIN — Medication 1 PATCH: at 11:39

## 2022-01-01 RX ADMIN — HYDROXYUREA 1000 MILLIGRAM(S): 500 CAPSULE ORAL at 05:38

## 2022-01-01 RX ADMIN — Medication 650 MILLIGRAM(S): at 08:25

## 2022-01-01 RX ADMIN — ENOXAPARIN SODIUM 40 MILLIGRAM(S): 100 INJECTION SUBCUTANEOUS at 13:00

## 2022-01-01 RX ADMIN — Medication 324 MILLIGRAM(S): at 14:23

## 2022-01-01 RX ADMIN — LISINOPRIL 20 MILLIGRAM(S): 2.5 TABLET ORAL at 06:29

## 2022-01-01 RX ADMIN — HYDROXYUREA 1000 MILLIGRAM(S): 500 CAPSULE ORAL at 06:02

## 2022-01-01 RX ADMIN — Medication 3 MILLIGRAM(S): at 04:00

## 2022-01-01 RX ADMIN — ONDANSETRON 4 MILLIGRAM(S): 8 TABLET, FILM COATED ORAL at 21:17

## 2022-01-01 RX ADMIN — Medication 2 UNIT(S): at 22:22

## 2022-01-01 RX ADMIN — PANTOPRAZOLE SODIUM 40 MILLIGRAM(S): 20 TABLET, DELAYED RELEASE ORAL at 17:30

## 2022-01-01 RX ADMIN — PIPERACILLIN AND TAZOBACTAM 3.38 GRAM(S): 4; .5 INJECTION, POWDER, LYOPHILIZED, FOR SOLUTION INTRAVENOUS at 00:49

## 2022-01-01 RX ADMIN — ONDANSETRON 4 MILLIGRAM(S): 8 TABLET, FILM COATED ORAL at 14:33

## 2022-01-01 RX ADMIN — Medication 1 PATCH: at 21:03

## 2022-01-01 RX ADMIN — Medication 5 UNIT(S): at 19:19

## 2022-01-01 RX ADMIN — Medication 12.5 MILLIGRAM(S): at 21:17

## 2022-01-01 RX ADMIN — Medication 1 PATCH: at 04:00

## 2022-01-01 RX ADMIN — Medication 2: at 17:05

## 2022-01-01 RX ADMIN — Medication 81 MILLIGRAM(S): at 13:15

## 2022-01-01 RX ADMIN — SODIUM CHLORIDE 100 MILLILITER(S): 9 INJECTION, SOLUTION INTRAVENOUS at 11:39

## 2022-01-01 RX ADMIN — SODIUM CHLORIDE 1000 MILLILITER(S): 9 INJECTION INTRAMUSCULAR; INTRAVENOUS; SUBCUTANEOUS at 00:19

## 2022-01-01 RX ADMIN — SODIUM CHLORIDE 1000 MILLILITER(S): 9 INJECTION INTRAMUSCULAR; INTRAVENOUS; SUBCUTANEOUS at 09:55

## 2022-01-01 RX ADMIN — QUETIAPINE FUMARATE 50 MILLIGRAM(S): 200 TABLET, FILM COATED ORAL at 22:08

## 2022-01-01 RX ADMIN — Medication 250 MILLIGRAM(S): at 01:34

## 2022-01-01 RX ADMIN — Medication 15 MILLILITER(S): at 06:21

## 2022-01-01 RX ADMIN — Medication 15 MILLILITER(S): at 13:29

## 2022-01-01 RX ADMIN — HYDROXYUREA 1000 MILLIGRAM(S): 500 CAPSULE ORAL at 05:25

## 2022-01-01 RX ADMIN — Medication 1 APPLICATION(S): at 06:22

## 2022-01-01 RX ADMIN — CALAMINE AND ZINC OXIDE AND PHENOL 1 APPLICATION(S): 160; 10 LOTION TOPICAL at 02:27

## 2022-01-01 RX ADMIN — SENNA PLUS 2 TABLET(S): 8.6 TABLET ORAL at 22:08

## 2022-01-01 RX ADMIN — Medication 1 PATCH: at 20:44

## 2022-01-01 RX ADMIN — Medication 1 PATCH: at 20:00

## 2022-01-01 RX ADMIN — ENOXAPARIN SODIUM 40 MILLIGRAM(S): 100 INJECTION SUBCUTANEOUS at 12:54

## 2022-01-01 RX ADMIN — Medication 1 PATCH: at 12:48

## 2022-01-01 RX ADMIN — PIPERACILLIN AND TAZOBACTAM 25 GRAM(S): 4; .5 INJECTION, POWDER, LYOPHILIZED, FOR SOLUTION INTRAVENOUS at 09:19

## 2022-01-01 RX ADMIN — SODIUM CHLORIDE 1000 MILLILITER(S): 9 INJECTION INTRAMUSCULAR; INTRAVENOUS; SUBCUTANEOUS at 14:11

## 2022-01-01 RX ADMIN — Medication 5 UNIT(S): at 18:20

## 2022-01-01 RX ADMIN — Medication 15 MILLILITER(S): at 05:25

## 2022-01-01 RX ADMIN — Medication 5: at 18:26

## 2022-01-01 RX ADMIN — PIPERACILLIN AND TAZOBACTAM 25 GRAM(S): 4; .5 INJECTION, POWDER, LYOPHILIZED, FOR SOLUTION INTRAVENOUS at 01:15

## 2022-01-01 RX ADMIN — SODIUM CHLORIDE 120 MILLILITER(S): 9 INJECTION INTRAMUSCULAR; INTRAVENOUS; SUBCUTANEOUS at 09:26

## 2022-01-01 RX ADMIN — Medication 1 PATCH: at 07:10

## 2022-01-01 RX ADMIN — Medication 15 MILLILITER(S): at 22:09

## 2022-01-01 RX ADMIN — RUXOLITINIB 15 MILLIGRAM(S): 25 TABLET ORAL at 06:21

## 2022-01-01 RX ADMIN — MORPHINE SULFATE 4 MILLIGRAM(S): 50 CAPSULE, EXTENDED RELEASE ORAL at 00:25

## 2022-01-01 RX ADMIN — INSULIN GLARGINE 6 UNIT(S): 100 INJECTION, SOLUTION SUBCUTANEOUS at 21:54

## 2022-01-01 RX ADMIN — Medication 5 UNIT(S): at 12:53

## 2022-01-01 RX ADMIN — Medication 15 MILLILITER(S): at 14:26

## 2022-01-01 RX ADMIN — INSULIN GLARGINE 15 UNIT(S): 100 INJECTION, SOLUTION SUBCUTANEOUS at 22:24

## 2022-01-01 RX ADMIN — Medication 5 UNIT(S): at 13:15

## 2022-01-01 RX ADMIN — Medication 1 APPLICATION(S): at 17:31

## 2022-01-01 RX ADMIN — ATORVASTATIN CALCIUM 20 MILLIGRAM(S): 80 TABLET, FILM COATED ORAL at 23:15

## 2022-01-01 RX ADMIN — RUXOLITINIB 15 MILLIGRAM(S): 25 TABLET ORAL at 05:27

## 2022-01-01 RX ADMIN — PIPERACILLIN AND TAZOBACTAM 25 GRAM(S): 4; .5 INJECTION, POWDER, LYOPHILIZED, FOR SOLUTION INTRAVENOUS at 18:07

## 2022-01-01 RX ADMIN — Medication 4: at 12:58

## 2022-01-01 RX ADMIN — Medication 3 MILLIGRAM(S): at 00:52

## 2022-01-01 RX ADMIN — ENOXAPARIN SODIUM 40 MILLIGRAM(S): 100 INJECTION SUBCUTANEOUS at 12:05

## 2022-01-01 RX ADMIN — Medication 1 PATCH: at 06:34

## 2022-01-01 RX ADMIN — Medication 4: at 12:39

## 2022-01-01 RX ADMIN — PANTOPRAZOLE SODIUM 40 MILLIGRAM(S): 20 TABLET, DELAYED RELEASE ORAL at 14:33

## 2022-01-01 RX ADMIN — Medication 1 PATCH: at 13:00

## 2022-01-01 RX ADMIN — ENOXAPARIN SODIUM 40 MILLIGRAM(S): 100 INJECTION SUBCUTANEOUS at 12:48

## 2022-01-01 RX ADMIN — PIPERACILLIN AND TAZOBACTAM 25 GRAM(S): 4; .5 INJECTION, POWDER, LYOPHILIZED, FOR SOLUTION INTRAVENOUS at 19:06

## 2022-01-01 RX ADMIN — LACTULOSE 10 GRAM(S): 10 SOLUTION ORAL at 11:53

## 2022-02-01 NOTE — ED ADULT NURSE NOTE - IN THE PAST 12 MONTHS HAVE YOU USED DRUGS OTHER THAN THOSE REQUIRED FOR MEDICAL REASON?
No [de-identified] : New Patient - 1st visit to [facility] [place].  Previously seen at [address] [place] [address] Primary Care office.  Patient is a 37 year old male with a history of GERD, hemorrhoids, psoriasis who presents for a physical.  Patient sees Dermatology for the psoriasis and several other lesions.  He is off medication for the GERD.  Patient has a L inguinal hernia, and has not had surgery for it.  He had prior hemorrhoid surgery many years ago and has not needed treatment in the past couple of years.  Patient did have an incident of lightheadedness and near syncope at work early October 2021.  He was seen by Dr. Cloud at the primary care office and was felt to likely have a vasovagal episode.  Patient's job involves standing for long periods at work.  Patient grew up up in [place], and came to  [place].  He smokes 2 cigarettes/day.\par \par Patient reports receiving the influenza vaccination at [facility] late Nov 2021.  Patient reports receiving the Pfizer COVID-19 vaccine 2/9/2021 and will call with the 2nd dose.

## 2022-02-18 NOTE — ED PROVIDER NOTE - CLINICAL SUMMARY MEDICAL DECISION MAKING FREE TEXT BOX
57 y/o male with PMHx of DM type 2, HTN, and Arthritis who presented to the ED for dizziness X 3 days.  Concern for CVA/Vertigo/Anemia  Labs, EKG, CT, Antiemetics, Meclizine

## 2022-02-18 NOTE — ED ADULT NURSE REASSESSMENT NOTE - NS ED NURSE REASSESS COMMENT FT1
Break cover RN. Report given to ESSU 3 RN by primary RN Angeline. Pt baseline mental status, no chest pain or SOB. Respirations even and unlabored, appears to be comfortable, bed in lowest position, side rails up, safety maintained.

## 2022-02-18 NOTE — CONSULT NOTE ADULT - ASSESSMENT
56 year old right-handed man with PMHx T2DM, HTN, current smoker (60 pack year history), rheumatoid arthritis presents for dizziness x3 days, described as room-spinning whenever he turns his head in the bed or changes position. Neuro exam reveals quadriparesis, diffuse muscle atrophy areflexia in LEs, LUE dysmetria, diminished sensation in LEs to PP, +Romberg sign, gait ataxia.    Impression: Episodic dizziness described as room-spinning, unknown etiology, possible BPPV 2/2 vestibular dysfunction, however would r/o central etiology given mixed picture of wide-based gait and R dysmetria on exam, which can also be attributable to sensory ataxia due to uncontrolled diabetes.    Recommendations:  - Orthostatic vitals  - Trial of Meclizine 25mg q8h PRN  - Consider Valium 0.25mg up to 2mg a day PRN for up to 48 hours  - MRI brain w/o contrast  - Consider referral to vestibular rehab upon d/c  - Lipid profile, A1C  - PT/OT  - Smoking cessation    Pt to be seen and discussed w/ neurology attending, Dr. Jarrett.    Leila Pascual DO  PGY-3  Neurology Resident 56 year old right-handed man with PMHx T2DM, HTN, current smoker (60 pack year history), rheumatoid arthritis, and myeloproliferative disorder presents for dizziness x3 days, described as room-spinning whenever he turns his head in the bed or changes position. Neuro exam reveals quadriparesis, diffuse muscle atrophy areflexia in LEs, LUE dysmetria, diminished sensation in LEs to PP, +Romberg sign, gait ataxia. No nystagmus. HINTS does not suggest peripheral vertigo.    Impression: Episodic dizziness described as room-spinning, unknown etiology, possible BPPV 2/2 vestibular dysfunction, however would r/o central etiology given multiple vascular risk factors and mixed picture of wide-based gait and R dysmetria on exam, which would otherwise be attributable to sensory ataxia due to uncontrolled diabetes    Recommendations:  - Orthostatic vitals signs  - Trial of Meclizine 25mg q8h PRN  - Consider Valium 0.25mg up to 2mg a day PRN for up to 48 hours  - MRI brain w/o contrast  - Consider CT chest/abdomen/pelvis w/ contrast  - Consider referral to vestibular rehab upon d/c  - Lipid profile, A1C  - PT/OT  - Smoking cessation    Pt to be seen and discussed w/ neurology attending, Dr. aJrrett.    Leila Pascual,   PGY-3  Neurology Resident 56 year old right-handed man with PMHx T2DM, HTN, current smoker (60 pack year history), rheumatoid arthritis, and myeloproliferative disorder presents for dizziness x3 days, described as room-spinning whenever he turns his head in the bed or changes position. Neuro exam reveals quadriparesis, diffuse muscle atrophy areflexia in LEs, LUE dysmetria, diminished sensation in LEs to PP, +Romberg sign, gait ataxia. No nystagmus. HINTS does not suggest peripheral vertigo.    Impression: Episodic dizziness described as room-spinning, unknown etiology, possible BPPV 2/2 vestibular dysfunction, however would r/o central etiology given multiple vascular risk factors and mixed picture of wide-based gait and R dysmetria on exam, which would otherwise be attributable to sensory ataxia in setting of diabetes    Recommendations:  - Orthostatic vitals signs  - Trial of Meclizine 25mg q8h PRN  - Consider Valium 0.25mg up to 2mg a day PRN for up to 48 hours  - MRI brain w/o contrast  - Consider CT chest/abdomen/pelvis w/ contrast  - Consider referral to vestibular rehab upon d/c  - Lipid profile, A1C  - PT/OT  - Smoking cessation    Pt to be seen and discussed w/ neurology attending, Dr. Jarrett.    Leila Pascual,   PGY-3  Neurology Resident 56 year old right-handed man with PMHx T2DM, HTN, current smoker (60 pack year history), rheumatoid arthritis, and myeloproliferative disorder presents for dizziness x3 days, described as room-spinning whenever he turns his head in the bed or changes position. Neuro exam reveals quadriparesis, diffuse muscle atrophy areflexia in LEs, LUE dysmetria, diminished sensation in LEs to PP, +Romberg sign, gait ataxia. No nystagmus. HINTS does not suggest peripheral vertigo.    Impression: Episodic dizziness described as room-spinning, unknown etiology, clinically BPPV 2/2 vestibular dysfunction, however would r/o central etiology given multiple vascular risk factors and mixed picture of wide-based gait and R dysmetria on exam, which would otherwise be attributable to sensory ataxia in setting of diabetes    Recommendations:  - Orthostatic vitals signs  - Trial of Meclizine 25mg q8h PRN  - Consider Valium 0.25mg up to 2mg a day PRN for up to 48 hours  - MRI brain w/w/o contrast, thin cuts through the IAC  - Consider CT chest/abdomen/pelvis w/ contrast for malignancy screening  - Consider referral to vestibular rehab upon d/c  - Lipid profile, A1C  - PT/OT  - Smoking cessation    Pt to be seen and discussed w/ neurology attending, Dr. Jarrett.    Leila Pascual,   PGY-3  Neurology Resident

## 2022-02-18 NOTE — ED PROVIDER NOTE - ATTENDING CONTRIBUTION TO CARE
Pt was seen and evaluated by me. Pt is a 55 y/o male with PMHx of DM type 2, HTN, and Arthritis who presented to the ED for dizziness X 3 days. Pt states over the past 3 days having dizziness with room spinning and feeling off balance. Pt denies any falls or direct head trauma. Pt denies any headache, neck pain, back pain, fever, chills, SOB, chest pain, or abd pain. Lungs CTA b/l. RRR. Abd soft, non-tender. 5/5 b/l UE and LE. No nystagmus. EOMI b/l. Good finger to nose.  Concern for CVA/Vertigo/Anemia  Labs, EKG, CT, Antiemetics, Meclizine

## 2022-02-18 NOTE — CONSULT NOTE ADULT - SUBJECTIVE AND OBJECTIVE BOX
HPI:  56 year old man with PMHx T2DM, HTN, arthritis presents for dizziness x3 days and feeling off balance.    MEDICATIONS  (STANDING):  insulin glargine Injectable (LANTUS) 15 Unit(s) SubCutaneous once    MEDICATIONS  (PRN):    PAST MEDICAL & SURGICAL HISTORY:  HTN (hypertension)  DM (diabetes mellitus)  Arthritis  H/O eye surgery    FAMILY HISTORY:  Family history of heart disease  Early family history of heart disease in Father, and siblings    Allergies  No Known Allergies    SHx - No smoking, No ETOH, No drug abuse    Review of Systems:  CONSTITUTIONAL:   HEENT:  No visual loss, blurred vision, double vision.  No hearing loss, sneezing, congestion, runny nose or sore throat.  SKIN:  No rash or itching.  CARDIOVASCULAR:  No chest pain, chest pressure or chest discomfort. No palpitations or edema.  RESPIRATORY:  No shortness of breath, cough or sputum.  GASTROINTESTINAL:  No anorexia, nausea, vomiting or diarrhea. No abdominal pain.  GENITOURINARY:  NO dysuria. No increased frequency. No retention. No incontinence.  NEUROLOGICAL:  See HPI  MUSCULOSKELETAL:  No muscle, back pain, joint pain or stiffness.  HEMATOLOGIC:  No anemia, bleeding or bruising.  PSYCHIATRIC:    ENDOCRINOLOGIC:  No reports of sweating, cold or heat intolerance. No polyuria or polydipsia.        Vital Signs Last 24 Hrs  T(C): 36.4 (18 Feb 2022 20:40), Max: 36.7 (18 Feb 2022 12:03)  T(F): 97.5 (18 Feb 2022 20:40), Max: 98.1 (18 Feb 2022 16:30)  HR: 82 (18 Feb 2022 20:40) (81 - 86)  BP: 120/65 (18 Feb 2022 20:40) (117/70 - 145/87)  BP(mean): --  RR: 18 (18 Feb 2022 20:40) (16 - 18)  SpO2: 100% (18 Feb 2022 20:40) (100% - 100%)    PHYSICAL EXAM:  GENERAL: NAD  HEENT: Normocephalic;  conjunctivae and sclerae clear; moist mucous membranes;   NECK: Supple  EXTREMITIES: no cyanosis; no edema; no calf tenderness  SKIN: warm and dry; no rash             Neurological Exam:  - Mental Status:  AAOx3; speech is fluent with intact naming, repetition, and comprehension  - Cranial Nerves II-XII:    II:  PERRLA; visual fields are full to confrontation  III, IV, VI:  EOMI, no nystagmus  V:  facial sensation is intact in the V1-V3 distribution bilaterally.  VII:  face is symmetric with normal eye closure and smile  VIII:  hearing is intact to finger rub  IX, X:  uvula is midline and soft palate rises symmetrically  XI:  head turning and shoulder shrug are intact bilaterally  XII:  tongue protrudes in the midline  - Motor:  strength is 5/5 throughout; normal muscle bulk and tone throughout; no pronator drift  - Reflexes:  2+ and symmetric at the biceps, triceps, brachioradialis, knees, and ankles;  plantar reflexes downgoing bilaterally  - Sensory:  intact to light touch, pin prick, vibration, and joint-position sense throughout  - Coordination:  finger-nose-finger and heel-knee-shin intact without dysmetria; rapid alternating hand movements intact  - Gait:  normal steps, base, arm swing, and turning; heel and toe walking are normal; tandem gait is normal; Romberg testing is negative    Other:    02-18    135  |  96<L>  |  10  ----------------------------<  346<H>  4.3   |  26  |  0.37<L>    Ca    9.4      18 Feb 2022 14:49    TPro  6.4  /  Alb  3.7  /  TBili  1.0  /  DBili  x   /  AST  22  /  ALT  <5<L>  /  AlkPhos  283<H>  02-18                            10.9   75.65 )-----------( 601      ( 18 Feb 2022 14:50 )             35.0       Radiology    CT:   MRI  EKG:  tele:  TTE:  EEG:              HPI:  56 year old right-handed man with PMHx T2DM, HTN, current smoker (60 pack year history), rheumatoid arthritis presents for dizziness x3 days, described as room-spinning whenever he turns his head in the bed or changes position. He reports generalized weakness and 40lb weight loss in the last 2 months. He had underwent bone marrow biopsy that was concerning for myeloproliferative disorder in 2020. He has been unable to walk safely due to numbness at the soles of his feet as well, which he attributes to neuropathy related to diabetes. He denies changes in speech, unilateral weakness or other sensory changes. No hearing changes/tinnitus, visual changes or headache. He denies falls, head trauma, chest pain, palpitations. He experienced nausea and had multiple episodes of vomiting this morning, but dizziness started 3 days ago. He thought symptoms would subside, therefore waited to come in.    MEDICATIONS  (STANDING):  insulin glargine Injectable (LANTUS) 15 Unit(s) SubCutaneous once    MEDICATIONS  (PRN):    PAST MEDICAL & SURGICAL HISTORY:  HTN (hypertension)  DM (diabetes mellitus)  Arthritis  H/O eye surgery    FAMILY HISTORY:  Family history of heart disease  Early family history of heart disease in Father, and siblings    Allergies  No Known Allergies    SHx - No smoking, No ETOH, No drug abuse    Review of Systems:  CONSTITUTIONAL: No fevers, chills, + weight loss (40lbs in 2 months)  HEENT:  No visual loss, blurred vision, double vision.  No hearing loss, sneezing, congestion, runny nose or sore throat.  SKIN:  No rash or itching.  CARDIOVASCULAR:  No chest pain, chest pressure or chest discomfort. No palpitations or edema.  RESPIRATORY:  No shortness of breath, cough or sputum.  GASTROINTESTINAL:  No anorexia, nausea, vomiting or diarrhea. No abdominal pain.  GENITOURINARY:  No dysuria. No increased frequency. No retention. No incontinence.  NEUROLOGICAL:  See HPI  MUSCULOSKELETAL:  No muscle, back pain, joint pain or stiffness.  HEMATOLOGIC:  No anemia, bleeding or bruising.  PSYCHIATRIC:    ENDOCRINOLOGIC:  No reports of sweating, cold or heat intolerance. No polyuria or polydipsia.    Vital Signs Last 24 Hrs  T(C): 36.4 (18 Feb 2022 20:40), Max: 36.7 (18 Feb 2022 12:03)  T(F): 97.5 (18 Feb 2022 20:40), Max: 98.1 (18 Feb 2022 16:30)  HR: 82 (18 Feb 2022 20:40) (81 - 86)  BP: 120/65 (18 Feb 2022 20:40) (117/70 - 145/87)  BP(mean): --  RR: 18 (18 Feb 2022 20:40) (16 - 18)  SpO2: 100% (18 Feb 2022 20:40) (100% - 100%)    PHYSICAL EXAM:  GENERAL: NAD  HEENT: Normocephalic;  conjunctivae and sclerae clear; moist mucous membranes;   NECK: Supple  EXTREMITIES: no cyanosis; no edema; no calf tenderness  SKIN: warm and dry; no rash             Neurological Exam:  - Mental Status:  AAOx3; speech is fluent with intact naming, repetition, and comprehension  - Cranial Nerves II-XII:    II:  PERRLA; visual fields are full to confrontation  III, IV, VI:  EOMI, no nystagmus  V:  facial sensation is intact in the V1-V3 distribution bilaterally.  VII:  face is symmetric with normal eye closure and smile  VIII:  hearing is intact to finger rub  IX, X:  uvula is midline and soft palate rises symmetrically  XI:  head turning and shoulder shrug are intact bilaterally  XII:  tongue protrudes in the midline  - Motor: quadriparesis, weak  on left, 4-/5 deltoids b/l, RUE 4+/5, LUE bicep 4+/5, tricep 4-/5, L hip flexor and DF 4-/5, R hip flexor and DF 4+/5, PF 5/5 bilaterally; diffuse atrophy including temporalis  - Reflexes: 1+ and symmetric at the biceps, triceps, brachioradialis, absent reflex at knees and ankles;  plantar reflexes downgoing bilaterally  - Sensory: diminished distally to PP with improvement more proximally, following stocking-glove distribution, proprioception of big toes intact b/l  - Coordination: slight FTN dysmetria on left, intact on right; motor incoordination with fingertapping bilaterally  - Gait: wide-based gait and unable to stand on toes; Romberg testing with sway    Other:    02-18    135  |  96<L>  |  10  ----------------------------<  346<H>  4.3   |  26  |  0.37<L>    Ca    9.4      18 Feb 2022 14:49    TPro  6.4  /  Alb  3.7  /  TBili  1.0  /  DBili  x   /  AST  22  /  ALT  <5<L>  /  AlkPhos  283<H>  02-18                            10.9   75.65 )-----------( 601      ( 18 Feb 2022 14:50 )             35.0       Radiology  CT Angio Head w/ IV Cont (02.18.22 @ 19:19) >  IMPRESSION:  CT BRAIN: No acute intracranial hemorrhage, mass effect, or midline shift.    CTA BRAIN: No large vessel occlusion or high-grade stenosis.    CTA NECK: Moderate stenosis of the proximal right internal carotid artery.              HPI:  56 year old right-handed man with PMHx T2DM, HTN, current smoker (60 pack year history), rheumatoid arthritis presents for dizziness x3 days, described as room-spinning whenever he turns his head in the bed or changes position. He reports generalized weakness and 40lb weight loss in the last 2 months. He had underwent bone marrow biopsy that was concerning for myeloproliferative disorder in 2020. He has been unable to walk safely due to numbness at the soles of his feet as well, which he attributes to neuropathy related to diabetes. He denies changes in speech, unilateral weakness or other sensory changes. No hearing changes/tinnitus, visual changes or headache. He denies falls, head trauma, chest pain, palpitations. He experienced nausea and had multiple episodes of vomiting this morning, but dizziness started 3 days ago. He thought symptoms would subside, therefore waited to come in.    MEDICATIONS  (STANDING):  insulin glargine Injectable (LANTUS) 15 Unit(s) SubCutaneous once    MEDICATIONS  (PRN):    PAST MEDICAL & SURGICAL HISTORY:  HTN (hypertension)  DM (diabetes mellitus)  Arthritis  H/O eye surgery    FAMILY HISTORY:  Family history of heart disease  Early family history of heart disease in Father, and siblings    Allergies  No Known Allergies    SHx - +current smoker, 1.5ppd, No ETOH, No drug abuse    Review of Systems:  CONSTITUTIONAL: No fevers, chills, + weight loss (40lbs in 2 months)  HEENT:  No visual loss, blurred vision, double vision.  No hearing loss, sneezing, congestion, runny nose or sore throat.  SKIN:  No rash or itching.  CARDIOVASCULAR:  No chest pain, chest pressure or chest discomfort. No palpitations or edema.  RESPIRATORY:  No shortness of breath, cough or sputum.  GASTROINTESTINAL:  No anorexia, nausea, vomiting or diarrhea. No abdominal pain.  GENITOURINARY:  No dysuria. No increased frequency. No retention. No incontinence.  NEUROLOGICAL:  See HPI  MUSCULOSKELETAL:  No muscle, back pain, joint pain or stiffness.  HEMATOLOGIC:  No anemia, bleeding or bruising.  PSYCHIATRIC:    ENDOCRINOLOGIC:  No reports of sweating, cold or heat intolerance. No polyuria or polydipsia.    Vital Signs Last 24 Hrs  T(C): 36.4 (18 Feb 2022 20:40), Max: 36.7 (18 Feb 2022 12:03)  T(F): 97.5 (18 Feb 2022 20:40), Max: 98.1 (18 Feb 2022 16:30)  HR: 82 (18 Feb 2022 20:40) (81 - 86)  BP: 120/65 (18 Feb 2022 20:40) (117/70 - 145/87)  BP(mean): --  RR: 18 (18 Feb 2022 20:40) (16 - 18)  SpO2: 100% (18 Feb 2022 20:40) (100% - 100%)    PHYSICAL EXAM:  GENERAL: NAD  HEENT: Normocephalic;  conjunctivae and sclerae clear; moist mucous membranes;   NECK: Supple  EXTREMITIES: no cyanosis; no edema; no calf tenderness  SKIN: warm and dry; no rash             Neurological Exam:  - Mental Status:  AAOx3; speech is fluent with intact naming, repetition, and comprehension  - Cranial Nerves II-XII:    II:  PERRLA; visual fields are full to confrontation  III, IV, VI:  EOMI, no nystagmus  V:  facial sensation is intact in the V1-V3 distribution bilaterally.  VII:  face is symmetric with normal eye closure and smile  VIII:  hearing is intact to finger rub  IX, X:  uvula is midline and soft palate rises symmetrically  XI:  head turning and shoulder shrug are intact bilaterally  XII:  tongue protrudes in the midline  - Motor: quadriparesis, weak  on left, 4-/5 deltoids b/l, RUE 4+/5, LUE bicep 4+/5, tricep 4-/5, L hip flexor and DF 4-/5, R hip flexor and DF 4+/5, PF 5/5 bilaterally; diffuse atrophy including temporalis  - Reflexes: 1+ and symmetric at the biceps, triceps, brachioradialis, absent reflex at knees and ankles;  plantar reflexes downgoing bilaterally  - Sensory: diminished distally to PP with improvement more proximally, following stocking-glove distribution, proprioception of big toes intact b/l  - Coordination: slight FTN dysmetria on left, intact on right; motor incoordination with fingertapping bilaterally  - Gait: wide-based gait and unable to stand on toes; Romberg testing with sway    Other:    02-18    135  |  96<L>  |  10  ----------------------------<  346<H>  4.3   |  26  |  0.37<L>    Ca    9.4      18 Feb 2022 14:49    TPro  6.4  /  Alb  3.7  /  TBili  1.0  /  DBili  x   /  AST  22  /  ALT  <5<L>  /  AlkPhos  283<H>  02-18                            10.9   75.65 )-----------( 601      ( 18 Feb 2022 14:50 )             35.0       Radiology  CT Angio Head w/ IV Cont (02.18.22 @ 19:19) >  IMPRESSION:  CT BRAIN: No acute intracranial hemorrhage, mass effect, or midline shift.    CTA BRAIN: No large vessel occlusion or high-grade stenosis.    CTA NECK: Moderate stenosis of the proximal right internal carotid artery.              HPI:  56 year old right-handed man with PMHx T2DM, HTN, current smoker (60 pack year history), rheumatoid arthritis, and myeloproliferative disorder presents for dizziness x3 days, described as room-spinning whenever he turns his head in the bed or changes position. He reports generalized weakness and 40lb weight loss in the last 2 months. He had underwent bone marrow biopsy that was concerning for myeloproliferative disorder in 2020. He has been unable to walk safely due to numbness at the soles of his feet as well, which he attributes to neuropathy related to diabetes. He denies changes in speech, unilateral weakness or other sensory changes. No hearing changes/tinnitus, visual changes or headache. He denies falls, head trauma, chest pain, palpitations. He experienced nausea and had multiple episodes of vomiting this morning, but dizziness started 3 days ago. He thought symptoms would subside, therefore waited to come in. Patient normally ambulates without mechanical assistance.    MEDICATIONS  (STANDING):  insulin glargine Injectable (LANTUS) 15 Unit(s) SubCutaneous once    MEDICATIONS  (PRN):    PAST MEDICAL & SURGICAL HISTORY:  HTN (hypertension)  DM (diabetes mellitus)  Arthritis  H/O eye surgery    FAMILY HISTORY:  Family history of heart disease  Early family history of heart disease in Father, and siblings    Allergies  No Known Allergies    SHx - +current smoker, 1.5ppd, No ETOH, No drug abuse    Review of Systems:  CONSTITUTIONAL: No fevers, chills, + weight loss (40lbs in 2 months)  HEENT:  No visual loss, blurred vision, double vision.  No hearing loss, sneezing, congestion, runny nose or sore throat.  SKIN:  No rash or itching.  CARDIOVASCULAR:  No chest pain, chest pressure or chest discomfort. No palpitations or edema.  RESPIRATORY:  No shortness of breath, cough or sputum.  GASTROINTESTINAL:  No anorexia, nausea, vomiting or diarrhea. No abdominal pain.  GENITOURINARY:  No dysuria. No increased frequency. No retention. No incontinence.  NEUROLOGICAL:  See HPI  MUSCULOSKELETAL:  No muscle, back pain, joint pain or stiffness.  HEMATOLOGIC:  No anemia, bleeding or bruising.  PSYCHIATRIC:    ENDOCRINOLOGIC:  No reports of sweating, cold or heat intolerance. No polyuria or polydipsia.    Vital Signs Last 24 Hrs  T(C): 36.4 (18 Feb 2022 20:40), Max: 36.7 (18 Feb 2022 12:03)  T(F): 97.5 (18 Feb 2022 20:40), Max: 98.1 (18 Feb 2022 16:30)  HR: 82 (18 Feb 2022 20:40) (81 - 86)  BP: 120/65 (18 Feb 2022 20:40) (117/70 - 145/87)  BP(mean): --  RR: 18 (18 Feb 2022 20:40) (16 - 18)  SpO2: 100% (18 Feb 2022 20:40) (100% - 100%)    PHYSICAL EXAM:  GENERAL: NAD  HEENT: Normocephalic;  conjunctivae and sclerae clear; moist mucous membranes;   NECK: Supple  EXTREMITIES: no cyanosis; no edema; no calf tenderness  SKIN: warm and dry; no rash             Neurological Exam:  - Mental Status: Alert, oriented to person, place, time, situation; speech is fluent with intact naming, repetition, and comprehension  - Cranial Nerves II-XII:    II:  PERRL 3mm b/l, visual fields are full to confrontation  III, IV, VI:  EOMI, no nystagmus  V:  facial sensation is intact in the V1-V3 distribution bilaterally.  VII:  face is symmetric with normal eye closure and smile  VIII:  hearing is intact to finger rub  IX, X:  uvula is midline and soft palate rises symmetrically  XI:  head turning and shoulder shrug are intact bilaterally  XII:  tongue protrudes in the midline  - Motor: quadriparesis, weak  on left, 4-/5 deltoids b/l, RUE 4+/5, LUE bicep 4+/5, tricep 4-/5, L hip flexor and DF 4-/5, R hip flexor and DF 4+/5, PF 5/5 bilaterally; diffuse atrophy including temporalis  - Reflexes: 1+ and symmetric at the biceps, triceps, brachioradialis, absent reflex at knees and ankles;  plantar reflexes downgoing bilaterally  - Sensory: diminished distally to PP with improvement more proximally, following stocking-glove distribution, proprioception of big toes intact b/l  - Coordination: slight FTN dysmetria on left, intact on right; motor incoordination with fingertapping bilaterally  - Gait: wide-based gait and unable to stand on toes; Romberg testing with sway    Other:    02-18    135  |  96<L>  |  10  ----------------------------<  346<H>  4.3   |  26  |  0.37<L>    Ca    9.4      18 Feb 2022 14:49    TPro  6.4  /  Alb  3.7  /  TBili  1.0  /  DBili  x   /  AST  22  /  ALT  <5<L>  /  AlkPhos  283<H>  02-18                            10.9   75.65 )-----------( 601      ( 18 Feb 2022 14:50 )             35.0       Radiology  CT Angio Head w/ IV Cont (02.18.22 @ 19:19) >  IMPRESSION:  CT BRAIN: No acute intracranial hemorrhage, mass effect, or midline shift.    CTA BRAIN: No large vessel occlusion or high-grade stenosis.    CTA NECK: Moderate stenosis of the proximal right internal carotid artery.              HPI:  56 year old right-handed man with PMHx T2DM, HTN, current smoker (60 pack year history), rheumatoid arthritis, and myeloproliferative disorder presents for dizziness x3 days, described as room-spinning whenever he turns his head in the bed or changes position. He reports generalized weakness and 40lb weight loss in the last 2 months. He had underwent bone marrow biopsy that was concerning for myeloproliferative disorder in 2020. He has been unable to walk safely due to numbness at the soles of his feet as well, which he attributes to neuropathy related to diabetes. He denies changes in speech, unilateral weakness or other sensory changes. No hearing changes/tinnitus, visual changes or headache. He denies falls, head trauma, chest pain, palpitations. He experienced nausea and had multiple episodes of vomiting this morning, but dizziness started 3 days ago. He thought symptoms would subside, therefore waited to come in. Patient normally ambulates without mechanical assistance.    MEDICATIONS  (STANDING):  insulin glargine Injectable (LANTUS) 15 Unit(s) SubCutaneous once    MEDICATIONS  (PRN):    PAST MEDICAL & SURGICAL HISTORY:  HTN (hypertension)  DM (diabetes mellitus)  Arthritis  H/O eye surgery    FAMILY HISTORY:  Family history of heart disease  Early family history of heart disease in Father, and siblings    Allergies  No Known Allergies    SHx - +current smoker, 1.5ppd, No ETOH, No drug abuse    Review of Systems:  CONSTITUTIONAL: No fevers, chills, + weight loss (40lbs in 2 months)  HEENT:  No visual loss, blurred vision, double vision.  No hearing loss, sneezing, congestion, runny nose or sore throat.  SKIN:  No rash or itching.  CARDIOVASCULAR:  No chest pain, chest pressure or chest discomfort. No palpitations or edema.  RESPIRATORY:  No shortness of breath, cough or sputum.  GASTROINTESTINAL:  No anorexia, nausea, vomiting or diarrhea. No abdominal pain.  GENITOURINARY:  No dysuria. No increased frequency. No retention. No incontinence.  NEUROLOGICAL:  See HPI  MUSCULOSKELETAL:  No muscle, back pain, joint pain or stiffness.  HEMATOLOGIC:  No anemia, bleeding or bruising.  PSYCHIATRIC:    ENDOCRINOLOGIC:  No reports of sweating, cold or heat intolerance. No polyuria or polydipsia.    Vital Signs Last 24 Hrs  T(C): 36.4 (18 Feb 2022 20:40), Max: 36.7 (18 Feb 2022 12:03)  T(F): 97.5 (18 Feb 2022 20:40), Max: 98.1 (18 Feb 2022 16:30)  HR: 82 (18 Feb 2022 20:40) (81 - 86)  BP: 120/65 (18 Feb 2022 20:40) (117/70 - 145/87)  BP(mean): --  RR: 18 (18 Feb 2022 20:40) (16 - 18)  SpO2: 100% (18 Feb 2022 20:40) (100% - 100%)    PHYSICAL EXAM:  GENERAL: NAD  HEENT: Normocephalic;  conjunctivae and sclerae clear; moist mucous membranes;   NECK: Supple  EXTREMITIES: no cyanosis; no edema; no calf tenderness  SKIN: warm and dry; no rash             Neurological Exam:  - Mental Status: Alert, oriented to person, place, time, situation; speech is fluent with intact naming, repetition, and comprehension  - Cranial Nerves II-XII:    II:  PERRL 3mm b/l, visual fields are full to confrontation  III, IV, VI:  EOMI, no nystagmus  V:  facial sensation is intact in the V1-V3 distribution bilaterally.  VII:  face is symmetric with normal eye closure and smile  VIII:  hearing is intact to finger rub  IX, X:  uvula is midline and soft palate rises symmetrically  XI:  head turning and shoulder shrug are intact bilaterally  XII:  tongue protrudes in the midline  - Motor: quadriparesis, weak  on left, 4-/5 deltoids b/l, RUE 4+/5, LUE bicep 4+/5, tricep 4-/5, L hip flexor and DF 4-/5, R hip flexor and DF 4+/5, PF 5/5 bilaterally; diffuse atrophy including temporalis  - Reflexes: 1+ and symmetric at the biceps, triceps, brachioradialis, absent reflex at knees and ankles;  plantar reflexes downgoing bilaterally  - Sensory: diminished distally to PP with improvement more proximally, following stocking-glove distribution, proprioception of big toes intact b/l  - Coordination: slight FTN dysmetria on left, intact on right  - Gait: wide-based gait and unable to stand on toes; Romberg testing with sway    Other:    02-18    135  |  96<L>  |  10  ----------------------------<  346<H>  4.3   |  26  |  0.37<L>    Ca    9.4      18 Feb 2022 14:49    TPro  6.4  /  Alb  3.7  /  TBili  1.0  /  DBili  x   /  AST  22  /  ALT  <5<L>  /  AlkPhos  283<H>  02-18                            10.9   75.65 )-----------( 601      ( 18 Feb 2022 14:50 )             35.0       Radiology  CT Angio Head w/ IV Cont (02.18.22 @ 19:19) >  IMPRESSION:  CT BRAIN: No acute intracranial hemorrhage, mass effect, or midline shift.    CTA BRAIN: No large vessel occlusion or high-grade stenosis.    CTA NECK: Moderate stenosis of the proximal right internal carotid artery.

## 2022-02-18 NOTE — ED PROVIDER NOTE - PROGRESS NOTE DETAILS
mirtha pgy1: paged hospitalist for admission. per discharge note from 2020, there was a bone marrow biopsy done with findings concerning for myeloproliferative disorder. Pt was set for follow up with Dr. Blanca Dumont, but never did. discussed case with Dr. Eisenberg for admission. Insulin orders per request and will admit to TELE. Also requests that neurology be involved. mirtha pgy1 spoke to neurology re: eval for vertiginous sx will see pt in ED

## 2022-02-18 NOTE — ED ADULT NURSE NOTE - OBJECTIVE STATEMENT
pt alert,oriented x3. reports vomiting,dizziness x 3 days . denies ch pains. eval by md. iv access labs sent will continue to monitor.

## 2022-02-18 NOTE — ED ADULT TRIAGE NOTE - CHIEF COMPLAINT QUOTE
Pt c/o dizziness n/v, unsteady gait for the past 3 days, pt denies any present pain, denies SOB, afebrile.

## 2022-02-18 NOTE — CONSULT NOTE ADULT - ATTENDING COMMENTS
Positional vertigo for a few days - improving.  Since Sept. 2021 - progressive weakness and paresthesias in both feet.     Exam:  Right beat nystagmus on right gaze.  counterclockwise nys on left gaze.    Motor: (R/L)  Delt 4+/4+  Tri 4/4  Biceps 4+/4+  WE 4+/ Limited by joint disease from RA  FAbd& FE: 4-/4-  HF 4- --->4, B  Ankle DF 4+/4  EHL2-3 B    Reflexes:  Biceps and BR 1  Others 0    Sensory: Decreased vibration to great toe, B.    2/19/22 - A1C with Estimated Average Glucose Result: 5.2  7/27/21 - A1C with Estimated Average Glucose: 11.1    A/P  One acute issue of vertigo - most c/w benign paroxysmal positional vertigo.  I agree with work up and management as above.     Chronic issue of a sensorimotor polyneuropathy which could clinically be consistent with chronic inflammatory demyelinating polyradiculoneuropathy. This can be associated with his myeloproliferative neoplasm.  I would recommend initially treating his neoplasm and observing for any response/improvement of the neuropathy.  If his neoplasm improves but the neuropathy does not improve within 1-2 months of treatment, then I would recommend a trial of IVIg.   Please have him f/u with neurology as outpatient for this.    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     D/W Heme/Onc fellow, patient.    Thank you Positional vertigo for a few days - improving.  Since Sept. 2021 - progressive weakness and paresthesias in both feet.     Exam:  Right beat nystagmus on right gaze.  counterclockwise nys on left gaze.    Motor: (R/L)  Delt 4+/4+  Tri 4/4  Biceps 4+/4+  WE 4+/ Limited by joint disease from RA  FAbd& FE: 4-/4-  HF 4- --->4, B  Ankle DF 4+/4  EHL2-3 B    Reflexes:  Biceps and BR 1  Others 0    Sensory: Decreased vibration to great toe, B.    2/19/22 - A1C with Estimated Average Glucose Result: 5.2  7/27/21 - A1C with Estimated Average Glucose: 11.1    A/P  One acute issue of vertigo - most c/w benign paroxysmal positional vertigo.  I agree with work up and management as above.     Chronic issue of a sensorimotor polyneuropathy which could clinically be consistent with chronic inflammatory demyelinating polyradiculoneuropathy. This can be associated with his myeloproliferative neoplasm.  I would recommend initially treating his neoplasm and observing for any response/improvement of the neuropathy.  If his neoplasm improves but the neuropathy does not improve within 1-2 months of treatment, then I would recommend a trial of IVIg.   Please have him f/u with neurology as outpatient for this.    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     D/W Heme/Onc fellow, patient.    Thank you    Please call us for any further questions.

## 2022-02-18 NOTE — ED PROVIDER NOTE - NEUROLOGICAL, MLM
Alert and oriented, no focal deficits, no motor or sensory deficits. 5/5 b/l UE and LE. Good finger to nose.

## 2022-02-18 NOTE — ED PROVIDER NOTE - OBJECTIVE STATEMENT
57 y/o male with PMHx of DM type 2, HTN, and Arthritis who presented to the ED for dizziness X 3 days. Pt states over the past 3 days having dizziness with room spinning and feeling off balance. Pt denies any falls or direct head trauma. Pt denies any headache, neck pain, back pain, fever, chills, SOB, chest pain, or abd pain.

## 2022-02-19 NOTE — H&P ADULT - NSHPLABSRESULTS_GEN_ALL_CORE
Labs:                        10.9   75.65 )-----------( 601      ( 18 Feb 2022 14:50 )             35.0     Auto Eosinophil # 1.74  / Auto Eosinophil % 2.3   / Auto Neutrophil # 44.94 / Auto Neutrophil % 52.4  / BANDS % 7.0      Hgb Trend: 10.9<--    02-18    135  |  96<L>  |  10  ----------------------------<  346<H>  4.3   |  26  |  0.37<L>    Ca    9.4      18 Feb 2022 14:49  TPro  6.4  /  Alb  3.7  /  TBili  1.0  /  DBili  x   /  AST  22  /  ALT  <5<L>  /  AlkPhos  283<H>  02-18    Creatinine Trend: 0.37<--            ABG:   VENT:       Labs result reviewed by me.

## 2022-02-19 NOTE — H&P ADULT - PROBLEM SELECTOR PLAN 3
Per patient: home medications: metformin 1000 BID, Basaglar 30 units qhs and humalog 20 units before meals (only eats twice). States that he often wakes up with FS 40s-90s. In 2021 pt was discharged with Basaglar 20 and Humalog 5, however, he states his PCP increased the doses since then   --start lantus 15 units qhs and Lispro 5 units premeal   --low ISS and FS qac and qhs   --check A1c   --nutrition consult Per patient: home medications: metformin 1000 BID, Basaglar 30 units qhs and humalog 20 units before meals (only eats twice). States that he often wakes up with FS 40s-90s. In 2021 pt was discharged with Basaglar 20 and Humalog 5, however, he states his PCP increased the doses since then   --start lantus 15 units qhs and Lispro 5 units premeal   --low ISS and FS qac and qhs   --check A1c   --nutrition consult  --endo consult in AM Per patient: home medications: metformin 1000 BID, Basaglar 30 units qhs and Humalog 20 units before meals (only eats twice). States that he often wakes up with FS 40s-90s. In 2021 pt was discharged with Basaglar 20 and Humalog 5, however, he states his PCP increased the doses since then   --start Lantus 15 units qhs and Lispro 5 units pre-meal   --low ISS and FS qac and qhs   --check A1c   --nutrition consult  --endo consult in AM

## 2022-02-19 NOTE — H&P ADULT - PROBLEM SELECTOR PLAN 1
possible BPPV 2/2 vestibular dysfunction vs central etiology   --neuro recs apprecaited   --check Orthostatic vitals   --Trial of Meclizine 25mg q8h PRN   --Consider Valium 0.25mg up to 2mg a day PRN for up to 48 hours   --check MRI brain w/o contrast   --referral to vestibular rehab upon d/c   --check Lipid profile, A1C   --PT/OT possible BPPV 2/2 vestibular dysfunction vs central etiology. CTA showing mod proximal R carotid artery stenosis  --neuro recs apprecaited   --check Orthostatic vitals   --Trial of Meclizine 25mg q8h PRN   --Consider Valium 0.25mg up to 2mg a day PRN for up to 48 hours   --check MRI brain w/o contrast   --referral to vestibular rehab upon d/c   --check Lipid profile, A1C   --PT/OT possible BPPV 2/2 vestibular dysfunction vs central etiology. CTA showing mod proximal R carotid artery stenosis  --neuro recs appreciated   --check Orthostatic vitals   --Trial of Meclizine 25mg q8h PRN   --Consider Valium 0.25mg up to 2mg a day PRN for up to 48 hours   --check MRI brain w/o contrast   --referral to vestibular rehab upon d/c   --check Lipid profile, A1C   --PT/OT

## 2022-02-19 NOTE — PROGRESS NOTE ADULT - SUBJECTIVE AND OBJECTIVE BOX
Laci Martin MD  PGY1  Preferred contact via [employer] Teams      Patient is a 56y old  Male who presents with a chief complaint of     SUBJECTIVE / OVERNIGHT EVENTS:    MEDICATIONS  (STANDING):  aspirin  chewable 81 milliGRAM(s) Oral daily  dextrose 40% Gel 15 Gram(s) Oral once  dextrose 5%. 1000 milliLiter(s) (50 mL/Hr) IV Continuous <Continuous>  dextrose 5%. 1000 milliLiter(s) (100 mL/Hr) IV Continuous <Continuous>  dextrose 50% Injectable 25 Gram(s) IV Push once  dextrose 50% Injectable 12.5 Gram(s) IV Push once  dextrose 50% Injectable 25 Gram(s) IV Push once  enoxaparin Injectable 40 milliGRAM(s) SubCutaneous daily  glucagon  Injectable 1 milliGRAM(s) IntraMuscular once  influenza   Vaccine 0.5 milliLiter(s) IntraMuscular once  insulin glargine Injectable (LANTUS) 15 Unit(s) SubCutaneous at bedtime  insulin lispro (ADMELOG) corrective regimen sliding scale   SubCutaneous three times a day before meals  insulin lispro (ADMELOG) corrective regimen sliding scale   SubCutaneous at bedtime  insulin lispro Injectable (ADMELOG) 5 Unit(s) SubCutaneous three times a day before meals  lisinopril 20 milliGRAM(s) Oral daily    MEDICATIONS  (PRN):  acetaminophen     Tablet .. 650 milliGRAM(s) Oral every 6 hours PRN Temp greater or equal to 38C (100.4F), Mild Pain (1 - 3)  benzonatate 100 milliGRAM(s) Oral three times a day PRN Cough  meclizine 25 milliGRAM(s) Oral every 8 hours PRN Dizziness  melatonin 3 milliGRAM(s) Oral at bedtime PRN Insomnia      CAPILLARY BLOOD GLUCOSE      POCT Blood Glucose.: 294 mg/dL (19 Feb 2022 02:31)  POCT Blood Glucose.: 297 mg/dL (19 Feb 2022 01:17)  POCT Blood Glucose.: 322 mg/dL (18 Feb 2022 23:18)  POCT Blood Glucose.: 301 mg/dL (18 Feb 2022 21:30)  POCT Blood Glucose.: 355 mg/dL (18 Feb 2022 12:08)    I&O's Summary      Vital Signs Last 24 Hrs  T(C): 36.9 (19 Feb 2022 06:22), Max: 36.9 (19 Feb 2022 02:45)  T(F): 98.4 (19 Feb 2022 06:22), Max: 98.4 (19 Feb 2022 02:45)  HR: 86 (19 Feb 2022 06:22) (81 - 94)  BP: 108/62 (19 Feb 2022 06:22) (108/62 - 145/87)  BP(mean): --  RR: 18 (19 Feb 2022 06:22) (16 - 18)  SpO2: 96% (19 Feb 2022 06:22) (96% - 100%)    PHYSICAL EXAM:      LABS:                        10.2   79.22 )-----------( 642      ( 19 Feb 2022 06:39 )             34.4      02-19    133<L>  |  96<L>  |  10  ----------------------------<  278<H>  4.7   |  25  |  0.34<L>    Ca    9.4      19 Feb 2022 06:39  Phos  2.4     02-19  Mg     2.00     02-19    TPro  6.5  /  Alb  3.6  /  TBili  0.8  /  DBili  x   /  AST  22  /  ALT  7   /  AlkPhos  270<H>  02-19              RADIOLOGY & ADDITIONAL TESTS:    Imaging Personally Reviewed:    Consultant(s) Notes Reviewed:      Care Discussed with Consultants/Other Providers:   Laci Martin MD  PGY1  Preferred contact via [employer] Teams      Patient is a 56y old  Male who presents with a chief complaint of     SUBJECTIVE / OVERNIGHT EVENTS: NAEON. Patient is hungry this morning. States he is not nauseous. Denies chest pain, SOB, cough, N/V/D.     MEDICATIONS  (STANDING):  aspirin  chewable 81 milliGRAM(s) Oral daily  dextrose 40% Gel 15 Gram(s) Oral once  dextrose 5%. 1000 milliLiter(s) (50 mL/Hr) IV Continuous <Continuous>  dextrose 5%. 1000 milliLiter(s) (100 mL/Hr) IV Continuous <Continuous>  dextrose 50% Injectable 25 Gram(s) IV Push once  dextrose 50% Injectable 12.5 Gram(s) IV Push once  dextrose 50% Injectable 25 Gram(s) IV Push once  enoxaparin Injectable 40 milliGRAM(s) SubCutaneous daily  glucagon  Injectable 1 milliGRAM(s) IntraMuscular once  influenza   Vaccine 0.5 milliLiter(s) IntraMuscular once  insulin glargine Injectable (LANTUS) 15 Unit(s) SubCutaneous at bedtime  insulin lispro (ADMELOG) corrective regimen sliding scale   SubCutaneous three times a day before meals  insulin lispro (ADMELOG) corrective regimen sliding scale   SubCutaneous at bedtime  insulin lispro Injectable (ADMELOG) 5 Unit(s) SubCutaneous three times a day before meals  lisinopril 20 milliGRAM(s) Oral daily    MEDICATIONS  (PRN):  acetaminophen     Tablet .. 650 milliGRAM(s) Oral every 6 hours PRN Temp greater or equal to 38C (100.4F), Mild Pain (1 - 3)  benzonatate 100 milliGRAM(s) Oral three times a day PRN Cough  meclizine 25 milliGRAM(s) Oral every 8 hours PRN Dizziness  melatonin 3 milliGRAM(s) Oral at bedtime PRN Insomnia      CAPILLARY BLOOD GLUCOSE      POCT Blood Glucose.: 294 mg/dL (19 Feb 2022 02:31)  POCT Blood Glucose.: 297 mg/dL (19 Feb 2022 01:17)  POCT Blood Glucose.: 322 mg/dL (18 Feb 2022 23:18)  POCT Blood Glucose.: 301 mg/dL (18 Feb 2022 21:30)  POCT Blood Glucose.: 355 mg/dL (18 Feb 2022 12:08)    I&O's Summary        Vital Signs Last 24 Hrs  T(C): 36.7 (19 Feb 2022 15:19), Max: 36.9 (19 Feb 2022 02:45)  T(F): 98.1 (19 Feb 2022 15:19), Max: 98.4 (19 Feb 2022 02:45)  HR: 90 (19 Feb 2022 15:19) (82 - 94)  BP: 105/60 (19 Feb 2022 15:19) (105/60 - 137/82)  BP(mean): --  RR: 18 (19 Feb 2022 15:19) (16 - 18)  SpO2: 95% (19 Feb 2022 15:19) (95% - 100%)    PHYSICAL EXAM:  GENERAL: NAD, well-groomed, well-developed  HEAD:  Atraumatic, Normocephalic  EYES: EOMI, PERRLA, conjunctiva and sclera clear  ENMT: No tonsillar erythema, exudates, or enlargement; Moist mucous membranes, Good dentition  NECK: Supple, No JVD  NERVOUS SYSTEM: AOX3, motor and sensation grossly intact in b/l UE and b/l LE  PSYCHIATRIC: Appropriate affect and mood  CHEST/LUNG: Clear to auscultation bilaterally; No rales, rhonchi, wheezing, or rubs  HEART: Regular rate and rhythm; No murmurs, rubs, or gallops. No LE edema  ABDOMEN: Soft, Nontender, mildly distended; Bowel sounds present  EXTREMITIES:  2+ Peripheral Pulses, No clubbing, cyanosis  SKIN: No rashes or lesions    LABS:                        10.2   79.22 )-----------( 642      ( 19 Feb 2022 06:39 )             34.4     19 Feb 2022 06:39    133    |  96     |  10     ----------------------------<  278    4.7     |  25     |  0.34     Ca    9.4        19 Feb 2022 06:39  Phos  2.4       19 Feb 2022 06:39  Mg     2.00      19 Feb 2022 06:39    TPro  6.5    /  Alb  3.6    /  TBili  0.8    /  DBili  x      /  AST  22     /  ALT  7      /  AlkPhos  270    19 Feb 2022 06:39      CAPILLARY BLOOD GLUCOSE      POCT Blood Glucose.: 239 mg/dL (19 Feb 2022 17:59)  POCT Blood Glucose.: 218 mg/dL (19 Feb 2022 12:20)  POCT Blood Glucose.: 276 mg/dL (19 Feb 2022 08:42)  POCT Blood Glucose.: 294 mg/dL (19 Feb 2022 02:31)  POCT Blood Glucose.: 297 mg/dL (19 Feb 2022 01:17)  POCT Blood Glucose.: 322 mg/dL (18 Feb 2022 23:18)  POCT Blood Glucose.: 301 mg/dL (18 Feb 2022 21:30)    BLOOD CULTURE    RADIOLOGY & ADDITIONAL TESTS:    Imaging Personally Reviewed:  [ ] YES                10.2   79.22 )-----------( 642      ( 19 Feb 2022 06:39 )             34.4      02-19    133<L>  |  96<L>  |  10  ----------------------------<  278<H>  4.7   |  25  |  0.34<L>    Ca    9.4      19 Feb 2022 06:39  Phos  2.4     02-19  Mg     2.00     02-19    TPro  6.5  /  Alb  3.6  /  TBili  0.8  /  DBili  x   /  AST  22  /  ALT  7   /  AlkPhos  270<H>  02-19              RADIOLOGY & ADDITIONAL TESTS:    Imaging Personally Reviewed:    Consultant(s) Notes Reviewed:      Care Discussed with Consultants/Other Providers:

## 2022-02-19 NOTE — PROGRESS NOTE ADULT - PROBLEM SELECTOR PLAN 3
Per patient: home medications: metformin 1000 BID, Basaglar 30 units qhs and humalog 20 units before meals (only eats twice). States that he often wakes up with FS 40s-90s. In 2021 pt was discharged with Basaglar 20 and Humalog 5, however, he states his PCP increased the doses since then   --start lantus 15 units qhs and Lispro 5 units premeal   --low ISS and FS qac and qhs   --check A1c   --nutrition consult  --endo consult in AM Per patient: home medications: metformin 1000 BID, Basaglar 30 units qhs and humalog 20 units before meals (only eats twice). States that he often wakes up with FS 40s-90s. In 2021 pt was discharged with Basaglar 20 and Humalog 5, however, he states his PCP increased the doses since then   --start lantus 15 units qhs and Lispro 5 units premeal   --low ISS and FS qac and qhs   --check A1c   --nutrition consult

## 2022-02-19 NOTE — PROGRESS NOTE ADULT - PROBLEM SELECTOR PLAN 1
possible BPPV 2/2 vestibular dysfunction vs central etiology. CTA showing mod proximal R carotid artery stenosis  --neuro recs apprecaited   --check Orthostatic vitals   --Trial of Meclizine 25mg q8h PRN   --Consider Valium 0.25mg up to 2mg a day PRN for up to 48 hours   --check MRI brain w/o contrast   --referral to vestibular rehab upon d/c   --check Lipid profile, A1C   --PT/OT

## 2022-02-19 NOTE — PROGRESS NOTE ADULT - ASSESSMENT
56M hx of T2DM, HTN, HLD presents for dizziness x3 days found to have WBC 75k concerning for myeloproliferative disorder.

## 2022-02-19 NOTE — H&P ADULT - NSHPPHYSICALEXAM_GEN_ALL_CORE
T(C): 36.6 (02-19-22 @ 01:16), Max: 36.7 (02-18-22 @ 12:03)  HR: 89 (02-19-22 @ 01:16) (81 - 94)  BP: 134/71 (02-19-22 @ 01:16) (117/70 - 145/87)  RR: 18 (02-19-22 @ 01:16) (16 - 18)  SpO2: 96% (02-19-22 @ 01:16) (96% - 100%)    GENERAL: Laying comfortably, NAD  EYES: EOMI, PERRL, no scleral icterus  NECK: No JVD  LUNG: Clear to auscultation bilaterally; No wheeze, crackles or rhonci  HEART: Regular rate and rhythm; No murmurs, rubs, or gallops  ABDOMEN: Soft, Nontender, Nondistended  EXTREMITIES:  No LE edema, Peripheral Pulses intact, No clubbing, cyanosis, or edema  PSYCH: AAOx3  NEUROLOGY: non-focal, strength 5/5 in all extremities, sensation intact  SKIN: No rashes or lesions

## 2022-02-19 NOTE — PHYSICAL THERAPY INITIAL EVALUATION ADULT - PERTINENT HX OF CURRENT PROBLEM, REHAB EVAL
pt. is a 56 year old Male history of Type 2 DM, HTN, HLD presents for dizziness, associated with nausea and 1 episode of yellow vomit, non bloody. PMH: DM, HTN.

## 2022-02-19 NOTE — H&P ADULT - PROBLEM SELECTOR PLAN 2
Previous Bone marrow biopsy JAK2+   --emailed Heme/Onc for consult Previous Bone marrow biopsy JAK2+. pt reports 40 lb weight loss in 5 months  --emailed Heme/Onc for consult

## 2022-02-19 NOTE — H&P ADULT - HISTORY OF PRESENT ILLNESS
56M hx of T2DM, HTN, HLD presents for dizziness x3 days, described as room-spinning whenever he turns his head in the bed or changes position. Associated with nausea x 3 days and 1 episode of yellow vomit this morning, non bloody. Dizziness has made it difficult for him to ambulate, normally he ambulates without difficulty, without cane or walker. Denies headaches, visual changes, eat ringing, neck pain falls . Denies fevers, chills, SOB, CP. He reports generalized weakness and 40lb weight loss in the last 5 months, unintentional. He reports decreased appetite and change in taste for the past few months, saying “thats why I only eat ice now”. Patient reports being compliant with his diabetes medication, sometimes waking up with glucose levels 40s-90s. When it is 40s-50s, he develops blurry vision improved with drinking juice.     Of note patient was recently admitted 7/29/21 - 7/31/21 for BRPB, s/p EGD/colonoscopy revealing duodenitis and polyps. At that admission he had leukocytosis (15-20k) with immature granulocytes and he had underwent bone marrow biopsy that was concerning for myeloproliferative disorder in 2020. He was due to follow up with Oncologist Dr. Tim Rios but did not follow up.     ED: VSS, 2L bolus, meclizine 12.5mg, 25mg, Lantus 15, lispro 2, Zofran x 2

## 2022-02-19 NOTE — CONSULT NOTE ADULT - SUBJECTIVE AND OBJECTIVE BOX
HPI:  56M hx of T2DM, HTN, HLD presents for dizziness x3 days, described as room-spinning whenever he turns his head in the bed or changes position. Associated with nausea x 3 days and 1 episode of yellow vomit this morning, non bloody. Dizziness has made it difficult for him to ambulate, normally he ambulates without difficulty, without cane or walker. Denies headaches, visual changes, eat ringing, neck pain falls . Denies fevers, chills, SOB, CP. He reports generalized weakness and 40lb weight loss in the last 5 months, unintentional. He reports decreased appetite and change in taste for the past few months, saying “thats why I only eat ice now”. Patient reports being compliant with his diabetes medication, sometimes waking up with glucose levels 40s-90s. When it is 40s-50s, he develops blurry vision improved with drinking juice.     Of note patient was recently admitted 7/29/21 - 7/31/21 for BRPB, s/p EGD/colonoscopy revealing duodenitis and polyps. At that admission he had leukocytosis (15-20k) with immature granulocytes and he had underwent bone marrow biopsy that was concerning for myeloproliferative disorder in 2020. He was due to follow up with Dr. Hillman on 8/14/2020 but never followed.      ED: VSS, 2L bolus, meclizine 12.5mg, 25mg, Lantus 15, lispro 2, Zofran x 2  (19 Feb 2022 02:24)      14 point ROS otherwise negative    PAST MEDICAL & SURGICAL HISTORY:  HTN (hypertension)    DM (diabetes mellitus)    Arthritis    Myeloproliferative disease    Smoking    H/O eye surgery        Allergies    No Known Allergies    Intolerances        MEDICATIONS  (STANDING):  aspirin  chewable 81 milliGRAM(s) Oral daily  dextrose 40% Gel 15 Gram(s) Oral once  dextrose 5%. 1000 milliLiter(s) (50 mL/Hr) IV Continuous <Continuous>  dextrose 5%. 1000 milliLiter(s) (100 mL/Hr) IV Continuous <Continuous>  dextrose 50% Injectable 25 Gram(s) IV Push once  dextrose 50% Injectable 12.5 Gram(s) IV Push once  dextrose 50% Injectable 25 Gram(s) IV Push once  enoxaparin Injectable 40 milliGRAM(s) SubCutaneous daily  glucagon  Injectable 1 milliGRAM(s) IntraMuscular once  influenza   Vaccine 0.5 milliLiter(s) IntraMuscular once  insulin glargine Injectable (LANTUS) 15 Unit(s) SubCutaneous at bedtime  insulin lispro (ADMELOG) corrective regimen sliding scale   SubCutaneous three times a day before meals  insulin lispro (ADMELOG) corrective regimen sliding scale   SubCutaneous at bedtime  insulin lispro Injectable (ADMELOG) 5 Unit(s) SubCutaneous three times a day before meals  lactated ringers. 1000 milliLiter(s) (100 mL/Hr) IV Continuous <Continuous>  lisinopril 20 milliGRAM(s) Oral daily  nicotine -  14 mG/24Hr(s) Patch 1 patch Transdermal daily  potassium phosphate / sodium phosphate Powder (PHOS-NaK) 1 Packet(s) Oral every 6 hours    MEDICATIONS  (PRN):  acetaminophen     Tablet .. 650 milliGRAM(s) Oral every 6 hours PRN Temp greater or equal to 38C (100.4F), Mild Pain (1 - 3)  benzonatate 100 milliGRAM(s) Oral three times a day PRN Cough  meclizine 25 milliGRAM(s) Oral every 8 hours PRN Dizziness  melatonin 3 milliGRAM(s) Oral at bedtime PRN Insomnia      FAMILY HISTORY:  Family history of heart disease  Early family history of heart disease in Father, and siblings        SOCIAL HISTORY: No EtOH, no tobacco    Height (cm): 185.4 (02-19 @ 02:45)  Weight (kg): 82.4 (02-19 @ 02:45)  BMI (kg/m2): 24 (02-19 @ 02:45)  BSA (m2): 2.07 (02-19 @ 02:45)    VITALS:   T(F): 98.4 (02-19-22 @ 06:22), Max: 98.4 (02-19-22 @ 02:45)  HR: 86 (02-19-22 @ 06:22)  BP: 108/62 (02-19-22 @ 06:22)  RR: 18 (02-19-22 @ 06:22)  SpO2: 96% (02-19-22 @ 06:22)  Wt(kg): --    PHYSICAL EXAM    GENERAL: NAD, well-developed  HEAD:  Atraumatic, Normocephalic  EYES: EOMI, PERRLA, conjunctiva and sclera clear  NECK: Supple, No JVD  CHEST/LUNG: Clear to auscultation bilaterally; No wheeze  HEART: Regular rate and rhythm; No murmurs, rubs, or gallops  ABDOMEN: Soft, Nontender, Nondistended; Bowel sounds present  EXTREMITIES:  2+ Peripheral Pulses, No clubbing, cyanosis, or edema  NEUROLOGY: non-focal  SKIN: No rashes or lesions    LABS:                         10.2   79.22 )-----------( 642      ( 19 Feb 2022 06:39 )             34.4     02-19    133<L>  |  96<L>  |  10  ----------------------------<  278<H>  4.7   |  25  |  0.34<L>    Ca    9.4      19 Feb 2022 06:39  Phos  2.4     02-19  Mg     2.00     02-19    TPro  6.5  /  Alb  3.6  /  TBili  0.8  /  DBili  x   /  AST  22  /  ALT  7   /  AlkPhos  270<H>  02-19    Lactate Dehydrogenase, Serum: 1236 U/L (02-19 @ 08:03)  Magnesium, Serum: 2.00 mg/dL (02-19 @ 06:39)  Phosphorus Level, Serum: 2.4 mg/dL (02-19 @ 06:39)          IMAGING: Reviewed  HPI:  56M hx of T2DM, HTN, HLD presents for dizziness x3 days, described as room-spinning whenever he turns his head in the bed or changes position. Associated with nausea x 3 days and 1 episode of yellow vomit this morning, non bloody. Dizziness has made it difficult for him to ambulate, normally he ambulates without difficulty, without cane or walker. Denies headaches, visual changes, eat ringing, neck pain falls . Denies fevers, chills, SOB, CP. He reports generalized weakness and 40lb weight loss in the last 5 months, unintentional. He reports decreased appetite and change in taste for the past few months, saying “thats why I only eat ice now”. Patient reports being compliant with his diabetes medication, sometimes waking up with glucose levels 40s-90s. When it is 40s-50s, he develops blurry vision improved with drinking juice.     Of note patient was recently admitted 7/29/21 - 7/31/21 for BRPB, s/p EGD/colonoscopy revealing duodenitis and polyps. At that admission he had leukocytosis (15-20k) with immature granulocytes and he had underwent bone marrow biopsy that was concerning for myeloproliferative disorder in 2020. He was due to follow up with Dr. Hillman on 8/14/2020 but never followed.      ED: VSS, 2L bolus, meclizine 12.5mg, 25mg, Lantus 15, lispro 2, Zofran x 2  (19 Feb 2022 02:24)      14 point ROS otherwise negative    PAST MEDICAL & SURGICAL HISTORY:  HTN (hypertension)    DM (diabetes mellitus)    Arthritis    Myeloproliferative disease    Smoking    H/O eye surgery        Allergies    No Known Allergies    Intolerances        MEDICATIONS  (STANDING):  aspirin  chewable 81 milliGRAM(s) Oral daily  dextrose 40% Gel 15 Gram(s) Oral once  dextrose 5%. 1000 milliLiter(s) (50 mL/Hr) IV Continuous <Continuous>  dextrose 5%. 1000 milliLiter(s) (100 mL/Hr) IV Continuous <Continuous>  dextrose 50% Injectable 25 Gram(s) IV Push once  dextrose 50% Injectable 12.5 Gram(s) IV Push once  dextrose 50% Injectable 25 Gram(s) IV Push once  enoxaparin Injectable 40 milliGRAM(s) SubCutaneous daily  glucagon  Injectable 1 milliGRAM(s) IntraMuscular once  influenza   Vaccine 0.5 milliLiter(s) IntraMuscular once  insulin glargine Injectable (LANTUS) 15 Unit(s) SubCutaneous at bedtime  insulin lispro (ADMELOG) corrective regimen sliding scale   SubCutaneous three times a day before meals  insulin lispro (ADMELOG) corrective regimen sliding scale   SubCutaneous at bedtime  insulin lispro Injectable (ADMELOG) 5 Unit(s) SubCutaneous three times a day before meals  lactated ringers. 1000 milliLiter(s) (100 mL/Hr) IV Continuous <Continuous>  lisinopril 20 milliGRAM(s) Oral daily  nicotine -  14 mG/24Hr(s) Patch 1 patch Transdermal daily  potassium phosphate / sodium phosphate Powder (PHOS-NaK) 1 Packet(s) Oral every 6 hours    MEDICATIONS  (PRN):  acetaminophen     Tablet .. 650 milliGRAM(s) Oral every 6 hours PRN Temp greater or equal to 38C (100.4F), Mild Pain (1 - 3)  benzonatate 100 milliGRAM(s) Oral three times a day PRN Cough  meclizine 25 milliGRAM(s) Oral every 8 hours PRN Dizziness  melatonin 3 milliGRAM(s) Oral at bedtime PRN Insomnia      FAMILY HISTORY:  Family history of heart disease  Early family history of heart disease in Father, and siblings        SOCIAL HISTORY: No EtOH, no tobacco    Height (cm): 185.4 (02-19 @ 02:45)  Weight (kg): 82.4 (02-19 @ 02:45)  BMI (kg/m2): 24 (02-19 @ 02:45)  BSA (m2): 2.07 (02-19 @ 02:45)    VITALS:   T(F): 98.4 (02-19-22 @ 06:22), Max: 98.4 (02-19-22 @ 02:45)  HR: 86 (02-19-22 @ 06:22)  BP: 108/62 (02-19-22 @ 06:22)  RR: 18 (02-19-22 @ 06:22)  SpO2: 96% (02-19-22 @ 06:22)  Wt(kg): --    PHYSICAL EXAM    GENERAL: NAD, well-developed  HEAD:  Atraumatic, Normocephalic  EYES: EOMI, PERRLA, conjunctiva and sclera clear  NECK: Supple, No JVD  CHEST: Normal resp effort  ABDOMEN: Soft, Nontender, Nondistended; Bowel sounds present  EXTREMITIES:  2+ Peripheral Pulses, No clubbing, cyanosis, or edema  NEUROLOGY: non-focal  SKIN: No rashes or lesions    LABS:                         10.2   79.22 )-----------( 642      ( 19 Feb 2022 06:39 )             34.4     02-19    133<L>  |  96<L>  |  10  ----------------------------<  278<H>  4.7   |  25  |  0.34<L>    Ca    9.4      19 Feb 2022 06:39  Phos  2.4     02-19  Mg     2.00     02-19    TPro  6.5  /  Alb  3.6  /  TBili  0.8  /  DBili  x   /  AST  22  /  ALT  7   /  AlkPhos  270<H>  02-19    Lactate Dehydrogenase, Serum: 1236 U/L (02-19 @ 08:03)  Magnesium, Serum: 2.00 mg/dL (02-19 @ 06:39)  Phosphorus Level, Serum: 2.4 mg/dL (02-19 @ 06:39)          IMAGING: Reviewed  HPI:  56M hx of T2DM, HTN, HLD presents for dizziness x3 days, described as room-spinning whenever he turns his head in the bed or changes position. Associated with nausea x 3 days and 1 episode of yellow vomit this morning, non bloody. Dizziness has made it difficult for him to ambulate, normally he ambulates without difficulty, without cane or walker. Denies headaches, visual changes, eat ringing, neck pain falls . Denies fevers, chills, SOB, CP. He reports generalized weakness and 40lb weight loss in the last 5 months, unintentional. He reports decreased appetite and change in taste for the past few months, saying “thats why I only eat ice now”. Patient reports being compliant with his diabetes medication, sometimes waking up with glucose levels 40s-90s. When it is 40s-50s, he develops blurry vision improved with drinking juice.     Of note patient was recently admitted 7/29/21 - 7/31/21 for BRPB, s/p EGD/colonoscopy revealing duodenitis and polyps. At that admission he had leukocytosis (15-20k) with immature granulocytes and he had underwent bone marrow biopsy that was concerning for myeloproliferative disorder in 2020. He was due to follow up with Dr. Hillman on 8/14/2020 but never followed.      ED: VSS, 2L bolus, meclizine 12.5mg, 25mg, Lantus 15, lispro 2, Zofran x 2  (19 Feb 2022 02:24)      14 point ROS otherwise negative    PAST MEDICAL & SURGICAL HISTORY:  HTN (hypertension)    DM (diabetes mellitus)    Arthritis    Myeloproliferative disease    Smoking    H/O eye surgery        Allergies    No Known Allergies    Intolerances        MEDICATIONS  (STANDING):  aspirin  chewable 81 milliGRAM(s) Oral daily  dextrose 40% Gel 15 Gram(s) Oral once  dextrose 5%. 1000 milliLiter(s) (50 mL/Hr) IV Continuous <Continuous>  dextrose 5%. 1000 milliLiter(s) (100 mL/Hr) IV Continuous <Continuous>  dextrose 50% Injectable 25 Gram(s) IV Push once  dextrose 50% Injectable 12.5 Gram(s) IV Push once  dextrose 50% Injectable 25 Gram(s) IV Push once  enoxaparin Injectable 40 milliGRAM(s) SubCutaneous daily  glucagon  Injectable 1 milliGRAM(s) IntraMuscular once  influenza   Vaccine 0.5 milliLiter(s) IntraMuscular once  insulin glargine Injectable (LANTUS) 15 Unit(s) SubCutaneous at bedtime  insulin lispro (ADMELOG) corrective regimen sliding scale   SubCutaneous three times a day before meals  insulin lispro (ADMELOG) corrective regimen sliding scale   SubCutaneous at bedtime  insulin lispro Injectable (ADMELOG) 5 Unit(s) SubCutaneous three times a day before meals  lactated ringers. 1000 milliLiter(s) (100 mL/Hr) IV Continuous <Continuous>  lisinopril 20 milliGRAM(s) Oral daily  nicotine -  14 mG/24Hr(s) Patch 1 patch Transdermal daily  potassium phosphate / sodium phosphate Powder (PHOS-NaK) 1 Packet(s) Oral every 6 hours    MEDICATIONS  (PRN):  acetaminophen     Tablet .. 650 milliGRAM(s) Oral every 6 hours PRN Temp greater or equal to 38C (100.4F), Mild Pain (1 - 3)  benzonatate 100 milliGRAM(s) Oral three times a day PRN Cough  meclizine 25 milliGRAM(s) Oral every 8 hours PRN Dizziness  melatonin 3 milliGRAM(s) Oral at bedtime PRN Insomnia      FAMILY HISTORY:  Family history of heart disease  Early family history of heart disease in Father, and siblings        SOCIAL HISTORY: No EtOH, no tobacco    Height (cm): 185.4 (02-19 @ 02:45)  Weight (kg): 82.4 (02-19 @ 02:45)  BMI (kg/m2): 24 (02-19 @ 02:45)  BSA (m2): 2.07 (02-19 @ 02:45)    VITALS:   T(F): 98.4 (02-19-22 @ 06:22), Max: 98.4 (02-19-22 @ 02:45)  HR: 86 (02-19-22 @ 06:22)  BP: 108/62 (02-19-22 @ 06:22)  RR: 18 (02-19-22 @ 06:22)  SpO2: 96% (02-19-22 @ 06:22)  Wt(kg): --    PHYSICAL EXAM    GENERAL: NAD, well-developed  HEAD:  Atraumatic, Normocephalic  EYES: EOMI, PERRLA, conjunctiva and sclera clear  NECK: Supple, No JVD  CHEST: Normal resp effort  ABDOMEN: Soft, Distended. Possible splenomegaly.   EXTREMITIES:  2+ Peripheral Pulses, No clubbing, cyanosis, or edema  NEUROLOGY: non-focal  SKIN: No rashes or lesions    LABS:                         10.2   79.22 )-----------( 642      ( 19 Feb 2022 06:39 )             34.4     02-19    133<L>  |  96<L>  |  10  ----------------------------<  278<H>  4.7   |  25  |  0.34<L>    Ca    9.4      19 Feb 2022 06:39  Phos  2.4     02-19  Mg     2.00     02-19    TPro  6.5  /  Alb  3.6  /  TBili  0.8  /  DBili  x   /  AST  22  /  ALT  7   /  AlkPhos  270<H>  02-19    Lactate Dehydrogenase, Serum: 1236 U/L (02-19 @ 08:03)  Magnesium, Serum: 2.00 mg/dL (02-19 @ 06:39)  Phosphorus Level, Serum: 2.4 mg/dL (02-19 @ 06:39)          IMAGING: Reviewed

## 2022-02-19 NOTE — H&P ADULT - ATTENDING COMMENTS
56  year old male, with past history as noted above, with significance for "JAK2 positive myeloproliferative neoplasm," per bone marrow biopsy of 11/2020, being evaluated for dizziness, leukocytosis, with finding of leukocytosis (75.65).  Reports weight loss of ~ 40 lbs over ~ 5 months.  COVID-19, RVP negative.  Lactate = 1.5.  CTA of neck with "moderate stenosis of the proximal right internal carotid artery."  Being followed by Neurology team (appreciated).  No gross signs of infection appreciated.   F/up AM lab-work.     Reports of hypoglycemia during some nights (40's to 90's).  Will need Endocrinology consult in the AM (please call).  Low A1c level, in the setting of leukocytosis, may not be a true value of diabetic state.  F/u fructosamine level.    Oncology consult in the AM.    Placed NPO - pending bedside dysphagia screen.  Medications to be prescribed post screen.    All other management as prescribed. 56  year old male, with past history as noted above, with significance for "JAK2 positive myeloproliferative neoplasm," per bone marrow biopsy of 11/2020, being evaluated for dizziness, leukocytosis, with finding of leukocytosis (75.65).  Reports weight loss of ~ 40 lbs over ~ 5 months.  COVID-19, RVP negative.  Lactate = 1.5.  CTA of neck with "moderate stenosis of the proximal right internal carotid artery."  Being followed by Neurology team (appreciated).  No gross signs of infection appreciated.  F/up AM lab-work.     Reports of hypoglycemia during some nights (40's to 90's).  Will need Endocrinology consult in the AM (please call).  Low A1c level, in the setting of leukocytosis, may not be a true value of diabetic state.  F/u fructosamine level.    Oncology consult in the AM.    All other management as prescribed.

## 2022-02-19 NOTE — PHYSICAL THERAPY INITIAL EVALUATION ADULT - ADDITIONAL COMMENTS
-Pt. reports he has suffered multiple falls in the past month   -pt. report he has 5 steps to negotiate at home   -Pt. benefits from increased timing during all functional mobility secondary to dizziness symptoms.   -Pt. left comfortable in bed with all tubes/lines intact, call bell in reach and in NAD.

## 2022-02-19 NOTE — PATIENT PROFILE ADULT - FALL HARM RISK - HARM RISK INTERVENTIONS

## 2022-02-19 NOTE — PHYSICAL THERAPY INITIAL EVALUATION ADULT - DISCHARGE DISPOSITION, PT EVAL
anticipated discharge to rehab facility to address current functional limitation to optimize safety to allow pt. to reach their optimal level of function. please follow therapy for ongoing functional mobility performance./rehabilitation facility

## 2022-02-19 NOTE — H&P ADULT - NSICDXPASTMEDICALHX_GEN_ALL_CORE_FT
PAST MEDICAL HISTORY:  Arthritis     DM (diabetes mellitus)     HTN (hypertension)     Myeloproliferative disease      PAST MEDICAL HISTORY:  Arthritis     DM (diabetes mellitus)     HTN (hypertension)     Myeloproliferative disease     Smoking

## 2022-02-19 NOTE — OCCUPATIONAL THERAPY INITIAL EVALUATION ADULT - PERTINENT HX OF CURRENT PROBLEM, REHAB EVAL
56 year old male with history of T2DM, HTN, HLD presents for dizziness x3 days. Found to have WBC 75k concerning for myeloproliferative disorder.

## 2022-02-19 NOTE — CONSULT NOTE ADULT - ATTENDING COMMENTS
patient states he wants outpatient follow up for treatment an eval and now that he knows that it is a type of cancer he wants to get treated  start hydrea here for now   would consider PT maría

## 2022-02-19 NOTE — H&P ADULT - NSHPSOCIALHISTORY_GEN_ALL_CORE
Current smoker 1.5ppd x 40 years. Denies current alcohol or drug use. 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 Lives by himself In an apartment

## 2022-02-19 NOTE — CONSULT NOTE ADULT - ASSESSMENT
**INCOMPLETE**    53 y/o Male smoker, MHx of DM Type 2, HTN, psoriatic arthritis seen by hematology in 2020 for leukocytosis (neutrophil predominant with immature granulocytes, metamyelocytes), inc eos and basophils in setting of splenomegaly, weight loss, concerning for CML. Bone marrow Bx on 7/30/2020 was concerning for Atypical CML. Patient was dischared the following day with request to follow up with Dr. Hillman on 8/14/2020. Patient never showed up for appointment. He now p/w dizziness/nausea/vomitting.     # Atypical CML  - BM bx on 7/30/2020 showed JAK2 positive myeloproliferative neoplasm with SF3B1 mutation  - Patient had normal male karyotype, a negative BCR/ABL1 and a negative PDGFRA FISH rearrangement at the time  - He never received treatment for this and white count has now gone from ~13k in 2020 to ~ 70-80 k now  - Recommend starting Hydrea 1gm BID  - Patient will need outpatient follow up- this kind of disease is usually treated in the outpatient setting  - Will send referral to [facility] to make another appt for patient to be seen   - Emphasized importance of appropriate follow up to patient       Martina Maldonado, PGY-5  Hematology-Oncology Fellow  [phone] ([place]) [phone] ([facility])  I can also be reached on [employer] Teams  Please page fellow on call after 5pm and on weekends 53 y/o Male smoker, MHx of DM Type 2, HTN, psoriatic arthritis seen by hematology in 2020 for leukocytosis (neutrophil predominant with immature granulocytes, metamyelocytes), inc eos and basophils in setting of splenomegaly, weight loss, concerning for CML. Bone marrow Bx on 7/30/2020 was concerning for Atypical CML. Patient was dischared the following day with request to follow up with Dr. Hillman on 8/14/2020. Patient never showed up for appointment. He now p/w dizziness/nausea/vomitting.     # Atypical CML  - BM bx on 7/30/2020 showed JAK2 positive myeloproliferative neoplasm with SF3B1 mutation  - Patient had normal male karyotype, a negative BCR/ABL1 and a negative PDGFRA FISH rearrangement at the time  - He never received treatment for this and white count has now gone from ~13k in 2020 to ~ 70-80 k now  - Smear reviewed: Inc promyelocytes, myelocytes and metas   - Recommend starting Hydrea 1gm BID  - Patient will need outpatient follow up- this kind of disease is usually treated in the outpatient setting  - Will send referral to [facility] to make another appt for patient to be seen   - Emphasized importance of appropriate follow up to patient   - Neuro concerned about possible CIDP- treating underling condition should help this if it were the case.       Martina Maldonado, PGY-5  Hematology-Oncology Fellow  [phone] ([place]) [phone] ([facility])  I can also be reached on [employer] Teams  Please page fellow on call after 5pm and on weekends 55 y/o Male smoker, MHx of DM Type 2, HTN, psoriatic arthritis seen by hematology in 2020 for leukocytosis (neutrophil predominant with immature granulocytes, metamyelocytes), inc eos and basophils in setting of splenomegaly, weight loss, concerning for CML. Bone marrow Bx on 7/30/2020 was concerning for Atypical CML. Patient was dischared the following day with request to follow up with Dr. Hillman on 8/14/2020. Patient never showed up for appointment. He now p/w dizziness/nausea/vomitting.     # Atypical CML  - BM bx on 7/30/2020 showed JAK2 positive myeloproliferative neoplasm with SF3B1 mutation  - Patient had normal male karyotype, a negative BCR/ABL1 and a negative PDGFRA FISH rearrangement at the time  - He never received treatment for this and white count has now gone from ~13k in 2020 to ~ 70-80 k now  - Smear reviewed: Inc promyelocytes, myelocytes and metas   - Recommend starting Hydrea 1gm BID  - Recommend abdominal ultrasound to evaluate for splenomegaly   - Patient will need outpatient follow up- this kind of disease is usually treated in the outpatient setting  - Will send referral to [facility] to make another appt for patient to be seen   - Emphasized importance of appropriate follow up to patient   - Neuro concerned about possible CIDP- treating underling condition should help this if it were the case.       Martina Maldonado, PGY-5  Hematology-Oncology Fellow  [phone] ([place]) [phone] ([facility])  I can also be reached on [employer] Teams  Please page fellow on call after 5pm and on weekends

## 2022-02-19 NOTE — OCCUPATIONAL THERAPY INITIAL EVALUATION ADULT - LIVES WITH, PROFILE
Patient lives alone in an apartment with 5 steps to enter. Reports history of 3 falls within the past month. Patient has a walk-in shower with no durable medical equipment.

## 2022-02-19 NOTE — H&P ADULT - BIRTH SEX
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 Gaviota Murray is here for 1 year follow up for right breast cancer. She had right breast skin sparing mastectomy for persistent positive close/margins and direct to implant reconstruction by Dr. Curly Rubin. She is on arimidex and is having fewer side effects now. She reports only some left knee pain. She has not undergone colonoscopy, but has completed cologuard. No new chest pain, SOB, headaches, abdominal pain, urinary complaints. No new changes in PMH/PSH/meds/allergies. No imaging today, due in 6 months. /81 (Site: Left Upper Arm, Position: Sitting, Cuff Size: Medium Adult)   Pulse 69   Temp 97.4 °F (36.3 °C) (Temporal)   Resp 16   Ht 5' 2.01\" (1.575 m)   Wt 166 lb (75.3 kg)   BMI 30.35 kg/m²    Alert and appropriate, NAD. No thyromegaly or cervical LAD. EOMI, anicteric. Incision well healed. Bruising upper skin flap resolved. No evidence of hematoma or infection. Some rippling medially. Left breast no masses, nipple discharge, or skin changes. No axillary LAD bilaterally  Abdomen soft, NTND, no masses  Ext no edema, well perfused. She does have improved ROM at right shoulder, abducts only to 160 degrees, has less tightness along right chest wall. Good ROM left shoulder. A/P  [id number] stage IIA multifocal invasive ductal carcinoma, ER+/VA+/her2-. COLLETTE at 1 year follow up. S/p total mastectomy with immediate direct to implant reconstruction. No radiation/chemo necessary. Oncotype 6. Genetic testing negative.      Continue arimidex,   F/u 6 months for exam and left breast screening mammogram. Male

## 2022-02-19 NOTE — H&P ADULT - NSHPREVIEWOFSYSTEMS_GEN_ALL_CORE
CONSTITUTIONAL:  + weight loss, no fever, chills, + weakness or fatigue.  HEENT:  Eyes:  No visual loss, blurred vision, double vision or yellow sclerae. Ears, Nose, Throat:  No hearing loss, sneezing, congestion, runny nose or sore throat.  SKIN:  No rash or itching.  CARDIOVASCULAR:  No chest pain, chest pressure or chest discomfort. No palpitations.  RESPIRATORY:  No shortness of breath, cough or sputum.  GASTROINTESTINAL:  No anorexia, nausea, vomiting or diarrhea. No abdominal pain or blood.  GENITOURINARY:  Denies hematuria, dysuria.   NEUROLOGICAL:  No headache, + dizziness, no syncope, paralysis, ataxia, numbness or tingling in the extremities. No change in bowel or bladder control.  MUSCULOSKELETAL:  No muscle, back pain, joint pain or stiffness.  HEMATOLOGIC:  No anemia, bleeding or bruising.  LYMPHATICS:  No enlarged nodes.   PSYCHIATRIC:  No history of depression or anxiety.  ENDOCRINOLOGIC:  No reports of sweating, cold or heat intolerance. No polyuria or polydipsia.  ALLERGIES:  No history of asthma, hives, eczema or rhinitis.

## 2022-02-19 NOTE — PROGRESS NOTE ADULT - PROBLEM SELECTOR PLAN 2
Previous Bone marrow biopsy JAK2+. pt reports 40 lb weight loss in 5 months  --emailed Heme/Onc for consult

## 2022-02-19 NOTE — H&P ADULT - ASSESSMENT
56M hx of T2DM, HTN, HLD presents for dizziness x3 days found to have WBC 75k concerning for myeloproliferative disorder.  [ x ]  Lab studies reviewed  [ x ]  Radiology reviewed  [ x ]  Old records reviewed    56M hx of T2DM, HTN, HLD presents for dizziness x3 days found to have WBC 75k concerning for myeloproliferative disorder.

## 2022-02-20 NOTE — PROGRESS NOTE ADULT - SUBJECTIVE AND OBJECTIVE BOX
Laci Martin MD  PGY1  Preferred contact via [employer] Teams      Patient is a 56y old  Male who presents with a chief complaint of     SUBJECTIVE / OVERNIGHT EVENTS:    MEDICATIONS  (STANDING):  aspirin  chewable 81 milliGRAM(s) Oral daily  dextrose 40% Gel 15 Gram(s) Oral once  dextrose 5%. 1000 milliLiter(s) (50 mL/Hr) IV Continuous <Continuous>  dextrose 5%. 1000 milliLiter(s) (100 mL/Hr) IV Continuous <Continuous>  dextrose 50% Injectable 25 Gram(s) IV Push once  dextrose 50% Injectable 12.5 Gram(s) IV Push once  dextrose 50% Injectable 25 Gram(s) IV Push once  enoxaparin Injectable 40 milliGRAM(s) SubCutaneous daily  glucagon  Injectable 1 milliGRAM(s) IntraMuscular once  hydroxyurea 1000 milliGRAM(s) Oral two times a day  influenza   Vaccine 0.5 milliLiter(s) IntraMuscular once  insulin glargine Injectable (LANTUS) 15 Unit(s) SubCutaneous at bedtime  insulin lispro (ADMELOG) corrective regimen sliding scale   SubCutaneous three times a day before meals  insulin lispro (ADMELOG) corrective regimen sliding scale   SubCutaneous at bedtime  insulin lispro Injectable (ADMELOG) 5 Unit(s) SubCutaneous three times a day before meals  lactated ringers. 1000 milliLiter(s) (100 mL/Hr) IV Continuous <Continuous>  lisinopril 20 milliGRAM(s) Oral daily  nicotine -  14 mG/24Hr(s) Patch 1 patch Transdermal daily    MEDICATIONS  (PRN):  acetaminophen     Tablet .. 650 milliGRAM(s) Oral every 6 hours PRN Temp greater or equal to 38C (100.4F), Mild Pain (1 - 3)  benzonatate 100 milliGRAM(s) Oral three times a day PRN Cough  meclizine 25 milliGRAM(s) Oral every 8 hours PRN Dizziness  melatonin 3 milliGRAM(s) Oral at bedtime PRN Insomnia  ondansetron    Tablet 4 milliGRAM(s) Oral every 8 hours PRN Nausea and/or Vomiting      CAPILLARY BLOOD GLUCOSE      POCT Blood Glucose.: 185 mg/dL (19 Feb 2022 22:01)  POCT Blood Glucose.: 239 mg/dL (19 Feb 2022 17:59)  POCT Blood Glucose.: 218 mg/dL (19 Feb 2022 12:20)  POCT Blood Glucose.: 276 mg/dL (19 Feb 2022 08:42)    I&O's Summary      Vital Signs Last 24 Hrs  T(C): 37.2 (20 Feb 2022 05:44), Max: 37.2 (20 Feb 2022 05:44)  T(F): 99 (20 Feb 2022 05:44), Max: 99 (20 Feb 2022 05:44)  HR: 77 (20 Feb 2022 05:44) (77 - 90)  BP: 102/84 (20 Feb 2022 05:44) (102/84 - 125/76)  BP(mean): --  RR: 18 (20 Feb 2022 05:44) (18 - 18)  SpO2: 97% (20 Feb 2022 05:44) (95% - 98%)    PHYSICAL EXAM:      LABS:                        10.2   79.22 )-----------( 642      ( 19 Feb 2022 06:39 )             34.4      02-19    133<L>  |  96<L>  |  10  ----------------------------<  278<H>  4.7   |  25  |  0.34<L>    Ca    9.4      19 Feb 2022 06:39  Phos  2.4     02-19  Mg     2.00     02-19    TPro  6.5  /  Alb  3.6  /  TBili  0.8  /  DBili  x   /  AST  22  /  ALT  7   /  AlkPhos  270<H>  02-19              RADIOLOGY & ADDITIONAL TESTS:    Imaging Personally Reviewed:    Consultant(s) Notes Reviewed:      Care Discussed with Consultants/Other Providers:   Laci Martin MD  PGY1  Preferred contact via Microsoft Teams      Patient is a 56y old  Male who presents with a chief complaint of     SUBJECTIVE / OVERNIGHT EVENTS: NAEON. Patient complained of nausea overnight and had 1 episode of vomiting per nursing. Still complaining of dizziness on turning of his head. Denies chest pain, abdominal pain, diarrhea, SOB, cough.     MEDICATIONS  (STANDING):  aspirin  chewable 81 milliGRAM(s) Oral daily  dextrose 40% Gel 15 Gram(s) Oral once  dextrose 5%. 1000 milliLiter(s) (50 mL/Hr) IV Continuous <Continuous>  dextrose 5%. 1000 milliLiter(s) (100 mL/Hr) IV Continuous <Continuous>  dextrose 50% Injectable 25 Gram(s) IV Push once  dextrose 50% Injectable 12.5 Gram(s) IV Push once  dextrose 50% Injectable 25 Gram(s) IV Push once  enoxaparin Injectable 40 milliGRAM(s) SubCutaneous daily  glucagon  Injectable 1 milliGRAM(s) IntraMuscular once  hydroxyurea 1000 milliGRAM(s) Oral two times a day  influenza   Vaccine 0.5 milliLiter(s) IntraMuscular once  insulin glargine Injectable (LANTUS) 15 Unit(s) SubCutaneous at bedtime  insulin lispro (ADMELOG) corrective regimen sliding scale   SubCutaneous three times a day before meals  insulin lispro (ADMELOG) corrective regimen sliding scale   SubCutaneous at bedtime  insulin lispro Injectable (ADMELOG) 5 Unit(s) SubCutaneous three times a day before meals  lactated ringers. 1000 milliLiter(s) (100 mL/Hr) IV Continuous <Continuous>  lisinopril 20 milliGRAM(s) Oral daily  nicotine -  14 mG/24Hr(s) Patch 1 patch Transdermal daily    MEDICATIONS  (PRN):  acetaminophen     Tablet .. 650 milliGRAM(s) Oral every 6 hours PRN Temp greater or equal to 38C (100.4F), Mild Pain (1 - 3)  benzonatate 100 milliGRAM(s) Oral three times a day PRN Cough  meclizine 25 milliGRAM(s) Oral every 8 hours PRN Dizziness  melatonin 3 milliGRAM(s) Oral at bedtime PRN Insomnia  ondansetron    Tablet 4 milliGRAM(s) Oral every 8 hours PRN Nausea and/or Vomiting      CAPILLARY BLOOD GLUCOSE      POCT Blood Glucose.: 185 mg/dL (19 Feb 2022 22:01)  POCT Blood Glucose.: 239 mg/dL (19 Feb 2022 17:59)  POCT Blood Glucose.: 218 mg/dL (19 Feb 2022 12:20)  POCT Blood Glucose.: 276 mg/dL (19 Feb 2022 08:42)    I&O's Summary        Vital Signs Last 24 Hrs  T(C): 37.2 (20 Feb 2022 05:44), Max: 37.2 (20 Feb 2022 05:44)  T(F): 99 (20 Feb 2022 05:44), Max: 99 (20 Feb 2022 05:44)  HR: 77 (20 Feb 2022 05:44) (77 - 90)  BP: 102/84 (20 Feb 2022 05:44) (102/84 - 125/76)  BP(mean): --  RR: 18 (20 Feb 2022 05:44) (18 - 18)  SpO2: 97% (20 Feb 2022 05:44) (95% - 98%)    PHYSICAL EXAM:  GENERAL: disheveled   HEAD:  Atraumatic, Normocephalic  EYES: EOMI, PERRLA, conjunctiva and sclera clear  ENMT: No tonsillar erythema, exudates, or enlargement; Moist mucous membranes, Good dentition  NECK: Supple, No JVD  NERVOUS SYSTEM: AOX3, motor and sensation grossly intact in b/l UE and b/l LE  PSYCHIATRIC: Appropriate affect and mood  CHEST/LUNG: Clear to auscultation bilaterally; No rales, rhonchi, wheezing, or rubs  HEART: Regular rate and rhythm; No murmurs, rubs, or gallops. No LE edema  ABDOMEN: Soft, Nontender, Nondistended; Bowel sounds present  EXTREMITIES:  2+ Peripheral Pulses, No clubbing, cyanosis  SKIN: No rashes or lesions      LABS:                        10.2   79.22 )-----------( 642      ( 19 Feb 2022 06:39 )             34.4      02-19    133<L>  |  96<L>  |  10  ----------------------------<  278<H>  4.7   |  25  |  0.34<L>    Ca    9.4      19 Feb 2022 06:39  Phos  2.4     02-19  Mg     2.00     02-19    TPro  6.5  /  Alb  3.6  /  TBili  0.8  /  DBili  x   /  AST  22  /  ALT  7   /  AlkPhos  270<H>  02-19              RADIOLOGY & ADDITIONAL TESTS:    Imaging Personally Reviewed:    Consultant(s) Notes Reviewed:      Care Discussed with Consultants/Other Providers:

## 2022-02-20 NOTE — PROGRESS NOTE ADULT - PROBLEM SELECTOR PLAN 1
possible BPPV 2/2 vestibular dysfunction vs central etiology. CTA showing mod proximal R carotid artery stenosis  --neuro recs apprecaited   --check Orthostatic vitals   --Trial of Meclizine 25mg q8h PRN   --Consider Valium 0.25mg up to 2mg a day PRN for up to 48 hours   --check MRI brain w/o contrast   --referral to vestibular rehab upon d/c   --check Lipid profile, A1C   --PT/OT possible BPPV 2/2 vestibular dysfunction vs central etiology. CTA showing mod proximal R carotid artery stenosis  --neuro recs appreciated   --check Orthostatic vitals   --Trial of Meclizine 25mg q8h PRN   --check MRI brain w/o contrast   --referral to vestibular rehab upon d/c   --check Lipid profile, A1C   --PT/OT

## 2022-02-20 NOTE — PROGRESS NOTE ADULT - PROBLEM SELECTOR PLAN 3
Per patient: home medications: metformin 1000 BID, Basaglar 30 units qhs and humalog 20 units before meals (only eats twice). States that he often wakes up with FS 40s-90s. In 2021 pt was discharged with Basaglar 20 and Humalog 5, however, he states his PCP increased the doses since then   --start lantus 15 units qhs and Lispro 5 units premeal   --low ISS and FS qac and qhs   --check A1c   --nutrition consult

## 2022-02-20 NOTE — PROGRESS NOTE ADULT - PROBLEM SELECTOR PLAN 2
Previous Bone marrow biopsy JAK2+. pt reports 40 lb weight loss in 5 months  --emailed Heme/Onc for consult Previous Bone marrow biopsy JAK2+. 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

## 2022-02-21 NOTE — PROGRESS NOTE ADULT - SUBJECTIVE AND OBJECTIVE BOX
Laci Martin MD  PGY1  Preferred contact via [employer] Teams      Patient is a 56y old  Male who presents with a chief complaint of     SUBJECTIVE / OVERNIGHT EVENTS: NAEON.     MEDICATIONS  (STANDING):  aspirin  chewable 81 milliGRAM(s) Oral daily  dextrose 40% Gel 15 Gram(s) Oral once  dextrose 5%. 1000 milliLiter(s) (50 mL/Hr) IV Continuous <Continuous>  dextrose 5%. 1000 milliLiter(s) (100 mL/Hr) IV Continuous <Continuous>  dextrose 50% Injectable 25 Gram(s) IV Push once  dextrose 50% Injectable 12.5 Gram(s) IV Push once  dextrose 50% Injectable 25 Gram(s) IV Push once  enoxaparin Injectable 40 milliGRAM(s) SubCutaneous daily  glucagon  Injectable 1 milliGRAM(s) IntraMuscular once  hydroxyurea 1000 milliGRAM(s) Oral two times a day  influenza   Vaccine 0.5 milliLiter(s) IntraMuscular once  insulin glargine Injectable (LANTUS) 15 Unit(s) SubCutaneous at bedtime  insulin lispro (ADMELOG) corrective regimen sliding scale   SubCutaneous three times a day before meals  insulin lispro (ADMELOG) corrective regimen sliding scale   SubCutaneous at bedtime  insulin lispro Injectable (ADMELOG) 5 Unit(s) SubCutaneous three times a day before meals  lactated ringers. 1000 milliLiter(s) (100 mL/Hr) IV Continuous <Continuous>  lisinopril 20 milliGRAM(s) Oral daily  nicotine -  14 mG/24Hr(s) Patch 1 patch Transdermal daily    MEDICATIONS  (PRN):  acetaminophen     Tablet .. 650 milliGRAM(s) Oral every 6 hours PRN Temp greater or equal to 38C (100.4F), Mild Pain (1 - 3)  benzonatate 100 milliGRAM(s) Oral three times a day PRN Cough  meclizine 25 milliGRAM(s) Oral every 8 hours PRN Dizziness  melatonin 3 milliGRAM(s) Oral at bedtime PRN Insomnia  ondansetron    Tablet 4 milliGRAM(s) Oral every 8 hours PRN Nausea and/or Vomiting      CAPILLARY BLOOD GLUCOSE      POCT Blood Glucose.: 219 mg/dL (20 Feb 2022 22:19)  POCT Blood Glucose.: 124 mg/dL (20 Feb 2022 17:50)  POCT Blood Glucose.: 122 mg/dL (20 Feb 2022 12:51)  POCT Blood Glucose.: 132 mg/dL (20 Feb 2022 08:43)    I&O's Summary      Vital Signs Last 24 Hrs  T(C): 36.7 (21 Feb 2022 06:20), Max: 37.2 (20 Feb 2022 14:58)  T(F): 98.1 (21 Feb 2022 06:20), Max: 98.9 (20 Feb 2022 14:58)  HR: 84 (21 Feb 2022 06:20) (83 - 89)  BP: 121/79 (21 Feb 2022 06:20) (115/60 - 122/60)  BP(mean): --  RR: 18 (21 Feb 2022 06:20) (17 - 18)  SpO2: 96% (21 Feb 2022 06:20) (96% - 96%)    PHYSICAL EXAM:      LABS:                        9.9    67.22 )-----------( 564      ( 20 Feb 2022 07:35 )             31.8      02-20    134<L>  |  96<L>  |  10  ----------------------------<  145<H>  4.1   |  29  |  0.38<L>    Ca    9.1      20 Feb 2022 07:35  Phos  3.0     02-20  Mg     2.00     02-20    TPro  5.9<L>  /  Alb  3.4  /  TBili  0.8  /  DBili  x   /  AST  20  /  ALT  5   /  AlkPhos  236<H>  02-20              RADIOLOGY & ADDITIONAL TESTS:    Imaging Personally Reviewed:    Consultant(s) Notes Reviewed:      Care Discussed with Consultants/Other Providers:   Laci Martin MD  PGY1  Preferred contact via [employer] Teams      Patient is a 56y old  Male who presents with a chief complaint of     SUBJECTIVE / OVERNIGHT EVENTS: NAEON. Patient states he is no longer nauseous and has not vomited overnight. Still complaining of dizziness and unsteadiness aggravated by head movement that meclizine has not assisted with. Also complaining of mild abdominal pain. Denies chest pain, SOB, cough, N/V/D, headache.     MEDICATIONS  (STANDING):  aspirin  chewable 81 milliGRAM(s) Oral daily  dextrose 40% Gel 15 Gram(s) Oral once  dextrose 5%. 1000 milliLiter(s) (50 mL/Hr) IV Continuous <Continuous>  dextrose 5%. 1000 milliLiter(s) (100 mL/Hr) IV Continuous <Continuous>  dextrose 50% Injectable 25 Gram(s) IV Push once  dextrose 50% Injectable 12.5 Gram(s) IV Push once  dextrose 50% Injectable 25 Gram(s) IV Push once  enoxaparin Injectable 40 milliGRAM(s) SubCutaneous daily  glucagon  Injectable 1 milliGRAM(s) IntraMuscular once  hydroxyurea 1000 milliGRAM(s) Oral two times a day  influenza   Vaccine 0.5 milliLiter(s) IntraMuscular once  insulin glargine Injectable (LANTUS) 15 Unit(s) SubCutaneous at bedtime  insulin lispro (ADMELOG) corrective regimen sliding scale   SubCutaneous three times a day before meals  insulin lispro (ADMELOG) corrective regimen sliding scale   SubCutaneous at bedtime  insulin lispro Injectable (ADMELOG) 5 Unit(s) SubCutaneous three times a day before meals  lactated ringers. 1000 milliLiter(s) (100 mL/Hr) IV Continuous <Continuous>  lisinopril 20 milliGRAM(s) Oral daily  nicotine -  14 mG/24Hr(s) Patch 1 patch Transdermal daily    MEDICATIONS  (PRN):  acetaminophen     Tablet .. 650 milliGRAM(s) Oral every 6 hours PRN Temp greater or equal to 38C (100.4F), Mild Pain (1 - 3)  benzonatate 100 milliGRAM(s) Oral three times a day PRN Cough  meclizine 25 milliGRAM(s) Oral every 8 hours PRN Dizziness  melatonin 3 milliGRAM(s) Oral at bedtime PRN Insomnia  ondansetron    Tablet 4 milliGRAM(s) Oral every 8 hours PRN Nausea and/or Vomiting      CAPILLARY BLOOD GLUCOSE      POCT Blood Glucose.: 219 mg/dL (20 Feb 2022 22:19)  POCT Blood Glucose.: 124 mg/dL (20 Feb 2022 17:50)  POCT Blood Glucose.: 122 mg/dL (20 Feb 2022 12:51)  POCT Blood Glucose.: 132 mg/dL (20 Feb 2022 08:43)    I&O's Summary      Vital Signs Last 24 Hrs  T(C): 36.7 (21 Feb 2022 06:20), Max: 37.2 (20 Feb 2022 14:58)  T(F): 98.1 (21 Feb 2022 06:20), Max: 98.9 (20 Feb 2022 14:58)  HR: 84 (21 Feb 2022 06:20) (83 - 89)  BP: 121/79 (21 Feb 2022 06:20) (115/60 - 122/60)  BP(mean): --  RR: 18 (21 Feb 2022 06:20) (17 - 18)  SpO2: 96% (21 Feb 2022 06:20) (96% - 96%)    PHYSICAL EXAM:  GENERAL: NAD, disheveled   HEAD:  Atraumatic, Normocephalic  EYES: EOMI, PERRLA, conjunctiva and sclera clear  ENMT: No tonsillar erythema, exudates, or enlargement; Moist mucous membranes, Good dentition  NECK: Supple, No JVD  NERVOUS SYSTEM: AOX3, motor and sensation grossly intact in b/l UE and b/l LE  PSYCHIATRIC: Appropriate affect and mood  CHEST/LUNG: Clear to auscultation bilaterally; No rales, rhonchi, wheezing, or rubs  HEART: Regular rate and rhythm; No murmurs, rubs, or gallops. No LE edema  ABDOMEN: Soft, mild epigastric and RUQ tenderness, Nondistended; Bowel sounds present  EXTREMITIES:  2+ Peripheral Pulses, No clubbing, cyanosis  SKIN: No rashes or lesions    LABS:                        9.9    67.22 )-----------( 564      ( 20 Feb 2022 07:35 )             31.8      02-20    134<L>  |  96<L>  |  10  ----------------------------<  145<H>  4.1   |  29  |  0.38<L>    Ca    9.1      20 Feb 2022 07:35  Phos  3.0     02-20  Mg     2.00     02-20    TPro  5.9<L>  /  Alb  3.4  /  TBili  0.8  /  DBili  x   /  AST  20  /  ALT  5   /  AlkPhos  236<H>  02-20              RADIOLOGY & ADDITIONAL TESTS:    Imaging Personally Reviewed:    Consultant(s) Notes Reviewed:      Care Discussed with Consultants/Other Providers:

## 2022-02-21 NOTE — PROGRESS NOTE ADULT - PROBLEM SELECTOR PLAN 1
possible BPPV 2/2 vestibular dysfunction vs central etiology. CTA showing mod proximal R carotid artery stenosis  --neuro recs appreciated   --check Orthostatic vitals   --Trial of Meclizine 25mg q8h PRN   --check MRI brain w/o contrast   --referral to vestibular rehab upon d/c   --check Lipid profile, A1C   --PT/OT possible BPPV 2/2 vestibular dysfunction vs central etiology. CTA showing mod proximal R carotid artery stenosis  --neuro recs appreciated   --check Orthostatic vitals   --Trial of Meclizine 25mg q8h PRN, has not been efficacious  --check MRI brain w/o contrast   --referral to vestibular rehab upon d/c   --check Lipid profile, A1C   --PT/OT, patient recommended for rehab  -- will have nursing walk with patient to assess gait, patient has been observed walking through halls without difficulty

## 2022-02-21 NOTE — PROGRESS NOTE ADULT - PROBLEM SELECTOR PLAN 2
Previous Bone marrow biopsy JAK2+. 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 pt reports 40 lb weight loss in 5 months  -heme onc following, patient had CML on previous biopsy and can be treated as an outpatient  -needs abdominal US as inpatient. Completed and waiting read.

## 2022-02-21 NOTE — PROGRESS NOTE ADULT - PROBLEM SELECTOR PLAN 3
Per patient: home medications: metformin 1000 BID, Basaglar 30 units qhs and humalog 20 units before meals (only eats twice). States that he often wakes up with FS 40s-90s. In 2021 pt was discharged with Basaglar 20 and Humalog 5, however, he states his PCP increased the doses since then   --start lantus 15 units qhs and Lispro 5 units premeal   --low ISS and FS qac and qhs   --check A1c   --nutrition consult Per patient: home medications: metformin 1000 BID, Basaglar 30 units qhs and humalog 20 units before meals (only eats twice). States that he often wakes up with FS 40s-90s. In 2021 pt was discharged with Basaglar 20 and Humalog 5, however, he states his PCP increased the doses since then   --start lantus 15 units qhs and Lispro 5 units premeal   --low ISS and FS qac and qhs   --A1c is 5.2 and sugars well controlled inpatient  --nutrition consult

## 2022-02-22 NOTE — PROGRESS NOTE ADULT - PROBLEM SELECTOR PLAN 1
possible BPPV 2/2 vestibular dysfunction vs central etiology. CTA showing mod proximal R carotid artery stenosis  --neuro recs appreciated   --secondary to hyperviscosity of blood 2/2 elevated WBC count? Subjectively improving w/ decreasing WBC  --check Orthostatic vitals   --Trial of Meclizine 25mg q8h PRN, has not been efficacious  --check MRI brain w/o contrast   --referral to vestibular rehab upon d/c   --check Lipid profile, A1C   --PT/OT, patient recommended for rehab  -- will have nursing walk with patient to assess gait, patient has been observed walking through halls without difficulty possible BPPV 2/2 vestibular dysfunction vs central etiology. CTA showing mod proximal R carotid artery stenosis  --neuro recs appreciated   --secondary to hyperviscosity of blood 2/2 elevated WBC count? Subjectively improving w/ decreasing WBC  --check Orthostatic vitals   --Trial of Meclizine 25mg q8h PRN, has not been efficacious  --check MRI brain w/o contrast   --referral to vestibular rehab upon d/c   --check Lipid profile, A1C   --PT/OT, patient recommended for rehab  -- PT reevaluated today, recommended home w/ home PT possible BPPV 2/2 vestibular dysfunction vs central etiology. CTA showing mod proximal R carotid artery stenosis  --neuro recs appreciated   --secondary to hyperviscosity of blood 2/2 elevated WBC count? Subjectively improving w/ decreasing WBC  --check Orthostatic vitals   --Trial of Meclizine 25mg q8h PRN, has not been efficacious  --MRI brain w/o contrast initially ordered, can be completed as outpatient if still felt necessary per Neuro. Neuro outpatient f/u  --referral to vestibular rehab upon d/c   --check Lipid profile, A1C   --PT/OT, patient recommended for rehab  -- PT reevaluated today, recommended home w/ home PT

## 2022-02-22 NOTE — PROVIDER CONTACT NOTE (OTHER) - RECOMMENDATIONS
Attempt to place IV on pt in the morning if pt allows. MD notified and approved of no IV at the moment.
Notify MD
Monitor pt for any acute changes. Pt currently does not want anything. Attempt to place in new IV if pt tolerates/allows.
IV removed to prevent pt from ripping out and causing bleeding. Will monitor pt and try to place an IV in the AM
Notify MD

## 2022-02-22 NOTE — DIETITIAN INITIAL EVALUATION ADULT. - PROBLEM SELECTOR PLAN 1
possible BPPV 2/2 vestibular dysfunction vs central etiology. CTA showing mod proximal R carotid artery stenosis  --neuro recs appreciated   --check Orthostatic vitals   --Trial of Meclizine 25mg q8h PRN   --Consider Valium 0.25mg up to 2mg a day PRN for up to 48 hours   --check MRI brain w/o contrast   --referral to vestibular rehab upon d/c   --check Lipid profile, A1C   --PT/OT

## 2022-02-22 NOTE — PROVIDER CONTACT NOTE (CRITICAL VALUE NOTIFICATION) - BACKGROUND
Pt Aisha admitted for dizziness and giddiness
hx of Myeloproliferative disease, HTN, DM
Pt admitted with dizziness and giddiness
PT admitted for dizziness and giddiness

## 2022-02-22 NOTE — PROVIDER CONTACT NOTE (OTHER) - ACTION/TREATMENT ORDERED:
MD notified, no interventions ordered at this time. Will continue to monitor.
IV removed; will monitor pt. Will try to place new IV in pt in the morning.
MD notified interventions ordered.
Ok for pt to not have IV for now. Will attempt to place IV in morning.
Will monitor pt for any further changes. If new IV can be placed, order IV zofran.

## 2022-02-22 NOTE — PROVIDER CONTACT NOTE (OTHER) - ASSESSMENT
Patient vomiting and nauseous
Pt no s/s of bleeding in emesis, pt has nausea. Pt currently has no IV in arm because he requested it be taken out earlier during shift.
No bleeding from site, site is dry and intact. Pt not on any fluids or IV meds.
/62
Pt completed ordered LR continuous fluids. No medications ordered in next 12 hours through IV. Pt vitals stable, A&Ox4.

## 2022-02-22 NOTE — PROVIDER CONTACT NOTE (CRITICAL VALUE NOTIFICATION) - ACTION/TREATMENT ORDERED:
MD notified, no interventions ordered at this time
MD notified, no interventions at this time.
No further actions ordered at this time. Will continue to monitor pt for symptoms.
No action or treatment ordered at this time.

## 2022-02-22 NOTE — DISCHARGE NOTE NURSING/CASE MANAGEMENT/SOCIAL WORK - NSSCNAMETXT_GEN_ALL_CORE
[facility]. Nurse to visit on the day following discharge. Other appropriate services to be arranged thereafter.   Please contact the home care agency at the above phone number if you have not heard from them by approximately 12 noon on the day after your hospital discharge.

## 2022-02-22 NOTE — PROGRESS NOTE ADULT - PROBLEM SELECTOR PLAN 5
--hold home rosuvastatin I/s/o elevated alk phos   --f/u GGT, if normal can restart
--hold home rosuvastatin I/s/o elevated alk phos   --f/u GGT, if normal can restart
--hold home rosuvastatin I/s/o elevated alk phos   --GGT elevated, statin held
--hold home rosuvastatin I/s/o elevated alk phos   --f/u GGT, if normal can restart

## 2022-02-22 NOTE — DISCHARGE NOTE NURSING/CASE MANAGEMENT/SOCIAL WORK - PATIENT PORTAL LINK FT
You can access the FollowMyHealth Patient Portal offered by [facility] by registering at the following website: [email or web address]. By joining [employer]’s FollowMyHealth portal, you will also be able to view your health information using other applications (apps) compatible with our system.

## 2022-02-22 NOTE — DISCHARGE NOTE NURSING/CASE MANAGEMENT/SOCIAL WORK - NSDCFUADDAPPT_GEN_ALL_CORE_FT
Neurology appointment on Wednesday June 8th at 10am    Dr. Ilia Kessler  [address] [facility] [address]nd floor [address], [place] [address] [phone]

## 2022-02-22 NOTE — PROGRESS NOTE ADULT - ASSESSMENT
56M hx of T2DM, HTN, HLD presents for dizziness x3 days found to have WBC 75k w/ previous biopsy showing CML. Dizziness likely secondary to hyperviscosity of blood now improving as WBC downtrends.

## 2022-02-22 NOTE — PROVIDER CONTACT NOTE (CRITICAL VALUE NOTIFICATION) - RECOMMENDATIONS
Notify MD
Notify MD
Dr Martin from  Team 7 notified; no recommendations at this time.
WBCs have been downtrending over last few days - continue to monitor pt to ensure afebrile

## 2022-02-22 NOTE — PROGRESS NOTE ADULT - PROBLEM SELECTOR PLAN 3
Per patient: home medications: metformin 1000 BID, Basaglar 30 units qhs and humalog 20 units before meals (only eats twice). States that he often wakes up with FS 40s-90s. In 2021 pt was discharged with Basaglar 20 and Humalog 5, however, he states his PCP increased the doses since then   --start lantus 15 units qhs and Lispro 5 units premeal   --low ISS and FS qac and qhs   --A1c is 5.2 and sugars well controlled inpatient  --nutrition consult

## 2022-02-22 NOTE — DISCHARGE NOTE PROVIDER - NSFOLLOWUPCLINICS_GEN_ALL_ED_FT
[facility]  Hematology/Oncology  [address] [place] [address]  Phone: [phone]  Fax:   Follow Up Time: 2 weeks    [facility]  Neurology  [address] [place] [id number]  Phone: [phone]  Fax:   Follow Up Time: 2 weeks     [facility]  Hematology/Oncology  [address] [id number]  Phone: [phone]  Fax:   Follow Up Time: 2 weeks

## 2022-02-22 NOTE — DISCHARGE NOTE PROVIDER - NSDCCPCAREPLAN_GEN_ALL_CORE_FT
PRINCIPAL DISCHARGE DIAGNOSIS  Diagnosis: Dizziness  Assessment and Plan of Treatment: You came to the hospital with dizziness. The dizziness was likely due to your severely elevated white blood cell count due to your uncontrolled chronic myeloid leukemia. You were started on a medication called hydrea to help treat your cancer. Your white blood cell count dropped throughout your hospitalization and you told us your dizziness significantly improved. If you have these symptoms again please contact your physycian.      SECONDARY DISCHARGE DIAGNOSES  Diagnosis: Chronic myeloid leukemia  Assessment and Plan of Treatment: You were initially diagnosed with chronic myeliod leukemia during your previous hospitalization where you had a bone biopsy. This cancer can be treated outside of the hospital It is extremely important that you follow up at [facility] to ensure that your cancer gets treated. You were sent home on a new medication called hydrea, which you should take twice a day. [facility] for an appointment.    Diagnosis: Weakness  Assessment and Plan of Treatment: The weakness in your lower extremity may be due to your cancer, but also may be due to other issues. You should follow up with Neurology within 2 weeks for further treatment and work up of your weakness.    Diagnosis: Diabetes  Assessment and Plan of Treatment: Please use the adjusted insulin regimen decribed in your medications when you leave the hospital. Follow up with your physician for adjusting of your regimen back to your home dose.

## 2022-02-22 NOTE — DISCHARGE NOTE NURSING/CASE MANAGEMENT/SOCIAL WORK - NSDCPEFALRISK_GEN_ALL_CORE
For information on Fall & Injury Prevention, visit: [email or web address] OR  [email or web address] OR  https://www.cdc.gov/steadi/patient.html

## 2022-02-22 NOTE — DISCHARGE NOTE PROVIDER - HOSPITAL COURSE
56M hx of T2DM, HTN, HLD presents for dizziness x3 days found to have WBC 75k w/ previous biopsy showing atypical CML. His dizziness was likely due to hyperviscosity of his blood from his significantly elevated WBC count. Per Heme/Onc, patient was started on hydrea 1g BID and that his disease could be managed in the outpatient setting. His WBC count dropped to 54K before d/c and patient stated subjectively that his dizziness was significantly better. His gait also improved as PT worked with him twice and noted significant improvement. He was recommended for home PT on d/c.     Additionally, neurology was consulted due to weakness and dizziness. He has chronic numbness on the bottom of his feet which he had associated with his diabetes in conjunction with his weakness. Neurology believes that his symptoms may be related to CIDP and recommended MRI w w/o contrast. They also stated that his workup could be completed as an outpatient.    Patient was discharged with instructions for follow up with neurology and hematology/oncology. 56M hx of T2DM, HTN, HLD presents for dizziness x3 days found to have WBC 75k w/ previous biopsy showing atypical CML. His dizziness was likely due to hyperviscosity of his blood from his significantly elevated WBC count. Per Heme/Onc, patient was started on hydrea 1g BID and that his disease could be managed in the outpatient setting. His WBC count dropped to 54K before d/c and patient stated subjectively that his dizziness was significantly better. His gait also improved as PT worked with him twice and noted significant improvement. He was recommended for home PT on d/c.     Additionally, neurology was consulted due to weakness and dizziness. He has chronic numbness on the bottom of his feet which he had associated with his diabetes in conjunction with his weakness. Neurology brought up possibility of CIDP. Outpatient Neurology follow up and consideration of further imaging including potential MRI at that time if felt needed by Neurology.     Patient was discharged with instructions for follow up with neurology and hematology/oncology.

## 2022-02-22 NOTE — DIETITIAN INITIAL EVALUATION ADULT. - ADD RECOMMEND
Consider adjusting diet to soft and bite-sized, consistent carbohydrate (evening snack) per patient preference. Recommend no DASH/TLC diet given taste changes and significant weight loss. Consider multivitamin for micronutrient repletion. Obtain weekly weight and document PO intake to monitor trend.

## 2022-02-22 NOTE — DIETITIAN INITIAL EVALUATION ADULT. - PERTINENT MEDS FT
MEDICATIONS  (STANDING):  aspirin  chewable 81 milliGRAM(s) Oral daily  calamine/zinc oxide Lotion 1 Application(s) Topical daily  dextrose 40% Gel 15 Gram(s) Oral once  dextrose 5%. 1000 milliLiter(s) (50 mL/Hr) IV Continuous <Continuous>  dextrose 5%. 1000 milliLiter(s) (100 mL/Hr) IV Continuous <Continuous>  dextrose 50% Injectable 25 Gram(s) IV Push once  dextrose 50% Injectable 12.5 Gram(s) IV Push once  dextrose 50% Injectable 25 Gram(s) IV Push once  enoxaparin Injectable 40 milliGRAM(s) SubCutaneous daily  glucagon  Injectable 1 milliGRAM(s) IntraMuscular once  hydroxyurea 1000 milliGRAM(s) Oral two times a day  influenza   Vaccine 0.5 milliLiter(s) IntraMuscular once  insulin glargine Injectable (LANTUS) 15 Unit(s) SubCutaneous at bedtime  insulin lispro (ADMELOG) corrective regimen sliding scale   SubCutaneous three times a day before meals  insulin lispro (ADMELOG) corrective regimen sliding scale   SubCutaneous at bedtime  insulin lispro Injectable (ADMELOG) 5 Unit(s) SubCutaneous three times a day before meals  lactated ringers. 1000 milliLiter(s) (100 mL/Hr) IV Continuous <Continuous>  lisinopril 20 milliGRAM(s) Oral daily  nicotine -  14 mG/24Hr(s) Patch 1 patch Transdermal daily

## 2022-02-22 NOTE — PROGRESS NOTE ADULT - SUBJECTIVE AND OBJECTIVE BOX
Laci Martin MD  PGY1  Preferred contact via [employer] Teams      Patient is a 56y old  Male who presents with a chief complaint of     SUBJECTIVE / OVERNIGHT EVENTS:    MEDICATIONS  (STANDING):  aspirin  chewable 81 milliGRAM(s) Oral daily  calamine/zinc oxide Lotion 1 Application(s) Topical daily  dextrose 40% Gel 15 Gram(s) Oral once  dextrose 5%. 1000 milliLiter(s) (50 mL/Hr) IV Continuous <Continuous>  dextrose 5%. 1000 milliLiter(s) (100 mL/Hr) IV Continuous <Continuous>  dextrose 50% Injectable 25 Gram(s) IV Push once  dextrose 50% Injectable 12.5 Gram(s) IV Push once  dextrose 50% Injectable 25 Gram(s) IV Push once  enoxaparin Injectable 40 milliGRAM(s) SubCutaneous daily  glucagon  Injectable 1 milliGRAM(s) IntraMuscular once  hydroxyurea 1000 milliGRAM(s) Oral two times a day  influenza   Vaccine 0.5 milliLiter(s) IntraMuscular once  insulin glargine Injectable (LANTUS) 15 Unit(s) SubCutaneous at bedtime  insulin lispro (ADMELOG) corrective regimen sliding scale   SubCutaneous three times a day before meals  insulin lispro (ADMELOG) corrective regimen sliding scale   SubCutaneous at bedtime  insulin lispro Injectable (ADMELOG) 5 Unit(s) SubCutaneous three times a day before meals  lactated ringers. 1000 milliLiter(s) (100 mL/Hr) IV Continuous <Continuous>  lisinopril 20 milliGRAM(s) Oral daily  nicotine -  14 mG/24Hr(s) Patch 1 patch Transdermal daily    MEDICATIONS  (PRN):  acetaminophen     Tablet .. 650 milliGRAM(s) Oral every 6 hours PRN Temp greater or equal to 38C (100.4F), Mild Pain (1 - 3)  benzonatate 100 milliGRAM(s) Oral three times a day PRN Cough  meclizine 25 milliGRAM(s) Oral every 8 hours PRN Dizziness  melatonin 3 milliGRAM(s) Oral at bedtime PRN Insomnia  ondansetron    Tablet 4 milliGRAM(s) Oral every 8 hours PRN Nausea and/or Vomiting      CAPILLARY BLOOD GLUCOSE      POCT Blood Glucose.: 157 mg/dL (21 Feb 2022 22:26)  POCT Blood Glucose.: 136 mg/dL (21 Feb 2022 17:57)  POCT Blood Glucose.: 117 mg/dL (21 Feb 2022 12:58)  POCT Blood Glucose.: 128 mg/dL (21 Feb 2022 08:59)    I&O's Summary      Vital Signs Last 24 Hrs  T(C): 37.5 (22 Feb 2022 05:30), Max: 37.5 (22 Feb 2022 05:30)  T(F): 99.5 (22 Feb 2022 05:30), Max: 99.5 (22 Feb 2022 05:30)  HR: 94 (22 Feb 2022 05:30) (80 - 94)  BP: 130/65 (22 Feb 2022 05:30) (110/63 - 130/65)  BP(mean): --  RR: 17 (22 Feb 2022 05:30) (14 - 18)  SpO2: 96% (22 Feb 2022 05:30) (96% - 98%)    PHYSICAL EXAM:      LABS:                        10.0   54.04 )-----------( 528      ( 22 Feb 2022 07:10 )             33.8      02-21    135  |  98  |  11  ----------------------------<  113<H>  4.0   |  28  |  0.35<L>    Ca    8.9      21 Feb 2022 07:54  Phos  2.7     02-21  Mg     2.00     02-21    TPro  5.8<L>  /  Alb  3.2<L>  /  TBili  0.8  /  DBili  x   /  AST  30  /  ALT  8   /  AlkPhos  235<H>  02-21              RADIOLOGY & ADDITIONAL TESTS:    Imaging Personally Reviewed:    Consultant(s) Notes Reviewed:      Care Discussed with Consultants/Other Providers:   Laci Martin MD  PGY1  Preferred contact via [employer] Teams      Patient is a 56y old  Male who presents with a chief complaint of     SUBJECTIVE / OVERNIGHT EVENTS: NAEON. Patient states his dizziness has improved and worked with PT this morning. States he walked well and felt a little unsteady but did not believe he was going to fall. Endorses mild abdominal pain, but no longer has nausea. In addition denies chest pain, V/D, headache, SOB, cough.     MEDICATIONS  (STANDING):  aspirin  chewable 81 milliGRAM(s) Oral daily  calamine/zinc oxide Lotion 1 Application(s) Topical daily  dextrose 40% Gel 15 Gram(s) Oral once  dextrose 5%. 1000 milliLiter(s) (50 mL/Hr) IV Continuous <Continuous>  dextrose 5%. 1000 milliLiter(s) (100 mL/Hr) IV Continuous <Continuous>  dextrose 50% Injectable 25 Gram(s) IV Push once  dextrose 50% Injectable 12.5 Gram(s) IV Push once  dextrose 50% Injectable 25 Gram(s) IV Push once  enoxaparin Injectable 40 milliGRAM(s) SubCutaneous daily  glucagon  Injectable 1 milliGRAM(s) IntraMuscular once  hydroxyurea 1000 milliGRAM(s) Oral two times a day  influenza   Vaccine 0.5 milliLiter(s) IntraMuscular once  insulin glargine Injectable (LANTUS) 15 Unit(s) SubCutaneous at bedtime  insulin lispro (ADMELOG) corrective regimen sliding scale   SubCutaneous three times a day before meals  insulin lispro (ADMELOG) corrective regimen sliding scale   SubCutaneous at bedtime  insulin lispro Injectable (ADMELOG) 5 Unit(s) SubCutaneous three times a day before meals  lactated ringers. 1000 milliLiter(s) (100 mL/Hr) IV Continuous <Continuous>  lisinopril 20 milliGRAM(s) Oral daily  nicotine -  14 mG/24Hr(s) Patch 1 patch Transdermal daily    MEDICATIONS  (PRN):  acetaminophen     Tablet .. 650 milliGRAM(s) Oral every 6 hours PRN Temp greater or equal to 38C (100.4F), Mild Pain (1 - 3)  benzonatate 100 milliGRAM(s) Oral three times a day PRN Cough  meclizine 25 milliGRAM(s) Oral every 8 hours PRN Dizziness  melatonin 3 milliGRAM(s) Oral at bedtime PRN Insomnia  ondansetron    Tablet 4 milliGRAM(s) Oral every 8 hours PRN Nausea and/or Vomiting      CAPILLARY BLOOD GLUCOSE      POCT Blood Glucose.: 157 mg/dL (21 Feb 2022 22:26)  POCT Blood Glucose.: 136 mg/dL (21 Feb 2022 17:57)  POCT Blood Glucose.: 117 mg/dL (21 Feb 2022 12:58)  POCT Blood Glucose.: 128 mg/dL (21 Feb 2022 08:59)    I&O's Summary      Vital Signs Last 24 Hrs  T(C): 37.5 (22 Feb 2022 05:30), Max: 37.5 (22 Feb 2022 05:30)  T(F): 99.5 (22 Feb 2022 05:30), Max: 99.5 (22 Feb 2022 05:30)  HR: 94 (22 Feb 2022 05:30) (80 - 94)  BP: 130/65 (22 Feb 2022 05:30) (110/63 - 130/65)  BP(mean): --  RR: 17 (22 Feb 2022 05:30) (14 - 18)  SpO2: 96% (22 Feb 2022 05:30) (96% - 98%)    PHYSICAL EXAM:  GENERAL: NAD, well-groomed, well-developed  HEAD:  Atraumatic, Normocephalic  EYES: EOMI, PERRLA, conjunctiva and sclera clear  ENMT: No tonsillar erythema, exudates, or enlargement; Moist mucous membranes, Good dentition  NECK: Supple, No JVD  NERVOUS SYSTEM: AOX3, motor and sensation grossly intact in b/l UE and b/l LE  PSYCHIATRIC: Appropriate affect and mood  CHEST/LUNG: Clear to auscultation bilaterally; No rales, rhonchi, wheezing, or rubs  HEART: Regular rate and rhythm; No murmurs, rubs, or gallops. No LE edema  ABDOMEN: Soft, mild RUQ tenderness, Mild distension; Bowel sounds present  EXTREMITIES:  2+ Peripheral Pulses, No clubbing, cyanosis  SKIN: No rashes or lesions      LABS:                        10.0   54.04 )-----------( 528      ( 22 Feb 2022 07:10 )             33.8      02-21    135  |  98  |  11  ----------------------------<  113<H>  4.0   |  28  |  0.35<L>    Ca    8.9      21 Feb 2022 07:54  Phos  2.7     02-21  Mg     2.00     02-21    TPro  5.8<L>  /  Alb  3.2<L>  /  TBili  0.8  /  DBili  x   /  AST  30  /  ALT  8   /  AlkPhos  235<H>  02-21              RADIOLOGY & ADDITIONAL TESTS:    Imaging Personally Reviewed:    Consultant(s) Notes Reviewed:      Care Discussed with Consultants/Other Providers:   Laci Martin MD  PGY1  Preferred contact via [employer] Teams    Patient is a 56y old  Male who presents with a chief complaint of     SUBJECTIVE / OVERNIGHT EVENTS: NAEON. Patient states his dizziness has improved and worked with PT this morning. States he walked well and felt a little unsteady but did not believe he was going to fall. Endorses mild abdominal pain, but no longer has nausea. In addition denies chest pain, V/D, headache, SOB, cough.     MEDICATIONS  (STANDING):  aspirin  chewable 81 milliGRAM(s) Oral daily  calamine/zinc oxide Lotion 1 Application(s) Topical daily  dextrose 40% Gel 15 Gram(s) Oral once  dextrose 5%. 1000 milliLiter(s) (50 mL/Hr) IV Continuous <Continuous>  dextrose 5%. 1000 milliLiter(s) (100 mL/Hr) IV Continuous <Continuous>  dextrose 50% Injectable 25 Gram(s) IV Push once  dextrose 50% Injectable 12.5 Gram(s) IV Push once  dextrose 50% Injectable 25 Gram(s) IV Push once  enoxaparin Injectable 40 milliGRAM(s) SubCutaneous daily  glucagon  Injectable 1 milliGRAM(s) IntraMuscular once  hydroxyurea 1000 milliGRAM(s) Oral two times a day  influenza   Vaccine 0.5 milliLiter(s) IntraMuscular once  insulin glargine Injectable (LANTUS) 15 Unit(s) SubCutaneous at bedtime  insulin lispro (ADMELOG) corrective regimen sliding scale   SubCutaneous three times a day before meals  insulin lispro (ADMELOG) corrective regimen sliding scale   SubCutaneous at bedtime  insulin lispro Injectable (ADMELOG) 5 Unit(s) SubCutaneous three times a day before meals  lactated ringers. 1000 milliLiter(s) (100 mL/Hr) IV Continuous <Continuous>  lisinopril 20 milliGRAM(s) Oral daily  nicotine -  14 mG/24Hr(s) Patch 1 patch Transdermal daily    MEDICATIONS  (PRN):  acetaminophen     Tablet .. 650 milliGRAM(s) Oral every 6 hours PRN Temp greater or equal to 38C (100.4F), Mild Pain (1 - 3)  benzonatate 100 milliGRAM(s) Oral three times a day PRN Cough  meclizine 25 milliGRAM(s) Oral every 8 hours PRN Dizziness  melatonin 3 milliGRAM(s) Oral at bedtime PRN Insomnia  ondansetron    Tablet 4 milliGRAM(s) Oral every 8 hours PRN Nausea and/or Vomiting      CAPILLARY BLOOD GLUCOSE      POCT Blood Glucose.: 157 mg/dL (21 Feb 2022 22:26)  POCT Blood Glucose.: 136 mg/dL (21 Feb 2022 17:57)  POCT Blood Glucose.: 117 mg/dL (21 Feb 2022 12:58)  POCT Blood Glucose.: 128 mg/dL (21 Feb 2022 08:59)    I&O's Summary      Vital Signs Last 24 Hrs  T(C): 37.5 (22 Feb 2022 05:30), Max: 37.5 (22 Feb 2022 05:30)  T(F): 99.5 (22 Feb 2022 05:30), Max: 99.5 (22 Feb 2022 05:30)  HR: 94 (22 Feb 2022 05:30) (80 - 94)  BP: 130/65 (22 Feb 2022 05:30) (110/63 - 130/65)  BP(mean): --  RR: 17 (22 Feb 2022 05:30) (14 - 18)  SpO2: 96% (22 Feb 2022 05:30) (96% - 98%)    PHYSICAL EXAM:  GENERAL: NAD, well-groomed, well-developed  HEAD:  Atraumatic, Normocephalic  EYES: EOMI, PERRLA, conjunctiva and sclera clear  ENMT: No tonsillar erythema, exudates, or enlargement; Moist mucous membranes, Good dentition  NECK: Supple, No JVD  NERVOUS SYSTEM: AOX3, motor and sensation grossly intact in b/l UE and b/l LE  PSYCHIATRIC: Appropriate affect and mood  CHEST/LUNG: Clear to auscultation bilaterally; No rales, rhonchi, wheezing, or rubs  HEART: Regular rate and rhythm; No murmurs, rubs, or gallops. No LE edema  ABDOMEN: Soft, mild RUQ tenderness, Mild distension; Bowel sounds present  EXTREMITIES:  2+ Peripheral Pulses, No clubbing, cyanosis  SKIN: No rashes or lesions      LABS:                        10.0   54.04 )-----------( 528      ( 22 Feb 2022 07:10 )             33.8      02-21    135  |  98  |  11  ----------------------------<  113<H>  4.0   |  28  |  0.35<L>    Ca    8.9      21 Feb 2022 07:54  Phos  2.7     02-21  Mg     2.00     02-21    TPro  5.8<L>  /  Alb  3.2<L>  /  TBili  0.8  /  DBili  x   /  AST  30  /  ALT  8   /  AlkPhos  235<H>  02-21      RADIOLOGY & ADDITIONAL TESTS:    Imaging Personally Reviewed: yes    Consultant(s) Notes Reviewed: yes

## 2022-02-22 NOTE — DISCHARGE NOTE PROVIDER - CARE PROVIDER_API CALL
Your Primary Care Physician and Endocrinologist,   Phone: (   )    -  Fax: (   )    -  Follow Up Time:

## 2022-02-22 NOTE — PROVIDER CONTACT NOTE (OTHER) - REASON
Pt vomiting
/62
Pt wanted periph IV to be removed from arm
Patient vomiting and nauseous
Pt removed periph IV

## 2022-02-22 NOTE — DISCHARGE NOTE NURSING/CASE MANAGEMENT/SOCIAL WORK - NSDCCRNAME_GEN_ALL_CORE_FT
[employer] for 5pm  from Lui Henry.   Authorization provided by Maida from [facility] # [id number].

## 2022-02-22 NOTE — DISCHARGE NOTE PROVIDER - NSDCFUADDAPPT_GEN_ALL_CORE_FT
Neurology appointment on Wednesday June 8th at 10am    Dr. Ilia Kessler  [address] [facility] [address]nd [address], [place] [address] [phone]

## 2022-02-22 NOTE — DIETITIAN INITIAL EVALUATION ADULT. - OTHER INFO
56 year old male with a PMH of HTN, T2DM, HLD presents for dizziness x 3 days found to have WBC 75K concerning for myeloproliferative disorder, previous biopsy showed CML per chart.    Patient reports continued poor appetite, however noted consuming for the most part % of meals. Reports no GI distress at this time. Reports difficulty chewing due to missing teeth. Per discussion, patient is amenable to try soft and bite sized diet to promote PO intake. No swallowing issues reported. Has no food allergies. Reports UBW was 220 lbs. x 5 months, ABW is 181.6 lbs. (2/19) indicating a -17.5% wt. loss. No edema or pressure injuries noted per RN flow sheet.    Discussed with patient importance of small frequent meals, focusing on protein rich foods, calorie dense snacks, discussed foods to consider, using sauces/gravies to enhance flavor and oral nutrition supplements to meet nutritional needs and promote glycemic control. Patient amenable to try Glucerna (220 kcal, 10 g pro).

## 2022-02-22 NOTE — PROGRESS NOTE ADULT - PROBLEM SELECTOR PROBLEM 2
Myelodysplastic or myeloproliferative disease

## 2022-02-22 NOTE — PROGRESS NOTE ADULT - PROBLEM SELECTOR PLAN 4
--c/w home lisinopril w/ hold parameters

## 2022-02-22 NOTE — PROGRESS NOTE ADULT - ASSESSMENT
55 y/o Male smoker, MHx of DM Type 2, HTN, psoriatic arthritis seen by hematology in 2020 for leukocytosis (neutrophil predominant with immature granulocytes, metamyelocytes), inc eos and basophils in setting of splenomegaly, weight loss, concerning for CML. Bone marrow Bx on 7/30/2020 was concerning for Atypical CML. Patient was dischared the following day with request to follow up with Dr. Hillman on 8/14/2020. Patient never showed up for appointment. He now p/w dizziness/nausea/vomitting.     # Atypical CML  - BM bx on 7/30/2020 showed JAK2 positive myeloproliferative neoplasm with SF3B1 mutation  - Patient had normal male karyotype, a negative BCR/ABL1 and a negative PDGFRA FISH rearrangement at the time  - He never received treatment for this and white count has now gone from ~13k in 2020 to ~ 70-80 k now  - Smear reviewed: Inc promyelocytes, myelocytes and metas   - Recommend starting Hydrea 1gm BID  - Recommend abdominal ultrasound to evaluate for splenomegaly   - Patient will need outpatient follow up- this kind of disease is usually treated in the outpatient setting  - Will send referral to [facility] [place] to make another appt for patient to be seen   - Emphasized importance of appropriate follow up to patient   - Neuro concerned about possible CIDP- treating underling condition should help this if it were the case.   - Will send referral to [facility] [place] [facility] to make another appt for patient to be seen   - Emphasized importance of appropriate follow up to patient   - Neuro concerned about possible CIDP- treating underling condition should help this if it were the case.    55 y/o Male smoker, MHx of DM Type 2, HTN, psoriatic arthritis seen by hematology in 2020 for leukocytosis (neutrophil predominant with immature granulocytes, metamyelocytes), inc eos and basophils in setting of splenomegaly, weight loss, concerning for CML. Bone marrow Bx on 7/30/2020 was concerning for Atypical CML. Patient was dischared the following day with request to follow up with Dr. Hillman on 8/14/2020. Patient never showed up for appointment. He now p/w dizziness/nausea/vomitting.     # Atypical CML  - BM bx on 7/30/2020 showed JAK2 positive myeloproliferative neoplasm with SF3B1 mutation  - Patient had normal male karyotype, a negative BCR/ABL1 and a negative PDGFRA FISH rearrangement at the time  - He never received treatment for this and white count has now gone from ~13k in 2020 to ~ 70-80 k now  - Smear reviewed: Inc promyelocytes, myelocytes and metas   - Splenomegaly noted on abdominal US  - Recommend c/w  Hydrea 1gm BID  - Patient will need outpatient follow up- this kind of disease is usually treated in the outpatient setting  - Will send referral to [facility] [place] make another appt for patient to be seen   - Emphasized importance of appropriate follow up to patient   - Neuro concerned about possible CIDP- treating underling condition should help this if it were the case.   - Will send referral to [facility] to make another appt for patient to be seen   - Emphasized importance of appropriate follow up to patient   - Neuro concerned about possible CIDP- treating underling condition should help this if it were the case.

## 2022-02-22 NOTE — DIETITIAN NUTRITION RISK NOTIFICATION - TREATMENT: THE FOLLOWING DIET HAS BEEN RECOMMENDED
Diet, Regular:   Consistent Carbohydrate {Evening Snack} (CSTCHOSN)  DASH/TLC {Sodium & Cholesterol Restricted} (DASH) (02-19-22 @ 02:18) [Active]

## 2022-02-22 NOTE — DIETITIAN INITIAL EVALUATION ADULT. - PROBLEM SELECTOR PLAN 3
Per patient: home medications: metformin 1000 BID, Basaglar 30 units qhs and Humalog 20 units before meals (only eats twice). States that he often wakes up with FS 40s-90s. In 2021 pt was discharged with Basaglar 20 and Humalog 5, however, he states his PCP increased the doses since then   --start Lantus 15 units qhs and Lispro 5 units pre-meal   --low ISS and FS qac and qhs   --check A1c   --nutrition consult  --endo consult in AM

## 2022-02-22 NOTE — DISCHARGE NOTE PROVIDER - NSDCMRMEDTOKEN_GEN_ALL_CORE_FT
ASPIRIN LOW  CHW 81M  orally once a day  Basaglar KwikPen 100 units/mL subcutaneous solution: 30 unit(s) subcutaneous once a day (at bedtime)  HumaLOG KwikPen 200 units/mL (Concentrated) subcutaneous solution: 20 unit(s) subcutaneous 3 times a day (before meals)  lisinopril 20 mg oral tablet: 1 tab(s) orally once a day  metFORMIN 1000 mg oral tablet: 1 tab(s) orally 2 times a day  rosuvastatin 20 mg oral tablet: 1 tab(s) orally once a day   ASPIRIN LOW  CHW 81M  orally once a day  Basaglar KwikPen 100 units/mL subcutaneous solution: 15 unit(s) subcutaneous once a day (at bedtime)  HumaLOG KwikPen 200 units/mL (Concentrated) subcutaneous solution: 5 unit(s) subcutaneous 3 times a day (before meals)  hydroxyurea 500 mg oral capsule: 2 cap(s) orally 2 times a day  lisinopril 20 mg oral tablet: 1 tab(s) orally once a day  metFORMIN 1000 mg oral tablet: 1 tab(s) orally 2 times a day  rosuvastatin 20 mg oral tablet: 1 tab(s) orally once a day   ASPIRIN LOW  CHW 81M  orally once a day  Basaglar KwikPen 100 units/mL subcutaneous solution: 15 unit(s) subcutaneous once a day (at bedtime)   HumaLOG KwikPen 200 units/mL (Concentrated) subcutaneous solution: 5 unit(s) subcutaneous 3 times a day (before meals)   hydroxyurea 500 mg oral capsule: 2 cap(s) orally 2 times a day  Insulin Pen Needles, 4mm: 1 application subcutaneously 4 times a day. ** Use with insulin pen **   lisinopril 20 mg oral tablet: 1 tab(s) orally once a day  metFORMIN 1000 mg oral tablet: 1 tab(s) orally 2 times a day  rosuvastatin 20 mg oral tablet: 1 tab(s) orally once a day

## 2022-02-22 NOTE — PROGRESS NOTE ADULT - SUBJECTIVE AND OBJECTIVE BOX
****************************************  Chhaya Dhillon PGY4  Hematology/Oncology  [phone]  ****************************************  SUBJECTIVE    Patient seen and examined at bedside. No acute events overnight  Reported  Denied HA, CP, SOB, n/v/d/c , fevers, chills    OBJECTIVE     Vital Signs Last 24 Hrs  T(C): 37.5 (22 Feb 2022 05:30), Max: 37.5 (22 Feb 2022 05:30)  T(F): 99.5 (22 Feb 2022 05:30), Max: 99.5 (22 Feb 2022 05:30)  HR: 94 (22 Feb 2022 05:30) (80 - 94)  BP: 130/65 (22 Feb 2022 05:30) (110/63 - 130/65)  BP(mean): --  RR: 17 (22 Feb 2022 05:30) (14 - 18)  SpO2: 96% (22 Feb 2022 05:30) (96% - 98%)    PHYSICAL EXAM:  Constitutional: non-toxic, no distress  HEAD/EYES: anicteric, no conjunctival injection  ENT:  supple, no thrush  Cardiovascular:   normal S1, S2, no murmur, no edema  Respiratory:  clear BS bilaterally, no wheezes, no rales  GI:  soft, non-tender, normal bowel sounds  :  no hernandez, no CVA tenderness  Musculoskeletal:  no synovitis, normal ROM  Neurologic: awake and alert, normal strength, no focal findings  Skin:  no rash, no erythema, no phlebitis  Heme/Onc: no lymphadenopathy   Psychiatric:  awake, alert, appropriate mood          LABS                        10.0   54.04 )-----------( 528      ( 22 Feb 2022 07:10 )             33.8       02-22    134<L>  |  98  |  13  ----------------------------<  141<H>  4.1   |  28  |  0.42<L>    Ca    9.1      22 Feb 2022 07:10  Phos  3.0     02-22  Mg     2.10     02-22    TPro  6.2  /  Alb  3.7  /  TBili  1.1  /  DBili  x   /  AST  52<H>  /  ALT  8   /  AlkPhos  317<H>  02-22          Follow Up:      RADIOLOGY: ****************************************  Chhaya Dhillon PGY4  Hematology/Oncology  [phone]  ****************************************  SUBJECTIVE  Patient seen and examined at bedside. No acute events overnight  Denied HA, CP, SOB, n/v/d/c , fevers, chills    OBJECTIVE     Vital Signs Last 24 Hrs  T(C): 37.5 (22 Feb 2022 05:30), Max: 37.5 (22 Feb 2022 05:30)  T(F): 99.5 (22 Feb 2022 05:30), Max: 99.5 (22 Feb 2022 05:30)  HR: 94 (22 Feb 2022 05:30) (80 - 94)  BP: 130/65 (22 Feb 2022 05:30) (110/63 - 130/65)  BP(mean): --  RR: 17 (22 Feb 2022 05:30) (14 - 18)  SpO2: 96% (22 Feb 2022 05:30) (96% - 98%)    PHYSICAL EXAM:  GENERAL: NAD, well-groomed, well-developed  HEAD:  Atraumatic, Normocephalic  EYES: EOMI, PERRLA, conjunctiva and sclera clear  ENMT: No tonsillar erythema, exudates, or enlargement; Moist mucous membranes, Good dentition  NECK: Supple, No JVD  NERVOUS SYSTEM: AOX3  PSYCHIATRIC: Appropriate affect and mood  CHEST/LUNG: Clear to auscultation bilaterally; No rales, rhonchi, wheezing, or rubs  HEART: Regular rate and rhythm; No murmurs, rubs, or gallops. No LE edema  ABDOMEN: Soft, mild RUQ tenderness, Mild distension; Palpable spleen and liver               LABS                        10.0   54.04 )-----------( 528      ( 22 Feb 2022 07:10 )             33.8       02-22    134<L>  |  98  |  13  ----------------------------<  141<H>  4.1   |  28  |  0.42<L>    Ca    9.1      22 Feb 2022 07:10  Phos  3.0     02-22  Mg     2.10     02-22    TPro  6.2  /  Alb  3.7  /  TBili  1.1  /  DBili  x   /  AST  52<H>  /  ALT  8   /  AlkPhos  317<H>  02-22          Follow Up:      RADIOLOGY:

## 2022-02-22 NOTE — DIETITIAN INITIAL EVALUATION ADULT. - PERTINENT LABORATORY DATA
02-22 Na 134 mmol/L<L> Glu 141 mg/dL<H> K+ 4.1 mmol/L Cr 0.42 mg/dL<L> BUN 13 mg/dL Phos 3.0 mg/dL  02-22 @ 09:20 POCT 140 mg/dL  02-21 @ 22:26 POCT 157 mg/dL  02-21 @ 17:57 POCT 136 mg/dL  02-21 @ 12:58 POCT 117 mg/dL  A1C with Estimated Average Glucose (02.19.22 @ 06:39), A1C with Estimated Average Glucose Result: 5.2

## 2022-02-22 NOTE — PROGRESS NOTE ADULT - PROBLEM SELECTOR PLAN 2
Previous Bone marrow biopsy JAK2+. pt reports 40 lb weight loss in 5 months  -heme onc following, patient had CML on previous biopsy and can be treated as an outpatient  -needs abdominal US as inpatient. Completed and waiting read. Previous Bone marrow biopsy JAK2+. pt reports 40 lb weight loss in 5 months  -heme onc following, patient had CML on previous biopsy and can be treated as an outpatient  -abdominal US w/ hepatosplenomegaly

## 2022-02-22 NOTE — DISCHARGE NOTE PROVIDER - DETAILS OF MALNUTRITION DIAGNOSIS/DIAGNOSES
This patient has been assessed with a concern for Malnutrition and was treated during this hospitalization for the following Nutrition diagnosis/diagnoses:     -  02/22/2022: Severe protein-calorie malnutrition

## 2022-02-22 NOTE — PROGRESS NOTE ADULT - ATTENDING COMMENTS
Patient seen and examined, chart reviewed, images reviewed. Atypical CML, on therapy with Hydrea at 1 gram BID. he will continue on this diose and establish care with heme at [facility] as an outpatient. He was lost to follow up 2 years ago. I agree with Dr Blevins's assessment and plan.     YUMIKO Santa MD
56M hx of T2DM, HTN, HLD, atypical CML (not treated) presents for dizziness x3 days found to have WBC 75k.     #Dizziness, reporting sudden onset dizziness as well as decreased PO intake, loss of appetite, weight loss and night sweats since September. No focal neurological deficits that he reports. Exam consistent with a thin appearing male, neuro exam intact, no lymphadenopathy. Differential could include BPPV, stroke, malignancy, orthostatic hypotension. CT and CTA head/neck wnl. Neuro consulted, pending MR head. Meclizine prn dizziness.     #Leukocytosis, WBC 79. Hx of atypical CML diagnosed in 2020 and not treated. Heme/onc consulted. Outpatient management. Start on hydroxyurea..     #DM2, A1C 5.2. On insulin at home. Pt reports hyperglycemia and hypoglycemia at home. Continue with basal/bolus regimen at reduced dose and titrate.     #HTN, c/w lisinopril.     Plan discussed with HS.
56M hx of T2DM, HTN, HLD, atypical CML (not treated) presents for dizziness x3 days found to have WBC 75k.     #Dizziness, reporting sudden onset dizziness as well as decreased PO intake, loss of appetite, weight loss and night sweats since September. No focal neurological deficits that he reports. Exam consistent with a thin appearing male, neuro exam intact, no lymphadenopathy. Differential could include BPPV, stroke, malignancy, orthostatic hypotension. CT and CTA head/neck wnl. Neuro consulted, pending MR head but could be completed outpatient.     #Leukocytosis, WBC 79. Hx of atypical CML diagnosed in 2020 and not treated. Heme/onc consulted. Outpatient management. Start on hydroxyurea..     #DM2, A1C 5.2. On insulin at home. Pt reports hyperglycemia and hypoglycemia at home. Continue with basal/bolus regimen at reduced dose and titrate.     #HTN, c/w lisinopril.     #Dispo: PT recommending HECTOR, pt. amenable to HECTOR. Pending placement.     Plan discussed with HS.
56M hx of T2DM, HTN, HLD, atypical CML (not treated) presents for dizziness x3 days found to have WBC 75k.     Dizziness- improving. Likely in setting of untreated CML. Neuro consulted, pending MR head but could be completed outpatient.     Leukocytosis- WBC 79k on admission. Hx of atypical CML diagnosed in 2020 and not treated. Heme following, started on hydroxyurea.     DM2, A1C 5.2. On insulin at home. Pt reports hyperglycemia and hypoglycemia at home. Continue with basal/bolus regimen at reduced dose and titrate.     PT recommending HECTOR, pt. amenable to HECTOR. Pending placement.     D/w HS 7
56 year old male with DM2, HTN, HLD, previously hospitalization 7/2021 with GIB found to have duodenitis/polyps along with leukocytosis with immature granulocytes s/p BMB c/f myeloproliferatie disorder recommended outpatient f/u but hadn’t followed up, now presented with acute onset of dizziness found to have leukocytosis to 79k.    Dizziness- improving. Likely in setting of untreated CML. Neuro consulted. Outpatient follow up after discharge, consideration of further outpatient imaging including MRI if felt necessary per Neuro. Evaluated by PT, initially recommending rehab, now given improving symptoms, re-evaluated today rec home w/ PT.     Leukocytosis- WBC 79k on admission. Hx of atypical CML diagnosed in 2020 and not treated. Heme following, started on hydroxyurea. Referred to [facility] for outpatient follow up appt.     DM2- A1C 5.2 although likely less accurate given anemia. On insulin at home. Pt reports hyperglycemia and hypoglycemia at home. Regimen reduced here, advise reduced regimen on DC with close outpt glucose checks and follow up for continued titration of regimen.     DC planning time 35 minutes.

## 2022-02-22 NOTE — PROVIDER CONTACT NOTE (OTHER) - SITUATION
Pt Aisha removed his R hand periph IV, stated he doesn't need it.
/62
Pt Aisha wanted R arm IV to be removed, stated that he needs a break from the IV, stated that either RN remove IV or he will rip it out.
Patient vomiting and nauseous
Pt had small emesis. No s/s of bleeding. Pt declined medication intervention for vomiting at this time.

## 2022-02-22 NOTE — PROGRESS NOTE ADULT - PROBLEM SELECTOR PLAN 6
DVT: lovenox 40    Diet: DASH/TLC, CC   Dispo: PT consult
DVT: lovenox 40    Diet: DASH/TLC, CC   Dispo: PT consult and recommending rehab

## 2022-02-22 NOTE — PROVIDER CONTACT NOTE (CRITICAL VALUE NOTIFICATION) - SITUATION
Pt WBC 55.95
WBC 79.22
Pt admitted with dizziness, giddiness
Pt Aisha's morning lab results - WBC 54.04

## 2022-02-22 NOTE — DIETITIAN INITIAL EVALUATION ADULT. - ORAL INTAKE PTA/DIET HISTORY
Patient seen for assessment. Reports poor appetite/PO intake r/t taste changes and early satiety. Patient aware he needs to monitor is carbohydrate intake for his diabetes. Reports blood sugars fluctuated at home and was in the 400s sometimes because he would eat a lot of pasta for taste reasons.

## 2022-02-22 NOTE — PROVIDER CONTACT NOTE (OTHER) - BACKGROUND
Pt Aisha admitted for dizziness and giddiness; pmhx of T2DM. Pt finished LR fluids
Pt admitted for dizziness and giddiness.
Pt admitted for dizziness and giddiness. Pt had another small emesis earlier in night, was given PO zofran.
Pt admitted for dizziness and giddines
Pt Aisha admitted for dizziness and giddiness. High WBC, hx of T2DM

## 2022-02-22 NOTE — PROGRESS NOTE ADULT - PROBLEM SELECTOR PROBLEM 3
Type 2 diabetes mellitus with hypoglycemia

## 2022-02-23 PROBLEM — D47.1 CHRONIC MYELOPROLIFERATIVE DISEASE: Chronic | Status: ACTIVE | Noted: 2022-01-01

## 2022-02-23 PROBLEM — F17.200 NICOTINE DEPENDENCE, UNSPECIFIED, UNCOMPLICATED: Chronic | Status: ACTIVE | Noted: 2022-01-01

## 2022-03-01 NOTE — RESULTS/DATA
[FreeTextEntry1] : 3/1/2022\par Hospital labs reviewed. \par Anemia, thrombocytosis and leukocytosis noted. \par \par Bone marrow biopsy from 07/2020:\par \par Final Diagnosis\par 1, 2. Bone marrow biopsy (small sample) and bone marrow aspirate\par - Hypercellularity with myeloid predominant trilineage\par hematopoiesis with maturation and marked megakaryocytosis with\par atypical morphology and clustering\par - Increased reticulin fibers (focal MF-2)\par \par See note and description.\par \par Diagnostic note:\par Overall the findings are consistent with myeloproliferative\par neoplasm with focal myelofibrosis. Differential diagnostic\par considerations in this case include essential thrombocythemia\par with secondary marrow fibrosis or primary myelofibrosis.\par Correlation with clinical history, clinical findings (spleen\par status, LDH level), and JAK2 with reflex testing (MPL W515 and\par CALR), FISH/cytogenetic studies is necessary for a definitive\par classification. Comprehensive report with results of pending\par ancillary studies to follow.\par \par \par Hematopathology Addendum\par Comprehensive Hematopathology Report\par \par Final Diagnosis:\par 1, 2. 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 [employer] NGS\par Myeloid Disorder Sequencing Report ([employer])\par indicated mutations on JAK2 p.[id number], ASXL1 p.[id number]*, DNMT3A\par p.[id number] and SF3B1 p.[id number]. Based on the additional\par findings, the diagnosis is as stated above.\par \par \par IMAGING \par --------------------\par CT Abd (07/2020)\par Spleen 20 cm

## 2022-03-01 NOTE — ASSESSMENT
[FreeTextEntry1] : 56 year-old Mr. TILLEY is seen in consult for JAK2 positive MPN (no subtype mentioned) with focal myelofibrosis as per BMB in 07/2020. Patient was not on any treatment or hematology follow up since diagnosis. He recently was admitted with fatigue, dizziness and vomiting and was found to have high WBC (~75K) count contributed by neutrophils, immature granulocytes and a few blasts (<10%). He also has thrombocytosis (~600K). He has splenomegaly. 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 A repeat marrow is needed to assess the current status. WIll hold Hydrea for a week before marrow. \par \par # JAK2 positive MPN (no subtype established) \par - ET vs prefibortic PMF vs PMF (per 07/2020 BMB) \par - Additional mutations (SF3B1, ASXL1, DNMT3A) \par - CBC, CMP, LDH, Hapto, Retic, EPO, Peripheral flow today \par - Will repeat a bone marrow biopsy\par - Will hold Hydrea 1 week prior to BMB, resume after the procedure\par - Patient may be a candidate for JAKAFI\par - Will request a BMT evaluation \par - Consider neurology evaluation\par - RTC in a month

## 2022-03-01 NOTE — HISTORY OF PRESENT ILLNESS
[de-identified] : 56 year-old Mr. TILLEY is seen in consult for JAK2 positive MPN (not otherwise specified). Patient was recently admitted to [facility] with fatigue, vomiting, dizziness. He was found to have  high WBC count predominantly matured neutrophils and some blasts (<10%). It was also noted that he was admitted with rectal bleed  in 07/2020 and had a BMBx at that time which was suggestive of MPN disease (no subtype specified). He was discharged with outpatient hematology appointment but never followed up with outpatient hematology clinic. In the recent admission his symptoms were attributed to dehydration and high WBC count and was started on Hydrea.  He did not see a neurologist nor had a brain MRI or LP to investigate his dizziness. Patient is mostly wheelchair bound, continues to feel dizzy.

## 2022-03-10 NOTE — ED PROVIDER NOTE - NSFOLLOWUPINSTRUCTIONS_ED_ALL_ED_FT
You were seen in the ED for bleeding from your gums.    You were given a medication to help with the bleeding.    Please follow-up with your primary care doctor in the next 72 hours.    Please follow-up with your dental appointment as scheduled. Follow the recommendations given to you by your dentist.    Return to the Ed if you have any new or worsening symptoms such as worsening bleeding that does not stop after trying tea bags, pressure, salt water rinses, vomiting blood, heart palpitations and shortness of breath, dizziness, or any other concerning findings.

## 2022-03-10 NOTE — ED PROVIDER NOTE - CLINICAL SUMMARY MEDICAL DECISION MAKING FREE TEXT BOX
Patient is a 56y M PMHx CML (on oral chemo), OA, on ASA, no a/c, presenting today with gum bleeding since teeth removal x5 at 1 PM today. Hemodynamically stable, no SOB, CP, palpitations, dizziness, or other symptoms of anemia. Will attempt TXA, surgicell, consider dental consult if needed.

## 2022-03-10 NOTE — ED PROVIDER NOTE - ATTENDING CONTRIBUTION TO CARE
55yo M PMHx recently diagnosed CML (on oral chemo), HTN, DM, HLD, takes daily baby aspirin, p/w bleeding from maxillary teeth sockets since 1PM yesterday after 4 dental extractions he had a dental office in preparation for dental implants. No dizziness or chest pain but pt reports feeling fatigued and weak generally since prior to cancer diagnosis    General: Patient alert in no apparent distress  Skin: Dry and intact  HEENT: Head atraumatic. Oral mucosa moist. moist blood clots in front maxillary teeth with slow venous oozing  Eyes: Conjunctiva normal  Cardiac: Regular rhythm and rate. No pretibial edema b/l  Respiratory: Lungs clear b/l and symmetric. No respiratory distress. Able to speak in complete sentences.  Gastrointestinal: Abdomen soft, nondistended, nontender  Musculoskeletal: Moves all extremities spontaneously  Neurological: alert and oriented to person, place, and time  Psychiatric: Calm and cooperative    a/p  dental bleed  Hemodynamically stable  has known anemia and in setting of bleeding with pt unable to quantify blood, will get cbc  TXA topical to site with gauze then reassess

## 2022-03-10 NOTE — ED PROVIDER NOTE - PROGRESS NOTE DETAILS
Ordered TXA. Awaiting from pharmacy. Patient remains hemodynamically stable at this time. Patient bleeding has decreased significantly after TXA. Will continue to observe. Patient with dental follow-up tomorrow already. Will discharge if bleeding continues to improve. Bleeding stopped. Will discharge with follow-up.

## 2022-03-10 NOTE — ED ADULT TRIAGE NOTE - CHIEF COMPLAINT QUOTE
pt c/o bleeding in mouth states he had 5 teeth removed today at 1pm. c/o feeling weak and dizzy for months. PMHx leukemia on oral chemo, takes baby aspirin.

## 2022-03-10 NOTE — ED PROVIDER NOTE - NSFOLLOWUPCLINICS_GEN_ALL_ED_FT
[facility]  Dental  [address] [place] [id number]  Phone: [phone]  Fax:

## 2022-03-10 NOTE — ED PROVIDER NOTE - PATIENT PORTAL LINK FT
You can access the FollowMyHealth Patient Portal offered by [facility] by registering at the following website: [email or web address]. By joining [facility]’s FollowMyHealth portal, you will also be able to view your health information using other applications (apps) compatible with our system.

## 2022-03-10 NOTE — ED PROVIDER NOTE - OBJECTIVE STATEMENT
Patient is a 56y M PMHx CML OA, Patient is a 56y M PMHx CML (on oral chemo), OA, on ASA, no a/c, presenting today with gum bleeding since teeth removal x5 at 1 PM today. Patient went home and has been bleeding since. Has tried tea bags and salt water rinses, neither have helped. Patient has noticed clots, but no severe gushing of blood. Patient denies dizziness, n/v/d, heart palpitations, CP, SOB.

## 2022-03-10 NOTE — ED PROVIDER NOTE - PHYSICAL EXAMINATION
GENERAL: no acute distress, non-toxic appearing  HEENT: PERRLA, EOMI, normal conjunctiva, teeth 6,7,8,9,10 missing with bleeding from all sockets, blood is present but not currently dripping  CARDIAC: regular rate and rhythm  PULM: clear to ascultation bilaterally  GI: abdomen nondistended, soft, nontender  : no CVA tenderness, no suprapubic tenderness  NEURO: alert and oriented x 3, normal speech, no focal motor or sensory deficits, gait normal, no gross neurologic deficit  MSK: no visible deformities, no peripheral edema, calf tenderness/redness/swelling  SKIN: no visible rashes, dry, well-perfused  PSYCH: appropriate mood and affect

## 2022-03-11 NOTE — ED ADULT NURSE NOTE - NSIMPLEMENTINTERV_GEN_ALL_ED
Implemented All Universal Safety Interventions:  [place] to call system. Call bell, personal items and telephone within reach. Instruct patient to call for assistance. Room bathroom lighting operational. Non-slip footwear when patient is off stretcher. Physically safe environment: no spills, clutter or unnecessary equipment. Stretcher in lowest position, wheels locked, appropriate side rails in place.

## 2022-03-11 NOTE — ED ADULT NURSE NOTE - OBJECTIVE STATEMENT
Pt A&Ox4, ambulatory. Pt in NAD, respe qual; and unlabored. pmhx: dm2, HTN, leukemia. Pt last chemo PO inpatient 1 week ago. Today 1pm pt had teeth extracted at dentist and has not been able to control the bleeding since procedure. Pt endorses dizziness x1 month. Pt denies; SOB, CP, dysphagia, syncope, fever/chills.

## 2022-03-15 NOTE — PROCEDURE
[Bone Marrow Biopsy] : bone marrow biopsy [Bone Marrow Aspiration] : bone marrow aspiration  [Patient] : the patient [Verbal Consent Obtained] : verbal consent was obtained prior to the procedure [Patient identification verified] : patient identification verified [Procedure verified and consent obtained] : procedure verified and consent obtained [Laterality verified and correct site marked] : laterality verified and correct site marked [Left] : site: left [Correct positioning] : correct positioning [Prone] : prone [Superior iliac spine was identified] : the superior iliac spine was identified. [The left posterior iliac crest was prepped with betadine and draped, using sterile technique.] : The left posterior iliac crest was prepped with betadine and draped, using sterile technique. [Lidocaine was injected and into the periosteum overlying the site.] : Lidocaine was injected and into the periosteum overlying the site. [Aspirate] : aspirate [Cytogenetics] : cytogenetics [FISH] : FISH [Biopsy] : biopsy [Flow Cytometry] : flow cytometry [] : The patient was instructed to remove the bandage the following AM. The patient may bathe. Acetaminophen may be taken for discomfort, as per package directions.If there are any other problems, the patient was instructed to call the office. The patient verbalized understanding, and is aware of the office contact numbers. [FreeTextEntry1] : LELIA 2 + MPN [FreeTextEntry2] : 6 cc of lidocaine was used for the procedure. \par \par WBC: 21.68\par Hgb: 8.7\par Hct: 27.3\par Plts: 127\par \par Bone marrow aspiration and biopsy were done.  AML and foundation sent\par

## 2022-03-15 NOTE — REASON FOR VISIT
[Bone Marrow Biopsy] : bone marrow biopsy [Bone Marrow Aspiration] : bone marrow aspiration [FreeTextEntry2] : LELIA 2 + MPN

## 2022-04-01 NOTE — ED PROVIDER NOTE - NS ED ATTENDING STATEMENT MOD
This was a shared visit with the CLIF. I reviewed and verified the documentation and independently performed the documented:

## 2022-04-01 NOTE — ED PROVIDER NOTE - MOUTH
s/p dental extraction at right mandibular region; +mild oozing, + clots at extraction sites; no pooling of blood in mouth

## 2022-04-01 NOTE — ED PROVIDER NOTE - ATTENDING CONTRIBUTION TO CARE
Pt is a 57 y/o M PMHx CML, HTN, HLD, DM p/w bleeding gingiva s/p dental extraction today.  Pt reports he had four teeth extracted at 10 AM today.  Since then he reports he has had oozing bleeding from extraction sites.  Pt reports he had similar incident when he had other teeth extracted, which resolved with application of gauze soaked in TXA.  Pt reports he feels "a little tired."  Pt also reports moderate aching pain at extraction sites.  Pt denies any fevers, chills, chest pain, SOB, headache, syncope, use of blood thinners, palpitations, numbness, weakness, or any other specific complaints.

## 2022-04-01 NOTE — ED PROVIDER NOTE - PATIENT PORTAL LINK FT
You can access the FollowMyHealth Patient Portal offered by [facility] by registering at the following website: [email or web address]. By joining [employer]’s FollowMyHealth portal, you will also be able to view your health information using other applications (apps) compatible with our system.

## 2022-04-01 NOTE — ED PROVIDER NOTE - CROS ED CARDIOVAS ALL NEG
"Pt reported increased abdominal pain when ambulating to the bathroom. TIRSO notified. One time dose of IV dilaudid ordered. Pt also triggered sepsis protocol. /73 (BP Location: Right arm)  Pulse 90  Temp 99.3  F (37.4  C) (Oral)  Resp 16  Ht 1.702 m (5' 7\")  Wt 88.9 kg (196 lb)  LMP 02/05/2017 (Exact Date)  SpO2 100%  Breastfeeding? No  BMI 30.7 kg/m2 Lab to draw lactic acid - will continue to monitor closely. UA sent.  " negative...

## 2022-04-01 NOTE — ED ADULT NURSE NOTE - OBJECTIVE STATEMENT
Pt arrives to [facility] for blood in mouth. Pt reports having tooth pulled this morning with continuous bleeding, denies blood thinner use. Pt a&ox3, ambulatory at baseline, skin intact, respirations even and unlabored. Pt denies difficulty swallowing/speaking. CALLIE Limon placed 20G IV, labs drawn and sent, pt medicated as per ordered, will await further orders and continue to monitor.

## 2022-04-01 NOTE — ED ADULT NURSE NOTE - NSICDXPASTMEDICALHX_GEN_ALL_CORE_FT
PAST MEDICAL HISTORY:  Arthritis     DM (diabetes mellitus)     HTN (hypertension)     Myeloproliferative disease     Smoking

## 2022-04-01 NOTE — ED PROVIDER NOTE - NSFOLLOWUPINSTRUCTIONS_ED_ALL_ED_FT
You were evaluated in the Emergency Department for bleeding gums.      There are no signs of emergency conditions requiring admission to the hospital on today's workup.      We recommend that you see your primary care physician/dentist within the next 3 days for follow up.  Bring a copy of your discharge paperwork (including any test results) to your doctor.    Please continue to take all of your medications as prescribed. Please eat soft foods and/or liquids for the next week while your dental extraction site heals.     ***Return to the emergency department if you have any other new/concerning symptoms, including but not limited to: return of bleeding, vomiting, weakness, dizziness ***

## 2022-04-01 NOTE — ED PROVIDER NOTE - OBJECTIVE STATEMENT
Pt is a 55 y/o M PMHx CML, HTN, HLD, DM p/w bleeding gingiva s/p dental extraction today.  Pt reports he had four teeth extracted at 10 AM today.  Since then he reports he has had oozing bleeding from extraction sites.  Pt reports he had similar incident when he had other teeth extracted, which resolved with application of gauze soaked in TXA.  Pt reports he feels "a little tired."  Pt also reports moderate aching pain at extraction sites.  Pt denies any fevers, chills, chest pain, SOB, headache, syncope, use of blood thinners, palpitations, numbness, weakness, or any other specific complaints.

## 2022-04-01 NOTE — ED ADULT NURSE NOTE - CHIEF COMPLAINT QUOTE
Pt c/o bleeding from mouth. States he had teeth removal this morning, having continuation of bleeding from mouth since. Was seen here recently for same presentation. Denies blood thinner use. Denies chest pain, SOB/trouble breathing. PMHx Leukemia, DM, HTN

## 2022-04-01 NOTE — ED PROVIDER NOTE - CLINICAL SUMMARY MEDICAL DECISION MAKING FREE TEXT BOX
Pt is a 57 y/o M PMHx CML, HTN, HLD, DM p/w bleeding gingiva s/p dental extraction today. -- gingival bleeding s/p dental extraction -- labs, tylenol, topical TXA

## 2022-04-01 NOTE — ED PROVIDER NOTE - PROGRESS NOTE DETAILS
Juli, PGY2: Patient received as sign out, initially presented with bleeding gums. TXA packing placed. will re-eval. Juli, PGY2: continued oozing. packed again with txa/lido/epi impregnated gauze. will reassess in 30 min. Juli, PGY2: several stable appearing clots formed on lower molar extraction sites. no further oozing. will observe for additional 30 min and dispo accordingly. Juli, PGY2: several stable appearing clots formed on lower molar extraction sites. no further oozing. will observe for additional time and dispo accordingly. Juli, PGY2: no further bleeding. instructed on eating soft foods/liquids and follow up with dental.

## 2022-04-04 PROBLEM — D75.839 THROMBOCYTOSIS: Status: ACTIVE | Noted: 2022-01-01

## 2022-04-04 NOTE — RESULTS/DATA
[FreeTextEntry1] : 4/4/2022\par Bone marrow biopsy (3/15/2022)\par  Patient:   DINAH TILLEY\par \par \par Accession:                             [id number]\par \par Collected Date/Time:                   3/15/2022 09:00 EDT\par Received Date/Time:                    3/15/2022 15:24 EDT\par \par Hematopathology Report - Auth (Verified)\par \par Specimen(s) Submitted\par 1  Bone marrow biopsy MH OP\par 2  Bone marrow aspirate for Flow OP\par \par Final Diagnosis\par 1, 2. Bone marrow biopsy and bone marrow aspirate\par      - Hypocellular marrow (10 to 15%) with diffuse fibrosis (MF-3),\par  dilated sinuses, osteosclerosis, focal erythroid predominant trilineage\par \par  hematopoiesis, dyserythropoiesis with ring sideroblasts, and decreased\par  iron stores (known history of primary fibrosis).\par \par See note and description.\par \par Diagnostic note:\par Suggest correlation with clinical and cytogenetic findings.\par \par Comprehensive report with results of pending ancillary studies to follow.\par \par Ancillary studies\par Special stains:  Bone marrow aspirate iron stain:   No spicules are present\par  to evaluate for iron stores; there are sufficient nRBC to evaluate for\par  ring sideroblasts and ring sideroblasts are increased.\par Special stains (reticulin and trichrom) performed on block 1A show\par  significant diffuse fibrosis (MF-3).\par Flow cytometry  of bone marrow aspirate shows 0.8% myeloblasts (positive\par  for HLA-DR, CD34, , CD33, CD13, CD7; negative CD11b, CD15).\par \par Microscopic description:\par 1. Biopsy:  Sections of clot and biopsy show hypocellularity (10% to\par  15%) with diffuse fibrosis, significantly dilated sinuses, erythroid\par  predominance, myeloid and erythroid maturation, megakaryocytes adequate\par  (focal clustering, some hypolobated forms), osteosclerosis, mild\par  eosinophilia, mild increase in interstitial mast cells (many spindle\par  shaped), and decreased iron stores.\par 2. Aspirate:  Aparticulate and hemodilute aspirate smear with normal M:E\par  ratio (3.6:1), myeloid and erythroid maturation, dyserythropoiesis, few\par  lymphocytes, and mild eosinophilia.  Megakaryocytes are nearly absent.\par \par Bone Marrow Aspirate Differential: (200 Cells).\par Type  % Normal*\par Blast  1% 0-3\par Neutrophil and\par    Precursors   63% 33-63\par Eosinophil  8% 1-5\par Basophil  0% 0-1\par Pronormoblast  1% 0-2\par Normoblast  19% 15-25\par Monocyte  0% 0-2\par Lymphocyte  8% 10-15\par Plasma cell  0% 0-1\par   *Adult Range\par Comment\par Iron stain (examined to evaluate for iron stores; see microscopic\par  description) and Giemsa stain (shows appropriate staining pattern) are\par  performed and evaluated on block(s): 1A, B.\par \par Verified by: CYNTHIA Barrientos\par (Electronic Signature)\par Reported on: 03/21/22 15:48 EDT, [facility], [address], [place] [address]\par Phone: [phone]   Fax: [phone]\par  ==========================================\par \par \par 3/1/2022\par Hospital labs reviewed. \par Anemia, thrombocytosis and leukocytosis noted. \par \par Bone marrow biopsy from 07/2020:\par \par Final Diagnosis\par 1, 2. Bone marrow biopsy (small sample) and bone marrow aspirate\par - Hypercellularity with myeloid predominant trilineage\par hematopoiesis with maturation and marked megakaryocytosis with\par atypical morphology and clustering\par - Increased reticulin fibers (focal MF-2)\par \par See note and description.\par \par Diagnostic note:\par Overall the findings are consistent with myeloproliferative\par neoplasm with focal myelofibrosis. Differential diagnostic\par considerations in this case include essential thrombocythemia\par with secondary marrow fibrosis or primary myelofibrosis.\par Correlation with clinical history, clinical findings (spleen\par status, LDH level), and JAK2 with reflex testing (MPL [id number] and\par CALR), FISH/cytogenetic studies is necessary for a definitive\par classification. Comprehensive report with results of pending\par ancillary studies to follow.\par \par \par Hematopathology Addendum\par Comprehensive Hematopathology Report\par \par Final Diagnosis:\par 1, 2. 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 [employer] NGS\par Myeloid Disorder Sequencing Report ([employer])\par indicated mutations on JAK2 p.[id number], ASXL1 p.[id number]*, DNMT3A\par p.[id number] and SF3B1 p.[id number]. Based on the additional\par findings, the diagnosis is as stated above.\par \par \par IMAGING \par --------------------\par CT Abd (07/2020)\par Spleen 20 cm

## 2022-04-04 NOTE — PHYSICAL EXAM
[Capable of only limited self care, confined to bed or chair more than 50% of waking hours] : Status 3- Capable of only limited self care, confined to bed or chair more than 50% of waking hours [Thin] : thin [Normal] : affect appropriate [de-identified] : came in a wheel chair.

## 2022-04-04 NOTE — HISTORY OF PRESENT ILLNESS
[de-identified] : CHART REVIEW: (3/1/2022) 56 year-old Mr. TILLEY is seen in consult for JAK2 positive MPN (not otherwise specified). Patient was recently admitted to [facility] with fatigue, vomiting, dizziness. He was found to have  high WBC count predominantly matured neutrophils and some blasts (<10%). It was also noted that he was admitted with rectal bleed  in 07/2020 and had a BMBx at that time which was suggestive of MPN disease (no subtype specified). He was discharged with outpatient hematology appointment but never followed up with outpatient hematology clinic. In the recent admission his symptoms were attributed to dehydration and high WBC count and was started on Hydrea.  He did not see a neurologist nor had a brain MRI or LP to investigate his dizziness. Patient is mostly wheelchair bound, continues to feel dizzy.  [de-identified] : 4/4/2022\par Patient returns for a follow up. He has had a BMB in the interim. His biopsy result shows progression/transformation of his 07/2020 diagnosis (MF, marrow fibrosis grade 2) to overt PMF (marrow fibrosis grade 3), as well as MDS-RS-T. He has JAK2 mutation and also has SF3B1. The patient is on Hydrea. He tells he has improved to some extent. His appetite is little better and N/V is improved.

## 2022-04-04 NOTE — ASSESSMENT
[FreeTextEntry1] : 56 year-old Mr. TILLEY is seen in consult for JAK2 positive MPN (no subtype mentioned) with focal myelofibrosis as per BMB in 07/2020. Patient was not on any treatment or hematology follow up since diagnosis. He recently was admitted with fatigue, dizziness and vomiting and was found to have high WBC (~75K) count contributed by neutrophils, immature granulocytes and a few blasts (<10%). He also has thrombocytosis (~600K). He has splenomegaly. 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 A repeat marrow showed progression/transformation of his 07/2020 diagnosis (MF, marrow fibrosis grade 2) to overt PMF (marrow fibrosis grade 3), as well as MDS-RS-T. He has JAK2 mutation and also has SF3B1 mutation. \par \par # JAK2 positive MPN (no subtype established) \par - ET vs prefibortic PMF vs PMF (per 07/2020 BMB) \par - Additional mutations (SF3B1, ASXL1, DNMT3A) \par - Repeat BMB: progression/transformation of his 07/2020 diagnosis (MF, marrow fibrosis grade 2) to overt PMF (marrow fibrosis grade 3), as well as MDS-RS-T\par - CBC, CMP, LDH, Uric acid\par - Continue Hydrea 1 gm BID\par - Patient may be a candidate for JAKAFI if platelet count allows\par - Will consider HMA later \par - Schedule a BMT evaluation \par - RTC in 2 month

## 2022-04-23 PROBLEM — Z86.39 HISTORY OF DIABETES MELLITUS: Status: RESOLVED | Noted: 2017-09-20 | Resolved: 2022-01-01

## 2022-04-23 NOTE — REVIEW OF SYSTEMS
[Negative] : Allergic/Immunologic [FreeTextEntry2] : wheel chair bound, weak, fatigued [FreeTextEntry7] : bloated / distended..early satiety

## 2022-04-23 NOTE — REASON FOR VISIT
[Initial Consultation] : an initial consultation for [Myeloproliferative Disorder] : myeloproliferative disorder [FreeTextEntry2] : JAK2 positive MPN

## 2022-04-23 NOTE — ASSESSMENT
[FreeTextEntry1] : 56 year-old Mr. TILLEY is seen for initial consult for stem cell transplant for JAK2 positive MPN (no subtype mentioned) with focal myelofibrosis as per BMB in 07/2020. Patient was not on any treatment or hematology follow up since diagnosis. He recently was admitted with fatigue, dizziness and vomiting and was found to have high WBC (~75K) count contributed by neutrophils, immature granulocytes and a few blasts (<10%). He also has thrombocytosis (~600K). He has splenomegaly. 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 A repeat marrow showed progression/transformation of his 07/2020 diagnosis (MF, marrow fibrosis grade 2) to overt PMF (marrow fibrosis grade 3), as well as MDS-RS-T. He has JAK2 mutation and also has SF3B1 mutation. \par \par # JAK2 positive MPN (no subtype established) \par - ET vs prefibortic PMF vs PMF (per 07/2020 BMB) \par - Additional mutations (SF3B1, ASXL1, DNMT3A) \par - Repeat BMB: progression/transformation of his 07/2020 diagnosis (MF, marrow fibrosis grade 2) to overt PMF (marrow fibrosis grade 3), as well as MDS-RS-T\par - CBC reviewed from today..improved\par - Continue Hydrea 1 gm BID and  JAKAFI \par - could consider HMA \par -donor options limited to unrelated...pt is estranged from his family...no children\par - I have had an extensive discussion regarding the risks benefits alternatives rationale and logistics of allogenic stem cell transplant. The four week hospital stay and 6 to 12 month recovery was discussed. The preparative regimen and GV HD prophylactic regimen was discussed .... at this time patient’s performance status is not supportive of allogeneic transplant. I am also concerned with his lack of support system ... Literature was provided questions were addressed. I am somewhat hopeful that with continued treatment his performance status may improve. We discussed potential solutions to his lack of caregiver situation .. Vital organ function testing social work evaluation and orientation discussed .. The patient is reluctant at this time but I also believe that he may not be the most probably candidate for transplant intervention.. He will follow with Dr. Hillman.. His primary oncologist ,  in June I have asked them to follow up with myself at that time as well \par - RTC in 2 month

## 2022-04-23 NOTE — RESULTS/DATA
[FreeTextEntry1] : 4/4/2022\par Bone marrow biopsy (3/15/2022)\par  Patient:   DINAH TILLEY\par \par \par Accession:                             [id number]\par \par Collected Date/Time:                   3/15/2022 09:00 EDT\par Received Date/Time:                    3/15/2022 15:24 EDT\par \par Hematopathology Report - Auth (Verified)\par \par Specimen(s) Submitted\par 1  Bone marrow biopsy MH OP\par 2  Bone marrow aspirate for Flow OP\par \par Final Diagnosis\par 1, 2. Bone marrow biopsy and bone marrow aspirate\par      - Hypocellular marrow (10 to 15%) with diffuse fibrosis (MF-3),\par  dilated sinuses, osteosclerosis, focal erythroid predominant trilineage\par \par  hematopoiesis, dyserythropoiesis with ring sideroblasts, and decreased\par  iron stores (known history of primary fibrosis).\par \par See note and description.\par \par Diagnostic note:\par Suggest correlation with clinical and cytogenetic findings.\par \par Comprehensive report with results of pending ancillary studies to follow.\par \par Ancillary studies\par Special stains:  Bone marrow aspirate iron stain:   No spicules are present\par  to evaluate for iron stores; there are sufficient nRBC to evaluate for\par  ring sideroblasts and ring sideroblasts are increased.\par Special stains (reticulin and trichrom) performed on block 1A show\par  significant diffuse fibrosis (MF-3).\par Flow cytometry  of bone marrow aspirate shows 0.8% myeloblasts (positive\par  for HLA-DR, CD34, , CD33, CD13, CD7; negative CD11b, CD15).\par \par Microscopic description:\par 1. Biopsy:  Sections of clot and biopsy show hypocellularity (10% to\par  15%) with diffuse fibrosis, significantly dilated sinuses, erythroid\par  predominance, myeloid and erythroid maturation, megakaryocytes adequate\par  (focal clustering, some hypolobated forms), osteosclerosis, mild\par  eosinophilia, mild increase in interstitial mast cells (many spindle\par  shaped), and decreased iron stores.\par 2. Aspirate:  Aparticulate and hemodilute aspirate smear with normal M:E\par  ratio (3.6:1), myeloid and erythroid maturation, dyserythropoiesis, few\par  lymphocytes, and mild eosinophilia.  Megakaryocytes are nearly absent.\par \par Bone Marrow Aspirate Differential: (200 Cells).\par Type  % Normal*\par Blast  1% 0-3\par Neutrophil and\par    Precursors   63% 33-63\par Eosinophil  8% 1-5\par Basophil  0% 0-1\par Pronormoblast  1% 0-2\par Normoblast  19% 15-25\par Monocyte  0% 0-2\par Lymphocyte  8% 10-15\par Plasma cell  0% 0-1\par   *Adult Range\par Comment\par Iron stain (examined to evaluate for iron stores; see microscopic\par  description) and Giemsa stain (shows appropriate staining pattern) are\par  performed and evaluated on block(s): 1A, B.\par \par Verified by: CYNTHIA Barrientos\par (Electronic Signature)\par Reported on: 03/21/22 15:48 EDT, [facility], [address]  [facility]. [address], [place] [address]\par Phone: [phone]   Fax: [phone]\par  ==========================================\par \par \par 3/1/2022\par Hospital labs reviewed. \par Anemia, thrombocytosis and leukocytosis noted. \par \par Bone marrow biopsy from 07/2020:\par \par Final Diagnosis\par 1, 2. Bone marrow biopsy (small sample) and bone marrow aspirate\par - Hypercellularity with myeloid predominant trilineage\par hematopoiesis with maturation and marked megakaryocytosis with\par atypical morphology and clustering\par - Increased reticulin fibers (focal MF-2)\par \par See note and description.\par \par Diagnostic note:\par Overall the findings are consistent with myeloproliferative\par neoplasm with focal myelofibrosis. Differential diagnostic\par considerations in this case include essential thrombocythemia\par with secondary marrow fibrosis or primary myelofibrosis.\par Correlation with clinical history, clinical findings (spleen\par status, LDH level), and JAK2 with reflex testing (MPL W515 and\par CALR), FISH/cytogenetic studies is necessary for a definitive\par classification. Comprehensive report with results of pending\par ancillary studies to follow.\par \par \par Hematopathology Addendum\par Comprehensive Hematopathology Report\par \par Final Diagnosis:\par 1, 2. 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 [employer] NGS\par Myeloid Disorder Sequencing Report ([employer])\par indicated mutations on JAK2 p.[id number], ASXL1 p.[id number]*, DNMT3A\par p.[id number] and SF3B1 p.[id number]. Based on the additional\par findings, the diagnosis is as stated above.\par \par \par IMAGING \par --------------------\par CT Abd (07/2020)\par Spleen 20 cm

## 2022-04-23 NOTE — HISTORY OF PRESENT ILLNESS
[de-identified] :  56 year-old Mr. TILLEY is seen in consult for JAK2 positive MPN (not otherwise specified). Patient was recently admitted to [facility] with fatigue, vomiting, dizziness. He was found to have  high WBC count predominantly matured neutrophils and some blasts (<10%). It was also noted that he was admitted with rectal bleed  in 07/2020 and had a BMBx at that time which was suggestive of MPN disease (no subtype specified). He was discharged with outpatient hematology appointment but never followed up with outpatient hematology clinic. In the recent admission his symptoms were attributed to dehydration and high WBC count and was started on Hydrea.  He did not see a neurologist nor had a brain MRI or LP to investigate his dizziness. Patient is mostly wheelchair bound. [de-identified] : 4/20/2022\par Patient presents for initial eval for allogeneic stem cell transplant... He has had a recent BMB. His biopsy result shows progression/transformation of his 07/2020 diagnosis (MF, marrow fibrosis grade 2) to overt PMF (marrow fibrosis grade 3), as well as MDS-RS-T. He has JAK2 mutation and also has SF3B1. The patient is on Hydrea. He offers that he has improved to some extent. His appetite is a little better and N/V is improved. He is in a wheelchair..

## 2022-04-23 NOTE — PHYSICAL EXAM
[Capable of only limited self care, confined to bed or chair more than 50% of waking hours] : Status 3- Capable of only limited self care, confined to bed or chair more than 50% of waking hours [Thin] : thin [Normal] : affect appropriate [de-identified] : came in a wheel chair.  [de-identified] : splenomegaly

## 2022-06-23 NOTE — PHYSICAL EXAM
[Ambulatory and capable of all self care but unable to carry out any work activities] : Status 2- Ambulatory and capable of all self care but unable to carry out any work activities. Up and about more than 50% of waking hours [Normal] : well developed, well nourished, in no acute distress [de-identified] : splenomegaly

## 2022-06-23 NOTE — RESULTS/DATA
[FreeTextEntry1] : 4/4/2022\par Bone marrow biopsy (3/15/2022)\par  Patient:   DINAH TILLEY\par \par \par Accession:                             [id number]\par \par Collected Date/Time:                   3/15/2022 09:00 EDT\par Received Date/Time:                    3/15/2022 15:24 EDT\par \par Hematopathology Report - Auth (Verified)\par \par Specimen(s) Submitted\par 1  Bone marrow biopsy MH OP\par 2  Bone marrow aspirate for Flow OP\par \par Final Diagnosis\par 1, 2. Bone marrow biopsy and bone marrow aspirate\par      - Hypocellular marrow (10 to 15%) with diffuse fibrosis (MF-3),\par  dilated sinuses, osteosclerosis, focal erythroid predominant trilineage\par \par  hematopoiesis, dyserythropoiesis with ring sideroblasts, and decreased\par  iron stores (known history of primary fibrosis).\par \par See note and description.\par \par Diagnostic note:\par Suggest correlation with clinical and cytogenetic findings.\par \par Comprehensive report with results of pending ancillary studies to follow.\par \par Ancillary studies\par Special stains:  Bone marrow aspirate iron stain:   No spicules are present\par  to evaluate for iron stores; there are sufficient nRBC to evaluate for\par  ring sideroblasts and ring sideroblasts are increased.\par Special stains (reticulin and trichrom) performed on block 1A show\par  significant diffuse fibrosis (MF-3).\par Flow cytometry  of bone marrow aspirate shows 0.8% myeloblasts (positive\par  for HLA-DR, CD34, , CD33, CD13, CD7; negative CD11b, CD15).\par \par Microscopic description:\par 1. Biopsy:  Sections of clot and biopsy show hypocellularity (10% to\par  15%) with diffuse fibrosis, significantly dilated sinuses, erythroid\par  predominance, myeloid and erythroid maturation, megakaryocytes adequate\par  (focal clustering, some hypolobated forms), osteosclerosis, mild\par  eosinophilia, mild increase in interstitial mast cells (many spindle\par  shaped), and decreased iron stores.\par 2. Aspirate:  Aparticulate and hemodilute aspirate smear with normal M:E\par  ratio (3.6:1), myeloid and erythroid maturation, dyserythropoiesis, few\par  lymphocytes, and mild eosinophilia.  Megakaryocytes are nearly absent.\par \par Bone Marrow Aspirate Differential: (200 Cells).\par Type  % Normal*\par Blast  1% 0-3\par Neutrophil and\par    Precursors   63% 33-63\par Eosinophil  8% 1-5\par Basophil  0% 0-1\par Pronormoblast  1% 0-2\par Normoblast  19% 15-25\par Monocyte  0% 0-2\par Lymphocyte  8% 10-15\par Plasma cell  0% 0-1\par   *Adult Range\par Comment\par Iron stain (examined to evaluate for iron stores; see microscopic\par  description) and Giemsa stain (shows appropriate staining pattern) are\par  performed and evaluated on block(s): 1A, B.\par \par Verified by: CYNTHIA Barrientos\par (Electronic Signature)\par Reported on: 03/21/22 15:48 EDT, [facility], [address]  [facility]. [address], [place] [address]\par Phone: [phone]   Fax: [phone]\par  ==========================================\par \par \par 3/1/2022\par Hospital labs reviewed. \par Anemia, thrombocytosis and leukocytosis noted. \par \par Bone marrow biopsy from 07/2020:\par \par Final Diagnosis\par 1, 2. Bone marrow biopsy (small sample) and bone marrow aspirate\par - Hypercellularity with myeloid predominant trilineage\par hematopoiesis with maturation and marked megakaryocytosis with\par atypical morphology and clustering\par - Increased reticulin fibers (focal MF-2)\par \par See note and description.\par \par Diagnostic note:\par Overall the findings are consistent with myeloproliferative\par neoplasm with focal myelofibrosis. Differential diagnostic\par considerations in this case include essential thrombocythemia\par with secondary marrow fibrosis or primary myelofibrosis.\par Correlation with clinical history, clinical findings (spleen\par status, LDH level), and JAK2 with reflex testing (MPL W515 and\par CALR), FISH/cytogenetic studies is necessary for a definitive\par classification. Comprehensive report with results of pending\par ancillary studies to follow.\par \par \par Hematopathology Addendum\par Comprehensive Hematopathology Report\par \par Final Diagnosis:\par 1, 2. 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 [employer] NGS\par Myeloid Disorder Sequencing Report ([employer])\par indicated mutations on JAK2 p.[id number], ASXL1 p.Kiu929*, DNMT3A\par p.[id number] and SF3B1 p.[id number]. Based on the additional\par findings, the diagnosis is as stated above.\par \par \par IMAGING \par --------------------\par CT Abd (07/2020)\par Spleen 20 cm

## 2022-06-23 NOTE — ASSESSMENT
[FreeTextEntry1] : 56 year-old Mr. TILLEY is seen for initial consult for stem cell transplant for JAK2 positive MPN (no subtype mentioned) with focal myelofibrosis as per BMB in 07/2020. Patient was not on any treatment or hematology follow up since diagnosis. He recently was admitted with fatigue, dizziness and vomiting and was found to have high WBC (~75K) count contributed by neutrophils, immature granulocytes and a few blasts (<10%). He also has thrombocytosis (~600K). He has splenomegaly. 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 A repeat marrow showed progression/transformation of his 07/2020 diagnosis (MF, marrow fibrosis grade 2) to overt PMF (marrow fibrosis grade 3), as well as MDS-RS-T. He has JAK2 mutation and also has SF3B1 mutation. \par \par # JAK2 positive MPN (no subtype established) \par - ET vs prefibortic PMF vs PMF (per 07/2020 BMB) \par - Additional mutations (SF3B1, ASXL1, DNMT3A) \par - Repeat BMB: progression/transformation of his 07/2020 diagnosis (MF, marrow fibrosis grade 2) to overt PMF (marrow fibrosis grade 3), as well as MDS-RS-T\par - CBC reviewed from today..plts down to 49..recheck in 2 weeks\par - Decrease Hydrea 500 mg  BID b/c of falling plts... and cont  JAKAFI \par - could consider HMA \par -donor options limited to unrelated...pt is estranged from his family...no children\par - I have had another extensive discussion regarding the risks benefits alternatives rationale and logistics of allogenic stem cell transplant. The four week hospital stay and 6 to 12 month recovery was discussed. The preparative regimen and GV HD prophylactic regimen was discussed .... at this time patient’s performance status is somewhat improved... not the best  for  allogeneic transplant. I am also concerned with his lack of support system ... Literature was provided questions were addressed. I am somewhat hopeful that with continued treatment his performance status may improve. We discussed potential solutions to his lack of caregiver situation .. Vital organ function testing social work evaluation and orientation discussed .. The patient is reluctant at this time but wants to be considered for the procedure..he may not be the best candidate for transplant intervention.. He will follow with Dr. Hillman.. His primary oncologist ,  in July... I have asked them to follow up with myself  in 2 months ...in the meantime will proceed with vital organ function testing...HLA typing was done today...a prelim donor search will be done...

## 2022-06-23 NOTE — HISTORY OF PRESENT ILLNESS
[de-identified] :  56 year-old Mr. TILLEY is seen in consult for JAK2 positive MPN (not otherwise specified). Patient was recently admitted to [facility] with fatigue, vomiting, dizziness. He was found to have  high WBC count predominantly matured neutrophils and some blasts (<10%). It was also noted that he was admitted with rectal bleed  in 07/2020 and had a BMBx at that time which was suggestive of MPN disease (no subtype specified). He was discharged with outpatient hematology appointment but never followed up with outpatient hematology clinic. In the recent admission his symptoms were attributed to dehydration and high WBC count and was started on Hydrea.  He did not see a neurologist nor had a brain MRI or LP to investigate his dizziness. Patient has been mostly wheelchair bound...today is walking with a cane [de-identified] : \par Patient presents for f/u to  initial eval for allogeneic stem cell transplant... He has had a recent BMB. His biopsy result shows progression/transformation of his 07/2020 diagnosis (MF, marrow fibrosis grade 2) to overt PMF (marrow fibrosis grade 3), as well as MDS-RS-T. He has JAK2 mutation and also has SF3B1. The patient is on Hydrea 1000 mg bid and jakafi 15 mg bid... He offers that he has improved to some extent. His appetite is a little better and N/V is improved. He is not  in a wheelchair..

## 2022-06-23 NOTE — REVIEW OF SYSTEMS
[Negative] : Allergic/Immunologic [FreeTextEntry2] : not in wheel chair, less weak, fatigued [FreeTextEntry7] : bloated / distended..early satiety

## 2022-06-27 NOTE — PROCEDURE
[FreeTextEntry1] : randy R 1, L 0.8\par duplex: patent aorta/TANA .  heavily calcified/nonvisualized EIA, suggestion of severe stenosis

## 2022-06-27 NOTE — ASSESSMENT
[FreeTextEntry1] : lifelimiting claudication, proabable EIA disease.  ABIs likely falsely elevated.  Need CTA to further eval/plan revascularization options\par \par smoking cessation is critical to prevent progression, pt states he is down to 1/2 pack from 2-3 full ppd\par \par fu after CTA

## 2022-06-27 NOTE — PHYSICAL EXAM
[JVD] : no jugular venous distention  [Normal Breath Sounds] : Normal breath sounds [Normal Heart Sounds] : normal heart sounds [1+] : left 1+ [de-identified] : awake, alert [FreeTextEntry1] : diminished femoral pulses, non palp pedal pulses bilat\par no wounds/ulcers

## 2022-06-27 NOTE — HISTORY OF PRESENT ILLNESS
[FreeTextEntry1] : 56M, claudication\par \par Pt reports sig weakness bilat legs, left worse than right.  No pain associated.  He states it occurs after very short distance walking, worse w incline/stairs.  No wounds or ulcers.  \par \par long smoking hx.\par Myeloproliferative disorder followed by heme\par DM on insulin\par HTN\par \par independent, performs adls

## 2022-07-22 NOTE — ASSESSMENT
[FreeTextEntry1] : 56 year-old Mr. TILLEY is seen in consult for JAK2 positive MPN (no subtype mentioned) with focal myelofibrosis as per BMB in 07/2020. Patient was not on any treatment or hematology follow up since diagnosis. He recently was admitted with fatigue, dizziness and vomiting and was found to have high WBC (~75K) count contributed by neutrophils, immature granulocytes and a few blasts (<10%). He also has thrombocytosis (~600K). He has splenomegaly. 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 A repeat marrow showed progression/transformation of his 07/2020 diagnosis (MF, marrow fibrosis grade 2) to overt PMF (marrow fibrosis grade 3), as well as MDS-RS-T. He has JAK2 mutation and also has SF3B1 mutation. He was started on Hydrea and later JAKAFI was approved. His counts dropped significantly.  \par \par \par \par [] JAK2 positive MPN (no subtype established) \par - ET vs prefibortic PMF vs PMF (per 07/2020 BMB) \par - Additional mutations (SF3B1, ASXL1, DNMT3A) \par - Repeat BMB: progression/transformation of his 07/2020 diagnosis (MF, marrow fibrosis grade 2) to overt PMF (marrow fibrosis grade 3), as well as MDS-RS-T\par - CBC (q2w), CMP, LDH, Uric acid today\par - DIscontinue Hydrea \par - Continue JAKAFI  (15 mg BID)\par - F/u with BMT \par - RTC in 2 months \par \par [] MDS-RS-T \par - Mutations: SF3B1, ASXL1, DNMT3A\par - Will consider HMA later \par

## 2022-07-22 NOTE — RESULTS/DATA
[FreeTextEntry1] : 7/22/2022\par Labs reviewed. Significant drop in all counts noted. WBC is now WNL. \par \par \par 4/4/2022\par Bone marrow biopsy (3/15/2022)\par  Patient:   DINAH TILLEY\par \par \par Accession:                             [id number]\par \par Collected Date/Time:                   3/15/2022 09:00 EDT\par Received Date/Time:                    3/15/2022 15:24 EDT\par \par Hematopathology Report - Auth (Verified)\par \par Specimen(s) Submitted\par 1  Bone marrow biopsy MH OP\par 2  Bone marrow aspirate for Flow OP\par \par Final Diagnosis\par 1, 2. Bone marrow biopsy and bone marrow aspirate\par      - Hypocellular marrow (10 to 15%) with diffuse fibrosis (MF-3),\par  dilated sinuses, osteosclerosis, focal erythroid predominant trilineage\par \par  hematopoiesis, dyserythropoiesis with ring sideroblasts, and decreased\par  iron stores (known history of primary fibrosis).\par \par See note and description.\par \par Diagnostic note:\par Suggest correlation with clinical and cytogenetic findings.\par \par Comprehensive report with results of pending ancillary studies to follow.\par \par Ancillary studies\par Special stains:  Bone marrow aspirate iron stain:   No spicules are present\par  to evaluate for iron stores; there are sufficient nRBC to evaluate for\par  ring sideroblasts and ring sideroblasts are increased.\par Special stains (reticulin and trichrom) performed on block 1A show\par  significant diffuse fibrosis (MF-3).\par Flow cytometry  of bone marrow aspirate shows 0.8% myeloblasts (positive\par  for HLA-DR, CD34, , CD33, CD13, CD7; negative CD11b, CD15).\par \par Microscopic description:\par 1. Biopsy:  Sections of clot and biopsy show hypocellularity (10% to\par  15%) with diffuse fibrosis, significantly dilated sinuses, erythroid\par  predominance, myeloid and erythroid maturation, megakaryocytes adequate\par  (focal clustering, some hypolobated forms), osteosclerosis, mild\par  eosinophilia, mild increase in interstitial mast cells (many spindle\par  shaped), and decreased iron stores.\par 2. Aspirate:  Aparticulate and hemodilute aspirate smear with normal M:E\par  ratio (3.6:1), myeloid and erythroid maturation, dyserythropoiesis, few\par  lymphocytes, and mild eosinophilia.  Megakaryocytes are nearly absent.\par \par Bone Marrow Aspirate Differential: (200 Cells).\par Type  % Normal*\par Blast  1% 0-3\par Neutrophil and\par    Precursors   63% 33-63\par Eosinophil  8% 1-5\par Basophil  0% 0-1\par Pronormoblast  1% 0-2\par Normoblast  19% 15-25\par Monocyte  0% 0-2\par Lymphocyte  8% 10-15\par Plasma cell  0% 0-1\par   *Adult Range\par Comment\par Iron stain (examined to evaluate for iron stores; see microscopic\par  description) and Giemsa stain (shows appropriate staining pattern) are\par  performed and evaluated on block(s): 1A, B.\par \par Verified by: CYNTHIA Barrientos\par (Electronic Signature)\par Reported on: 03/21/22 15:48 EDT, [facility], [address]\par  [facility]. [address] [place] [id number]\par Phone: [phone]   Fax: [phone]\par  ==========================================\par \par \par 3/1/2022\par Hospital labs reviewed. \par Anemia, thrombocytosis and leukocytosis noted. \par \par Bone marrow biopsy from 07/2020:\par \par Final Diagnosis\par 1, 2. Bone marrow biopsy (small sample) and bone marrow aspirate\par - Hypercellularity with myeloid predominant trilineage\par hematopoiesis with maturation and marked megakaryocytosis with\par atypical morphology and clustering\par - Increased reticulin fibers (focal MF-2)\par \par See note and description.\par \par Diagnostic note:\par Overall the findings are consistent with myeloproliferative\par neoplasm with focal myelofibrosis. Differential diagnostic\par considerations in this case include essential thrombocythemia\par with secondary marrow fibrosis or primary myelofibrosis.\par Correlation with clinical history, clinical findings (spleen\par status, LDH level), and JAK2 with reflex testing (MPL W515 and\par CALR), FISH/cytogenetic studies is necessary for a definitive\par classification. Comprehensive report with results of pending\par ancillary studies to follow.\par \par \par Hematopathology Addendum\par Comprehensive Hematopathology Report\par \par Final Diagnosis:\par 1, 2. 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 [employer] NGS\par Myeloid Disorder Sequencing Report ([place])\par indicated mutations on JAK2 p.[id number], ASXL1 p.Nai884*, DNMT3A\par p.[id number] and SF3B1 p.[id number]. Based on the additional\par findings, the diagnosis is as stated above.\par \par \par IMAGING \par --------------------\par CT Abd (07/2020)\par Spleen 20 cm

## 2022-07-22 NOTE — PHYSICAL EXAM
[Capable of only limited self care, confined to bed or chair more than 50% of waking hours] : Status 3- Capable of only limited self care, confined to bed or chair more than 50% of waking hours [Thin] : thin [Normal] : affect appropriate [de-identified] : came in a wheel chair.

## 2022-07-22 NOTE — HISTORY OF PRESENT ILLNESS
[de-identified] : CHART REVIEW: (3/1/2022) 56 year-old Mr. TILLEY is seen in consult for JAK2 positive MPN (not otherwise specified). Patient was recently admitted to [facility] with fatigue, vomiting, dizziness. He was found to have  high WBC count predominantly matured neutrophils and some blasts (<10%). It was also noted that he was admitted with rectal bleed  in 07/2020 and had a BMBx at that time which was suggestive of MPN disease (no subtype specified). He was discharged with outpatient hematology appointment but never followed up with outpatient hematology clinic. In the recent admission his symptoms were attributed to dehydration and high WBC count and was started on Hydrea.  He did not see a neurologist nor had a brain MRI or LP to investigate his dizziness. Patient is mostly wheelchair bound, continues to feel dizzy.  [de-identified] : 4/4/2022\par Patient returns for a follow up. He has had a BMB in the interim. His biopsy result shows progression/transformation of his 07/2020 diagnosis (MF, marrow fibrosis grade 2) to overt PMF (marrow fibrosis grade 3), as well as MDS-RS-T. He has JAK2 mutation and also has SF3B1. The patient is on Hydrea. He tells he has improved to some extent. His appetite is little better and N/V is improved. \par \par 7/22/2022\par Patient presents for a follow up. He endorses no significant change in his health status. He has seen and has been evaluated by BMT, thanks to Dr. Domínguez.  Unrelated match donor plan is in progress. His counts noted to have dropped significantly. He has been taking Hydrea and Jakafi both.

## 2022-07-27 NOTE — DATA REVIEWED
[FreeTextEntry1] : CTA 7/16/22: sig SFA stenosis, L worse than right.  patent, mild bilat iliac disease

## 2022-07-27 NOTE — PHYSICAL EXAM
[JVD] : no jugular venous distention  [Normal Breath Sounds] : Normal breath sounds [Normal Heart Sounds] : normal heart sounds [1+] : left 1+ [de-identified] : awake, alert [FreeTextEntry1] : diminished femoral pulses, non palp pedal pulses bilat\par no wounds/ulcers

## 2022-07-27 NOTE — ASSESSMENT
[FreeTextEntry1] : I am concerned about performing revascularization given his hx of imbalance and potential fall risk, as revasc would require either DAPT or asa/xarelto 2.5 bid.  \par He has no rest pain or ulceration at this point.\par Will discuss starting pletal w heme, to ensure no drug interaction\par fu 3 mo

## 2022-07-27 NOTE — HISTORY OF PRESENT ILLNESS
[FreeTextEntry1] : 56M, claudication\par \par Pt reports sig weakness bilat legs, left worse than right.  No pain associated.  He states it occurs after very short distance walking, worse w incline/stairs.  No wounds or ulcers.  \par \par long smoking hx.\par Myeloproliferative disorder followed by heme\par DM on insulin\par HTN\par \par independent, performs adls [de-identified] : 7/27/22: telephone visit.  continues to have balance issues, sustained a fall 3 days ago, hit his head on the pavement.  Did not require hospitalization.  He continues to cut down on smoking.  CTA performed

## 2022-08-18 PROBLEM — Z87.39 HISTORY OF ARTHRITIS: Status: RESOLVED | Noted: 2017-09-20 | Resolved: 2022-01-01

## 2022-08-18 NOTE — HISTORY OF PRESENT ILLNESS
[de-identified] :  56 year-old Mr. TILLEY is seen in consult for JAK2 positive MPN (not otherwise specified). Patient was recently admitted to [facility] with fatigue, vomiting, dizziness. He was found to have  high WBC count predominantly matured neutrophils and some blasts (<10%). It was also noted that he was admitted with rectal bleed  in 07/2020 and had a BMBx at that time which was suggestive of MPN disease (no subtype specified). He was discharged with outpatient hematology appointment but never followed up with outpatient hematology clinic. In the recent admission his symptoms were attributed to dehydration and high WBC count and was started on Hydrea.  He did not see a neurologist nor had a brain MRI or LP to investigate his dizziness. Patient has been mostly wheelchair bound...today is walking with a cane [de-identified] : \par Patient presents for f/u to  initial eval for allogeneic stem cell transplant... He has had a recent BMB. His biopsy result shows progression/transformation of his 07/2020 diagnosis (MF, marrow fibrosis grade 2) to overt PMF (marrow fibrosis grade 3), as well as MDS-RS-T. He has JAK2 mutation and also has SF3B1. The patient is on Hydrea 500 mg bid and jakafi 15 mg bid... He offers that he has improved to some extent. His appetite is a little better and N/V is improved. He is not  in a wheelchair..

## 2022-08-18 NOTE — ASSESSMENT
[FreeTextEntry1] : 56 year-old Mr. TILLEY is seen for initial consult for stem cell transplant for JAK2 positive MPN (no subtype mentioned) with focal myelofibrosis as per BMB in 07/2020. Patient was not on any treatment or hematology follow up since diagnosis. He recently was admitted with fatigue, dizziness and vomiting and was found to have high WBC (~75K) count contributed by neutrophils, immature granulocytes and a few blasts (<10%). He also has thrombocytosis (~600K). He has splenomegaly. 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 A repeat marrow showed progression/transformation of his 07/2020 diagnosis (MF, marrow fibrosis grade 2) to overt PMF (marrow fibrosis grade 3), as well as MDS-RS-T. He has JAK2 mutation and also has SF3B1 mutation. \par \par # JAK2 positive MPN (no subtype established) \par - ET vs prefibortic PMF vs PMF (per 07/2020 BMB) \par - Additional mutations (SF3B1, ASXL1, DNMT3A) \par - Repeat BMB: progression/transformation of his 07/2020 diagnosis (MF, marrow fibrosis grade 2) to overt PMF (marrow fibrosis grade 3), as well as MDS-RS-T\par - CBC reviewed from today..plts down to 137.....wbc 21.....recheck in 3 to 4 weeks\par - Decreased Hydrea 500 mg  BID b/c of falling plts... and cont  JAKAFI \par - could consider HMA \par -donor options limited to mismatched unrelated...pt is estranged from his family...no children\par - I have had another extensive discussion regarding the risks benefits alternatives rationale and logistics of allogenic stem cell transplant. The four week hospital stay and 6 to 12 month recovery was discussed. The preparative regimen and GV HD prophylactic regimen was discussed .... at this time patient’s performance status is somewhat improved... not the best  for  allogeneic transplant. I am also concerned with his lack of support system ... Literature was provided questions were addressed. I am somewhat hopeful that with continued treatment his performance status may improve. We discussed potential solutions to his lack of caregiver situation .. Vital organ function testing social work evaluation and orientation discussed ....echo / muga with borderline EF...asked pt to f/u with pmd and cardiology..both through [facility].. The patient is reluctant at this time but wants to be considered for the procedure..he may not be the best candidate for transplant intervention.. He will follow with Dr. Hillman.. His primary oncologist ,  in oct.... I have asked them to follow up with myself  in 3 months ......a prelim donor search was done...no 12/12 donors...mismatched unrelated donor transplant can be done...At this time risk may outweigh benefit...

## 2022-08-18 NOTE — RESULTS/DATA
[FreeTextEntry1] : 4/4/2022\par Bone marrow biopsy (3/15/2022)\par  Patient:   DINAH TILLEY\par \par \par Accession:                             [id number]\par \par Collected Date/Time:                   3/15/2022 09:00 EDT\par Received Date/Time:                    3/15/2022 15:24 EDT\par \par Hematopathology Report - Auth (Verified)\par \par Specimen(s) Submitted\par 1  Bone marrow biopsy MH OP\par 2  Bone marrow aspirate for Flow OP\par \par Final Diagnosis\par 1, 2. Bone marrow biopsy and bone marrow aspirate\par      - Hypocellular marrow (10 to 15%) with diffuse fibrosis (MF-3),\par  dilated sinuses, osteosclerosis, focal erythroid predominant trilineage\par \par  hematopoiesis, dyserythropoiesis with ring sideroblasts, and decreased\par  iron stores (known history of primary fibrosis).\par \par See note and description.\par \par Diagnostic note:\par Suggest correlation with clinical and cytogenetic findings.\par \par Comprehensive report with results of pending ancillary studies to follow.\par \par Ancillary studies\par Special stains:  Bone marrow aspirate iron stain:   No spicules are present\par  to evaluate for iron stores; there are sufficient nRBC to evaluate for\par  ring sideroblasts and ring sideroblasts are increased.\par Special stains (reticulin and trichrom) performed on block 1A show\par  significant diffuse fibrosis (MF-3).\par Flow cytometry  of bone marrow aspirate shows 0.8% myeloblasts (positive\par  for HLA-DR, CD34, , CD33, CD13, CD7; negative CD11b, CD15).\par \par Microscopic description:\par 1. Biopsy:  Sections of clot and biopsy show hypocellularity (10% to\par  15%) with diffuse fibrosis, significantly dilated sinuses, erythroid\par  predominance, myeloid and erythroid maturation, megakaryocytes adequate\par  (focal clustering, some hypolobated forms), osteosclerosis, mild\par  eosinophilia, mild increase in interstitial mast cells (many spindle\par  shaped), and decreased iron stores.\par 2. Aspirate:  Aparticulate and hemodilute aspirate smear with normal M:E\par  ratio (3.6:1), myeloid and erythroid maturation, dyserythropoiesis, few\par  lymphocytes, and mild eosinophilia.  Megakaryocytes are nearly absent.\par \par Bone Marrow Aspirate Differential: (200 Cells).\par Type  % Normal*\par Blast  1% 0-3\par Neutrophil and\par    Precursors   63% 33-63\par Eosinophil  8% 1-5\par Basophil  0% 0-1\par Pronormoblast  1% 0-2\par Normoblast  19% 15-25\par Monocyte  0% 0-2\par Lymphocyte  8% 10-15\par Plasma cell  0% 0-1\par   *Adult Range\par Comment\par Iron stain (examined to evaluate for iron stores; see microscopic\par  description) and Giemsa stain (shows appropriate staining pattern) are\par  performed and evaluated on block(s): 1A, B.\par \par Verified by: CYNTHIA Barrientos\par (Electronic Signature)\par Reported on: 03/21/22 15:48 EDT, [facility], [address]\par  [facility]. [address], [place] [address]\par Phone: [phone]   Fax: [phone]\par  ==========================================\par \par \par 3/1/2022\par Hospital labs reviewed. \par Anemia, thrombocytosis and leukocytosis noted. \par \par Bone marrow biopsy from 07/2020:\par \par Final Diagnosis\par 1, 2. Bone marrow biopsy (small sample) and bone marrow aspirate\par - Hypercellularity with myeloid predominant trilineage\par hematopoiesis with maturation and marked megakaryocytosis with\par atypical morphology and clustering\par - Increased reticulin fibers (focal MF-2)\par \par See note and description.\par \par Diagnostic note:\par Overall the findings are consistent with myeloproliferative\par neoplasm with focal myelofibrosis. Differential diagnostic\par considerations in this case include essential thrombocythemia\par with secondary marrow fibrosis or primary myelofibrosis.\par Correlation with clinical history, clinical findings (spleen\par status, LDH level), and JAK2 with reflex testing (MPL W515 and\par CALR), FISH/cytogenetic studies is necessary for a definitive\par classification. Comprehensive report with results of pending\par ancillary studies to follow.\par \par \par Hematopathology Addendum\par Comprehensive Hematopathology Report\par \par Final Diagnosis:\par 1, 2. 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 [employer] NGS\par Myeloid Disorder Sequencing Report ([employer])\par indicated mutations on JAK2 p.[id number], ASXL1 p.Nma023*, DNMT3A\par p.[id number] and SF3B1 p.[id number]. Based on the additional\par findings, the diagnosis is as stated above.\par \par \par IMAGING \par --------------------\par CT Abd (07/2020)\par Spleen 20 cm

## 2022-08-18 NOTE — PHYSICAL EXAM
[Ambulatory and capable of all self care but unable to carry out any work activities] : Status 2- Ambulatory and capable of all self care but unable to carry out any work activities. Up and about more than 50% of waking hours [Normal] : affect appropriate [de-identified] : splenomegaly

## 2022-08-31 NOTE — HISTORY OF PRESENT ILLNESS
[de-identified] : CHART REVIEW: (3/1/2022) 56 year-old Mr. TILLEY is seen in consult for JAK2 positive MPN (not otherwise specified). Patient was recently admitted to [facility] with fatigue, vomiting, dizziness. He was found to have  high WBC count predominantly matured neutrophils and some blasts (<10%). It was also noted that he was admitted with rectal bleed  in 07/2020 and had a BMBx at that time which was suggestive of MPN disease (no subtype specified). He was discharged with outpatient hematology appointment but never followed up with outpatient hematology clinic. In the recent admission his symptoms were attributed to dehydration and high WBC count and was started on Hydrea.  He did not see a neurologist nor had a brain MRI or LP to investigate his dizziness. Patient is mostly wheelchair bound, continues to feel dizzy.  [de-identified] : 4/4/2022\par Patient returns for a follow up. He has had a BMB in the interim. His biopsy result shows progression/transformation of his 07/2020 diagnosis (MF, marrow fibrosis grade 2) to overt PMF (marrow fibrosis grade 3), as well as MDS-RS-T. He has JAK2 mutation and also has SF3B1. The patient is on Hydrea. He tells he has improved to some extent. His appetite is little better and N/V is improved. \par \par 7/22/2022\par Patient presents for a follow up. He endorses no significant change in his health status. He has seen and has been evaluated by BMT, thanks to Dr. Domínguez.  Unrelated match donor plan is in progress. His counts noted to have dropped significantly. He has been taking Hydrea and Jakafi both. \par \par 8/31/2022\par Patient returns for a follow up. He has been seeing BMT provider who told him to continue current management. The patient has improved a lot. He states that he feels better when he takes Hydrea in addition to Jakafi. He has gained some weight. He has no complaints other than weakness in the legs.

## 2022-08-31 NOTE — ASSESSMENT
[FreeTextEntry1] : 56 year-old Mr. TILLEY is seen in consult for JAK2 positive MPN (no subtype mentioned) with focal myelofibrosis as per BMB in 07/2020. Patient was not on any treatment or hematology follow up since diagnosis. He recently was admitted with fatigue, dizziness and vomiting and was found to have high WBC (~75K) count contributed by neutrophils, immature granulocytes and a few blasts (<10%). He also has thrombocytosis (~600K). He has splenomegaly. 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 A repeat marrow showed progression/transformation of his 07/2020 diagnosis (MF, marrow fibrosis grade 2) to overt PMF (marrow fibrosis grade 3), as well as MDS-RS-T. He has JAK2 mutation and also has SF3B1 mutation. He was started on Hydrea and later JAKAFI was approved. His counts dropped significantly.  Jakafi dose decreased to 15 mg, counts improved but WBC started to increased. Patient felt better while he was  on Hydrea. Will continue Hydrea 500 mg daily. \par \par \par \par [] JAK2 positive MPN (no subtype established) \par - ET vs prefibortic PMF vs PMF (per 07/2020 BMB) \par - Additional mutations (SF3B1, ASXL1, DNMT3A) \par - Repeat BMB: progression/transformation of his 07/2020 diagnosis (MF, marrow fibrosis grade 2) to overt PMF (marrow fibrosis grade 3), as well as MDS-RS-T\par - CBC (q2w), CMP, LDH, Uric acid today\par - DIscontinue Hydrea \par - Restart Hydrea (8/31/2022) 500 mg daily (patient feels better with Hydrea, Rx sent today 8/31/2022)\par - Continue JAKAFI  (15 mg BID, Rx sent today 8/31/2022)) \par - Counts improved  \par - F/u with BMT \par - RTC in 2 months \par \par [] MDS-RS-T \par - Mutations: SF3B1, ASXL1, DNMT3A\par - Will consider HMA later \par \par \par

## 2022-08-31 NOTE — PHYSICAL EXAM
[Capable of only limited self care, confined to bed or chair more than 50% of waking hours] : Status 3- Capable of only limited self care, confined to bed or chair more than 50% of waking hours [Thin] : thin [Normal] : affect appropriate [de-identified] : came in a wheel chair.

## 2022-08-31 NOTE — RESULTS/DATA
[FreeTextEntry1] : 8/31/2022\par Hgb improved to Normal \par Platelet improved to 137K \par WBC increased to 21K (did normalize before) \par \par 7/22/2022\par Labs reviewed. Significant drop in all counts noted. WBC is now WNL. \par \par \par 4/4/2022\par Bone marrow biopsy (3/15/2022)\par  Patient:   DINAH TILLEY\par \par \par Accession:                             [id number]\par \par Collected Date/Time:                   3/15/2022 09:00 EDT\par Received Date/Time:                    3/15/2022 15:24 EDT\par \par Hematopathology Report - Auth (Verified)\par \par Specimen(s) Submitted\par 1  Bone marrow biopsy MH OP\par 2  Bone marrow aspirate for Flow OP\par \par Final Diagnosis\par 1, 2. Bone marrow biopsy and bone marrow aspirate\par      - Hypocellular marrow (10 to 15%) with diffuse fibrosis (MF-3),\par  dilated sinuses, osteosclerosis, focal erythroid predominant trilineage\par \par  hematopoiesis, dyserythropoiesis with ring sideroblasts, and decreased\par  iron stores (known history of primary fibrosis).\par \par See note and description.\par \par Diagnostic note:\par Suggest correlation with clinical and cytogenetic findings.\par \par Comprehensive report with results of pending ancillary studies to follow.\par \par Ancillary studies\par Special stains:  Bone marrow aspirate iron stain:   No spicules are present\par  to evaluate for iron stores; there are sufficient nRBC to evaluate for\par  ring sideroblasts and ring sideroblasts are increased.\par Special stains (reticulin and trichrom) performed on block 1A show\par  significant diffuse fibrosis (MF-3).\par Flow cytometry  of bone marrow aspirate shows 0.8% myeloblasts (positive\par  for HLA-DR, CD34, , CD33, CD13, CD7; negative CD11b, CD15).\par \par Microscopic description:\par 1. Biopsy:  Sections of clot and biopsy show hypocellularity (10% to\par  15%) with diffuse fibrosis, significantly dilated sinuses, erythroid\par  predominance, myeloid and erythroid maturation, megakaryocytes adequate\par  (focal clustering, some hypolobated forms), osteosclerosis, mild\par  eosinophilia, mild increase in interstitial mast cells (many spindle\par  shaped), and decreased iron stores.\par 2. Aspirate:  Aparticulate and hemodilute aspirate smear with normal M:E\par  ratio (3.6:1), myeloid and erythroid maturation, dyserythropoiesis, few\par  lymphocytes, and mild eosinophilia.  Megakaryocytes are nearly absent.\par \par Bone Marrow Aspirate Differential: (200 Cells).\par Type  % Normal*\par Blast  1% 0-3\par Neutrophil and\par    Precursors   63% 33-63\par Eosinophil  8% 1-5\par Basophil  0% 0-1\par Pronormoblast  1% 0-2\par Normoblast  19% 15-25\par Monocyte  0% 0-2\par Lymphocyte  8% 10-15\par Plasma cell  0% 0-1\par   *Adult Range\par Comment\par Iron stain (examined to evaluate for iron stores; see microscopic\par  description) and Giemsa stain (shows appropriate staining pattern) are\par  performed and evaluated on block(s): 1A, B.\par \par Verified by: CYNTHIA Barrientos\par (Electronic Signature)\par Reported on: 03/21/22 15:48 EDT, [facility], [address]  [facility] [address] [address]\par Phone: [phone]   Fax: [phone]\par  ==========================================\par \par \par 3/1/2022\par Hospital labs reviewed. \par Anemia, thrombocytosis and leukocytosis noted. \par \par Bone marrow biopsy from 07/2020:\par \par Final Diagnosis\par 1, 2. Bone marrow biopsy (small sample) and bone marrow aspirate\par - Hypercellularity with myeloid predominant trilineage\par hematopoiesis with maturation and marked megakaryocytosis with\par atypical morphology and clustering\par - Increased reticulin fibers (focal MF-2)\par \par See note and description.\par \par Diagnostic note:\par Overall the findings are consistent with myeloproliferative\par neoplasm with focal myelofibrosis. Differential diagnostic\par considerations in this case include essential thrombocythemia\par with secondary marrow fibrosis or primary myelofibrosis.\par Correlation with clinical history, clinical findings (spleen\par status, LDH level), and JAK2 with reflex testing (MPL [id number] and\par CALR), FISH/cytogenetic studies is necessary for a definitive\par classification. Comprehensive report with results of pending\par ancillary studies to follow.\par \par \par Hematopathology Addendum\par Comprehensive Hematopathology Report\par \par Final Diagnosis:\par 1, 2. 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 [employer] NGS\par Myeloid Disorder Sequencing Report ([facility])\par indicated mutations on JAK2 p.[id number], ASXL1 p.[id number]*, DNMT3A\par p.[id number] and SF3B1 p.[id number]. Based on the additional\par findings, the diagnosis is as stated above.\par \par \par IMAGING \par --------------------\par CT Abd (07/2020)\par Spleen 20 cm

## 2022-10-12 NOTE — ED PROVIDER NOTE - MDM ORDERS SUBMITTED SELECTION
Labs/Medications Dupixent Counseling: I discussed with the patient the risks of dupilumab including but not limited to eye infection and irritation, cold sores, injection site reactions, worsening of asthma, allergic reactions and increased risk of parasitic infection.  Live vaccines should be avoided while taking dupilumab. Dupilumab will also interact with certain medications such as warfarin and cyclosporine. The patient understands that monitoring is required and they must alert us or the primary physician if symptoms of infection or other concerning signs are noted.

## 2022-10-24 NOTE — ED ADULT TRIAGE NOTE - CHIEF COMPLAINT QUOTE
Pt c/o L. sided flank/abd cramping since 2 days ago. Also reports n/v earlier today. Denies fever, chills, diarrhea. PMHx leukemia on oral chemo, myeloproliferative disease, diabetes

## 2022-10-24 NOTE — ED ADULT NURSE NOTE - OBJECTIVE STATEMENT
Pt A&O x 4, ambulatory w/o assistance, shows no signs of acute discomfort. Pt brought in with lower abdominal pain that radiates to left flank associated w/ n/v. Pt states the pain has been constant x 2 days. Pt denies dysuria or burning w/ urination, but endorses priti colored urine. He also states he has been having difficulty having bowel movements. Pts abdomen noted to be distended. Pt denies chest discomfort or SOB. SpO2 98% on room air. Right AC 20 gauge placed. Labs drawn and medication administered. Pending guaiac and CT Abdomen.

## 2022-10-24 NOTE — ED ADULT TRIAGE NOTE - HEIGHT IN CM
185.42 O-L Flap Text: The defect edges were debeveled with a #15 scalpel blade.  Given the location of the defect, shape of the defect and the proximity to free margins an O-L flap was deemed most appropriate.  Using a sterile surgical marker, an appropriate advancement flap was drawn incorporating the defect and placing the expected incisions within the relaxed skin tension lines where possible.    The area thus outlined was incised deep to adipose tissue with a #15 scalpel blade.  The skin margins were undermined to an appropriate distance in all directions utilizing iris scissors.

## 2022-10-25 NOTE — H&P ADULT - ATTENDING COMMENTS
56 year old male with PMH of JAK2 positive MPN wtih focal myelofibrosis on oral chemo and DM presents with two days of L sided abdominal pain with associated N/V found to be in blast crisis.  Afebrile  AAOX3, +hepatosplenomegaly, +s1s2, diminished BS b/l, + JAMES  , 30% blasts  #MPN with elevated blasts: concerning for blast crisis: pain control, pt to be transferred to [facility], H/O service  #DM: hold oral agents, ISS, check fingersticks  rest as above 56 year old male with PMH of JAK2 positive MPN wtih focal myelofibrosis on oral chemo and DM presents with two days of L sided abdominal pain with associated N/V found to be in blast crisis.  Afebrile  AAOX3, +hepatosplenomegaly, +s1s2, diminished BS b/l, + JAMES  , 30% blasts  #MPN with elevated blasts: concerning for blast crisis: pain control, pt to be transferred to [facility], H/O service, check tumor lysis labs, treatment as per h/o  #DM: hold oral agents, ISS, check fingersticks  rest as above

## 2022-10-25 NOTE — H&P ADULT - ASSESSMENT
Patient is a 55yo gentleman with with PMH of primary myelofibrosis who presents with leukocytosis and 19% peripheral blasts concerning for transformation to acute leukemia.     7/2020- BMBx found hypercellularity with myeloid predominant trilineage hematopoiesis with maturation and marked megakaryocytosis with atypical morphology nd clustering, and increased reticulin fibers (focal MF-2).   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   JAK2 positive, SF3B1, DNMT3A and ASXL1 mutations.     CT imaging identified 20cm splenomegaly.  Patient did not follow up and was not on therapy.     3/15/2022- Hospitalized and found to have WBC ~75k, with blasts <10%.   BMBx found hypocellular marrow (10-15%) with diffuse fibrosis (MF3), dilated sinuses, osteosclerosis, focal erythroid predominant trilineage hematopoiesis, dyseryhtropoiesis with ring sideroblasts, and decreased iron stores  Patient started on hydrea and then Jakafi, with improvement in counts.   he is currently on hydrea 500mg PO qdaily and jakafi 15mg PO BID.       PMH: DM, htn, osteoarthritis  PSH:   All: NKDA     Patient is a 57yo gentleman with with PMH of primary myelofibrosis who presents with leukocytosis and 19% peripheral blasts concerning for transformation to acute leukemia.

## 2022-10-25 NOTE — H&P ADULT - HISTORY OF PRESENT ILLNESS
Patient is a 57yo gentleman with with PMH of primary myelofibrosis who presents with     7/2020- BMBx found hypercellularity with myeloid predominant trilineage hematopoiesis with maturation and marked megakaryocytosis with atypical morphology nd clustering, and increased reticulin fibers (focal MF-2).   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   JAK2 positive, SF3B1, DNMT3A and ASXL1 mutations.     CT imaging identified 20cm splenomegaly.  Patient did not follow up and was not on therapy.     3/15/2022- Hospitalized and found to have WBC ~75k, with blasts <10%.   BMBx found hypocellular marrow (10-15%) with diffuse fibrosis (MF3), dilated sinuses, osteosclerosis, focal erythroid predominant trilineage hematopoiesis, dyseryhtropoiesis with ring sideroblasts, and decreased iron stores  Patient started on hydrea and then Jakafi, with improvement in counts.   he is currently on hydrea 500mg PO qdaily and jakafi 15mg PO BID.       PMH: DM, htn, osteoarthritis  PSH:   All: NKDA   Patient is a 57yo gentleman with with PMH of primary myelofibrosis who presents as transfer from [facility] due to concern for acute leukemia.     Patient presented with 3 days of persistent, worsening stabbing LUQ pain radiating to the back, with nausea and dry heaving, and poor PO intake. He denies diarrhea. He notes black, tarry stool.   He denies fevers, chills. He has drenching night sweats.  Weight is stable, but appetite is poor. He has had diffuse pruritis for about 1 year.  He denies SOB, palpitations, CP.  He endorses cough. He has lower extremity edema.     7/2020- BMBx found hypercellularity with myeloid predominant trilineage hematopoiesis with maturation and marked megakaryocytosis with atypical morphology and clustering, and increased reticulin fibers (focal MF-2).   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   JAK2 positive, SF3B1, DNMT3A and ASXL1 mutations.     CT imaging identified 20cm splenomegaly.  Patient did not follow up and was not on therapy.     3/15/2022- Hospitalized and found to have WBC ~75k, with blasts <10%.   BMBx found hypocellular marrow (10-15%) with diffuse fibrosis (MF3), dilated sinuses, osteosclerosis, focal erythroid predominant trilineage hematopoiesis, dyseryhtropoiesis with ring sideroblasts, and decreased iron stores  Patient started on hydrea and then Jakafi, with improvement in counts.     Patient had been on hydroxyurea 500mg TID and jakafi 15mg PO BID. He ran out of hydroxyurea 3 days ago.  He stopped taking baby aspirin months ago due to excessive bleeding after dental work.  Patient is a 55yo gentleman with with PMH of primary myelofibrosis, T2DM, hld who presents as transfer from [facility] due to concern for acute leukemia.     Patient presented with 3 days of persistent, worsening stabbing LUQ pain radiating to the back, with nausea and dry heaving, and poor PO intake. He denies diarrhea. He notes black, tarry stool.   He denies fevers, chills. He has drenching night sweats.  Weight is stable, but appetite is poor. He has had diffuse pruritis for about 1 year.  He denies SOB, palpitations, CP.  He endorses cough. He has lower extremity edema.     7/2020- BMBx found hypercellularity with myeloid predominant trilineage hematopoiesis with maturation and marked megakaryocytosis with atypical morphology and clustering, and increased reticulin fibers (focal MF-2).   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   JAK2 positive, SF3B1, DNMT3A and ASXL1 mutations.     CT imaging identified 20cm splenomegaly.  Patient did not follow up and was not on therapy.     3/15/2022- Hospitalized and found to have WBC ~75k, with blasts <10%.   BMBx found hypocellular marrow (10-15%) with diffuse fibrosis (MF3), dilated sinuses, osteosclerosis, focal erythroid predominant trilineage hematopoiesis, dyserythropoiesis with ring sideroblasts, and decreased iron stores  Patient started on hydrea and then Jakafi, with improvement in counts.     Patient had been on hydroxyurea 500mg TID and jakafi 15mg PO BID. He ran out of hydroxyurea 3 days ago.  He stopped taking baby aspirin months ago due to excessive bleeding after dental work.

## 2022-10-25 NOTE — H&P ADULT - PROBLEM SELECTOR PLAN 2
- Takes metformin 1000 mg BID and humalog 5 units SQ TID AC at home  - Start ISS with low dose admelog while inpatient  - FS TID before meals and HS

## 2022-10-25 NOTE — H&P ADULT - NSHPLABSRESULTS_GEN_ALL_CORE
LABS:                        13.7   173.44 )-----------( 182      ( 24 Oct 2022 22:24 )             47.0     10-24    134<L>  |  94<L>  |  13  ----------------------------<  172<H>  4.8   |  28  |  0.41<L>    Ca    10.1      24 Oct 2022 22:24  Phos  3.6     10-25    TPro  7.0  /  Alb  4.0  /  TBili  2.3<H>  /  DBili  x   /  AST  64<H>  /  ALT  15  /  AlkPhos  278<H>  10-24    PT/INR - ( 25 Oct 2022 06:00 )   PT: 14.5 sec;   INR: 1.25 ratio         PTT - ( 25 Oct 2022 06:00 )  PTT:38.5 sec LABS:                        13.5   178.11 )-----------( 258      ( 25 Oct 2022 09:40 )             46.2     10-25    128<L>  |  89<L>  |  13  ----------------------------<  259<H>  5.2   |  24  |  0.49<L>    Ca    9.5      25 Oct 2022 09:40  Phos  3.1     10-25  Mg     2.40     10-25    TPro  6.2  /  Alb  3.9  /  TBili  2.5<H>  /  DBili  x   /  AST  72<H>  /  ALT  21  /  AlkPhos  251<H>  10-25    PT/INR - ( 25 Oct 2022 06:00 )   PT: 14.5 sec;   INR: 1.25 ratio      PTT - ( 25 Oct 2022 06:00 )  PTT:38.5 sec    Urinalysis Basic - ( 25 Oct 2022 04:29 )    Color: Varsha / Appearance: Clear / S.026 / pH: x  Gluc: x / Ketone: Negative  / Bili: Small / Urobili: 3 mg/dL   Blood: x / Protein: Trace / Nitrite: Negative   Leuk Esterase: Negative / RBC: 2 /HPF / WBC 1 /HPF   Sq Epi: x / Non Sq Epi: 0 /HPF / Bacteria: Negative    CAPILLARY BLOOD GLUCOSE  POCT Blood Glucose.: 273 mg/dL (25 Oct 2022 10:12)  POCT Blood Glucose.: 319 mg/dL (25 Oct 2022 09:04)  POCT Blood Glucose.: 271 mg/dL (25 Oct 2022 06:19)  POCT Blood Glucose.: 317 mg/dL (24 Oct 2022 19:50)    CT A+P   IMPRESSION:  "Hepatosplenomegaly, with interval increase in size since prior   examination of 2022.    Urinary bladder wall thickening versus underdistention. Correlate with   urinalysis and patient symptomatology to exclude underlying infection."

## 2022-10-25 NOTE — H&P ADULT - NSHPPHYSICALEXAM_GEN_ALL_CORE
T(C): 37.9 (10-25-22 @ 11:56), Max: 37.9 (10-25-22 @ 11:56)  HR: 100 (10-25-22 @ 11:56) (100 - 100)  BP: 165/83 (10-25-22 @ 11:56) (165/83 - 165/83)  RR: 18 (10-25-22 @ 11:56) (18 - 18)  SpO2: 92% (10-25-22 @ 11:56) (92% - 92%)  Wt(kg): --    PHYSICAL EXAM:  GENERAL: NAD, well-groomed, appears uncomfortable  HEAD:  Atraumatic, temporal wasting  EYES: EOMI, PERRLA, conjunctiva and sclera clear  ENMT: No oropharyngeal exudates,  + missing teeth, Moist mucous membranes  NECK: Supple, no cervical lymphadenopathy, no JVD  NERVOUS SYSTEM:  Alert & Oriented X3, CN II-XII intact, 5/5 BUE and BLE motor strength, full sensation to light touch   CHEST/LUNG: Clear to auscultation bilaterally; no rhonchi  HEART: Regular rate and rhythm; No murmurs  ABDOMEN: Soft, +4cm hepatomegaly, 4cm splenomegaly  EXTREMITIES:  2+ radial Pulses, No cyanosis, +bilateral lower extremity edema  SKIN: warm, dry  LYMPH: no palpable cervical, supraclavicular, axillary lymphadenopathy

## 2022-10-25 NOTE — H&P ADULT - PROBLEM SELECTOR PLAN 3
Start lisinopril 5mg PO qdaily with hold precautions. Start nicotine patch. Start lantus 8units qhs, and premeal sliding scale insulin.  Check A1C.   Hold home metformin.

## 2022-10-25 NOTE — H&P ADULT - ATTENDING COMMENTS
Primary: Shakil    MEDICATIONS  (STANDING):  allopurinol 300 milliGRAM(s) Oral daily  Biotene Dry Mouth Oral Rinse 15 milliLiter(s) Swish and Spit three times a day  clotrimazole 1% Cream 1 Application(s) Topical two times a day  dextrose 5%. 1000 milliLiter(s) (50 mL/Hr) IV Continuous <Continuous>  dextrose 5%. 1000 milliLiter(s) (100 mL/Hr) IV Continuous <Continuous>  dextrose 50% Injectable 25 Gram(s) IV Push once  dextrose 50% Injectable 12.5 Gram(s) IV Push once  dextrose 50% Injectable 25 Gram(s) IV Push once  glucagon  Injectable 1 milliGRAM(s) IntraMuscular once  hydroxyurea 1000 milliGRAM(s) Oral two times a day  insulin glargine Injectable (LANTUS) 6 Unit(s) SubCutaneous at bedtime  insulin lispro (ADMELOG) corrective regimen sliding scale   SubCutaneous three times a day before meals  insulin lispro (ADMELOG) corrective regimen sliding scale   SubCutaneous at bedtime  nicotine - 21 mG/24Hr(s) Patch 1 Patch Transdermal daily  pantoprazole  Injectable 40 milliGRAM(s) IV Push every 12 hours  ruxolitinib 15 milliGRAM(s) Oral two times a day  sodium chloride 0.9%. 1000 milliLiter(s) (50 mL/Hr) IV Continuous <Continuous>        Assessment: 57yo prescribed Rux and HU for primary myelofibrosis now admitted for disease progression after stopping medications for three days.     PMHx: T2DM    7/2020- BMBx found hypercellularity with myeloid predominant trilineage hematopoiesis with maturation and marked megakaryocytosis with atypical morphology and clustering, and increased reticulin fibers (focal MF-2).   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   JAK2 positive, SF3B1, DNMT3A and ASXL1 mutations.       Plan:  Heme: Start HU 1gram bid, restart Jakfii  allopurinol    ID: primary prophylaxis is not required    Radiographs: dopplers (10/25/22): pending    Nutrition: insulin, gentle hydration    DVT prophylaxis: sq heparin.    Over 60 minutes were spent in non-resident teaching, patient care and patient care coordination.

## 2022-10-25 NOTE — CONSULT NOTE ADULT - ASSESSMENT
56M hx JAK2+ MPN with focal myelofibrosis on ruxolitinib and hydroxyurea, MDS-RS-T, T2DM, HTN, HLD. daily smoker who presents with two days of L sided abdominal pain with associated N/V. 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    #JAK2+ MPN / MDS-RS-T    56M hx JAK2+ MPN with focal myelofibrosis on ruxolitinib and hydroxyurea, MDS-RS-T, T2DM, HTN, HLD. daily smoker who presents with two days of L sided abdominal pain with associated N/V. 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    #JAK2+ MPN / MDS-RS-T   JAK2+ MPN (no subtype mentioned) with focal myelofibrosis as per BMB in 07/2020. Initially admitted with fatigue, dizziness and vomiting and was found to have high WBC (~75K) count contributed by neutrophils, immature granulocytes and a few blasts (<10%) and thrombocytosis (~600K). 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 A repeat marrow showed progression/transformation of his 07/2020 diagnosis (MF, marrow fibrosis grade 2) to overt PMF (marrow fibrosis grade 3), as well as MDS-RS-T. He has JAK2 mutation and also has SF3B1 mutation. He was started on Hydrea and later JAKAFI was approved.  - Known history of splenomegaly. Was 18.6cm on CTA abd 7/16/22, now 26.7cm and likely the cause of his nausea/vomiting  - No neurologic symptoms concerning for leukostasis despite WBC count > 100 K/uL  - Peripheral smear reviewed 10/25/22: Suboptimal slide but noted many blast-appearing cells and many retics and nucleated RBCs, no platelet clumping  - Please send peripheral blood flow cytometry STAT. Ordered STAT PML-ROHINI, BCR-ABL and FLT 3.   - Will need repeat bone marrow biopsy    - Please order:        - Serum iron w/ TIBC, ferritin, B12 and folate level        - Haptoglobin / Retic count        - D-dimer for DIC labs        - LDH / Uric acid level for TLS labs        - G6PD level  - If uric acid > 8, give 3mg IV Rasburicase x1; if >12 give 6mg IV x1  - Continue with home hydroxyurea 1000mg BID  - To expedite treatment, please order:        - MUGA or TTE        - HIV and Acute hepatitis panel (please also include HepB c Ab IgM and Hep B cAb total)  - Please check CBC with Diff, coags, fibrinogen and d-dimer DAILY  - Trend TLS labs daily (uric acid, LDH, Mg, Phos)  - Transfuse for Hgb < 7 and platelets < 10k, < 15k if febrile and < 50k if non-CNS bleeding      ***Note is incomplete. Will discuss plan with attending.     Martell Castillo MD  Hematology/Oncology Fellow PGY-4  Pager: [facility] [phone] / [phone] [phone]   After 5pm and on weekends please page on-call fellow

## 2022-10-25 NOTE — H&P ADULT - PROBLEM SELECTOR PLAN 5
[facility]  Cardiology  Progress Note    Patient Name: Emily Cook  MRN: [id number]  Admission Date: 9/22/2017  Hospital Length of Stay: 9 days  Code Status: DNR   Attending Physician: Augustine Rhodes MD   Primary Care Physician: Kaylee Cazares MD  Expected Discharge Date: 10/3/2017  Principal Problem:Acute decompensated heart failure    Subjective:     Hospital Course:   -Remained stable overnight   - Denied CP, SOB or palpitation.     Interval History: Patient reported feeling OK  Remained on nasal oxygen and all her PH medications.     Review of Systems   Constitution: Negative.   HENT: Negative.    Cardiovascular: Negative.    Respiratory: Negative.    Endocrine: Negative.      Objective:     Vital Signs (Most Recent):  Temp: 98.3 °F (36.8 °C) (10/01/17 0401)  Pulse: 92 (10/01/17 0527)  Resp: 18 (10/01/17 0527)  BP: (!) 92/54 (10/01/17 0401)  SpO2: (!) 91 % (10/01/17 0527) Vital Signs (24h Range):  Temp:  [98 °F (36.7 °C)-98.6 °F (37 °C)] 98.3 °F (36.8 °C)  Pulse:  [] 92  Resp:  [17-18] 18  SpO2:  [87 %-96 %] 91 %  BP: ()/(52-59) 92/54     Weight: 71.6 kg (157 lb 13.6 oz)  Body mass index is 27.96 kg/m².     SpO2: (!) 91 %  O2 Device (Oxygen Therapy): High Flow nasal Cannula      Intake/Output Summary (Last 24 hours) at 10/01/17 0656  Last data filed at 09/30/17 1700   Gross per 24 hour   Intake           320.64 ml   Output                0 ml   Net           320.64 ml       Lines/Drains/Airways     Central Venous Catheter Line                 Tunneled Central Line Insertion/Assessment - Single Lumen  left subclavian -- days         Tunneled Central Line Insertion/Assessment - Double Lumen  09/08/17 0905 left internal jugular 22 days          Airway                 Airway - Non-Surgical Mask -- days          Peripheral Intravenous Line                 Peripheral IV - Single Lumen 09/26/17 1150 Right Upper Arm 4 days                Physical Exam   Constitutional: She is  oriented to person, place, and time. She appears well-developed and well-nourished. No distress.   HENT:   Head: Normocephalic and atraumatic.   Mouth/Throat: Oropharynx is clear and moist.   Nasal Canula both nostrils   Eyes: Pupils are equal, round, and reactive to light. Right eye exhibits no discharge. Left eye exhibits no discharge. No scleral icterus.   Neck: Normal range of motion. Neck supple. No JVD present. No tracheal deviation present. No thyromegaly present.   Cardiovascular: Normal rate, regular rhythm and intact distal pulses.  Exam reveals no gallop and no friction rub.    Pulmonary/Chest: Effort normal and breath sounds normal. No respiratory distress. She has no wheezes. She exhibits no tenderness.   Abdominal: Soft. Bowel sounds are normal. She exhibits no distension and no mass. There is no tenderness. There is no rebound and no guarding.   Musculoskeletal: Normal range of motion. She exhibits no edema, tenderness or deformity.   Lymphadenopathy:     She has no cervical adenopathy.   Neurological: She is alert and oriented to person, place, and time. She has normal reflexes. She displays normal reflexes. No cranial nerve deficit. She exhibits normal muscle tone. Coordination normal.   Skin: Skin is warm and dry. She is not diaphoretic.   Psychiatric: She has a normal mood and affect. Her behavior is normal. Thought content normal.   Nursing note and vitals reviewed.      Significant Labs:   CMP   Recent Labs  Lab 09/30/17  0340   *   K 3.2*   CL 92*   CO2 31*      BUN 20   CREATININE 1.8*   CALCIUM 9.4   ANIONGAP 12   ESTGFRAFRICA 35.5*   EGFRNONAA 30.8*   , CBC   Recent Labs  Lab 09/30/17  0340   WBC 5.30   HGB 12.8   HCT 39.0       and INR   Recent Labs  Lab 09/30/17  0339   INR 2.8*       Significant Imaging: X-Ray: CXR: X-Ray Chest 1 View (CXR): No results found for this visit on 09/22/17.    Assessment and Plan:     Brief HPI: 55 y.o. female with hx of pulmonary HTN WHO  group 1 with 5L O2 at home on Macitentan, Veletri and Coumadin admitted on 09/22/2017 for acute decompensated heart failure in the setting of pre-existing RHF and Pulm HTN.     Acute on chronic respiratory failure with hypoxia    - Complicated by RV failure.   - Transitioned to oral diuretics from IV  - Continue Bumex oral - making good urine  - Asymptomatic - titration of the supplemental oxygen down.         Moderate to severe pulmonary hypertension    - WHO Group 1, last echo with severely reduced RV function with RVSP at 96  - Continue on supplemental oxygen via NC   - Cont IV Veletri (Epoprostinel) and Macitentan,   - Plan to D/C on Remoduline (Treprostinil)           Palliative care encounter    - Pt is DNR now  - Plan to D/C on home hospice once medically stable.             VTE Risk Mitigation         Ordered     warfarin tablet 5 mg  Daily     Route:  Oral        09/27/17 0852          Shantal Wilder MD  Cardiology  [facility]   VTE ppx: venodyne boots  Activity: OOB with assistance  Diet: NPO pending repeat CBC, Start nicotine patch.

## 2022-10-25 NOTE — ED PROVIDER NOTE - OBJECTIVE STATEMENT
57 yo M pmhx CML on oral chemo htn hld dm p/w 2 days of L sided abd pain. Sharp constant pain radiates to back. no trauma to area. pt also reporting n/v x2 non bloody. taking tylenol with no relief. no hx of similar. no f/c no dysuria. pt reporting dark urine. pt also having dark tarry stool over last year. last bm yesterday.

## 2022-10-25 NOTE — H&P ADULT - SOCIAL HISTORY: TOBACCO USE
daily smoker daily smoker, currently 1/2 to 3/4 a pack per day  45 year hx of smoking  previously smoked 2-3 packs per day

## 2022-10-25 NOTE — DISCHARGE NOTE PROVIDER - HOSPITAL COURSE
Discharge Summary       Admission diagnoses:   ***  Abdominal pain    Discharge diagnoses:   ***  HPI: 56 year old male with PMH of MPD on oral chemo, DM, HTN, HLD. daily smoker who presents with two days of L sided abdominal pain with associated N/V.  Reports waking up with LUQ pain radiating to his back about 3 days ago. The pain was sharp and constant. It progressively increased to 10/10 severity with associated nausea, nonbloody vomiting x 2, and decreased PO intake. He has been mainly drinking water with no solid intake x 3 days. He does not have teeth or dentures. He eats soft foods and glucerna shakes. He was taking OTC tylenol at home with no relief. He reports last having a black, tarry BM 3 days ago. He has been having black stools for 1 year with associated anemia in the past. He denies diarrhea or aspen red blood in stools. He was diagnosed with leukemia 1 year ago after a 60 lb wt loss, fatigue, and weakness. He follows with oncology here (Dr. Art) and takes PO Jakafi and hydroxyurea. He ran out of hydroxyurea about 3 days ago and has not been taking it daily. He notes he bruises more easily recently, even with itching his skin. He has also felt dizzy and weak for several months. He fell about 3 weeks ago at home with abrasions to L knee.    ED Course: CT A+P showed increased HSM, no acute pathology. UA neg, WBCs 170s with 19.5% blasts (previously 4%). Uric acid, phosphorus WNL    Hospital Course:   For full details, please see H&P, progress notes, consult notes and ancillary notes. Briefly, DINAH TILLEY is a 56yMale with a history of ***. The patient's hospital course will be summarized in a problem based format.    # ***    # ***    On day of discharge, patient is clinically stable with no new exam findings or acute symptoms compared to prior. The patient was seen by the attending physician on the date of discharge and deemed stable and acceptable for discharge. The patient's chronic medical conditions were treated accordingly per the patient's home medication regimen. The patient's medication reconciliation (with changes made to chronic medications), follow up appointments, discharge orders, instructions, and significant lab and diagnostic studies are as noted.     Discharge follow up action items:     1. Follow up with PCP in 1-2 weeks.   2. Follow up labs, path, & imaging ***  3. Medication changes ***  4. On hold medications ***    Patient's ordered code status: ***    Patient disposition: ***     Discharge Summary       Admission diagnoses:   ***  Abdominal pain    Discharge diagnoses:   ***  HPI: 56 year old male with PMH of MPD on oral chemo, DM, HTN, HLD. daily smoker who presents with two days of L sided abdominal pain with associated N/V.  Reports waking up with LUQ pain radiating to his back about 3 days ago. The pain was sharp and constant. It progressively increased to 10/10 severity with associated nausea, nonbloody vomiting x 2, and decreased PO intake. He has been mainly drinking water with no solid intake x 3 days. He does not have teeth or dentures. He eats soft foods and glucerna shakes. He was taking OTC tylenol at home with no relief. He reports last having a black, tarry BM 3 days ago. He has been having black stools for 1 year with associated anemia in the past. He denies diarrhea or aspen red blood in stools. He was diagnosed with leukemia 1 year ago after a 60 lb wt loss, fatigue, and weakness. He follows with oncology here (Dr. Art) and takes PO Jakafi and hydroxyurea. He ran out of hydroxyurea about 3 days ago and has not been taking it daily. He notes he bruises more easily recently, even with itching his skin. He has also felt dizzy and weak for several months. He fell about 3 weeks ago at home with abrasions to L knee.    ED Course: CT A+P showed increased HSM, no acute pathology. UA neg, WBCs 170s with 19.5% blasts (previously 4%). Uric acid, phosphorus WNL    Hospital Course:   Patient was seen by the hematology-oncology team in the morning, and there was concern for blast crisis. Patient will be transferred to [facility] for further care under Dr. Canchola. In the ED, the patient received 4mg IVP morphine 3x. Flow labs were sent per heme. At this time the patient is medically optimized for transfer to [facility].    Discharge Summary     Admission diagnoses:   Abdominal pain    Discharge diagnoses:   R/o blast crisis    HPI: 56 year old male with PMH of MPD on oral chemo, DM, HTN, HLD. daily smoker who presents with two days of L sided abdominal pain with associated N/V.  Reports waking up with LUQ pain radiating to his back about 3 days ago. The pain was sharp and constant. It progressively increased to 10/10 severity with associated nausea, nonbloody vomiting x 2, and decreased PO intake. He has been mainly drinking water with no solid intake x 3 days. He does not have teeth or dentures. He eats soft foods and glucerna shakes. He was taking OTC tylenol at home with no relief. He reports last having a black, tarry BM 3 days ago. He has been having black stools for 1 year with associated anemia in the past. He denies diarrhea or aspen red blood in stools. He was diagnosed with leukemia 1 year ago after a 60 lb wt loss, fatigue, and weakness. He follows with oncology here (Dr. Art) and takes PO Jakafi and hydroxyurea. He ran out of hydroxyurea about 3 days ago and has not been taking it daily. He notes he bruises more easily recently, even with itching his skin. He has also felt dizzy and weak for several months. He fell about 3 weeks ago at home with abrasions to L knee.    ED Course: CT A+P showed increased HSM, no acute pathology. UA neg, WBCs 170s with 19.5% blasts (previously 4%). Uric acid, phosphorus WNL    Hospital Course:   Patient was seen by the hematology-oncology team in the morning, and there was concern for blast crisis. Patient will be transferred to [facility] for further care under Dr. Canchola. In the ED, the patient received 4mg IVP morphine 3x. Flow labs were sent per heme. At this time the patient is medically optimized for transfer to [facility].

## 2022-10-25 NOTE — H&P ADULT - NSHPOUTPATIENTPROVIDERS_GEN_ALL_CORE
Dr. Hillman- hematologist at [facility] Dr. Hillman- hematologist at [facility]  Dr Yesenia Estrada- PMD

## 2022-10-25 NOTE — DISCHARGE NOTE PROVIDER - NSDCFUSCHEDAPPT_GEN_ALL_CORE_FT
Rohit Medina  [facility]  VASCULAR [address]  Scheduled Appointment: 11/04/2022    [facility]  VASCULAR [address]  Scheduled Appointment: 11/04/2022    Edward Domínguez  [facility]  [facility]  Scheduled Appointment: 11/23/2022    Martha Hillman  [facility]  Scheduled Appointment: 12/28/2022

## 2022-10-25 NOTE — H&P ADULT - NSICDXFAMILYHX_GEN_ALL_CORE_FT
FAMILY HISTORY:  Family history of heart disease, Early family history of heart disease in father (MI, CABG in 50s) and sister (MI in 40s)    Uncle  Still living? Unknown  Family history of lung cancer, Age at diagnosis: Age Unknown

## 2022-10-25 NOTE — H&P ADULT - NSHPLABSRESULTS_GEN_ALL_CORE
CBC: WBC 173k, Hgb 13.7, Hc 47, plt 182k  50% neutrophils,  19% blasts  30 nucleated RBCs    INR 1.25  PT 14.5, PTT 38.5, Fibrinogen 479    PROCEDURE:  CT of the Abdomen and Pelvis was performed.  Sagittal and coronal reformats were performed.    FINDINGS:  LOWER CHEST: Coronary artery and aortic root calcifications.    LIVER: Hepatomegaly.  BILE DUCTS: Normal caliber.  GALLBLADDER: Cholelithiasis.  SPLEEN: Splenomegaly. Heterogeneous. 26.7 cm.  PANCREAS: Within normal limits.  ADRENALS: Within normal limits.  KIDNEYS/URETERS: No renal stones or hydronephrosis. Subcentimeter right   hypodense focus is too small to characterize. Inferiorly displaced left   kidney secondary to mass effect from splenomegaly.    BLADDER: Multiple thickening versus underdistention..  REPRODUCTIVE ORGANS: Prostate within normal limits in size and contains   coarse calcification.    BOWEL: No bowel obstruction. Appendix is normal. Small hiatal hernia.  PERITONEUM: No ascites. Similar nonspecific stranding within the left   paracolic gutter.  VESSELS: Atherosclerotic changes.  RETROPERITONEUM/LYMPH NODES: No lymphadenopathy.  ABDOMINAL WALL: Tiny fat-containing umbilical hernia. Cutaneous edema.  BONES: Diffusely heterogeneous demonstrating increased density, likely   reflective of patient's known myeloproliferative disorder.    IMPRESSION:  Hepatosplenomegaly, with interval increase in size since prior   examination of 7/16/2022.    Urinary bladder wall thickening versus underdistention. Correlate with   urinalysis and patient symptomatology to exclude underlying infection.    Iron studies:   TIBC 509 (elevated)  Ferritin 268 WNL,  8% iron sat  Folate 14.9 WNL, B12  LDH 1831 elevated, Uric acid 7.7 WNL

## 2022-10-25 NOTE — PHYSICAL THERAPY INITIAL EVALUATION ADULT - PERTINENT HX OF CURRENT PROBLEM, REHAB EVAL
Patient is a 57yo gentleman with with PMH of primary myelofibrosis who presents with leukocytosis and 19% peripheral blasts concerning for transformation to acute leukemia.

## 2022-10-25 NOTE — H&P ADULT - NSHPREVIEWOFSYSTEMS_GEN_ALL_CORE
REVIEW OF SYSTEMS  CONSTITUTIONAL: No fever, no chills, + fatigue, + night sweats,   EYES: No eye pain, no vision changes  ENMT:  No difficulty hearing, no throat pain  RESPIRATORY: + chronic cough, no wheezing,  no sputum production; No shortness of breath  CARDIOVASCULAR: No chest pain, no palpitations, no PATRICK, + leg swelling  GASTROINTESTINAL: + LUQ abdominal +  nausea, +vomiting, + black stool, no diarrhea,   GENITOURINARY: No dysuria, no hematuria  NEUROLOGICAL: No headaches, no loss of strength, no numbness  SKIN: +pruritis, + rash on feet  MUSCULOSKELETAL: No joint pain, no joint swelling; No muscle pain  HEME/LYMPH: No easy bruising, bleeding

## 2022-10-25 NOTE — ED ADULT NURSE REASSESSMENT NOTE - NS ED NURSE REASSESS COMMENT FT1
Break RN note- Patient resting quietly in bed, breathing even and nonlabored. Patient complaining of left flank pain. Patient medicated as ordered. Safety maintained. Patient stable upon exiting the room.
report received from night RN dylan. pt resting comfortably in bed, denies complaints at this time. labwork drawn and sent as per MD orders. pt in NAD. respirations even and unlabored. repositioned for comfort. will continue to monitor.
pt alert and oriented, resting in stretcher. pt denies complants at this time. pt RR are even and unlabored, NAD. VSS. Call bell within reach. Will continue to monitor.

## 2022-10-25 NOTE — ED PROVIDER NOTE - PROGRESS NOTE DETAILS
pt reassessed, no complaints. abd pain improved, however states feels the pain to be returning.  56yoM with PMH CML or oral CT, HTN, HLD, DM, daily smoker, presenting with sharp, LUQ pain starting over past few days. + nausea. no vomiting, diarrhea, fevers, dysuria. no hx of abd surgeries. pt states he also has a myeloproliferative disorder, had ran out of his CT agent past few days.   CT a/p increasing HSM, no acute abnormality. UA negative, wbc 170s, blasts 19.5% (significant increase from 4% prior). uric acid level WNL. will admit for BM biopsy. DIXON GANT:  pt reassessed, no complaints. abd pain improved, however states feels the pain to be returning.  56yoM with PMH CML or oral CT, HTN, HLD, DM, daily smoker, presenting with sharp, LUQ pain starting over past few days. + nausea. no vomiting, diarrhea, fevers, dysuria. no hx of abd surgeries. pt states he also has a myeloproliferative disorder, had ran out of his CT agent past few days.   CT a/p increasing HSM, no acute abnormality. UA negative, wbc 170s, blasts 19.5% (significant increase from 4% prior). uric acid level WNL. phos WNL. will admit DIXON GANT: pt signed out to me by night team - DR. Jang.  pt reassessed, no complaints. abd pain improved, however states feels the pain to be returning.  56yoM with PMH CML or oral CT, HTN, HLD, DM, daily smoker, presenting with sharp, LUQ pain starting over past few days. + nausea. no vomiting, diarrhea, fevers, dysuria. no hx of abd surgeries. pt states he also has a myeloproliferative disorder, had ran out of his CT agent past few days.   CT a/p increasing HSM, no acute abnormality. UA negative, wbc 170s, blasts 19.5% (significant increase from 4% prior). uric acid level WNL. phos WNL. will admit

## 2022-10-25 NOTE — ED PROVIDER NOTE - CLINICAL SUMMARY MEDICAL DECISION MAKING FREE TEXT BOX
55 yo M pmhx CML on oral chemo htn hld dm p/w 2 days of L sided abd pain. a/w n/v. VSS. distended, TTP L sided abd and diffusely no cva tenderness. c/f diverticulits vs stone vs appy uti vs msk. lower c/f for obstruction. plan labs ekg ua meds CT a/p and r/a

## 2022-10-25 NOTE — PHYSICAL THERAPY INITIAL EVALUATION ADULT - BALANCE TRAINING, PT EVAL
GOAL: Patient will demonstrate an increase in static/dynamic balance in sitting/standing where deficient by at least 1 grade within 1-2 weeks to facilitate greater independence during functional mobility and ADL's.

## 2022-10-25 NOTE — H&P ADULT - NSHPREVIEWOFSYSTEMS_GEN_ALL_CORE
REVIEW OF SYSTEMS:  CONSTITUTIONAL: No weakness, fevers or chills  EYES/ENT: No visual changes;  No vertigo or throat pain   NECK: No pain or stiffness  RESPIRATORY: No cough, wheezing, hemoptysis; No shortness of breath  CARDIOVASCULAR: No chest pain or palpitations  GASTROINTESTINAL: No abdominal or epigastric pain. No nausea, vomiting, or hematemesis; No diarrhea or constipation. No melena or hematochezia.  GENITOURINARY: No dysuria, frequency or hematuria  NEUROLOGICAL: No numbness or weakness  SKIN: No itching, rashes REVIEW OF SYSTEMS:  CONSTITUTIONAL: Reports weakness, fatigue. Denies fevers, chills, night sweats  EYES/ENT: No visual changes; No vertigo or throat pain   NECK: No pain or stiffness  RESPIRATORY: No cough, wheezing, hemoptysis; No shortness of breath  CARDIOVASCULAR: No chest pain or palpitations  GASTROINTESTINAL: No abdominal or epigastric pain. No diarrhea or constipation. + N/V, black stools  GENITOURINARY: No dysuria, frequency or hematuria  NEUROLOGICAL: No numbness or weakness; reports dizziness  SKIN: No itching, rashes; bruising more easily REVIEW OF SYSTEMS:  CONSTITUTIONAL: Reports weakness, fatigue. Denies fevers, chills, night sweats  EYES/ENT: No visual changes; No vertigo or throat pain   NECK: No pain or stiffness  RESPIRATORY: No cough, wheezing, hemoptysis; No shortness of breath  CARDIOVASCULAR: No chest pain or palpitations  GASTROINTESTINAL: No abdominal or epigastric pain. No diarrhea or constipation. + N/V, black stools  GENITOURINARY: No dysuria, frequency or hematuria  NEUROLOGICAL: No numbness or tingling; reports dizziness  SKIN: skin itching, bruising more easily

## 2022-10-25 NOTE — H&P ADULT - PROBLEM SELECTOR PLAN 1
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  PLAN  - Send peripheral blood flow cytometry STAT; check cytogenetics (PML-ROHINI, BCR-ABL and FLT 3)  - Pain: tylenol 650 mg PO PRN q6h for mild-moderate pain; morphine 4 mg IV q6h PRN for severe pain   - Appreciate heme-onc lab recs:        - Serum iron w/ TIBC, ferritin, B12 and folate level        - Haptoglobin / Retic count        - D-dimer for DIC labs        - LDH / Uric acid level for TLS labs        - G6PD level  - If uric acid > 8, give 3mg IV Rasburicase x1; if >12 give 6mg IV x1  - Continue with home hydroxyurea 1000mg BID  - Monitor neuro status, WBCs >100k  - Transfer to [facility] Heme-Onc service

## 2022-10-25 NOTE — PHYSICAL THERAPY INITIAL EVALUATION ADULT - GAIT TRAINING, PT EVAL
GOAL: Patient will ambulate 150 feet independently with SC in 1-2 weeks. Pt will be able to neg 6 steps w/ 1 HR independently in 1 week.

## 2022-10-25 NOTE — ED PROVIDER NOTE - PHYSICAL EXAMINATION
Gen: NAD, non-toxic appearing  Head: normal appearing  HEENT: normal conjunctiva, oral mucosa moist  Lung: no respiratory distress, speaking in full sentences, CTA b/l     CV: regular rate and rhythm, no murmurs  Abd: soft, distended, TTP L sided abd and diffusely no cva tenderness.   MSK: no visible deformities  Neuro: No focal deficits, AAOx3  Skin: Warm  Psych: normal affect

## 2022-10-25 NOTE — PHYSICAL THERAPY INITIAL EVALUATION ADULT - ADDITIONAL COMMENTS
Pt lives with a friend in a pvt house w/ 1 step to enter and 5 steps inside to neg. Pt's friend assists w/ IADL's. Pt amb w/ SC PTA.

## 2022-10-25 NOTE — H&P ADULT - NSHPPHYSICALEXAM_GEN_ALL_CORE
PHYSICAL EXAM:  T(C): 36.7 (10-25-22 @ 06:41), Max: 36.8 (10-24-22 @ 22:43)  HR: 102 (10-25-22 @ 06:41) (81 - 102)  BP: 134/68 (10-25-22 @ 06:41) (113/59 - 139/78)  RR: 18 (10-25-22 @ 06:41) (17 - 18)  SpO2: 96% (10-25-22 @ 06:41) (95% - 98%)    CONSTITUTIONAL: Well groomed, no apparent distress  EYES: PERRLA and symmetric, EOMI, No conjunctival or scleral injection, non-icteric  ENMT: Oral mucosa with moist membranes. Normal dentition; no pharyngeal injection or exudates             NECK: Supple, symmetric and without tracheal deviation   RESP: No respiratory distress, no use of accessory muscles; CTA b/l, no WRR  CV: RRR, +S1S2, no MRG; no JVD; no peripheral edema  GI: Soft, NT, ND, no rebound, no guarding; no palpable masses; no hepatosplenomegaly; no hernia palpated  LYMPH: No cervical LAD or tenderness; no axillary LAD or tenderness; no inguinal LAD or tenderness  MSK: Normal gait; No digital clubbing or cyanosis; examination of the (head/neck/spine/ribs/pelvis, RUE, LUE, RLE, LLE) without misalignment,            Normal ROM without pain, no spinal tenderness, normal muscle strength/tone  SKIN: No rashes or ulcers noted; no subcutaneous nodules or induration palpable  NEURO: CN II-XII intact; normal reflexes in upper and lower extremities, sensation intact in upper and lower extremities b/l to light touch   PSYCH: Appropriate insight/judgment; A+O x 3, mood and affect appropriate, recent/remote memory intact PHYSICAL EXAM:  T(C): 36.7 (10-25-22 @ 06:41), Max: 36.8 (10-24-22 @ 22:43)  HR: 102 (10-25-22 @ 06:41) (81 - 102)  BP: 134/68 (10-25-22 @ 06:41) (113/59 - 139/78)  RR: 18 (10-25-22 @ 06:41) (17 - 18)  SpO2: 96% (10-25-22 @ 06:41) (95% - 98%)    CONSTITUTIONAL: in moderate distress, A&O x 3  EYES: PERRLA and symmetric, EOMI, L eye conjunctival injection  ENMT: Oral mucosa with moist membranes. Normal dentition; no pharyngeal injection or exudates             NECK: Supple, symmetric and without tracheal deviation, FROM  RESP: No respiratory distress, no use of accessory muscles; CTA except for b/l lower rhonchi that cleared with deep inspiration  CV: RRR, +S1S2, no MRG; no JVD; no peripheral edema  GI: Significant hepatic and splenic enlargement; LUQ TTP with radiation to mid-back; nondistended, no rebound, no guarding  LYMPH: No cervical LAD or tenderness  MSK: Normal gait; No digital clubbing or cyanosis, normal muscle strength/tone  SKIN: Bruising noted in multiple areas (abdomen, back, legs), petechiae, redness  NEURO: CN II-XII intact; normal reflexes in upper and lower extremities, sensation intact in upper and lower extremities b/l to light touch   PSYCH: Appropriate insight/judgment, mood and affect appropriate, recent/remote memory intact

## 2022-10-25 NOTE — H&P ADULT - ASSESSMENT
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 daily smoker who presents with two days of LUQ abdominal pain with associated N/V. In the ED, CT A+P showed increased HSM. WBCs 170s with 19.5% blasts (previously 4%). Uric acid, phosphorus WNL. Continues to be in pain, requiring IV morphine x 3. 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 Patient was admitted to the Heme-Onc service for further management, and will be transferred to [facility].

## 2022-10-25 NOTE — CONSULT NOTE ADULT - SUBJECTIVE AND OBJECTIVE BOX
Hematology Consult Note    HPI as per admitting team:   56 year old male with PMH of CML on oral chemo, DM, HTN, HLD. daily smoker who presents with two days of L sided abdominal pain with associated N/V.      ED Course: CT A+P showed increased HSM, no acute pathology. UA neg, WBCs 170s with 19.5% blasts (previously 4%). 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 Patient was not on any treatment or hematology follow up since diagnosis. He recently was admitted with fatigue, dizziness and vomiting and was found to have high WBC (~75K) count contributed by neutrophils, immature granulocytes and a few blasts (<10%). He also has thrombocytosis (~600K). He has splenomegaly. 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 A repeat marrow showed progression/transformation of his 07/2020 diagnosis (MF, marrow fibrosis grade 2) to overt PMF (marrow fibrosis grade 3), as well as MDS-RS-T. He has JAK2 mutation and also has SF3B1 mutation. He was started on Hydrea and later JAKAFI was approved. His counts dropped significantly. Jakafi dose decreased to 15 mg, counts improved but WBC started to increased.    Patient reports he had surgery of the left eye with [facility] and was told he had a retinal hemorrhage in his left eye.    REVIEW OF SYSTEMS:    CONSTITUTIONAL: No weakness, fevers or chills  EYES/ENT: No visual changes;  No vertigo or throat pain   NECK: No pain or stiffness  RESPIRATORY: No cough, wheezing, hemoptysis; No shortness of breath  CARDIOVASCULAR: No chest pain or palpitations  GASTROINTESTINAL: No abdominal or epigastric pain. No nausea, vomiting, or hematemesis; No diarrhea or constipation. No melena or hematochezia.  GENITOURINARY: No dysuria, frequency or hematuria  NEUROLOGICAL: No numbness or weakness  SKIN: No itching, burning, rashes, or lesions   All other review of systems is negative unless indicated above.    PAST MEDICAL & SURGICAL HISTORY:  HTN (hypertension)  DM (diabetes mellitus)  Arthritis  Myeloproliferative disease    H/O eye surgery    FAMILY HISTORY:  Family history of heart disease  Early family history of heart disease in Father, and siblings        SOCIAL HISTORY:   2-3 ppd x 45 years, currently smoking. No EtOH or illicit drug use. Lives alone but currently has a friend living with him to take care of him.    Allergies  No Known Allergies      MEDICATIONS  (STANDING):  nicotine - 21 mG/24Hr(s) Patch 1 Patch Transdermal daily    MEDICATIONS  (PRN):  acetaminophen     Tablet .. 650 milliGRAM(s) Oral every 6 hours PRN Temp greater or equal to 38C (100.4F), Mild Pain (1 - 3)  melatonin 3 milliGRAM(s) Oral at bedtime PRN Insomnia      OBJECTIVE   Height (cm): 185.4 (10-24 @ 19:45)    T(F): 98.1 (10-25-22 @ 06:41), Max: 98.3 (10-24-22 @ 22:43)  HR: 102 (10-25-22 @ 06:41)  BP: 134/68 (10-25-22 @ 06:41)  RR: 18 (10-25-22 @ 06:41)  SpO2: 96% (10-25-22 @ 06:41)  Wt(kg): --    PHYSICAL EXAM   GENERAL: NAD, well-developed  HEAD:  Atraumatic, Normocephalic  EYES: EOMI, PERRLA, conjunctiva and sclera clear  NECK: Supple, No JVD  CHEST/LUNG: Clear to auscultation bilaterally; No wheeze  HEART: Regular rate and rhythm; No murmurs, rubs, or gallops  ABDOMEN: Soft, Nontender, Nondistended; Bowel sounds present  EXTREMITIES:  2+ Peripheral Pulses, No clubbing, cyanosis, or edema  NEUROLOGY: non-focal  SKIN: No rashes or lesions                          13.7   173.44 )-----------( 182      ( 24 Oct 2022 22:24 )             47.0       10-24    134<L>  |  94<L>  |  13  ----------------------------<  172<H>  4.8   |  28  |  0.41<L>    Ca    10.1      24 Oct 2022 22:24  Phos  3.6     10-25    TPro  7.0  /  Alb  4.0  /  TBili  2.3<H>  /  DBili  x   /  AST  64<H>  /  ALT  15  /  AlkPhos  278<H>  10-24      Uric Acid, Serum: 7.5 mg/dL (10-25 @ 06:00)  Phosphorus Level, Serum: 3.6 mg/dL (10-25 @ 06:00)       Hematology Consult Note    HPI as per admitting team:   56 year old male with PMH of CML on oral chemo, DM, HTN, HLD. daily smoker who presents with two days of L sided abdominal pain with associated N/V.      ED Course: CT A+P showed increased HSM, no acute pathology. UA neg, WBCs 170s with 19.5% blasts (previously 4%). 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 Patient was not on any treatment or hematology follow up since diagnosis. He recently was admitted with fatigue, dizziness and vomiting and was found to have high WBC (~75K) count contributed by neutrophils, immature granulocytes and a few blasts (<10%). He also has thrombocytosis (~600K). He has splenomegaly. 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 A repeat marrow showed progression/transformation of his 07/2020 diagnosis (MF, marrow fibrosis grade 2) to overt PMF (marrow fibrosis grade 3), as well as MDS-RS-T. He has JAK2 mutation and also has SF3B1 mutation. He was started on Hydrea and later JAKAFI was approved. His counts dropped significantly. Jakafi dose decreased to 15 mg, counts improved but WBC started to increased.    Patient reports he had surgery of the left eye with [facility] and was told he had a retinal hemorrhage in his left eye.    REVIEW OF SYSTEMS:    CONSTITUTIONAL: No weakness, fevers or chills  EYES/ENT: No visual changes;  No vertigo or throat pain   NECK: No pain or stiffness  RESPIRATORY: No cough, wheezing, hemoptysis; No shortness of breath  CARDIOVASCULAR: No chest pain or palpitations  GASTROINTESTINAL: No abdominal or epigastric pain. No nausea, vomiting, or hematemesis; No diarrhea or constipation. No melena or hematochezia.  GENITOURINARY: No dysuria, frequency or hematuria  NEUROLOGICAL: No numbness or weakness  SKIN: No itching, burning, rashes, or lesions   All other review of systems is negative unless indicated above.    PAST MEDICAL & SURGICAL HISTORY:  HTN (hypertension)  DM (diabetes mellitus)  Arthritis  Myeloproliferative disease    H/O eye surgery    FAMILY HISTORY:  Family history of heart disease  Early family history of heart disease in Father, and siblings        SOCIAL HISTORY:   2-3 ppd x 45 years, currently smoking. No EtOH or illicit drug use. Lives alone but currently has a friend living with him to take care of him.    Allergies  No Known Allergies      MEDICATIONS  (STANDING):  nicotine - 21 mG/24Hr(s) Patch 1 Patch Transdermal daily    MEDICATIONS  (PRN):  acetaminophen     Tablet .. 650 milliGRAM(s) Oral every 6 hours PRN Temp greater or equal to 38C (100.4F), Mild Pain (1 - 3)  melatonin 3 milliGRAM(s) Oral at bedtime PRN Insomnia      OBJECTIVE   Height (cm): 185.4 (10-24 @ 19:45)    T(F): 98.1 (10-25-22 @ 06:41), Max: 98.3 (10-24-22 @ 22:43)  HR: 102 (10-25-22 @ 06:41)  BP: 134/68 (10-25-22 @ 06:41)  RR: 18 (10-25-22 @ 06:41)  SpO2: 96% (10-25-22 @ 06:41)  Wt(kg): --    PHYSICAL EXAM   GENERAL: NAD, well-developed  HEAD:  Atraumatic, Normocephalic  EYES: EOMI, PERRLA, conjunctiva and sclera clear  NECK: Supple, No JVD  CHEST/LUNG: Clear to auscultation bilaterally; No wheeze  HEART: Regular rate and rhythm; No murmurs, rubs, or gallops  ABDOMEN: Soft, Nontender, Nondistended; Bowel sounds present  EXTREMITIES:  2+ Peripheral Pulses, No clubbing, cyanosis, or edema  NEUROLOGY: non-focal  SKIN: No rashes or lesions                          13.7   173.44 )-----------( 182      ( 24 Oct 2022 22:24 )             47.0       10-24    134<L>  |  94<L>  |  13  ----------------------------<  172<H>  4.8   |  28  |  0.41<L>    Ca    10.1      24 Oct 2022 22:24  Phos  3.6     10-25    TPro  7.0  /  Alb  4.0  /  TBili  2.3<H>  /  DBili  x   /  AST  64<H>  /  ALT  15  /  AlkPhos  278<H>  10-24      Uric Acid, Serum: 7.5 mg/dL (10-25 @ 06:00)  Phosphorus Level, Serum: 3.6 mg/dL (10-25 @ 06:00)    RADIOLOGY & ADDITIONAL TESTING:   < from: CT Abdomen and Pelvis w/ IV Cont (10.25.22 @ 02:40) >  FINDINGS:  LOWER CHEST: Coronary artery and aortic root calcifications.    LIVER: Hepatomegaly.  BILE DUCTS: Normal caliber.  GALLBLADDER: Cholelithiasis.  SPLEEN: Splenomegaly. Heterogeneous. 26.7 cm.  PANCREAS: Within normal limits.  ADRENALS: Within normal limits.  KIDNEYS/URETERS: No renal stones or hydronephrosis. Subcentimeter right   hypodense focus is too small to characterize. Inferiorly displaced left   kidney secondary to mass effect from splenomegaly.    BLADDER: Multiple thickening versus underdistention..  REPRODUCTIVE ORGANS: Prostate within normal limits in size and contains   coarse calcification.    BOWEL: No bowel obstruction. Appendix is normal. Small hiatal hernia.  PERITONEUM: No ascites. Similar nonspecific stranding within the left   paracolic gutter.  VESSELS: Atherosclerotic changes.  RETROPERITONEUM/LYMPH NODES: No lymphadenopathy.  ABDOMINAL WALL: Tiny fat-containing umbilical hernia. Cutaneous edema.  BONES: Diffusely heterogeneous demonstrating increased density, likely   reflective of patient's known myeloproliferative disorder.    IMPRESSION:  Hepatosplenomegaly, with interval increase in size since prior   examination of 7/16/2022.    Urinary bladder wall thickening versus underdistention. Correlate with   urinalysis and patient symptomatology to exclude underlying infection.        --- End of Report ---    YOHANNES PACHECO MD; Resident Radiologist  This document has been electronically signed.  DEJA BOYER MD; Attending Radiologist  This document has been electronically signed. Oct 25 2022  3:15AM    < end of copied text >

## 2022-10-25 NOTE — DISCHARGE NOTE PROVIDER - NSDCCPCAREPLAN_GEN_ALL_CORE_FT
PRINCIPAL DISCHARGE DIAGNOSIS  Diagnosis: Abdominal pain  Assessment and Plan of Treatment: Transferred to [facility] for further care

## 2022-10-25 NOTE — ED PROVIDER NOTE - ATTENDING CONTRIBUTION TO CARE
The patient is a 56y Male smoker who has a past medical and surgery history of HTN DM MPS/CML on oral chemo and eye surgery  PTED with Pt c/o L. sided flank/abd cramping since 2 days ago. Also reports n/v earlier today. Denies fever, chills, diarrhea.    Vital Signs Last 24 Hrs  T(F): 97.6 HR: 96 BP: 139/70 RR: 17 SpO2: 97% (24 Oct 2022 19:45) (97% - 97%)  PE: as described; my additions and exceptions are noted in the chart    DATA:    LAB: Pending at time of evaluation     IMPRESSION/RISK:  Dx=  LUQT  Consderation; gastritis pancreatic tail process splenomegaly vs infarct/emboli/abscess diverticulitis pyelonephritis  ivfs  labs/UA   analgesics  CTScan  reassess   probable admit

## 2022-10-25 NOTE — PATIENT PROFILE ADULT - FALL HARM RISK - HARM RISK INTERVENTIONS

## 2022-10-25 NOTE — H&P ADULT - NSICDXPASTMEDICALHX_GEN_ALL_CORE_FT
PAST MEDICAL HISTORY:  HTN (hypertension)     Primary myelofibrosis     T2DM (type 2 diabetes mellitus)

## 2022-10-25 NOTE — H&P ADULT - NSHPSOCIALHISTORY_GEN_ALL_CORE
Patient has a friend living with him.   He uses a cane at home.   He fell 3 weeks ago and scraped up his knee. Knee sometimes gives out.   He smoked for 43 years, stopped 3 days ago. He went up to 3-4 ppd, but now on 1/2 a pack per day.   He has not had alcohol in 23 years. No hx of IVDU.

## 2022-10-25 NOTE — DISCHARGE NOTE PROVIDER - NSDCACTIVITY_GEN_ALL_CORE
[address] - [address] [place] [address]    Office:  [phone]   Fax:  [phone]         [facility]  PEDIATRIC INFECTIOUS DISEASES               Date: 2019    To:Brigitte Wade MD  [address]  [place] [address]    Pt: Gold Perera  MR: [id number]  : 2020  YADI: 2019    Dear Dr. Wade    I had the pleasure of seeing Gold at the Pediatric Infectious Diseases Clinic at the [facility]. 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 He is growing well and currently there doesn't seem to be any concerns. He was started on Valganciclovir and his parents stopped it 2 months into the course for fear of side effects. He continued his regular checkups and has been asymptomatic. He passed his last audiological test and is scheduled for a follow up as well as an ophthalmology check up in the coming months. Last urine sample (3/26) was positive for CMV viral load (close to 2 million) but is of little concern considering treated children continue to shed the virus in saliva and urine for many years.   He is growing well, gaining weight and developmental milestones appropriately.He is feeding well (formula feeds) and is immunized up to date. I recommend that he continues his regular tests and follow-up in 4 months     Review of Systems: The Review of Systems is negative other than noted in the HPI  Past Medical/surgical History: This patient has no significant past medical history  Family History: Non-contributory   Social History: Lives with parents   Immunization: Immunizations are up to date  Allergies: All allergies reviewed and addressed    medications: I have reviewed this patient's current medications     Physical Exam Vitals were reviewed  Temp: 98  F (36.7  C) Temp src: Axillary                 :  General: Appears, active, alert and  interactive  Head: Normocephalic, anterior fonatanelle open and normal   Ears: External ear normal, canal clear   Eyes: Pupils equal, round and reactive. Extra-ocular muscles are grossly normal. Conjunctive and sclera clear   Nose: Remnants of dried nasal discharge present  Mouth: Moist normal oral mucosa present  Neck: No cervical lymphadenopathy/neck masses   CVS: S1, S2 normal, no murmurs   RS: Air entry equal in all lung fields, no rales/rhonchi/crepitations, no increased work of breathing   Abdomen: Soft, non-tender, no organomegaly/masses, Bowel sounds heard   CNS: No gross focal deficit  Skin: Clear, no rashes/scars   Extremities: Moves all four limbs equally, peripheries warm and dry, capillary refill time <3 seconds     Lab:None today     Assessment and plan:  Gold appears to be doing well. Currently he is asymptomatic, he has passed his audiological test and is no longer on Valganciclovir. He is growing well and achieving developmental milestones appropriately. His urine CMV viral load is high (close to 2 million) but is of little concern.   I recommend continuation with regular audiological tests and a routine follow-up in 4 months.       Follow-up appointment was schedule for 4 months     Of course, if symptoms reoccur or any new issue arise I would be happy to see Gold again at clinic sooner.    Please contact me directly with any questions.    Thank you for allowing me to assist in Gold's care.       Sincerely,    Nimesh Ward (MBBS, Student)     Patient was seen together with Dr Brigitte Wade, the attending physician at clinic. Assessment and plan were discussed with Dr. aWde and the family.    Attestation    I, Brigitte Wade M.D., have personally examined Gold and interviewed his parents. I've reviewed the note written by Nimesh Ward (MS) and agree with the physical finding, assessment, and plan as outlined. I've made my edits to the note above.    In summary: Gold is doing very well.  His congenital CMV appears to be asymptomatic. He is growing and thriving nicely and is reaching his developmental milestones. He was initially treated with oral antiviral (valganciclovir [Valcyte] which was stopped in mid January (per parental preference). He has not had any further issues since then. He passed audiology testing and is scheduled for audiology follow-up in few months. He is also scheduled to be evaluated by ophthalmology. Last urine sample was still positive very high viral load (close to 2 million). This has little clinical concerns and should not change our management, as children with congenital CMV are expected to shed the virus in their urine and saliva for several years. I do not have further recommendations or interventions and would like to follow-up with him in 4 months. Please notify us about any clinical changes or concerns and we can always see him earlier.      It was my pleasure seeing Gold at clinic today and assist in his care. Please do not hesitate to contact me directly with any questions.    I spent a total of 40 minutes face-to-face with Gold and his family during today s office visit. Over 50% of this time was spent counseling the patient and/or coordinating care.    Brigitte Wade M.D.    Pediatric Infectious Diseases  [facility]  [facility]  Clinic Coordinator: [phone]          Copy to patient  OSCAR FARNSWORTHBEL   [address] [place] [id number]       No restrictions

## 2022-10-25 NOTE — DISCHARGE NOTE PROVIDER - NSDCMRMEDTOKEN_GEN_ALL_CORE_FT
gabapentin 300 mg oral capsule: 1 cap(s) orally once a day  HumaLOG KwikPen 200 units/mL (Concentrated) subcutaneous solution: 5 unit(s) subcutaneous 3 times a day (before meals)   hydroxyurea 500 mg oral capsule: 2 cap(s) orally 2 times a day  Jakafi 15 mg oral tablet: 1 tab(s) orally 2 times a day  lisinopril 5 mg oral tablet: 1 tab(s) orally once a day  metFORMIN 1000 mg oral tablet: 1 tab(s) orally 2 times a day   acetaminophen 325 mg oral tablet: 2 tab(s) orally every 6 hours, As needed, Temp greater or equal to 38C (100.4F), Mild Pain (1 - 3)  gabapentin 300 mg oral capsule: 1 cap(s) orally once a day

## 2022-10-25 NOTE — H&P ADULT - HISTORY OF PRESENT ILLNESS
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 daily smoker who presents with two days of L sided abdominal pain with associated N/V.      ED Course: CT A+P showed increased HSM, no acute pathology. UA neg, WBCs 170s with 19.5% blasts (previously 4%). 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 daily smoker who presents with two days of L sided abdominal pain with associated N/V.  Reports waking up with LUQ pain radiating to his back about 3 days ago. The pain was sharp and constant. It progressively increased to 10/10 severity with associated nausea, nonbloody vomiting x 2, and decreased PO intake. He has been mainly drinking water with no solid intake x 3 days. He does not have teeth or dentures. He eats soft foods and glucerna shakes. He was taking OTC tylenol at home with no relief. He reports last having a black, tarry BM 3 days ago. He has been having black stools for 1 year with associated anemia in the past. He denies diarrhea or aspen red blood in stools. He was diagnosed with leukemia 1 year ago after a 60 lb wt loss, fatigue, and weakness. He follows with oncology here (Dr. Art) and takes PO Jakafi and hydroxyurea. He ran out of hydroxyurea about 3 days ago and has not been taking it daily. He has also felt dizzy and weak for several months. He fell about 3 weeks ago at home with abrasions to L knee.    ED Course: CT A+P showed increased HSM, no acute pathology. UA neg, WBCs 170s with 19.5% blasts (previously 4%). 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 daily smoker who presents with two days of L sided abdominal pain with associated N/V.  Reports waking up with LUQ pain radiating to his back about 3 days ago. The pain was sharp and constant. It progressively increased to 10/10 severity with associated nausea, nonbloody vomiting x 2, and decreased PO intake. He has been mainly drinking water with no solid intake x 3 days. He does not have teeth or dentures. He eats soft foods and glucerna shakes. He was taking OTC tylenol at home with no relief. He reports last having a black, tarry BM 3 days ago. He has been having black stools for 1 year with associated anemia in the past. He denies diarrhea or aspen red blood in stools. He was diagnosed with leukemia 1 year ago after a 60 lb wt loss, fatigue, and weakness. He follows with oncology here (Dr. Art) and takes PO Jakafi and hydroxyurea. He ran out of hydroxyurea about 3 days ago and has not been taking it daily. He notes he bruises more easily recently, even with itching his skin. He has also felt dizzy and weak for several months. He fell about 3 weeks ago at home with abrasions to L knee.    ED Course: CT A+P showed increased HSM, no acute pathology. UA neg, WBCs 170s with 19.5% blasts (previously 4%). 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 daily smoker who presents with two days of L sided abdominal pain with associated N/V. Reports waking up with LUQ pain radiating to his back about 3 days ago. The pain was sharp and constant. It progressively increased to 10/10 severity with associated nausea, nonbloody vomiting x 2, and decreased PO intake. He has been mainly drinking water with no solid intake x 3 days. He does not have teeth or dentures. He eats soft foods and glucerna shakes. He was taking OTC tylenol at home with no relief. He reports last having a black, tarry BM 3 days ago. He has been having black stools for 1 year with associated anemia in the past. He denies diarrhea or aspen red blood in stools. He was diagnosed with leukemia 1 year ago after a 60 lb wt loss, fatigue, and weakness. He follows with oncology here (Dr. Art) and takes PO Jakafi and hydroxyurea. He ran out of hydroxyurea about 3 days ago and has not been taking it daily. He notes he bruises more easily recently, even with itching his skin. He has also felt dizzy and weak for several months. He fell about 3 weeks ago at home with abrasions to L knee.    ED Course: CT A+P showed increased HSM, no acute pathology. UA neg, WBCs 170s with 19.5% blasts (previously 4%). Uric acid, phosphorus WNL; IV morphine x 3 for pain

## 2022-10-25 NOTE — H&P ADULT - PROBLEM SELECTOR PLAN 1
Tresiba 12 units AM   humalog scale TID  jakafi 15mg BID  metformin 1000mg BID yes  hydroxyurea 500mg  1 cap TID   stopped aspirin 1-2 months ago, due to bleeding related to dental work     Labs pending:   G6PD  Flow cytometry, FLT3, onkosight panel, FISH, BCR- ABL,   Acute hepatitis panel - Peripheral smear requested:   - Peripheral blood had 19% blasts.  - Check CBC with differential, CMP, coags, fibrinogen, and LDH, uric acid, magnesium, phosphorus and calcium qdaily  - Check D-dimer   - Start IVF at 50cc/hr.   - Flow cytometry, FISH, cytogenetics, BCR-ABL, FLT3, PML-ROHINI? were sent at [facility] (will follow up).    - G6PD level is pending ([facility] record)  - Will check TTE  - Check HIV and hepatitis studies  - Will start hydroxyurea 1000mg BID  - Supportive transfusion for Hgb < 7, platelets < 10k - Peripheral smear requested:   - Peripheral blood had 19% blasts.  - Check CBC with differential, CMP, coags, fibrinogen, and LDH, uric acid, magnesium, phosphorus and calcium qdaily  - Check D-dimer   - Start IVF at 50cc/hr.   - Flow cytometry, FISH, cytogenetics, BCR-ABL, FLT3, PML-ROHINI? were sent at [facility] (will follow up).    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  - Check CBC with differential, CMP, coags, fibrinogen, and LDH, uric acid, magnesium, phosphorus and calcium qdaily  - Check D-dimer   - Start IVF at 50cc/hr.   - Flow cytometry, FISH, cytogenetics, BCR-ABL, FLT3, PML-ROHINI were sent at [facility] (will follow up).    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     - Will start hydroxyurea 1000mg BID and jakafi 15mg BID  - Peripheral smear reviewed: Numerous nucleated RBCs, large population of neutrophils and large platelets.   - Peripheral blood had 19% blasts on differential.  - Check CBC with differential, CMP, coags, fibrinogen, and LDH, uric acid, magnesium, phosphorus and calcium qdaily  - Start IVF at 50cc/hr.   - Flow cytometry, FISH, cytogenetics, BCR-ABL, FLT3, PML-ROHINI were sent at [facility] (will follow up).    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

## 2022-10-25 NOTE — H&P ADULT - PROBLEM SELECTOR PLAN 4
Start nicotine patch. VTE ppx: venodyne boots  Activity: OOB with assistance  Diet: NPO pending repeat CBC, Star ciclopirox for tinea pedis.

## 2022-10-26 NOTE — DISCHARGE NOTE PROVIDER - NSDCHOSPICE_GEN_A_CORE
Spoke with patient, tested positive for influnza A and rsv last week. Was under the assumption that she was tested for co-vid 19, test was sent to [facility] laboratory, test was not performed, did not meet criteria. Patient was told by [facility] nurse, even if tested positive, patient would be free to go back to work after 7 days after fever resolves. Patient was told by [facility] where she was tested that she was to be quarantined for 14 days, which would be Vic 3-. Would need work slip stating when she can go back to work. Still has cough, feeling much better.    No

## 2022-10-26 NOTE — PROGRESS NOTE ADULT - PROBLEM SELECTOR PLAN 1
Patient has known primary myelofibrosis, with worsening leukocytosis in setting of missing doses of hydroxyurea.     - Will start hydroxyurea 1000mg BID and jakafi 15mg BID  - Peripheral smear reviewed: Numerous nucleated RBCs, large population of neutrophils and large platelets.   - Peripheral blood had 19% blasts on differential.  - Check CBC with differential, CMP, coags, fibrinogen, and LDH, uric acid, magnesium, phosphorus and calcium qdaily  - Start IVF at 50cc/hr.   - Flow cytometry, FISH, cytogenetics, BCR-ABL, FLT3, PML-ROHINI were sent at [facility] (will follow up).    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   Continue hydroxyurea 1000mg BID and jakafi 15mg BID  Monitor CBC with differential, CMP, coags, fibrinogen, and LDH, uric acid, magnesium, phosphorus and calcium qdaily, IV hydration, transfusions as needed, antiemetics as needed, pain control, seroqual for sleep,   FU Flow cytometry, FISH, cytogenetics, BCR-ABL, FLT3, PML-ROHINI were sent at [facility].  - G6PD level was sent at [facility]  No diuresis today as swelling is likely related to Hepatosplenomegaly and decreased venous return but intravascularly dry.  10/25 [place] duplex (-) DVT.  Hyperkalemia- lokelma x1 today.

## 2022-10-26 NOTE — PROGRESS NOTE ADULT - PROBLEM SELECTOR PLAN 6
VTE ppx: venodyne boots  Activity: OOB with assistance  Diet: NPO pending repeat CBC, VTE ppx with Lovenox.

## 2022-10-26 NOTE — DISCHARGE NOTE PROVIDER - CARE PROVIDER_API CALL
Martha Hillman (MD; PhD)  Hematology; Internal Medicine; Oncology  [address] [place] [address]  Phone: [phone]  Fax: [phone]  Follow Up Time:

## 2022-10-26 NOTE — DISCHARGE NOTE PROVIDER - NSDCMRMEDTOKEN_GEN_ALL_CORE_FT
HumaLOG 100 units/mL subcutaneous solution: sliding scale  hydroxyurea 500 mg oral capsule: 2 tab(s) orally 2 times a day  Jakafi 15 mg oral tablet: 1 tab(s) orally 2 times a day  metFORMIN 1000 mg oral tablet: 1 tab(s) orally 2 times a day  Tresiba 100 units/mL subcutaneous solution: 12 unit(s) subcutaneous once a day   hydroxyurea 500 mg oral capsule: 2 tab(s) orally 2 times a day  Jakafi 15 mg oral tablet: 1 tab(s) orally 2 times a day   #1 Rolling walker: #1 Rolling Walker    ICD10 C95.90  hydroxyurea 500 mg oral capsule: 2 tab(s) orally 2 times a day  Jakafi 15 mg oral tablet: 1 tab(s) orally 2 times a day  metFORMIN 1000 mg oral tablet: 1 tab(s) orally 2 times a day  Out patient PT:   Tresiba 100 units/mL subcutaneous solution: 12 unit(s) subcutaneous once a day   #1 Rolling walker: #1 Rolling Walker    ICD10 C95.90  HumaLOG 100 units/mL injectable solution: 20 unit(s) injectable 2 times a day  hydroxyurea 500 mg oral capsule: 2 tab(s) orally 2 times a day  Jakafi 15 mg oral tablet: 1 tab(s) orally 2 times a day  metFORMIN 1000 mg oral tablet: 1 tab(s) orally 2 times a day  Tresiba 100 units/mL subcutaneous solution: 12 unit(s) subcutaneous once a day

## 2022-10-26 NOTE — PROGRESS NOTE ADULT - SUBJECTIVE AND OBJECTIVE BOX
Diagnosis: AML    Protocol/Chemo Regimen: TBD    Day: na    Pt endorsed:      Review of Systems: Denies nausea, vomiting, diarrhea, chest pain, shortness of breath, headache, weakness    Pain scale:     Diet: consistent carbs, no snacks, renal     Allergies    No Known Allergies    Intolerances    HEME/ONC MEDICATIONS  hydroxyurea 1000 milliGRAM(s) Oral two times a day  ruxolitinib 15 milliGRAM(s) Oral two times a day      STANDING MEDICATIONS  allopurinol 300 milliGRAM(s) Oral daily  Biotene Dry Mouth Oral Rinse 15 milliLiter(s) Swish and Spit three times a day  clotrimazole 1% Cream 1 Application(s) Topical two times a day  dextrose 5%. 1000 milliLiter(s) IV Continuous <Continuous>  dextrose 5%. 1000 milliLiter(s) IV Continuous <Continuous>  dextrose 50% Injectable 25 Gram(s) IV Push once  dextrose 50% Injectable 12.5 Gram(s) IV Push once  dextrose 50% Injectable 25 Gram(s) IV Push once  glucagon  Injectable 1 milliGRAM(s) IntraMuscular once  insulin glargine Injectable (LANTUS) 6 Unit(s) SubCutaneous at bedtime  insulin lispro (ADMELOG) corrective regimen sliding scale   SubCutaneous three times a day before meals  insulin lispro (ADMELOG) corrective regimen sliding scale   SubCutaneous at bedtime  nicotine - 21 mG/24Hr(s) Patch 1 Patch Transdermal daily  pantoprazole  Injectable 40 milliGRAM(s) IV Push every 12 hours  sodium chloride 0.9%. 1000 milliLiter(s) IV Continuous <Continuous>      PRN MEDICATIONS  acetaminophen     Tablet .. 650 milliGRAM(s) Oral every 6 hours PRN  dextrose Oral Gel 15 Gram(s) Oral once PRN        Vital Signs Last 24 Hrs  T(C): 36.6 (26 Oct 2022 04:26), Max: 37.9 (25 Oct 2022 11:56)  T(F): 97.8 (26 Oct 2022 04:26), Max: 100.2 (25 Oct 2022 11:56)  HR: 94 (26 Oct 2022 04:26) (94 - 105)  BP: 125/75 (26 Oct 2022 04:26) (111/56 - 165/83)  BP(mean): --  RR: 18 (26 Oct 2022 04:26) (18 - 18)  SpO2: 94% (26 Oct 2022 04:26) (92% - 94%)    Parameters below as of 26 Oct 2022 04:26  Patient On (Oxygen Delivery Method): nasal cannula  O2 Flow (L/min): 3      PHYSICAL EXAM  General: NAD  HEENT: clear oropharynx,   CV: (+) S1/S2 RRR  Lungs: positive air movement b/l ant lungs, clear to auscultation, no wheezes, no rales  Abdomen: soft, non-tender, non-distended  Ext: no edema  Skin: no rashes and no petechiae  Neuro: alert and oriented X 3, no focal deficits  Central Line:       LABS:                             Diagnosis: AML    Protocol/Chemo Regimen: Jakafi and hydrea    Day: na    Pt endorsed:  inability to sleep    Review of Systems: Denies nausea, vomiting, diarrhea, chest pain, shortness of breath, headache, weakness    Pain scale:     Diet: consistent carbs, no snacks, renal     Allergies    No Known Allergies    Intolerances    HEME/ONC MEDICATIONS  hydroxyurea 1000 milliGRAM(s) Oral two times a day  ruxolitinib 15 milliGRAM(s) Oral two times a day      STANDING MEDICATIONS  allopurinol 300 milliGRAM(s) Oral daily  Biotene Dry Mouth Oral Rinse 15 milliLiter(s) Swish and Spit three times a day  clotrimazole 1% Cream 1 Application(s) Topical two times a day  dextrose 5%. 1000 milliLiter(s) IV Continuous <Continuous>  dextrose 5%. 1000 milliLiter(s) IV Continuous <Continuous>  dextrose 50% Injectable 25 Gram(s) IV Push once  dextrose 50% Injectable 12.5 Gram(s) IV Push once  dextrose 50% Injectable 25 Gram(s) IV Push once  glucagon  Injectable 1 milliGRAM(s) IntraMuscular once  insulin glargine Injectable (LANTUS) 6 Unit(s) SubCutaneous at bedtime  insulin lispro (ADMELOG) corrective regimen sliding scale   SubCutaneous three times a day before meals  insulin lispro (ADMELOG) corrective regimen sliding scale   SubCutaneous at bedtime  nicotine - 21 mG/24Hr(s) Patch 1 Patch Transdermal daily  pantoprazole  Injectable 40 milliGRAM(s) IV Push every 12 hours  sodium chloride 0.9%. 1000 milliLiter(s) IV Continuous <Continuous>      PRN MEDICATIONS  acetaminophen     Tablet .. 650 milliGRAM(s) Oral every 6 hours PRN  dextrose Oral Gel 15 Gram(s) Oral once PRN        Vital Signs Last 24 Hrs  T(C): 36.6 (26 Oct 2022 04:26), Max: 37.9 (25 Oct 2022 11:56)  T(F): 97.8 (26 Oct 2022 04:26), Max: 100.2 (25 Oct 2022 11:56)  HR: 94 (26 Oct 2022 04:26) (94 - 105)  BP: 125/75 (26 Oct 2022 04:26) (111/56 - 165/83)  BP(mean): --  RR: 18 (26 Oct 2022 04:26) (18 - 18)  SpO2: 94% (26 Oct 2022 04:26) (92% - 94%)    Parameters below as of 26 Oct 2022 04:26  Patient On (Oxygen Delivery Method): nasal cannula  O2 Flow (L/min): 3      PHYSICAL EXAM  General: NAD  HEENT: clear oropharynx,   CV: (+) S1/S2 RRR  Lungs: positive air movement b/l ant lungs, clear to auscultation, no wheezes, no rales  Abdomen: soft, non-tender, non-distended  Ext: no edema  Skin: no rashes and no petechiae  Neuro: alert and oriented X 3, no focal deficits  Central Line: piv      LABS:                                                                                                                                                                                                                                                                                                                                                                                                                                                                                                                                                     12.7   163.33 )-----------( 178      ( 26 Oct 2022 07:16 )             44.3         Mean Cell Volume : 99.8 fl  Mean Cell Hemoglobin : 28.6 pg  Mean Cell Hemoglobin Concentration : 28.7 gm/dL  Auto Neutrophil # : 94.73 K/uL  Auto Lymphocyte # : 6.53 K/uL  Auto Monocyte # : 13.07 K/uL  Auto Eosinophil # : 8.17 K/uL  Auto Basophil # : 1.63 K/uL  Auto Neutrophil % : 56.0 %  Auto Lymphocyte % : 4.0 %  Auto Monocyte % : 8.0 %  Auto Eosinophil % : 5.0 %  Auto Basophil % : 1.0 %      10-26    130<L>  |  92<L>  |  16  ----------------------------<  175<H>  5.8<H>   |  23  |  0.50    Ca    9.3      26 Oct 2022 07:16  Phos  3.9     10-26  Mg     2.0     10-26    TPro  6.2  /  Alb  3.6  /  TBili  2.0<H>  /  DBili  x   /  AST  56<H>  /  ALT  14  /  AlkPhos  219<H>  10-26  PT/INR - ( 26 Oct 2022 07:16 )   PT: 15.0 sec;   INR: 1.29 ratio    LDH 1943  Uric Acid 8.1

## 2022-10-26 NOTE — PROGRESS NOTE ADULT - PROBLEM SELECTOR PLAN 3
Start lantus 8units qhs, and premeal sliding scale insulin.  Check A1C.   Hold home metformin. Continue with lantus, premeal sliding scale insulin.  Check A1C.   Hold home metformin.

## 2022-10-26 NOTE — DISCHARGE NOTE PROVIDER - HOSPITAL COURSE
56 year old prescribed Jakafi and HU for primary myelofibrosis now admitted for disease progression after stopping medications for three days. Patient had uncomplicated hospital course and was discharged home with follow up appointment made. 56 year old prescribed Jakafi and HU for primary myelofibrosis now admitted for disease progression after stopping medications for three days. Patient had uncomplicated hospital course and was discharged home with Hydrea/Jakafi .  Patient received IVF and antiemetics, strict I/O  and monitoring of CBC and electrolytes. Tolerated oral chemotherapy without complications. Stable for discharge home and follow up as an outpatient.

## 2022-10-26 NOTE — DISCHARGE NOTE PROVIDER - NSDCCPCAREPLAN_GEN_ALL_CORE_FT
PRINCIPAL DISCHARGE DIAGNOSIS  Diagnosis: Primary myelofibrosis  Assessment and Plan of Treatment: Notify your physician if bleeding; swelling; persistent nausea and vomiting; unable to urinate; pain not relieved by medications; fever; numbness, tingling; excessive diarrhea, inability to tolerate liquids or foods; increased irritability or sluggishness, rash        SECONDARY DISCHARGE DIAGNOSES  Diagnosis: T2DM (type 2 diabetes mellitus)  Assessment and Plan of Treatment: Continue your home insulin and metformin

## 2022-10-26 NOTE — PROGRESS NOTE ADULT - PROBLEM SELECTOR PLAN 2
Check duplex lower extremity US to rule out DVT. Patient is not neutropenic  Monitor for fever, culture if febrile.

## 2022-10-26 NOTE — PROGRESS NOTE ADULT - NS ATTEND AMEND GEN_ALL_CORE FT
Primary: Davidkil    Vital Signs Last 24 Hrs  T(C): 36.6 (26 Oct 2022 04:26), Max: 37.9 (25 Oct 2022 11:56)  T(F): 97.8 (26 Oct 2022 04:26), Max: 100.2 (25 Oct 2022 11:56)  HR: 94 (26 Oct 2022 04:26) (94 - 105)  BP: 125/75 (26 Oct 2022 04:26) (111/56 - 165/83)  BP(mean): --  RR: 18 (26 Oct 2022 04:26) (18 - 18)  SpO2: 94% (26 Oct 2022 04:26) (92% - 94%)    Parameters below as of 26 Oct 2022 04:26  Patient On (Oxygen Delivery Method): nasal cannula  O2 Flow (L/min): 3    MEDICATIONS  (STANDING):  allopurinol 300 milliGRAM(s) Oral daily  Biotene Dry Mouth Oral Rinse 15 milliLiter(s) Swish and Spit three times a day  clotrimazole 1% Cream 1 Application(s) Topical two times a day  dextrose 5%. 1000 milliLiter(s) (50 mL/Hr) IV Continuous <Continuous>  dextrose 5%. 1000 milliLiter(s) (100 mL/Hr) IV Continuous <Continuous>  dextrose 50% Injectable 25 Gram(s) IV Push once  dextrose 50% Injectable 12.5 Gram(s) IV Push once  dextrose 50% Injectable 25 Gram(s) IV Push once  glucagon  Injectable 1 milliGRAM(s) IntraMuscular once  hydroxyurea 1000 milliGRAM(s) Oral two times a day  insulin glargine Injectable (LANTUS) 6 Unit(s) SubCutaneous at bedtime  insulin lispro (ADMELOG) corrective regimen sliding scale   SubCutaneous three times a day before meals  insulin lispro (ADMELOG) corrective regimen sliding scale   SubCutaneous at bedtime  nicotine - 21 mG/24Hr(s) Patch 1 Patch Transdermal daily  pantoprazole  Injectable 40 milliGRAM(s) IV Push every 12 hours  ruxolitinib 15 milliGRAM(s) Oral two times a day  sodium chloride 0.9%. 1000 milliLiter(s) (50 mL/Hr) IV Continuous <Continuous>    Assessment: 56 year old prescribed Rux and HU for primary myelofibrosis now admitted for disease progression after stopping medications for three days.     PMHx: T2DM    7/2020- BMBx found hypercellularity with myeloid predominant trilineage hematopoiesis with maturation and marked megakaryocytosis with atypical morphology and clustering, and increased reticulin fibers (focal MF-2).   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   JAK2 positive, SF3B1, DNMT3A and ASXL1 mutations.       Plan:  Heme: continue HU 1gram bid & Jakafi  Continue allopurinol    ID: primary prophylaxis is not required    Radiographs: dopplers (10/25/22): no DVT    Nutrition: insulin, gentle hydration    DVT prophylaxis: sq heparin.    Over 35 minutes were spent in direct patient care and patient care coordination.

## 2022-10-27 NOTE — DISCHARGE NOTE NURSING/CASE MANAGEMENT/SOCIAL WORK - PATIENT PORTAL LINK FT
You can access the FollowMyHealth Patient Portal offered by [facility] by registering at the following website: [email or web address]. By joining [employer]’s FollowMyHealth portal, you will also be able to view your health information using other applications (apps) compatible with our system.

## 2022-10-27 NOTE — PROGRESS NOTE ADULT - PROBLEM SELECTOR PLAN 1
Patient has known primary myelofibrosis, with worsening leukocytosis in setting of missing doses of hydroxyurea.   Continue hydroxyurea 1000mg BID and jakafi 15mg BID  Monitor CBC with differential, CMP, coags, fibrinogen, and LDH, uric acid, magnesium, phosphorus and calcium qdaily, IV hydration, transfusions as needed, antiemetics as needed, pain control, seroqual for sleep,   FU Flow cytometry, FISH, cytogenetics, BCR-ABL, FLT3, PML-ROHINI were sent at [facility].  - G6PD level was sent at [facility]  No diuresis today as swelling is likely related to Hepatosplenomegaly and decreased venous return but intravascularly dry.  Discharge planning 10/27

## 2022-10-27 NOTE — PROGRESS NOTE ADULT - ASSESSMENT
56 year old prescribed Jakafi and HU for primary myelofibrosis now admitted for disease progression after stopping medications for three days.

## 2022-10-27 NOTE — PROVIDER CONTACT NOTE (FALL NOTIFICATION) - ASSESSMENT
Pt A/Ox4, vital signs stable, afebrile. Pt denies pain/discomfort, N/V/D, SOB, chest pain. Pt did not hit head at time of fall; pt denies LOC. Pt stated that he often feels light headed when ambulating. Pt stated he falls approximately 1x a week at home. No s/s of bleeding. Pt does not appear to be in acute distress at this time.  BP: 129/73  O2: 93% on room air   HR: 96  Temperature: 99.4F

## 2022-10-27 NOTE — PROVIDER CONTACT NOTE (CRITICAL VALUE NOTIFICATION) - ASSESSMENT
Pt A/Ox4, vital signs stable, afebrile. Pt denies N/V/D, SOB, chest pain. Pt c/o headache @ 0750, PRN Tylenol 650mg PO given with relief. Pt does not appear to be in acute distress at this time.

## 2022-10-27 NOTE — PROGRESS NOTE ADULT - NS ATTEND AMEND GEN_ALL_CORE FT
Primary: Davidkil    Vital Signs Last 24 Hrs  T(C): 36.6 (26 Oct 2022 04:26), Max: 37.9 (25 Oct 2022 11:56)  T(F): 97.8 (26 Oct 2022 04:26), Max: 100.2 (25 Oct 2022 11:56)  HR: 94 (26 Oct 2022 04:26) (94 - 105)  BP: 125/75 (26 Oct 2022 04:26) (111/56 - 165/83)  BP(mean): --  RR: 18 (26 Oct 2022 04:26) (18 - 18)  SpO2: 94% (26 Oct 2022 04:26) (92% - 94%)    Parameters below as of 26 Oct 2022 04:26  Patient On (Oxygen Delivery Method): nasal cannula  O2 Flow (L/min): 3    MEDICATIONS  (STANDING):  allopurinol 300 milliGRAM(s) Oral daily  Biotene Dry Mouth Oral Rinse 15 milliLiter(s) Swish and Spit three times a day  clotrimazole 1% Cream 1 Application(s) Topical two times a day  dextrose 5%. 1000 milliLiter(s) (50 mL/Hr) IV Continuous <Continuous>  dextrose 5%. 1000 milliLiter(s) (100 mL/Hr) IV Continuous <Continuous>  dextrose 50% Injectable 25 Gram(s) IV Push once  dextrose 50% Injectable 12.5 Gram(s) IV Push once  dextrose 50% Injectable 25 Gram(s) IV Push once  glucagon  Injectable 1 milliGRAM(s) IntraMuscular once  hydroxyurea 1000 milliGRAM(s) Oral two times a day  insulin glargine Injectable (LANTUS) 6 Unit(s) SubCutaneous at bedtime  insulin lispro (ADMELOG) corrective regimen sliding scale   SubCutaneous three times a day before meals  insulin lispro (ADMELOG) corrective regimen sliding scale   SubCutaneous at bedtime  nicotine - 21 mG/24Hr(s) Patch 1 Patch Transdermal daily  pantoprazole  Injectable 40 milliGRAM(s) IV Push every 12 hours  ruxolitinib 15 milliGRAM(s) Oral two times a day  sodium chloride 0.9%. 1000 milliLiter(s) (50 mL/Hr) IV Continuous <Continuous>    Assessment: 56 year old prescribed Rux and HU for primary myelofibrosis now admitted for disease progression after stopping medications for three days.     PMHx: T2DM    7/2020- BMBx found hypercellularity with myeloid predominant trilineage hematopoiesis with maturation and marked megakaryocytosis with atypical morphology and clustering, and increased reticulin fibers (focal MF-2).   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   JAK2 positive, SF3B1, DNMT3A and ASXL1 mutations.       Plan:  Heme: continue HU 1gram bid & Jakafi  Continue allopurinol    ID: primary prophylaxis is not required    Radiographs: dopplers (10/25/22): no DVT    Nutrition: insulin, gentle hydration    DVT prophylaxis: sq heparin.    Over 35 minutes were spent in direct patient care and patient care coordination. Primary: Kady    Assessment: 56 year old prescribed Rux and HU for primary myelofibrosis now admitted for disease progression after stopping medications for three days.     PMHx: T2DM    7/2020- BMBx found hypercellularity with myeloid predominant trilineage hematopoiesis with maturation and marked megakaryocytosis with atypical morphology and clustering, and increased reticulin fibers (focal MF-2).   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   JAK2 positive, SF3B1, DNMT3A and ASXL1 mutations.     Plan:  Heme: continue HU 1gram bid & Jakafi; [employer] myeloid panel to be sent on peripheral blood 10/27/22  Continue allopurinol    ID: primary prophylaxis is not required    Radiographs: dopplers (10/25/22): no DVT    Nutrition: insulin, gentle hydration    DVT prophylaxis: sq heparin.    Dispo: Currently limited ability to ambulate alone, had a fall 10/27/22    Over 35 minutes were spent in direct patient care and patient care coordination.

## 2022-10-27 NOTE — PROGRESS NOTE ADULT - PROBLEM SELECTOR PLAN 3
Continue with lantus, premeal sliding scale insulin.  Check A1C.   Hold home metformin. Continue with lantus, premeal sliding scale insulin.    Hold home metformin as per hospital policy

## 2022-10-27 NOTE — DISCHARGE NOTE NURSING/CASE MANAGEMENT/SOCIAL WORK - NSDCPEFALRISK_GEN_ALL_CORE
For information on Fall & Injury Prevention, visit: [email or web address] OR  [email or web address] OR  https://www.cdc.gov/steadi/patient.html

## 2022-10-27 NOTE — PROGRESS NOTE ADULT - SUBJECTIVE AND OBJECTIVE BOX
Diagnosis: AML    Protocol/Chemo Regimen: Jakafi and hydrea    Day: na    Pt endorsed:  inability to sleep    Review of Systems: Denies nausea, vomiting, diarrhea, chest pain, shortness of breath, headache, weakness    Pain scale:     Diet: consistent carbs, no snacks, renal     Allergies    No Known Allergies    Intolerances    HEME/ONC MEDICATIONS  hydroxyurea 1000 milliGRAM(s) Oral two times a day  ruxolitinib 15 milliGRAM(s) Oral two times a day      STANDING MEDICATIONS  allopurinol 300 milliGRAM(s) Oral daily  Biotene Dry Mouth Oral Rinse 15 milliLiter(s) Swish and Spit three times a day  clotrimazole 1% Cream 1 Application(s) Topical two times a day  dextrose 5%. 1000 milliLiter(s) IV Continuous <Continuous>  dextrose 5%. 1000 milliLiter(s) IV Continuous <Continuous>  dextrose 50% Injectable 25 Gram(s) IV Push once  dextrose 50% Injectable 12.5 Gram(s) IV Push once  dextrose 50% Injectable 25 Gram(s) IV Push once  glucagon  Injectable 1 milliGRAM(s) IntraMuscular once  insulin glargine Injectable (LANTUS) 6 Unit(s) SubCutaneous at bedtime  insulin lispro (ADMELOG) corrective regimen sliding scale   SubCutaneous three times a day before meals  insulin lispro (ADMELOG) corrective regimen sliding scale   SubCutaneous at bedtime  nicotine - 21 mG/24Hr(s) Patch 1 Patch Transdermal daily  pantoprazole  Injectable 40 milliGRAM(s) IV Push every 12 hours  sodium chloride 0.9%. 1000 milliLiter(s) IV Continuous <Continuous>      PRN MEDICATIONS  acetaminophen     Tablet .. 650 milliGRAM(s) Oral every 6 hours PRN  dextrose Oral Gel 15 Gram(s) Oral once PRN      Vital Signs Last 24 Hrs  T(C): 36.9 (27 Oct 2022 04:55), Max: 37.1 (26 Oct 2022 13:00)  T(F): 98.4 (27 Oct 2022 04:55), Max: 98.7 (26 Oct 2022 13:00)  HR: 93 (27 Oct 2022 04:55) (87 - 94)  BP: 113/67 (27 Oct 2022 04:55) (100/59 - 145/79)  BP(mean): --  RR: 18 (27 Oct 2022 04:55) (18 - 18)  SpO2: 93% (27 Oct 2022 04:55) (91% - 100%)    Parameters below as of 27 Oct 2022 04:55  Patient On (Oxygen Delivery Method): room air          PHYSICAL EXAM  General: NAD  HEENT: clear oropharynx,   CV: (+) S1/S2 RRR  Lungs: positive air movement b/l ant lungs, clear to auscultation, no wheezes, no rales  Abdomen: soft, non-tender, non-distended  Ext: no edema  Skin: no Rash   Neuro: alert and oriented X 3   Central Line: piv CDI             LABS:                          12.6   134.49 )-----------( 175      ( 27 Oct 2022 06:56 )             43.3         Mean Cell Volume : 98.6 fl  Mean Cell Hemoglobin : 28.7 pg  Mean Cell Hemoglobin Concentration : 29.1 gm/dL  Auto Neutrophil # : x  Auto Lymphocyte # : x  Auto Monocyte # : x  Auto Eosinophil # : x  Auto Basophil # : x  Auto Neutrophil % : x  Auto Lymphocyte % : x  Auto Monocyte % : x  Auto Eosinophil % : x  Auto Basophil % : x      10-27    133<L>  |  97  |  18  ----------------------------<  98  4.5   |  25  |  0.52    Ca    9.3      27 Oct 2022 06:59  Phos  3.4     10-27  Mg     2.1     10-27    TPro  6.2  /  Alb  3.5  /  TBili  2.0<H>  /  DBili  x   /  AST  53<H>  /  ALT  13  /  AlkPhos  210<H>  10-27        PT/INR - ( 26 Oct 2022 07:16 )   PT: 15.0 sec;   INR: 1.29 ratio             LDH 1736  Uric Acid 7.3                                                       Diagnosis: AML    Protocol/Chemo Regimen: Jakafi and hydrea    Day: na    Pt endorsed: Generalized weakness    Review of Systems: Denies nausea, vomiting, diarrhea, chest pain, shortness of breath, headache,     Pain scale: denies     Diet: consistent carbs, no snacks, renal     Allergies    No Known Allergies    Intolerances    HEME/ONC MEDICATIONS  hydroxyurea 1000 milliGRAM(s) Oral two times a day  ruxolitinib 15 milliGRAM(s) Oral two times a day      STANDING MEDICATIONS  allopurinol 300 milliGRAM(s) Oral daily  Biotene Dry Mouth Oral Rinse 15 milliLiter(s) Swish and Spit three times a day  clotrimazole 1% Cream 1 Application(s) Topical two times a day  dextrose 5%. 1000 milliLiter(s) IV Continuous <Continuous>  dextrose 5%. 1000 milliLiter(s) IV Continuous <Continuous>  dextrose 50% Injectable 25 Gram(s) IV Push once  dextrose 50% Injectable 12.5 Gram(s) IV Push once  dextrose 50% Injectable 25 Gram(s) IV Push once  glucagon  Injectable 1 milliGRAM(s) IntraMuscular once  insulin glargine Injectable (LANTUS) 6 Unit(s) SubCutaneous at bedtime  insulin lispro (ADMELOG) corrective regimen sliding scale   SubCutaneous three times a day before meals  insulin lispro (ADMELOG) corrective regimen sliding scale   SubCutaneous at bedtime  nicotine - 21 mG/24Hr(s) Patch 1 Patch Transdermal daily  pantoprazole  Injectable 40 milliGRAM(s) IV Push every 12 hours  sodium chloride 0.9%. 1000 milliLiter(s) IV Continuous <Continuous>      PRN MEDICATIONS  acetaminophen     Tablet .. 650 milliGRAM(s) Oral every 6 hours PRN  dextrose Oral Gel 15 Gram(s) Oral once PRN      Vital Signs Last 24 Hrs  T(C): 36.9 (27 Oct 2022 04:55), Max: 37.1 (26 Oct 2022 13:00)  T(F): 98.4 (27 Oct 2022 04:55), Max: 98.7 (26 Oct 2022 13:00)  HR: 93 (27 Oct 2022 04:55) (87 - 94)  BP: 113/67 (27 Oct 2022 04:55) (100/59 - 145/79)  BP(mean): --  RR: 18 (27 Oct 2022 04:55) (18 - 18)  SpO2: 93% (27 Oct 2022 04:55) (91% - 100%)    Parameters below as of 27 Oct 2022 04:55  Patient On (Oxygen Delivery Method): room air          PHYSICAL EXAM  General: NAD  HEENT: clear oropharynx,   CV: (+) S1/S2 RRR  Lungs: positive air movement b/l ant lungs, clear to auscultation, no wheezes, no rales  Abdomen: soft, non-tender, +distended, + hepatospenomegali  Ext: +3-3 edema  Skin: erythematous patchy  Rash on foot  Neuro: alert and oriented X 3   Central Line: piv CDI             LABS:                          12.6   134.49 )-----------( 175      ( 27 Oct 2022 06:56 )             43.3         Mean Cell Volume : 98.6 fl  Mean Cell Hemoglobin : 28.7 pg  Mean Cell Hemoglobin Concentration : 29.1 gm/dL  Auto Neutrophil # : x  Auto Lymphocyte # : x  Auto Monocyte # : x  Auto Eosinophil # : x  Auto Basophil # : x  Auto Neutrophil % : x  Auto Lymphocyte % : x  Auto Monocyte % : x  Auto Eosinophil % : x  Auto Basophil % : x      10-27    133<L>  |  97  |  18  ----------------------------<  98  4.5   |  25  |  0.52    Ca    9.3      27 Oct 2022 06:59  Phos  3.4     10-27  Mg     2.1     10-27    TPro  6.2  /  Alb  3.5  /  TBili  2.0<H>  /  DBili  x   /  AST  53<H>  /  ALT  13  /  AlkPhos  210<H>  10-27        PT/INR - ( 26 Oct 2022 07:16 )   PT: 15.0 sec;   INR: 1.29 ratio             LDH 1736  Uric Acid 7.3

## 2022-11-04 NOTE — ED ADULT NURSE NOTE - NSFALLRSKASSESASSIST_ED_ALL_ED
Encounter addended by: Jeni Schmitz [facility] on: 11/4/2022 7:43 AM   Actions taken: Clinical Note Signed no

## 2022-11-07 PROBLEM — D64.3: Status: ACTIVE | Noted: 2022-01-01

## 2022-11-07 PROBLEM — D72.829 LEUKOCYTOSIS: Status: ACTIVE | Noted: 2022-01-01

## 2022-11-07 NOTE — PHYSICAL EXAM
[Capable of only limited self care, confined to bed or chair more than 50% of waking hours] : Status 3- Capable of only limited self care, confined to bed or chair more than 50% of waking hours [Thin] : thin [Normal] : affect appropriate [de-identified] : came in a wheel chair.

## 2022-11-07 NOTE — RESULTS/DATA
[FreeTextEntry1] : 11/2/2022\par Hospital labs reviewed. \par Leukocytosis at ~150K, left shift, blast 8-14%\par Normal platelet counts\par Anemia\par High LDH \par \par \par 8/31/2022\par Hgb improved to Normal \par Platelet improved to 137K \par WBC increased to 21K (did normalize before) \par \par 7/22/2022\par Labs reviewed. Significant drop in all counts noted. WBC is now WNL. \par \par \par 4/4/2022\par Bone marrow biopsy (3/15/2022)\par  Patient:   DINAH TILLEY\par \par \par Accession:                             [id number]\par \par Collected Date/Time:                   3/15/2022 09:00 EDT\par Received Date/Time:                    3/15/2022 15:24 EDT\par \par Hematopathology Report - Auth (Verified)\par \par Specimen(s) Submitted\par 1  Bone marrow biopsy MH OP\par 2  Bone marrow aspirate for Flow OP\par \par Final Diagnosis\par 1, 2. Bone marrow biopsy and bone marrow aspirate\par      - Hypocellular marrow (10 to 15%) with diffuse fibrosis (MF-3),\par  dilated sinuses, osteosclerosis, focal erythroid predominant trilineage\par \par  hematopoiesis, dyserythropoiesis with ring sideroblasts, and decreased\par  iron stores (known history of primary fibrosis).\par \par See note and description.\par \par Diagnostic note:\par Suggest correlation with clinical and cytogenetic findings.\par \par Comprehensive report with results of pending ancillary studies to follow.\par \par Ancillary studies\par Special stains:  Bone marrow aspirate iron stain:   No spicules are present\par  to evaluate for iron stores; there are sufficient nRBC to evaluate for\par  ring sideroblasts and ring sideroblasts are increased.\par Special stains (reticulin and trichrom) performed on block 1A show\par  significant diffuse fibrosis (MF-3).\par Flow cytometry  of bone marrow aspirate shows 0.8% myeloblasts (positive\par  for HLA-DR, CD34, , CD33, CD13, CD7; negative CD11b, CD15).\par \par Microscopic description:\par 1. Biopsy:  Sections of clot and biopsy show hypocellularity (10% to\par  15%) with diffuse fibrosis, significantly dilated sinuses, erythroid\par  predominance, myeloid and erythroid maturation, megakaryocytes adequate\par  (focal clustering, some hypolobated forms), osteosclerosis, mild\par  eosinophilia, mild increase in interstitial mast cells (many spindle\par  shaped), and decreased iron stores.\par 2. Aspirate:  Aparticulate and hemodilute aspirate smear with normal M:E\par  ratio (3.6:1), myeloid and erythroid maturation, dyserythropoiesis, few\par  lymphocytes, and mild eosinophilia.  Megakaryocytes are nearly absent.\par \par Bone Marrow Aspirate Differential: (200 Cells).\par Type  % Normal*\par Blast  1% 0-3\par Neutrophil and\par    Precursors   63% 33-63\par Eosinophil  8% 1-5\par Basophil  0% 0-1\par Pronormoblast  1% 0-2\par Normoblast  19% 15-25\par Monocyte  0% 0-2\par Lymphocyte  8% 10-15\par Plasma cell  0% 0-1\par   *Adult Range\par Comment\par Iron stain (examined to evaluate for iron stores; see microscopic\par  description) and Giemsa stain (shows appropriate staining pattern) are\par  performed and evaluated on block(s): 1A, B.\par \par Verified by: CYNTHIA Barrientos\par (Electronic Signature)\par Reported on: 03/21/22 15:48 EDT, [facility], [address]  [facility] [address]\par Phone: [phone]   Fax: [phone]\par  ==========================================\par \par \par 3/1/2022\par Hospital labs reviewed. \par Anemia, thrombocytosis and leukocytosis noted. \par \par Bone marrow biopsy from 07/2020:\par \par Final Diagnosis\par 1, 2. Bone marrow biopsy (small sample) and bone marrow aspirate\par - Hypercellularity with myeloid predominant trilineage\par hematopoiesis with maturation and marked megakaryocytosis with\par atypical morphology and clustering\par - Increased reticulin fibers (focal MF-2)\par \par See note and description.\par \par Diagnostic note:\par Overall the findings are consistent with myeloproliferative\par neoplasm with focal myelofibrosis. Differential diagnostic\par considerations in this case include essential thrombocythemia\par with secondary marrow fibrosis or primary myelofibrosis.\par Correlation with clinical history, clinical findings (spleen\par status, LDH level), and JAK2 with reflex testing (MPL W515 and\par CALR), FISH/cytogenetic studies is necessary for a definitive\par classification. Comprehensive report with results of pending\par ancillary studies to follow.\par \par \par Hematopathology Addendum\par Comprehensive Hematopathology Report\par \par Final Diagnosis:\par 1, 2. 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 [employer] NGS\par Myeloid Disorder Sequencing Report ([employer])\par indicated mutations on JAK2 p.[id number], ASXL1 p.[id number]*, DNMT3A\par p.[id number] and SF3B1 p.[id number]. Based on the additional\par findings, the diagnosis is as stated above.\par \par \par IMAGING \par --------------------\par CT Abd (07/2020)\par Spleen 20 cm

## 2022-11-07 NOTE — ASSESSMENT
[FreeTextEntry1] : 56 year-old Mr. TILLEY is seen in consult for JAK2 positive MPN (no subtype mentioned) with focal myelofibrosis as per BMB in 07/2020. Patient was not on any treatment or hematology follow up since diagnosis. He recently was admitted with fatigue, dizziness and vomiting and was found to have high WBC (~75K) count contributed by neutrophils, immature granulocytes and a few blasts (<10%). He also has thrombocytosis (~600K). He has splenomegaly. 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 A repeat marrow showed progression/transformation of his 07/2020 diagnosis (MF, marrow fibrosis grade 2) to overt PMF (marrow fibrosis grade 3), as well as MDS-RS-T. He has JAK2 mutation and also has SF3B1 mutation. He was started on Hydrea and later JAKAFI was approved. His counts dropped significantly.  Jakafi dose decreased to 15 mg, counts improved but WBC started to increased. Patient felt better while he was  on Hydrea. \par \par Recently he was admitted to the hospital with abdominal pain. He also ran out of Hydrea. His WBC count was ~150K with ~8-14% bkasts. He was stabilized and discharged.  \par \par \par \par [] JAK2 positive MPN (no subtype established) \par - ET vs prefibortic PMF vs PMF (per 07/2020 BMB) \par - Additional mutations (SF3B1, ASXL1, DNMT3A) \par - Repeat BMB: progression/transformation of his 07/2020 diagnosis (MF, marrow fibrosis grade 2) to overt PMF (marrow fibrosis grade 3), as well as MDS-RS-T  \par - Was on Hydrea and JAKAFI \par - Recent WBC count >150K with 8-14% blasts\par - Suspicious for progression to AML \par - Bone marrow biopsy (schedule) \par - Obtain an splenic USG\par - Monitor CBC (q2w),\par - DIscontinue Hydrea (Patient has nonhealing leg ulcers\par - Continue JAKAFI  (15 mg BID, Rx sent)\par - F/u with BMT \par - RTC in 2 months \par \par \par [] MDS-RS-T \par - Mutations: SF3B1, ASXL1, DNMT3A\par - Plan for HMA (discussed with patient)  if not AML \par - PORT placement \par \par [] B/L leg edema'\par - Duplex US LE to r/o DVT \par - Lasix 20 mg daily for 30 days, reevaluate\par \par \par \par \par

## 2022-11-07 NOTE — HISTORY OF PRESENT ILLNESS
[de-identified] : CHART REVIEW: (3/1/2022) 56 year-old Mr. TILLEY is seen in consult for JAK2 positive MPN (not otherwise specified). Patient was recently admitted to [facility] with fatigue, vomiting, dizziness. He was found to have  high WBC count predominantly matured neutrophils and some blasts (<10%). It was also noted that he was admitted with rectal bleed  in 07/2020 and had a BMBx at that time which was suggestive of MPN disease (no subtype specified). He was discharged with outpatient hematology appointment but never followed up with outpatient hematology clinic. In the recent admission his symptoms were attributed to dehydration and high WBC count and was started on Hydrea.  He did not see a neurologist nor had a brain MRI or LP to investigate his dizziness. Patient is mostly wheelchair bound, continues to feel dizzy.  [de-identified] : 4/4/2022\par Patient returns for a follow up. He has had a BMB in the interim. His biopsy result shows progression/transformation of his 07/2020 diagnosis (MF, marrow fibrosis grade 2) to overt PMF (marrow fibrosis grade 3), as well as MDS-RS-T. He has JAK2 mutation and also has SF3B1. The patient is on Hydrea. He tells he has improved to some extent. His appetite is little better and N/V is improved. \par \par 7/22/2022\par Patient presents for a follow up. He endorses no significant change in his health status. He has seen and has been evaluated by BMT, thanks to Dr. Domínguez.  Unrelated match donor plan is in progress. His counts noted to have dropped significantly. He has been taking Hydrea and Jakafi both. \par \par 8/31/2022\par Patient returns for a follow up. He has been seeing BMT provider who told him to continue current management. The patient has improved a lot. He states that he feels better when he takes Hydrea in addition to Jakafi. He has gained some weight. He has no complaints other than weakness in the legs. \par \par 11/2/2022\par Patient presents for a follow up. He recently presented to [facility] with abdominal pain and LE edema. He ran out of Hydrea. He was stabilized and discharged. His disease may have progressed. Today we feels better, has no particular complaints except leg edema. He is continuing his medications.  \par \par

## 2022-11-08 NOTE — REASON FOR VISIT
[Bone Marrow Biopsy] : bone marrow biopsy [Bone Marrow Aspiration] : bone marrow aspiration [FreeTextEntry2] : JAK2 positive MPN, r/o AML

## 2022-11-08 NOTE — PROCEDURE
[Bone Marrow Biopsy] : bone marrow biopsy [Bone Marrow Aspiration] : bone marrow aspiration  [FreeTextEntry1] : JAK2 positive MPN, r/o AML  [FreeTextEntry2] : cc of lidocaine was used for the procedure. \par \par WBC:   K/uL\par Hgb:    g/dL\par Hct:     %\par Plts:     K/uL\par \par Bone marrow aspiration and biopsy were done. MPD, AML panel requested. [facility] sent. \par

## 2022-11-11 NOTE — PROCEDURE
[Bone Marrow Biopsy] : bone marrow biopsy [Bone Marrow Aspiration] : bone marrow aspiration  [Patient] : the patient [Verbal Consent Obtained] : verbal consent was obtained prior to the procedure [Patient identification verified] : patient identification verified [Procedure verified and consent obtained] : procedure verified and consent obtained [Laterality verified and correct site marked] : laterality verified and correct site marked [Right] : site: right [Correct positioning] : correct positioning [Correct implant and/ or special equipment obtained] : correct impact and/ or special equipment obtained [Prone] : prone [Superior iliac spine was identified] : the superior iliac spine was identified. [The right posterior iliac crest was prepped with betadine and draped, using sterile technique.] : The right posterior iliac crest was prepped with betadine and draped, using sterile technique. [Lidocaine was injected and into the periosteum overlying the site.] : Lidocaine was injected and into the periosteum overlying the site. [Aspirate] : aspirate [Cytogenetics] : cytogenetics [FISH] : FISH [Biopsy] : biopsy [Flow Cytometry] : flow cytometry [] : The patient was instructed to remove the bandage the following AM. The patient may bathe. Acetaminophen may be taken for discomfort, as per package directions.If there are any other problems, the patient was instructed to call the office. The patient verbalized understanding, and is aware of the office contact numbers. [FreeTextEntry1] : JAK2 positive MPN, r/o AML  [FreeTextEntry2] : 5 cc of lidocaine was used for the procedure. \par \par WBC:  114.90 K/uL\par Hgb:   12.6 g/dL\par Hct:    42.5 %\par Plts:    290 K/uL\par \par Bone marrow aspiration and biopsy were done. MPD, AML panel requested. [facility] sent. \par

## 2022-11-23 PROBLEM — D47.1 CHRONIC MYELOPROLIFERATIVE DISEASE: Chronic | Status: ACTIVE | Noted: 2022-01-01

## 2022-11-23 PROBLEM — E11.9 TYPE 2 DIABETES MELLITUS WITHOUT COMPLICATIONS: Chronic | Status: ACTIVE | Noted: 2022-01-01

## 2022-11-23 PROBLEM — I10 ESSENTIAL (PRIMARY) HYPERTENSION: Chronic | Status: ACTIVE | Noted: 2022-01-01

## 2022-11-24 PROBLEM — I74.09 AORTOILIAC OCCLUSIVE DISEASE: Status: ACTIVE | Noted: 2022-01-01

## 2022-11-24 PROBLEM — R16.1 SPLENOMEGALY: Status: ACTIVE | Noted: 2022-01-01

## 2022-11-24 PROBLEM — R60.0 LEG EDEMA: Status: ACTIVE | Noted: 2022-01-01

## 2022-11-24 PROBLEM — Z86.79 HISTORY OF HYPERTENSION: Status: RESOLVED | Noted: 2017-09-20 | Resolved: 2022-01-01

## 2022-11-24 PROBLEM — D46.9 MDS/MPN (MYELODYSPLASTIC/MYELOPROLIFERATIVE NEOPLASMS): Status: ACTIVE | Noted: 2022-01-01

## 2022-11-24 PROBLEM — F17.200 CURRENT SMOKER: Status: ACTIVE | Noted: 2017-09-20

## 2022-11-24 NOTE — PHYSICAL EXAM
[Ambulatory and capable of all self care but unable to carry out any work activities] : Status 2- Ambulatory and capable of all self care but unable to carry out any work activities. Up and about more than 50% of waking hours [Normal] : affect appropriate [de-identified] : 1plus edema [de-identified] : splenomegaly

## 2022-11-24 NOTE — RESULTS/DATA
[FreeTextEntry1] : 4/4/2022\par Bone marrow biopsy (3/15/2022)\par  Patient:   DINAH TILLEY\par \par \par Accession:                             [id number]\par \par Collected Date/Time:                   3/15/2022 09:00 EDT\par Received Date/Time:                    3/15/2022 15:24 EDT\par \par Hematopathology Report - Auth (Verified)\par \par Specimen(s) Submitted\par 1  Bone marrow biopsy MH OP\par 2  Bone marrow aspirate for Flow OP\par \par Final Diagnosis\par 1, 2. Bone marrow biopsy and bone marrow aspirate\par      - Hypocellular marrow (10 to 15%) with diffuse fibrosis (MF-3),\par  dilated sinuses, osteosclerosis, focal erythroid predominant trilineage\par \par  hematopoiesis, dyserythropoiesis with ring sideroblasts, and decreased\par  iron stores (known history of primary fibrosis).\par \par See note and description.\par \par Diagnostic note:\par Suggest correlation with clinical and cytogenetic findings.\par \par Comprehensive report with results of pending ancillary studies to follow.\par \par Ancillary studies\par Special stains:  Bone marrow aspirate iron stain:   No spicules are present\par  to evaluate for iron stores; there are sufficient nRBC to evaluate for\par  ring sideroblasts and ring sideroblasts are increased.\par Special stains (reticulin and trichrom) performed on block 1A show\par  significant diffuse fibrosis (MF-3).\par Flow cytometry  of bone marrow aspirate shows 0.8% myeloblasts (positive\par  for HLA-DR, CD34, , CD33, CD13, CD7; negative CD11b, CD15).\par \par Microscopic description:\par 1. Biopsy:  Sections of clot and biopsy show hypocellularity (10% to\par  15%) with diffuse fibrosis, significantly dilated sinuses, erythroid\par  predominance, myeloid and erythroid maturation, megakaryocytes adequate\par  (focal clustering, some hypolobated forms), osteosclerosis, mild\par  eosinophilia, mild increase in interstitial mast cells (many spindle\par  shaped), and decreased iron stores.\par 2. Aspirate:  Aparticulate and hemodilute aspirate smear with normal M:E\par  ratio (3.6:1), myeloid and erythroid maturation, dyserythropoiesis, few\par  lymphocytes, and mild eosinophilia.  Megakaryocytes are nearly absent.\par \par Bone Marrow Aspirate Differential: (200 Cells).\par Type  % Normal*\par Blast  1% 0-3\par Neutrophil and\par    Precursors   63% 33-63\par Eosinophil  8% 1-5\par Basophil  0% 0-1\par Pronormoblast  1% 0-2\par Normoblast  19% 15-25\par Monocyte  0% 0-2\par Lymphocyte  8% 10-15\par Plasma cell  0% 0-1\par   *Adult Range\par Comment\par Iron stain (examined to evaluate for iron stores; see microscopic\par  description) and Giemsa stain (shows appropriate staining pattern) are\par  performed and evaluated on block(s): 1A, B.\par \par Verified by: CYNTHIA Barrientos\par (Electronic Signature)\par Reported on: 03/21/22 15:48 EDT, [facility], [address]  [facility] [address], [place] [address]\par Phone: [phone]   Fax: [phone]\par  ==========================================\par \par \par 3/1/2022\par Hospital labs reviewed. \par Anemia, thrombocytosis and leukocytosis noted. \par \par Bone marrow biopsy from 07/2020:\par \par Final Diagnosis\par 1, 2. Bone marrow biopsy (small sample) and bone marrow aspirate\par - Hypercellularity with myeloid predominant trilineage\par hematopoiesis with maturation and marked megakaryocytosis with\par atypical morphology and clustering\par - Increased reticulin fibers (focal MF-2)\par \par See note and description.\par \par Diagnostic note:\par Overall the findings are consistent with myeloproliferative\par neoplasm with focal myelofibrosis. Differential diagnostic\par considerations in this case include essential thrombocythemia\par with secondary marrow fibrosis or primary myelofibrosis.\par Correlation with clinical history, clinical findings (spleen\par status, LDH level), and JAK2 with reflex testing (MPL W515 and\par CALR), FISH/cytogenetic studies is necessary for a definitive\par classification. Comprehensive report with results of pending\par ancillary studies to follow.\par \par \par Hematopathology Addendum\par Comprehensive Hematopathology Report\par \par Final Diagnosis:\par 1, 2. 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 [employer] NGS\par Myeloid Disorder Sequencing Report ([employer])\par indicated mutations on JAK2 p.[id number], ASXL1 p.[id number]*, DNMT3A\par p.[id number] and SF3B1 p.[id number]. Based on the additional\par findings, the diagnosis is as stated above.\par \par \par IMAGING \par --------------------\par CT Abd (07/2020)\par Spleen 20 cm

## 2022-11-24 NOTE — ASSESSMENT
[FreeTextEntry1] : 56 year-old Mr. TILLEY is seen for initial consult for stem cell transplant for JAK2 positive MPN (no subtype mentioned) with focal myelofibrosis as per BMB in 07/2020. Patient was not on any treatment or hematology follow up since diagnosis. He recently was admitted with fatigue, dizziness and vomiting and was found to have high WBC (~75K) count contributed by neutrophils, immature granulocytes and a few blasts (<10%). He also has thrombocytosis (~600K). He has splenomegaly. 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 A repeat marrow showed progression/transformation of his 07/2020 diagnosis (MF, marrow fibrosis grade 2) to overt PMF (marrow fibrosis grade 3), as well as MDS-RS-T. He has JAK2 mutation and also has SF3B1 mutation. \par \par # JAK2 positive MPN (no subtype established) \par - ET vs prefibortic PMF vs PMF (per 07/2020 BMB) \par - Additional mutations (SF3B1, ASXL1, DNMT3A) \par - Repeat BMB: progression/transformation of his 07/2020 diagnosis (MF, marrow fibrosis grade 2) to overt PMF (marrow fibrosis grade 3), as well as MDS-RS-T\par - CBC reviewed from today..plts and hgb stable....wbc 119....recheck in 2 weeks\par - increased Hydrea 1000 mg  BID ..with 500 mg midday. and cont  JAKAFI \par - to be  considered  HMA ...should start soon...asked pt to call Kris Thomas next week\par -donor options limited to mismatched unrelated...pt is estranged from his family...no children\par - I have had another extensive discussion regarding the risks benefits alternatives rationale and logistics of allogenic stem cell transplant. The four week hospital stay and 6 to 12 month recovery was discussed. The preparative regimen and GV HD prophylactic regimen was discussed .... at this time patient’s performance status is worse..... not the best  for  allogeneic transplant. I am also concerned with his lack of support system ... Literature was provided questions were addressed. I am somewhat hopeful that with continued treatment his performance status may improve. We discussed potential solutions to his lack of caregiver situation .. Vital organ function testing social work evaluation and orientation discussed ....echo / muga with borderline EF...asked pt to f/u with pmd and cardiology..both through [facility].. will have him see our onco cardiologist..The patient is reluctant at this time but wants to be considered for the procedure..he may not be the best candidate for transplant intervention.. He will follow with Dr. Hillman.. His primary oncologist ,  in dec... I have asked him  to follow up with myself  in 3 months ......a prelim donor search was done...no 12/12 donors...mismatched unrelated donor transplant can be done...At this time risk may outweigh benefit...
Labs/Imaging Studies

## 2022-11-24 NOTE — HISTORY OF PRESENT ILLNESS
[de-identified] :  56 year-old Mr. TILLEY is seen in consult for JAK2 positive MPN (not otherwise specified). Patient was recently admitted to [facility] with fatigue, vomiting, dizziness. He was found to have  high WBC count predominantly matured neutrophils and some blasts (<10%). It was also noted that he was admitted with rectal bleed  in 07/2020 and had a BMBx at that time which was suggestive of MPN disease (no subtype specified). He was discharged with outpatient hematology appointment but never followed up with outpatient hematology clinic. In the recent admission his symptoms were attributed to dehydration and high WBC count and was started on Hydrea.  He did not see a neurologist nor had a brain MRI or LP to investigate his dizziness. Patient has been mostly wheelchair bound...today is walking with a cane [de-identified] : \par Patient presents for f/u to  initial eval for allogeneic stem cell transplant... He has had a recent BMB. His prior  biopsy result shows progression/transformation of his 07/2020 diagnosis (MF, marrow fibrosis grade 2) to overt PMF (marrow fibrosis grade 3), as well as MDS-RS-T. He has JAK2 mutation and also has SF3B1. The patient is on Hydrea 1000 mg bid and jakafi 15 mg bid... He offers that he had improved to some extent but now is getting worse..was hospitalized recently... His appetite is not good... He is back  in a wheelchair..he offers that he will be starting chemotherapy...wbc over 100

## 2022-11-24 NOTE — REVIEW OF SYSTEMS
[Negative] : Allergic/Immunologic [Fatigue] : fatigue [Lower Ext Edema] : lower extremity edema [Cough] : cough [FreeTextEntry2] :  in wheel chair, less weak, fatigued [FreeTextEntry7] : bloated / distended..early satiety

## 2022-12-02 NOTE — ASU DISCHARGE PLAN (ADULT/PEDIATRIC) - ASU DC SPECIAL INSTRUCTIONSFT
Chest Port Placement    Discharge Instructions  - You have had a chest port implated in your chest.   - The port is ready for use.  - You may shower in 48 hours. No soaking or swimming for 2 weeks or until the site is completely healed.  - Keep the area covered and dry for the next 7 days. It may be removed by a chemotherapy nurse as needed for treatment.  - Do not perform any heavy lifting or put tension on the area for the next week or until the site is healed.  - You may resume your normal diet.  - You may resume your normal medications however you should wait 48 hours before restarting aspirin, plavix, or blood thinners.  - It is normal to experience some pain over the site for the next few days. You may take apply ice to the area (20 minutes on, 20 minutes off) and take Tylenol for that pain. Do not take more frequently than every 6 hours and do not exceed more than 3000mg of Tylenol in a 24 hour period.    - You were given conscious sedation which may make you drowsy, therefore you need someone to stay with you until the morning following the procedure.  - Do not drive, engage in heavy lifting or strenuous activity, or drink any alcoholic beverages for the next 24 hours.   - You may resume normal activity in 24 hours.    Notify your primary physician and/or Interventional Radiology IMMEDIATELY if you experience any of the following       - Fever of 100.4F or 38C       - Chills or Rigors/ Shakes       - Swelling and/or Redness in the area around the port       - Worsening Pain       - Blood soaked bandages or worsening bleeding       - Lightheadedness and/or dizziness upon standing       - Chest Pain/ Tightness       - Shortness of Breath       - Difficulty walking    If you have a problem that you believe requires IMMEDIATE attention, please go to your NEAREST Emergency Room. If you believe your problem can safely wait until you speak to a physician, please call Interventional Radiology for any concerns.    During Normal Weekday Business Hours- You can contact the Interventional Radiology department during normal business hours via telephone.  During Evenings and Weekends- If you need to contact Interventional Radiology during off hours, do so by calling the hospital and requesting to be connected to the Interventional Radiologist on call. Chest Port Placement    Discharge Instructions  - You have had a chest port implanted in your chest.   - The port is ready for use.  - You may shower in 72 hours. No soaking or swimming for 2 weeks or until the site is completely healed.  - Keep the area covered and dry for the next 7 days. It may be removed by a chemotherapy nurse as needed for treatment.  - Do not perform any heavy lifting or put tension on the area for the next week or until the site is healed.  - The skin glue (Dermabond) on your skin will act as a scab, allow it to fall off on its own over the next 1-3 weeks.  - You may resume your normal diet.  - You may resume your normal medications however you should wait 48 hours before restarting aspirin, plavix, or blood thinners.  - It is normal to experience some pain over the site for the next few days. You may take apply ice to the area (20 minutes on, 20 minutes off) and take Tylenol for that pain. Do not take more frequently than every 6 hours and do not exceed more than 3000mg of Tylenol in a 24 hour period.    - You were given conscious sedation which may make you drowsy, therefore you need someone to stay with you until the morning following the procedure.  - Do not drive, engage in heavy lifting or strenuous activity, or drink any alcoholic beverages for the next 24 hours.   - You may resume normal activity in 24 hours.    Notify your primary physician and/or Interventional Radiology IMMEDIATELY if you experience any of the following       - Fever of 101F or 38C       - Chills or Rigors/ Shakes       - Swelling and/or Redness in the area around the port       - Worsening Pain       - Blood soaked bandages or worsening bleeding       - Lightheadedness and/or dizziness upon standing       - Chest Pain/ Tightness       - Shortness of Breath       - Difficulty walking    If you have a problem that you believe requires IMMEDIATE attention, please go to your NEAREST Emergency Room. If you believe your problem can safely wait until you speak to a physician, please call Interventional Radiology for any concerns.    During Normal Weekday Business Hours- You can contact the Interventional Radiology department during normal business hours via telephone.  During Evenings and Weekends- If you need to contact Interventional Radiology during off hours, do so by calling the hospital and requesting to be connected to the Interventional Radiologist on call.

## 2022-12-02 NOTE — PROCEDURE NOTE - PROCEDURE FINDINGS AND DETAILS
Successful Right Chest Port Placement with placement of an 8F port with the tip at the superior cavoatrial junction. Ready for immediate use.

## 2022-12-02 NOTE — ASU DISCHARGE PLAN (ADULT/PEDIATRIC) - NS MD DC FALL RISK RISK
For information on Fall & Injury Prevention, visit: [email or web address] OR  [email or web address] OR  https://www.cdc.gov/steadi/patient.html

## 2022-12-02 NOTE — PRE-ANESTHESIA EVALUATION ADULT - NSANTHOSAYNRD_GEN_A_CORE
No. NILES screening performed.  STOP BANG Legend: 0-2 = LOW Risk; 3-4 = INTERMEDIATE Risk; 5-8 = HIGH Risk

## 2022-12-02 NOTE — PRE PROCEDURE NOTE - PRE PROCEDURE EVALUATION
Interventional Radiology    HPI:   56 yr old male,   JAK2 positive MPN (no subtype mentioned) with focal myelofibrosis as per BMB in 07/2020.   A repeat marrow showed progression/transformation of his 07/2020 diagnosis (MF, marrow fibrosis grade 2) to overt PMF (marrow fibrosis grade 3), as well as MDS-RS-T. He has JAK2 mutation and also has SF3B1 mutation.  Presents to to IR on 12/2/2022 for chest port placement for chemotherapy.              NPO:    Allergies:   Medications (Abx/Cardiac/Anticoagulation/Blood Products)      Data:  182.9  86.2  T(C): --  HR: 103  BP: 123/65  RR: 17  SpO2: 95%    Exam  General: No acute distress  Chest: Non labored breathing          Imaging:  reviewed     Plan: 56 yr old male,   JAK2 positive MPN (no subtype mentioned) with focal myelofibrosis as per BMB in 07/2020.   A repeat marrow showed progression/transformation of his 07/2020 diagnosis (MF, marrow fibrosis grade 2) to overt PMF (marrow fibrosis grade 3), as well as MDS-RS-T. He has JAK2 mutation and also has SF3B1 mutation.  Presents to to IR on 12/2/2022 for chest port placement for chemotherapy.     -Risks/Benefits/alternatives explained with the patient and/or healthcare proxy and witnessed informed consent obtained.    Interventional Radiology    HPI:  56 yr old male,   JAK2 positive MPN (no subtype mentioned) with focal myelofibrosis as per BMB in 07/2020.   A repeat marrow showed progression/transformation of his 07/2020 diagnosis (MF, marrow fibrosis grade 2) to overt PMF (marrow fibrosis grade 3), as well as MDS-RS-T. He has JAK2 mutation and also has SF3B1 mutation.  Presents to to IR on 12/2/2022 for chest port placement for chemotherapy.              NPO:  n/a    Allergies:   nkda    Medications (Abx/Cardiac/Anticoagulation/Blood Products)      Data:  182.9  86.2  T(C): --  HR: 103  BP: 123/65  RR: 17  SpO2: 95%    Exam  General: No acute distress  Chest: Non labored breathing          Imaging:  reviewed     Plan: 56 yr old male,   JAK2 positive MPN (no subtype mentioned) with focal myelofibrosis as per BMB in 07/2020.   A repeat marrow showed progression/transformation of his 07/2020 diagnosis (MF, marrow fibrosis grade 2) to overt PMF (marrow fibrosis grade 3), as well as MDS-RS-T. He has JAK2 mutation and also has SF3B1 mutation.  Presents to to IR on 12/2/2022 for chest port placement for chemotherapy.     -Risks/Benefits/alternatives explained with the patient and/or healthcare proxy and witnessed informed consent obtained.

## 2022-12-02 NOTE — ASU PATIENT PROFILE, ADULT - FALL HARM RISK - HARM RISK INTERVENTIONS

## 2022-12-02 NOTE — ASU DISCHARGE PLAN (ADULT/PEDIATRIC) - NURSING INSTRUCTIONS
Please feel free to contact us at [phone] if any problems arise. After 6PM, Monday through Friday, on weekends and on holidays, please call [phone] and ask for the radiology resident on call to be paged.

## 2022-12-02 NOTE — PRE-ANESTHESIA EVALUATION ADULT - NSANTHPMHFT_GEN_ALL_CORE
hx htn, dm2, smoker, myelofibrosis with profound leukocytosis, medication noncompliance, presents today for chest port insertion.

## 2022-12-02 NOTE — PRE-ANESTHESIA EVALUATION ADULT - NSANTHAIRWAYFT_ENT_ALL_CORE
2/18/2020    Call Regarding Preventive Health Screening Colonoscopy and Mammogram and ReattributionPhysical       Attempt 3    Message on voicemail    Comments:       Outreach   GB     TMD>3 FB, FROM [facility], Good mouth opening

## 2022-12-02 NOTE — PRE-ANESTHESIA EVALUATION ADULT - NSANTHADDINFOFT_GEN_ALL_CORE
Followup: During the consent process the patient asked if he could drink water, and was instructed by myself and the RN team to not drink anything prior the procedure. Shortly after consent, I received a call from the IR nurse that the patient was seen drinking water in the IR preop area. I informed the patient and the IR team that he needs to be NPO from clears for two hours prior to this elective procedure. Patient requests to proceed without anesthesia and have the port placement under local anesthesia.

## 2022-12-08 NOTE — ED PROVIDER NOTE - PHYSICAL EXAMINATION
GENERAL: older gentleman of stated age in NAD, sitting up in bed comfortably  HEAD:  Atraumatic, Normocephalic  EYES: EOMI, PERRLA, conjunctiva and sclera clear  ENT: Moist mucous membranes, diffuse bright red blood on lower gums   NECK: Supple  CHEST/LUNG: Clear to auscultation bilaterally; No rales, rhonchi, wheezing, or rubs. Unlabored respirations  HEART: Regular rate and rhythm; No murmurs, rubs, or gallops  ABDOMEN: Bowel sounds present; Soft, Nontender, Nondistended. No hepatomegally  EXTREMITIES:  2+ Peripheral Pulses, brisk capillary refill. No clubbing, cyanosis, or edema  NERVOUS SYSTEM:  Alert & Oriented X3, speech clear. No deficits   MSK: FROM all 4 extremities, full and equal strength  SKIN: No rashes or lesions

## 2022-12-08 NOTE — ED ADULT NURSE NOTE - NSICDXPASTMEDICALHX_GEN_ALL_CORE_FT
PAST MEDICAL HISTORY:  Arthritis     DM (diabetes mellitus)     HTN (hypertension)     HTN (hypertension)     Myeloproliferative disease     Primary myelofibrosis     Smoking     T2DM (type 2 diabetes mellitus)

## 2022-12-08 NOTE — ED ADULT NURSE NOTE - CHIEF COMPLAINT QUOTE
oral sx performed this morning, p/w bleeding to sx site, pt states he's on a blood thinner but unsure which one-hx leukemia (starting chemo Monday, DMT2- FS 316_

## 2022-12-08 NOTE — ED ADULT NURSE NOTE - NSICDXFAMILYHX_GEN_ALL_CORE_FT
FAMILY HISTORY:  Family history of heart disease, Early family history of heart disease in father (MI, CABG in 50s) and sister (MI in 40s)  FH: type 2 diabetes mellitus    Uncle  Still living? Unknown  Family history of lung cancer, Age at diagnosis: Age Unknown

## 2022-12-08 NOTE — ED PROVIDER NOTE - NSFOLLOWUPINSTRUCTIONS_ED_ALL_ED_FT
1. You presented to the emergency department for: bleeding gums after your dental procedure this morning. Gauze with thromboxane was applied to your gums and the bleeding stopped. Please follow up with your dentist for further management.     2. Your evaluation in the emergency department included a physician evaluation. Your work-up did not reveal any findings indicating the need for admission to the hospital or any emergent interventions at this time.     3. It is recommended that you follow-up with your Dentist as arranged by the discharge center for a repeat evaluation, and potentially further testing and treatment.       4. Please continue taking your regular medications as prescribed.       5. PLEASE RETURN TO THE EMERGENCY DEPARTMENT IMMEDIATELY IF you develop any fevers not responding to over the counter medications, uncontrollable nausea and vomiting, an inability to tolerate eating and drinking, difficulty breathing, chest pain, a severe increase in your symptoms, uncontrolled bleeding,  or pain, or any other new symptoms that concern you.

## 2022-12-08 NOTE — ED PROVIDER NOTE - PATIENT PORTAL LINK FT
You can access the FollowMyHealth Patient Portal offered by [facility] by registering at the following website: [email or web address]. By joining [facility]’s FollowMyHealth portal, you will also be able to view your health information using other applications (apps) compatible with our system.

## 2022-12-08 NOTE — ED PROVIDER NOTE - CLINICAL SUMMARY MEDICAL DECISION MAKING FREE TEXT BOX
56 yr old M with PMH of primary myelofibrosis, Type 2 DM, HTN, and HLD presents to the ED for bleeding gums s/p dental procedure this morning. PE notable for bight red blood on lower gums. Will obtain labs. Will try guaze w/wo TXA as it has worked in the past for the patient. If bleeding does not stop, will contact Dental again. 56 yr old M with PMH of primary myelofibrosis, Type 2 DM, HTN, and HLD presents to the ED for bleeding gums s/p dental procedure this morning. PE notable for bight red blood on lower gums. Will obtain labs. Will try guaze w/wo TXA as it has worked in the past for the patient. If bleeding does not stop, will contact Dental again.    Attending/MD Monika. see attg note

## 2022-12-08 NOTE — ED PROVIDER NOTE - OBJECTIVE STATEMENT
56 yr old M with PMH of primary myelofibrosis, Type 2 DM, HTN, and HLD presents to the ED for bleeding gums. Patient states that he had a dental procedure this morning around 10 AM. He states that his Dentist Dr. Maurisio Byrd told him that there is bone growing through his gums. He underwent a procedure today in  the bone was filed down and sutures were placed. He states that since he finished his procedure this morning his gums have been bleeding and he has been spitting up bright red blood with clots. He endorses fatigue and generalized weakness. He denies any blood thinner use.  He denies SOB, chest pain, fevers. Of note patient states that this has happened to him multiple times in the past after dental procedures.  He states that the application of gauze and a medication stopped the bleeding in the past.

## 2022-12-08 NOTE — ED PROVIDER NOTE - PROGRESS NOTE DETAILS
Spoke to on-call dental resident Mitchell Nieves on the phone. The resident recommended a trial of gauze w/wo TXA. If this did not work, he said to call him back. Gauze with TXA was applied and bleeding has stopped. Reassessment: No more bleeding

## 2022-12-08 NOTE — ED ADULT NURSE NOTE - OBJECTIVE STATEMENT
56 yr old male patient presented to the ER aox4 complains of gum bleeding started this morning after he had a procedure done at the dentist office. pmhx HTN, and HDL. Patient  was lined and lab with 20ga Iv to right wrist. Patient stated that this morning he went and file bone that was growing in his gum and the dentis sutured the incision site. After the procedure, he has been spitting blood with clots. Denied taking blood thinner, chest pain, SOB, fever and chill. Will continue to monitor for any changes. TXA is ordered.

## 2022-12-08 NOTE — ED PROVIDER NOTE - NS ED ROS FT
CONSTITUTIONAL: +weakness, +fatigue, no fevers, chills, sick contacts, or unintended weight loss  EYES: No visual changes or vertigo  ENT: +bleeding gums, No throat pain, rhinorrhea, or hearing loss   NECK: No pain or stiffness  RESPIRATORY: No cough, wheezing, hemoptysis; No shortness of breath  CARDIOVASCULAR: No chest pain or palpitations  GASTROINTESTINAL: No abdominal or epigastric pain. No nausea, vomiting, or hematemesis; No diarrhea or constipation. No melena or hematochezia.  GENITOURINARY: No dysuria, frequency or hematuria  NEUROLOGICAL: No numbness or weakness  SKIN: No itching, rashes, or bruises  Psych: Good mood, no substance use
- - -

## 2022-12-09 PROBLEM — D47.1 MYELOPROLIFERATIVE DISEASE: Status: ACTIVE | Noted: 2022-01-01

## 2022-12-09 PROBLEM — E11.9 CONTROLLED TYPE 2 DIABETES MELLITUS: Status: ACTIVE | Noted: 2020-08-04

## 2022-12-09 PROBLEM — I50.22 CHRONIC SYSTOLIC CONGESTIVE HEART FAILURE: Status: ACTIVE | Noted: 2022-01-01

## 2022-12-09 PROBLEM — I73.9 PAD (PERIPHERAL ARTERY DISEASE): Status: ACTIVE | Noted: 2022-01-01

## 2022-12-09 PROBLEM — Z01.818 ENCOUNTER FOR PRE-TRANSPLANT EVALUATION FOR STEM CELL TRANSPLANT: Status: ACTIVE | Noted: 2022-01-01

## 2022-12-09 NOTE — DISCUSSION/SUMMARY
[Systolic Heart Failure] : systolic heart failure [Peripheral Vascular Disease] : peripheral vascular disease [Deteriorating] : deteriorating [Not Responding to Treatment] : not responding to treatment [Medication Changes Per Orders] : Medication changes are as documented in orders [Smoking Cessation] : smoking cessation [Patient] : the patient [Guardian] : the guardian [Risks] : risks [Benefits] : benefits [Alternatives] : alternatives [With Me] : with me [___ Month(s)] : in [unfilled] month(s) [EKG obtained to assist in diagnosis and management of assessed problem(s)] : EKG obtained to assist in diagnosis and management of assessed problem(s)

## 2022-12-09 NOTE — PHYSICAL EXAM
[Ill-Appearing] : ill-appearing [Cachectic] : cachexia was observed [Normal Conjunctiva] : normal conjunctiva [No Xanthelasma] : no xanthelasma [Normal Venous Pressure] : normal venous pressure [No Carotid Bruit] : no carotid bruit [Normal S1, S2] : normal S1, S2 [No Murmur] : no murmur [No Rub] : no rub [No Gallop] : no gallop [No Respiratory Distress] : no respiratory distress  [Soft] : abdomen soft [Abnormal Gait] : abnormal gait [No Cyanosis] : no cyanosis [Diminished Pedal Pulses ___] : diminished pedal pulses [unfilled] [Edema ___] : edema [unfilled] [No Rash] : no rash [Moves all extremities] : moves all extremities [Alert and Oriented] : alert and oriented [de-identified] : warm

## 2022-12-09 NOTE — END OF VISIT
Lead connected to generator in the  chest wall. [Time Spent: ___ minutes] : I have spent [unfilled] minutes of time on the encounter. [>50% of the face to face encounter time was spent on counseling and/or coordination of care for ___] : Greater than 50% of the face to face encounter time was spent on counseling and/or coordination of care for [unfilled]

## 2022-12-09 NOTE — HISTORY OF PRESENT ILLNESS
[FreeTextEntry1] : DINAH TILLEY is a 56 year man with a history of diabetes and myeloproliferative disorder with JAK2 mutation, peripheral artery disease with aortoiliac and SMA stenosis, diabetes on insulin, cigarette smoking who presents for cardiovascular evaluation. He is seen with his caregiver today.\par \par He complains of dizziness, leg pain and weakness, easy bleeding, cough, and fatigue. He denies any history of heart disease, but an echo in August showed moderately reduced LV systolic function and the ECG is abnormal with LAD and incomplete LBBB and TW abnormalities consistent with cardiomyopathy.\par \par He continues to smoke. He tells me he is unable to quit smoking. He previously smoked 5 packets a day, currently 10-15 per day. Wearing patch and has lozenges and gum but not helping.\par \par He reports he bled in his mouth for 12 hours yesterday following an operation, went to the ER at [facility] and given a topical hemostatic. Did not receive transfusion.\par  kamar Lives in [place] with caregiver. Worked as  until September 2021 when he became symptomatic with weakness and lower extremity symptoms. Currently uses a cane.\par \par \par Cardiovascular Summary:\par ----------------------------------------------\par ECG:\par 12/9/2022: sinus tachycardia, incomplete LBBB, lateral TWI\par ----------------------------------------------\par Echo:\par 8/10/2022: EF 35-40 % (global), GLS -17.9 %, RV enlargement\par ----------------------------------------------\par MUGA:\par 8/10/2022: EF 47 %\par ----------------------------------------------\par Vascular:\par 7/16/2022 (CT-angio abdomen w/ run-off): \par - Severe proximal SMA stenosis versus near total occlusion with reconstitution.\par - bilateral external iliac and superficial femoral artery stenosis with calcified plaque\par -----------------------------------------------\par PFTs:\par 8/10/2022: severe restrictive ventilatory defect, decreased DLCO

## 2022-12-09 NOTE — COUNSELING
[Yes] : Risk of tobacco use and health benefits of smoking cessation discussed: Yes [Use of nicotine replacement therapies and other medications discussed] : Use of nicotine replacement therapies and other medications discussed [Encouraged to pick a quit date and identify support needed to quit] : Encouraged to pick a quit date and identify support needed to quit [Smoking Cessation Program Referral] : Smoking Cessation Program Referral  [No] : Not willing to quit smoking [FreeTextEntry3] : 15

## 2022-12-09 NOTE — REVIEW OF SYSTEMS
[Feeling Fatigued] : feeling fatigued [Blurry Vision] : blurred vision [Mouth Sores] : mouth sores [SOB] : shortness of breath [Dyspnea on exertion] : dyspnea during exertion [Lower Ext Edema] : lower extremity edema [Leg Claudication] : intermittent leg claudication [Orthopnea] : orthopnea [Cough] : cough [Coughing Up Blood] : hemoptysis [Change in Appetite] : change in appetite [Itching] : itching [Dizziness] : dizziness [Depression] : depression [Under Stress] : under stress [Easy Bleeding] : a tendency for easy bleeding [Easy Bruising] : a tendency for easy bruising

## 2022-12-09 NOTE — REASON FOR VISIT
[Formal Caregiver] : formal caregiver [FreeTextEntry3] : Dr. Domínguez [FreeTextEntry1] : DINAH TILLEY is a 56 year man with a history of diabetes and myeloproliferative disorder with JAK2 mutation, peripheral artery disease with aortoiliac and SMA stenosis, diabetes on insulin, cigarette smoking who presents for cardio-oncology evaluation.\par \par Prior Cancer Treatments:\par ------------------------------------------------------------------------\par Chemo/targeted therapy:\par ruxolitinib and hydroxyurea \par ------------------------------------------------------------------------

## 2022-12-09 NOTE — ASSESSMENT
[FreeTextEntry1] : 56 year old man with extensive atherosclerosis in the setting of diabetes, JAK2 mutated myeloproliferative disorder, and significant and inveterate cigarette smoking. He continues to smoke. He is not on a statin. Discussed the implications of his condition and the need for immediate smoking cessation to have any chance of meaningful improvement and successful treatment.\par \par Start rosuvastatin 20 mg daily\par Start bisoprolol 5 mg daily for cardiomyopathy and likely CAD\par No aspirin given bleeding diathesis, though platelet count normal currently. He would benefit from antiplatelet therapy.\par Smoking cessation emphasized. Strategies discussed.\par He is starting chemotherapy next week\par Follow up in one month to monitor progress and continue to optimize medical therapy.\par He is at high risk for complications, morbidity, and mortality.\par \par Discussed with the patient and his caregiver. All questions answered to the best of my ability and to his apparent satisfaction.

## 2022-12-14 NOTE — HISTORY OF PRESENT ILLNESS
[de-identified] : Patient here for Dacogen day 3 today. Called by nurse to evaluate patient for hypoxia. Sp02 88% ORA. Denies sob , cp, palpitations. He is a current smoker and denies asthma. VS: 90/60, HR 70s, afebrile. Pt admits to dizziness which is not new but that he took Bisprolol prescribed by his cardiologist for the first time yesterday. His care taker mentioned he fell yesterday but did not hit his head and he did not pass out. albuterol Nebulizer adminstered with improvement of O2 to 93% . Per primary team ok to treat with Dacogen today. Instructed pt and caretaker (Rohit) to monitor BP, if systolic bp is < 90 , instructed to hold Bisprolol and call office and cardiologist. Pt understands and agrees with plan , caretaker to buy bp machine today and monitor bp.  \par \par

## 2022-12-24 NOTE — ED ADULT TRIAGE NOTE - CHIEF COMPLAINT QUOTE
hem/onc- abd pain    c/o ruq pain with nausea and vomit x1 since last night. pt on chemo- last dose 12/16.  pmhx- diabetes type 2, leukemia, mds/mpn

## 2022-12-24 NOTE — PATIENT PROFILE ADULT - FALL HARM RISK - HARM RISK INTERVENTIONS

## 2022-12-24 NOTE — H&P ADULT - NSHPPHYSICALEXAM_GEN_ALL_CORE
LOS:     VITALS:   T(C): 36.7 (12-24-22 @ 15:00), Max: 36.9 (12-24-22 @ 08:57)  HR: 81 (12-24-22 @ 15:00) (76 - 84)  BP: 101/63 (12-24-22 @ 15:00) (101/63 - 107/62)  RR: 18 (12-24-22 @ 15:00) (16 - 20)  SpO2: 98% (12-24-22 @ 15:00) (93% - 100%)    GENERAL: NAD, lying in bed comfortably  HEAD:  Atraumatic, Normocephalic  EYES: conjunctiva and sclera clear  ENT: Moist mucous membranes  NECK: Supple, No JVD  CHEST/LUNG: Clear to auscultation bilaterally; No rales, rhonchi, wheezing, or rubs. Unlabored respirations  HEART: Regular rate and rhythm; No murmurs, rubs, or gallops  ABDOMEN: BSx4; Soft, TTP of RUQ; + Parker's sign  EXTREMITIES:  2+ LE pitting edema b/l   NERVOUS SYSTEM:  A&Ox3, no focal deficits   SKIN: No rashes or lesions

## 2022-12-24 NOTE — H&P ADULT - PROBLEM SELECTOR PLAN 7
Diet: NPO  DVT: SCD i/so thrombocytopenia  Dispo: PT eval Active smoker: 1 pack per week; uses nicotine patches 21mg QD   C/w nicotine patch

## 2022-12-24 NOTE — ED PROVIDER NOTE - OBJECTIVE STATEMENT
This is a 57-year-old male with past medical history of leukemia currently going treatment for chemo last treatment was approximately 1 week ago presented to the emergency room complaining of having abdominal pain starting yesterday along with nausea and vomiting since last night had 1 episode of nausea and vomiting right upper quadrant pain has been intermittent since yesterday denies having any fever or chills no diarrhea or constipation no dysuria hematuria urinary urgency or frequency he denies having any chest pain shortness of breath exertional dyspnea he says he has not really been eating or drinking because of the pain however is requesting water to drink right now.  Denies have any abdominal surgeries he states that there is a port in his right upper chest going downwards into his abdomen.

## 2022-12-24 NOTE — ED ADULT NURSE NOTE - NSIMPLEMENTINTERV_GEN_ALL_ED
Implemented All Fall Risk Interventions:  [place] to call system. Call bell, personal items and telephone within reach. Instruct patient to call for assistance. Room bathroom lighting operational. Non-slip footwear when patient is off stretcher. Physically safe environment: no spills, clutter or unnecessary equipment. Stretcher in lowest position, wheels locked, appropriate side rails in place. Provide visual cue, wrist band, yellow gown, etc. Monitor gait and stability. Monitor for mental status changes and reorient to person, place, and time. Review medications for side effects contributing to fall risk. Reinforce activity limits and safety measures with patient and family.

## 2022-12-24 NOTE — ED PROVIDER NOTE - ATTENDING APP SHARED VISIT CONTRIBUTION OF CARE
I (Elizabeth) agree with above, I performed a history and physical. Counseled jacob medical staff, physician assistant, and/or medical student on medical decision making as documented. Medical decisions and treatment interventions were made in real time during the patient encounter. Additionally and/or with the following exceptions: Patient is a 57-year-old male past medical history of leukemia as above who is presenting to the emergency department with right upper quadrant pain.  Was tender to palp in the right upper quadrant and left upper quadrant.  Found to have chronically elevated white count on previous laboratories as per chart review.  Today in the emergency department  Related elevated to 3.0, troponin elevated to 193.  Lactate 4.0 on venous blood gas.  X-ray clear.  Will obtain ultrasound and CT imaging.  Given elevated troponin and right upper quadrant left upper quadrant pain will admit for probable ACS.

## 2022-12-24 NOTE — CONSULT NOTE ADULT - SUBJECTIVE AND OBJECTIVE BOX
Interventional Radiology    HPI:   57-year-old male with past medical history of leukemia currently on treatment (last chemotherapy 8 days ago) who presents with RUQ pain for 1 day. He reports significant RUQ pain with n/v. He denies f/c. He reports he has not been able to tolerate PO. He reports that he is currently being treated for his leukemia.     In the eER the pt was hemodynamically stable. US was performed which showed signs of cholecystitis. Labs were significant for an elevated wbc, and a T bili of 3.0.     Allergies:   Medications (Abx/Cardiac/Anticoagulation/Blood Products)      Data:  182.9  T(C): 36.7  HR: 76  BP: 107/62  RR: 16  SpO2: 93%    LABS:                          10.3   70.16 )-----------( 45       ( 24 Dec 2022 09:13 )             31.4     12-24    132<L>  |  96<L>  |  23  ----------------------------<  215<H>  4.8   |  28  |  0.52    Ca    9.0      24 Dec 2022 09:13    TPro  6.0  /  Alb  3.4  /  TBili  3.0<H>  /  DBili  x   /  AST  48<H>  /  ALT  16  /  AlkPhos  253<H>  12-24        Radiology: US and CT reviewed    Assessment/Plan: 58 yo M pmh leukemia on chemotherapy p/w RUQ pain x1 day found to have acute cholecystitis    -- No evidence of gangrenous/destructive cholecystitis on imaging. Gallbladder is not distended. Recommend conservative management at this time.  --Abx, IVF  --Keep patient NPO  --If patient acutely decompensates, please page IR      Laci Reeves MD   Interventional Radiology PGY 4  Available on [employer] TEAMS    For EMERGENT inquiries/questions:  IR Pager ([facility]): [phone]  IR Pager ([facility]): [phone] ; [email or web address]    For non-emergent consults/questions:   Please place a sunrise order "Consult- Interventional Radiology" with an appropriate callback number    For questions about scheduling during appropriate work hours, call IR :  [facility]: [phone]  [facility]: [phone]    For outpatient IR booking:  [facility]: [phone]  [facility]: [phone]

## 2022-12-24 NOTE — ED PROVIDER NOTE - CLINICAL SUMMARY MEDICAL DECISION MAKING FREE TEXT BOX
This is a 57-year-old male with past medical history of leukemia currently going treatment for chemo last treatment was approximately 1 week ago presented to the emergency room complaining of having abdominal pain starting yesterday. abdominal pain-labs, fluids, us gallbladder

## 2022-12-24 NOTE — ED ADULT NURSE NOTE - OBJECTIVE STATEMENT
Received pt into spot #17, pt alert/oriented x 4, pt c/o mid upper epigastric abdominal pain with nausea/vomiting since yesterday. C/o 10/10 pain to area. Pt also at times calls out for nurse. Upon assessment, pt requesting help to sit up on side of bed. Pt able to sit on side of bed independently once assisted into sitting position. Moments later pt walking out of room with cane gait steady requesting for water. Kashmir METCALF went to eval pt. Instructed pt nothing to eat or drink until more testing done. Pt verbalized understanding. #20 angio placed to LAC , Morphine 4mg IVP givcen as ordered. NS bolus started as ordered. Blood work sent as ordered. Pt taken to xray dept for CXR.

## 2022-12-24 NOTE — ED ADULT NURSE REASSESSMENT NOTE - NS ED NURSE REASSESS COMMENT FT1
pt laying on stretcher on left side. Vitals obtained at this time. Pt's O2 sat RA on tele monitor ranging 88-93% on RA with a good wave form. Pt denies any difficulty breathing. Breathing easy and unlabored. Kashmir METCALF made aware. Pt assessed by PA. When pt sits up on side of stretcher, O2 sat increased to 96-99%. Pt states pain to abdomen at 8/10 pain scale. Pt does not appear in acute distress. Pt taking to friend on cell phone. No new orders at this time.

## 2022-12-24 NOTE — H&P ADULT - PROBLEM SELECTOR PLAN 2
Follows with Edd[facility] (Dr. Domínguez); last chemotherapy cycle (12/12)   - On home Jakafi 15 mg BID; Hydroxyurea; Decitabine  - Heme/onc consult  - CTM

## 2022-12-24 NOTE — CONSULT NOTE ADULT - SUBJECTIVE AND OBJECTIVE BOX
General Surgery Consult  Consulting surgical team: A Surgery  Consulting attending: Dariana    HPI:  This is a 57-year-old male with past medical history of leukemia currently on treatment (last chemotherapy 8 days ago) who presents with RUQ pain for 1 day. He reports significant RUQ pain with n/v. He denies f/c. He reports he has not been able to tolerate PO. He reports that he is currently being treated for his leukemia.     In the er the pt was hemodynamically stable. US was performed which showed signs of cholecystitis. Labs were significant for an elevated wbc, and a T bili of 3.0.     PAST MEDICAL HISTORY:  HTN (hypertension)    DM (diabetes mellitus)    Arthritis    Myeloproliferative disease    Smoking    History of myeloproliferative disorder    HTN (hypertension)    T2DM (type 2 diabetes mellitus)    Primary myelofibrosis        PAST SURGICAL HISTORY:  No significant past surgical history    H/O eye surgery    History of eye surgery        MEDICATIONS:  piperacillin/tazobactam IVPB... 3.375 Gram(s) IV Intermittent once      ALLERGIES:  No Known Allergies      VITALS & I/Os:  Vital Signs Last 24 Hrs  T(C): 36.7 (24 Dec 2022 11:13), Max: 36.9 (24 Dec 2022 08:57)  T(F): 98.1 (24 Dec 2022 11:13), Max: 98.5 (24 Dec 2022 08:57)  HR: 76 (24 Dec 2022 11:13) (76 - 84)  BP: 107/62 (24 Dec 2022 11:13) (101/71 - 107/62)  BP(mean): --  RR: 16 (24 Dec 2022 11:13) (16 - 20)  SpO2: 93% (24 Dec 2022 11:13) (93% - 100%)    Parameters below as of 24 Dec 2022 11:13  Patient On (Oxygen Delivery Method): room air        I&O's Summary      PHYSICAL EXAM:  General: No acute distress  Respiratory: Nonlabored  Cardiovascular: RRR  Abdominal: Soft, nondistended, RUQ tender. No rebound or guarding. No organomegaly, no palpable mass.  Extremities: Warm    LABS:                        10.3   70.16 )-----------( 45       ( 24 Dec 2022 09:13 )             31.4     12-24    132<L>  |  96<L>  |  23  ----------------------------<  215<H>  4.8   |  28  |  0.52    Ca    9.0      24 Dec 2022 09:13    TPro  6.0  /  Alb  3.4  /  TBili  3.0<H>  /  DBili  x   /  AST  48<H>  /  ALT  16  /  AlkPhos  253<H>  12-24    Lactate:  12-24 @ 08:57  4.0                  IMAGING:  < from: US Abdomen Upper Quadrant Right (12.24.22 @ 10:42) >  FINDINGS:  Liver: The liver is enlarged measuring 26 cm. Normal contour and   echotexture. The main portal vein is patent.  Bile ducts: Normal caliber. Common bile duct measures 5 mm.  Gallbladder: Dependent hyperechogenicity noted within the gallbladder   lumen suggesting stones versus sludge. Gallbladder wall thickening up to   5 mm with pericholecystic edema. Positive sonographic Parker sign per   technologist report. In the gallbladder fundus, there is region of ring   down artifact (1.55-18), possible focal adenomyomatosis.  Pancreas: Not visualized.  Right kidney: 12.0 cm. No hydronephrosis.  Ascites: None.  IVC: Visualized portion unremarkable.    IMPRESSION:  Dependent hyperechogenicity noted within the gallbladder lumen suggesting   stones versus sludge. Gallbladder wall thickening up to 5 mm with   pericholecystic edema. Positive sonographic Parker sign per technologist   report. In thegallbladder fundus, there is region of ring down artifact   (1.55-18), possible focal adenomyomatosis. Correlate clinically for   cholecystitis.    < end of copied text >

## 2022-12-24 NOTE — PATIENT PROFILE ADULT - FUNCTIONAL ASSESSMENT - BASIC MOBILITY 6.
4-calculated by average/Not able to assess (calculate score using [facility] averaging method)

## 2022-12-24 NOTE — H&P ADULT - PROBLEM SELECTOR PLAN 1
CC: sharp RUQ pain with nausea and 1 episode of nonbilious non bloody emesis   On PE: + Parker's sign w/ TTP of RUQ; afebrile   Alk phos - 253; AST - 48; ALT - 16; WBC - 70  (12/24) US -  gallbladder thickening with pericholecystic fluid; + Parker's sign  (12/24) CT abd/pelvis - pericholecystic fluid with gallbladder wall changes concern for acute hortencia    Plan:   - Surg consult, recs appreciated: poor sx candidate, recommend IR consult  - IR consult, recs appreciated: conservative management   - NPO; C/w zosyn (12/24  -   - Blood cx (?)  - GI consult for elevated Tbili (3.0); baseline - 2.0     Pain control:   Acetaminophen 650 q6h for mild, morphine 2 mg IV q6h for mod, Dilaudid 0.5 mg IV q6h for severe CC: sharp RUQ pain with nausea and 1 episode of nonbilious non bloody emesis   On PE: + Parker's sign w/ TTP of RUQ; afebrile   Alk phos - 253; AST - 48; ALT - 16; WBC - 70  (12/24) US -  gallbladder thickening with pericholecystic fluid; + Parker's sign  (12/24) CT abd/pelvis - pericholecystic fluid with gallbladder wall changes concern for acute hortencia    Plan:   - Surg consult, recs appreciated: poor sx candidate, recommend IR consult  - IR consult, recs appreciated: conservative management   - NPO; C/w zosyn (12/24  -   - Blood cx (12/14 - )   - GI consult for elevated Tbili (3.0); baseline - 2.0     Pain control:   Acetaminophen 650 q6h for mild, morphine 2 mg IV q6h for mod, Dilaudid 0.5 mg IV q6h for severe CC: sharp RUQ pain with nausea and 1 episode of nonbilious non bloody emesis   On PE: + Parker's sign w/ TTP of RUQ; afebrile   Alk phos - 253; AST - 48; ALT - 16; WBC - 70  (12/24) US -  gallbladder thickening with pericholecystic fluid; + Parker's sign  (12/24) CT abd/pelvis - pericholecystic fluid with gallbladder wall changes concern for acute hortencia    Plan:   - Surg consult, recs appreciated: poor sx candidate, recommend IR consult  - IR consult, recs appreciated: conservative management   - NPO; C/w zosyn (12/24  -   - Blood cx (12/14 - ); NS 1L @ 75 cc/hr  - GI consult for elevated Tbili (3.0); baseline - 2.0     Pain control:   Acetaminophen 650 q6h for mild-mod, morphine 2 mg IV q4h for severe pain CC: sharp RUQ pain with nausea and 1 episode of nonbilious non bloody emesis   On PE: + Parker's sign w/ TTP of RUQ; afebrile   Alk phos - 253; AST - 48; ALT - 16; WBC - 70  (12/24) US -  gallbladder thickening with pericholecystic fluid; + Parker's sign  (12/24) CT abd/pelvis - pericholecystic fluid with gallbladder wall changes concern for acute hortencia    Plan:   - Surg consult, recs appreciated: poor sx candidate, recommend IR consult  - IR consult, recs appreciated: conservative management   - NPO; C/w zosyn (12/24  -   - Blood cx (12/14 - );   - GI consult for elevated Tbili (3.0); baseline - 2.0     Pain control:   Acetaminophen 650 q6h for mild-mod, morphine 2 mg IV q4h for severe pain

## 2022-12-24 NOTE — H&P ADULT - PROBLEM SELECTOR PLAN 4
On home: furosemide 20 mg QD; Bisoprolol 5 mg QD  On PE; 2+ b/l LE pitting edema  Echo (10/22): EF: 45%; with mod pHTN, mild global left ventricular systolic dysfunction   - Hold home meds is/o hypotension (BP: 101/63)

## 2022-12-24 NOTE — H&P ADULT - NSHPLABSRESULTS_GEN_ALL_CORE
Complete Blood Count + Automated Diff (12.24.22 @ 09:13)   IANC: 25.81:   since it is based on the calculation from a manual differential. K/uL   WBC Count: 70.16:   RBC Count: 2.62 M/uL   Hemoglobin: 10.3 g/dL   Hematocrit: 31.4 %   Mean Cell Volume: 119.8 fL   Mean Cell Hemoglobin: 39.3 pg   Mean Cell Hemoglobin Conc: 32.8 gm/dL   Red Cell Distrib Width: 28.2: Dimorphic population, unable to determine RDW. %   Platelet Count - Automated: 45 K/uL   Auto Neutrophil #: 33.61 K/uL   Auto Lymphocyte #: 13.89 K/uL   Auto Monocyte #: 8.07 K/uL   Auto Eosinophil #: 0.70 K/uL   Auto Basophil #: 5.82 K/uL   Auto Neutrophil %: 47.    Comprehensive Metabolic Panel (12.24.22 @ 09:13)   Sodium, Serum: 132 mmol/L   Potassium, Serum: 4.8 mmol/L   Chloride, Serum: 96 mmol/L   Carbon Dioxide, Serum: 28 mmol/L   Anion Gap, Serum: 8 mmol/L   Blood Urea Nitrogen, Serum: 23 mg/dL   Creatinine, Serum: 0.52 mg/dL   Glucose, Serum: 215 mg/dL   Calcium, Total Serum: 9.0 mg/dL   Protein Total, Serum: 6.0 g/dL   Albumin, Serum: 3.4 g/dL   Bilirubin Total, Serum: 3.0 mg/dL   Alkaline Phosphatase, Serum: 253 U/L   Aspartate Aminotransferase (AST/SGOT): 48 U/L   Alanine Aminotransferase (ALT/SGPT): 16 U/L     Blood Gas Profile - Venous (12.24.22 @ 14:26)   pH, Venous: 7.37   pCO2, Venous: 53 mmHg   pO2, Venous: <20 mmHg   HCO3, Venous: 31 mmol/L   Base Excess, Venous: 4.6 mmol/L   Oxygen Saturation, Venous: 29.7 %   Total CO2, Venous: 32.2 mmol/L     Lipase, Serum (12.24.22 @ 09:13)   Lipase, Serum: 7 U/L T    roponin T, High Sensitivity (12.24.22 @ 09:13)   Troponin T, High Sensitivity Result: 193    Troponin T, High Sensitivity (12.24.22 @ 10:50)   Troponin T, High Sensitivity Result: 168:     < from: CT Abdomen and Pelvis w/ IV Cont (12.24.22 @ 12:09) >  IMPRESSION:    The patient is developed pericholecystic fluid and edematous changes of   the gallbladder wall with cholelithiasis may represent acute cholecystitis    Unchanged hepatosplenomegaly    < from: US Abdomen Upper Quadrant Right (12.24.22 @ 10:42) >    IMPRESSION:  Dependent hyperechogenicity noted within the gallbladder lumen suggesting   stones versus sludge. Gallbladder wall thickening up to 5 mm with   pericholecystic edema. Positive sonographic Parker sign per technologist   report. In thegallbladder fundus, there is region of ring down artifact   (1.55-18), possible focal adenomyomatosis. Correlate clinically for   cholecystitis.      from: Xray Chest 2 Views PA/Lat (12.24.22 @ 09:12) >    IMPRESSION:  No focal consolidations.

## 2022-12-24 NOTE — PATIENT PROFILE ADULT - FUNCTIONAL ASSESSMENT - DAILY ACTIVITY 3.
Upon inspection of IV site, noted that arm was warm, red, hard and irritated  FFP stopped at this time and physician contacted  Will attept new IV site  Only 1/2 FFP administered at this time (about 100cc)  Ice pack applied to old site after IV removed  Incident report filed  4 = No assist / stand by assistance

## 2022-12-24 NOTE — H&P ADULT - NSHPSOCIALHISTORY_GEN_ALL_CORE
Pt lives in a basement in [place] with a friend. Endorses poor diet. Active smoker (has cut down from 1 ppd to 1 pack per week), denies alcohol or drug use.

## 2022-12-24 NOTE — H&P ADULT - ATTENDING COMMENTS
57M with hx of myelofibrosis on chemo (last tx 1 week ago) who presents with RUQ abdominal pain with findings c/f cholecystitis vs. choledocholithiasis. Surgery evaluated - poor surgical candidate, rec IR/GI eval. Per IR - no evidence of gangrenous/destructive cholecystitis on imaging, rec conservative management. GI eval, empiric abx. Thrombocytopenia - much lower than baseline, ?iso of recent chemo, request heme eval, hold home Hydrea/Jakafi pending heme eval. Rest of care as above. 57M with hx of myelofibrosis on chemo (last tx 1 week ago) who presents with RUQ abdominal pain with findings c/f cholecystitis vs. choledocholithiasis. Surgery evaluated - poor surgical candidate, rec IR/GI eval. Per IR - no evidence of gangrenous/destructive cholecystitis on imaging, rec conservative management. GI eval. Discussed with GI fellow Dr. Giuseppe Morales - biliary ducts wnl on imaging, bili elevated not too far from baseline and iso of cholecystitis, can trend for now if uptrends can obtain MRCP/call GI back. Empiric abx for now pending cultures. Thrombocytopenia - much lower than baseline, ?iso of recent chemo, request heme eval, hold home Hydrea/Jakafi pending heme eval. Rest of care as above. 57M with hx of myelofibrosis on chemo (last tx 1 week ago) who presents with RUQ abdominal pain with findings c/f cholecystitis vs. choledocholithiasis. Surgery evaluated - poor surgical candidate, rec IR/GI eval and MRCP to r/o choledocholithiasis . Per IR - no evidence of gangrenous/destructive cholecystitis on imaging, rec conservative management. and GI eval. Discussed with GI fellow Dr. Giuseppe Morales - biliary ducts wnl on imaging, bili elevated not too far from baseline and iso of cholecystitis, can trend for now if uptrends can obtain MRCP/call GI back. Empiric abx for now pending cultures. Thrombocytopenia - much lower than baseline, ?iso of recent chemo, request heme eval, hold home Hydrea/Jakafi pending heme eval. Rest of care as above. 57M with hx of myelofibrosis on chemo (last tx 1 week ago) who presents with RUQ abdominal pain with findings c/f cholecystitis vs. choledocholithiasis. Surgery evaluated - poor surgical candidate, rec IR/GI eval and MRCP to r/o choledocholithiasis . Per IR - no evidence of gangrenous/destructive cholecystitis on imaging, rec conservative management. and GI eval. Discussed with GI fellow Dr. Giuseppe Morales - biliary ducts wnl on imaging, bili 3 slightly elevated from baseline, can trend for now if uptrends can obtain MRCP/call GI back. Empiric abx for now pending cultures. Thrombocytopenia - much lower than baseline, ?iso of recent chemo, request heme eval, hold home Hydrea/Jakafi pending heme eval. Rest of care as above.

## 2022-12-24 NOTE — ED PROVIDER NOTE - NS ED MD DISPO ISOLATION TYPES
Medication: pravastatin (PRAVACHOL) 10 MG tablet  Sig: TAKE 1 TABLET BY MOUTH  DAILY   Qty: 90 with 3 refills  Last Refill: 6/25/19  Last Office Visit for this diagnosis: 5/21/19  Next Appt:  9/6/19  Pharmacy Verified:  YES  Patient informed that refill may take up to 48 hours:  NO   None

## 2022-12-24 NOTE — H&P ADULT - PROBLEM SELECTOR PLAN 6
Active smoker: 1 pack per week; uses nicotine patches 21mg QD   C/w nicotine patch On home rosuvastatin 20 mg QD  - C/w home meds

## 2022-12-24 NOTE — CONSULT NOTE ADULT - ASSESSMENT
This is a 57-year-old male with past medical history of leukemia currently on treatment (last chemotherapy 8 days ago) who presents with RUQ pain for 1 day with signs of cholecystitis and possible choledocholithiasis.     -No acute surgical intervention. Pt is a poor surgical candidate given that he is currently on chemotherapy. For acute cholecystitis would recommend IR consult for percutaneous cholecystostomy tube  -GI consult - pt with elevated t bili - appears chronically elevated however higher than his baseline at this time  -IV abx - zosyn  -medical admission  -will follow closely    Discussed w/ Dr. Westbrook  A Surgery, [id number]

## 2022-12-24 NOTE — ED ADULT NURSE NOTE - PRIMARY CARE PROVIDER
December 6, 2018      Danni Balderas, ELYSE  [id number] [address] [address]           [facility] [address] [facility] [address] [place] [address]  Phone: [phone]          Patient: Cedric Gupta Jr.   MR Number: [id number]   YOB: 1943   Date of Visit: 12/6/2018       Dear Danni Balderas:    Thank you for referring Cedric Gupta to me for evaluation. Attached you will find relevant portions of my assessment and plan of care.    If you have questions, please do not hesitate to call me. I look forward to following Cedric Gupta along with you.    Sincerely,    Rosibel Quiroz, APRN    Enclosure  CC:  No Recipients    If you would like to receive this communication electronically, please contact [email or web address] or [phone] to request more information on [employer] access.    For providers and/or their staff who would like to refer a patient to [facility], please contact us through our one-stop-shop provider referral line, [facility], at [phone].    If you feel you have received this communication in error or would no longer like to receive these types of communications, please e-mail [email or web address]          unk

## 2022-12-24 NOTE — H&P ADULT - NSHPREVIEWOFSYSTEMS_GEN_ALL_CORE
REVIEW OF SYSTEMS:    CONSTITUTIONAL:  No weakness, fevers or chills  EYES/ENT:  No visual changes;  No vertigo or throat pain   NECK:  No pain or stiffness  RESPIRATORY:  No cough, wheezing, hemoptysis; No shortness of breath  CARDIOVASCULAR:  No chest pain or palpitations  GASTROINTESTINAL:  + RUQ pain with chronic constipation (>1 year)   GENITOURINARY:  No dysuria, frequency or hematuria  MUSCULOSKELETAL:  FROM all extremities, normal strength, No calf tenderness  NEUROLOGICAL:  No numbness or weakness  SKIN:  No itching, rashes

## 2022-12-24 NOTE — H&P ADULT - HISTORY OF PRESENT ILLNESS
56 y/o M with PMH of primary myelofibrosis, T2DM, HTN, HLD        ED Course:  Vitals: T 97.4 F, HR 81, /54, RR 20, SpO2 100% on RA  received aspirin 324 mg, morphine 4 mg IV x 2, zosyn, NS 1L bolus x 2    CTAP and RUQ US with signs suggesting cholecystitis 58 y/o M with PMH of primary myelofibrosis (on active chemotherapy; last cycle: 12/16) , T2DM (oral meds), HTN, HLD presents to the ED complaining of having 10/10, sharp, non-radiating, RUQ abdominal pain that started last night while sleeping. Pt states that the pain was so bad that it awoke him from his sleep. The pain was accompanied by nausea and  1 episode of non-bloody, non-bilious emesis since yesterday. Pt did not take any medications for symptom relief prior to ED arrival. Of note, pt states experience similar pain in the past (> 6 months ago) that would present with heavy meals but resolve on its own over a day. Currently, denies fever, headache, CP, SOB, palpitations, blurry vision or dysuria. Active smoker (> 40 years; currently smoking 1 pack per week), denies alcohol or recreational drug use.       ED Course:  Vitals: T 97.4 F, HR 81, /54, RR 20, SpO2 100% on RA  received aspirin 324 mg, morphine 4 mg IV x 2, zosyn, NS 1L bolus x 2    CTAP and RUQ US with signs suggesting cholecystitis

## 2022-12-24 NOTE — H&P ADULT - ASSESSMENT
58 y/o M with PMH of primary myelofibrosis (on active chemotherapy; last cycle: 12/16) , T2DM, HTN, HLD presents to the ED complaining of having 10/10, sharp, non-radiating, RUQ abdominal pain that started last night while sleeping. RUQ and CT abd/pelvis concerning for acute cholecystitis and WBC 70. Pt admitted to medicine for further management.

## 2022-12-25 NOTE — PROGRESS NOTE ADULT - PROBLEM SELECTOR PLAN 1
CC: sharp RUQ pain with nausea and 1 episode of nonbilious non bloody emesis   On PE: + Parker's sign w/ TTP of RUQ; afebrile   Alk phos - 253; AST - 48; ALT - 16; WBC - 70  (12/24) US -  gallbladder thickening with pericholecystic fluid; + Parker's sign  (12/24) CT abd/pelvis - pericholecystic fluid with gallbladder wall changes concern for acute hortencia    Plan:   - Surg consult, recs appreciated: poor sx candidate, recommend IR consult  - IR consult, recs appreciated: conservative management   - NPO; C/w zosyn (12/24  -   - Blood cx (12/14 - );   - GI consult for elevated Tbili (3.0); baseline - 2.0     Pain control:   Acetaminophen 650 q6h for mild-mod, morphine 2 mg IV q4h for severe pain CC: sharp RUQ pain with nausea and 1 episode of nonbilious non bloody emesis   On PE: + Parker's sign w/ TTP of RUQ; afebrile   Alk phos - 253; AST - 48; ALT - 16; WBC - 70  (12/24) US -  gallbladder thickening with pericholecystic fluid; + Parker's sign  (12/24) CT abd/pelvis - pericholecystic fluid with gallbladder wall changes concern for acute hortencia    Plan:   - Surg consult, recs appreciated: poor sx candidate, recommend IR consult  - IR consult, recs appreciated: conservative management   - NPO; C/w zosyn (12/24  -   - Blood cx (12/14 - );   - GI consult for elevated Tbili (3.0); baseline - 2.0; Today Tbili - 3.3  - Pending MRCP     Pain control:   Acetaminophen 650 q6h for mild-mod, morphine 2 mg IV q4h for severe pain

## 2022-12-25 NOTE — PROGRESS NOTE ADULT - SUBJECTIVE AND OBJECTIVE BOX
Patient is a 57y old  Male who presents with a chief complaint of cholecystitis (24 Dec 2022 14:38)      SUBJECTIVE / OVERNIGHT EVENTS:    MEDICATIONS  (STANDING):  atorvastatin 80 milliGRAM(s) Oral at bedtime  dextrose 5%. 1000 milliLiter(s) (100 mL/Hr) IV Continuous <Continuous>  dextrose 5%. 1000 milliLiter(s) (50 mL/Hr) IV Continuous <Continuous>  dextrose 50% Injectable 25 Gram(s) IV Push once  dextrose 50% Injectable 12.5 Gram(s) IV Push once  dextrose 50% Injectable 25 Gram(s) IV Push once  glucagon  Injectable 1 milliGRAM(s) IntraMuscular once  influenza   Vaccine 0.5 milliLiter(s) IntraMuscular once  insulin lispro (ADMELOG) corrective regimen sliding scale   SubCutaneous three times a day before meals  insulin lispro (ADMELOG) corrective regimen sliding scale   SubCutaneous at bedtime  meclizine 12.5 milliGRAM(s) Oral three times a day  nicotine - 21 mG/24Hr(s) Patch 1 Patch Transdermal daily  piperacillin/tazobactam IVPB.. 3.375 Gram(s) IV Intermittent every 8 hours  polyethylene glycol 3350 17 Gram(s) Oral daily  QUEtiapine 25 milliGRAM(s) Oral at bedtime    MEDICATIONS  (PRN):  acetaminophen     Tablet .. 650 milliGRAM(s) Oral every 6 hours PRN Mild Pain (1 - 3), Moderate Pain (4 - 6)  calamine/zinc oxide Lotion 1 Application(s) Topical two times a day PRN Rash and/or Itching  dextrose Oral Gel 15 Gram(s) Oral once PRN Blood Glucose LESS THAN 70 milliGRAM(s)/deciliter  melatonin 3 milliGRAM(s) Oral at bedtime PRN Insomnia  morphine  - Injectable 2 milliGRAM(s) IV Push every 4 hours PRN Severe Pain (7 - 10)  prochlorperazine   Tablet 10 milliGRAM(s) Oral every 6 hours PRN nausea      Vital Signs Last 24 Hrs  T(C): 37.7 (25 Dec 2022 05:10), Max: 37.7 (25 Dec 2022 05:10)  T(F): 99.8 (25 Dec 2022 05:10), Max: 99.8 (25 Dec 2022 05:10)  HR: 87 (25 Dec 2022 05:10) (74 - 87)  BP: 100/52 (25 Dec 2022 05:10) (99/56 - 109/70)  BP(mean): 67 (25 Dec 2022 05:10) (67 - 71)  RR: 17 (25 Dec 2022 05:10) (16 - 20)  SpO2: 94% (25 Dec 2022 05:10) (93% - 100%)    Parameters below as of 25 Dec 2022 05:10  Patient On (Oxygen Delivery Method): room air      CAPILLARY BLOOD GLUCOSE      POCT Blood Glucose.: 169 mg/dL (25 Dec 2022 06:04)  POCT Blood Glucose.: 155 mg/dL (25 Dec 2022 00:41)  POCT Blood Glucose.: 236 mg/dL (24 Dec 2022 17:37)  POCT Blood Glucose.: 185 mg/dL (24 Dec 2022 14:23)  POCT Blood Glucose.: 245 mg/dL (24 Dec 2022 10:11)  POCT Blood Glucose.: 240 mg/dL (24 Dec 2022 07:30)    I&O's Summary      PHYSICAL EXAM:  GENERAL: NAD, lying in bed comfortably  HEAD:  Atraumatic, Normocephalic  EYES: conjunctiva and sclera clear  ENT: Moist mucous membranes  NECK: Supple, No JVD  CHEST/LUNG: Clear to auscultation bilaterally; No rales, rhonchi, wheezing, or rubs. Unlabored respirations  HEART: Regular rate and rhythm; No murmurs, rubs, or gallops  ABDOMEN: BSx4; Soft, TTP of RUQ; + Parker's sign  EXTREMITIES:  2+ LE pitting edema b/l   NERVOUS SYSTEM:  A&Ox3, no focal deficits   SKIN: No rashes or lesions    LABS:                        10.3   70.16 )-----------( 45       ( 24 Dec 2022 09:13 )             31.4     12-24    132<L>  |  96<L>  |  23  ----------------------------<  215<H>  4.8   |  28  |  0.52    Ca    9.0      24 Dec 2022 09:13    TPro  6.0  /  Alb  3.4  /  TBili  3.0<H>  /  DBili  x   /  AST  48<H>  /  ALT  16  /  AlkPhos  253<H>  12-24      CARDIAC MARKERS ( 24 Dec 2022 09:13 )  x     / x     / 164 U/L / x     / x              RADIOLOGY & ADDITIONAL TESTS:    Imaging Personally Reviewed:    Consultant(s) Notes Reviewed:      Care Discussed with Consultants/Other Providers:   Patient is a 57y old  Male who presents with a chief complaint of cholecystitis (24 Dec 2022 14:38)      SUBJECTIVE / OVERNIGHT EVENTS: No acute events overnight. Tele reviewed: few PVC's. Pt refused telemetry monitoring at 1 am. Pt seen and examined at bedside this morning. Endorses significant improvement of abdominal pain (2 out of 10). Denies fever, nausea, vomiting, CP, SOB, changes in BM or dysuria.     MEDICATIONS  (STANDING):  atorvastatin 80 milliGRAM(s) Oral at bedtime  dextrose 5%. 1000 milliLiter(s) (100 mL/Hr) IV Continuous <Continuous>  dextrose 5%. 1000 milliLiter(s) (50 mL/Hr) IV Continuous <Continuous>  dextrose 50% Injectable 25 Gram(s) IV Push once  dextrose 50% Injectable 12.5 Gram(s) IV Push once  dextrose 50% Injectable 25 Gram(s) IV Push once  glucagon  Injectable 1 milliGRAM(s) IntraMuscular once  influenza   Vaccine 0.5 milliLiter(s) IntraMuscular once  insulin lispro (ADMELOG) corrective regimen sliding scale   SubCutaneous three times a day before meals  insulin lispro (ADMELOG) corrective regimen sliding scale   SubCutaneous at bedtime  meclizine 12.5 milliGRAM(s) Oral three times a day  nicotine - 21 mG/24Hr(s) Patch 1 Patch Transdermal daily  piperacillin/tazobactam IVPB.. 3.375 Gram(s) IV Intermittent every 8 hours  polyethylene glycol 3350 17 Gram(s) Oral daily  QUEtiapine 25 milliGRAM(s) Oral at bedtime    MEDICATIONS  (PRN):  acetaminophen     Tablet .. 650 milliGRAM(s) Oral every 6 hours PRN Mild Pain (1 - 3), Moderate Pain (4 - 6)  calamine/zinc oxide Lotion 1 Application(s) Topical two times a day PRN Rash and/or Itching  dextrose Oral Gel 15 Gram(s) Oral once PRN Blood Glucose LESS THAN 70 milliGRAM(s)/deciliter  melatonin 3 milliGRAM(s) Oral at bedtime PRN Insomnia  morphine  - Injectable 2 milliGRAM(s) IV Push every 4 hours PRN Severe Pain (7 - 10)  prochlorperazine   Tablet 10 milliGRAM(s) Oral every 6 hours PRN nausea      Vital Signs Last 24 Hrs  T(C): 37.7 (25 Dec 2022 05:10), Max: 37.7 (25 Dec 2022 05:10)  T(F): 99.8 (25 Dec 2022 05:10), Max: 99.8 (25 Dec 2022 05:10)  HR: 87 (25 Dec 2022 05:10) (74 - 87)  BP: 100/52 (25 Dec 2022 05:10) (99/56 - 109/70)  BP(mean): 67 (25 Dec 2022 05:10) (67 - 71)  RR: 17 (25 Dec 2022 05:10) (16 - 20)  SpO2: 94% (25 Dec 2022 05:10) (93% - 100%)    Parameters below as of 25 Dec 2022 05:10  Patient On (Oxygen Delivery Method): room air      CAPILLARY BLOOD GLUCOSE      POCT Blood Glucose.: 169 mg/dL (25 Dec 2022 06:04)  POCT Blood Glucose.: 155 mg/dL (25 Dec 2022 00:41)  POCT Blood Glucose.: 236 mg/dL (24 Dec 2022 17:37)  POCT Blood Glucose.: 185 mg/dL (24 Dec 2022 14:23)  POCT Blood Glucose.: 245 mg/dL (24 Dec 2022 10:11)  POCT Blood Glucose.: 240 mg/dL (24 Dec 2022 07:30)    I&O's Summary      PHYSICAL EXAM:  GENERAL: NAD, lying in bed comfortably  HEAD:  Atraumatic, Normocephalic  EYES: conjunctiva and sclera clear  ENT: Moist mucous membranes  NECK: Supple, No JVD  CHEST/LUNG: Clear to auscultation bilaterally; No rales, rhonchi, wheezing, or rubs. Unlabored respirations  HEART: Regular rate and rhythm; No murmurs, rubs, or gallops  ABDOMEN: BSx4; Soft, TTP of RUQ;   EXTREMITIES:  2+ LE pitting edema b/l   NERVOUS SYSTEM:  A&Ox3, no focal deficits   SKIN: No rashes or lesions    LABS:                        10.3   70.16 )-----------( 45       ( 24 Dec 2022 09:13 )             31.4     12-24    132<L>  |  96<L>  |  23  ----------------------------<  215<H>  4.8   |  28  |  0.52    Ca    9.0      24 Dec 2022 09:13    TPro  6.0  /  Alb  3.4  /  TBili  3.0<H>  /  DBili  x   /  AST  48<H>  /  ALT  16  /  AlkPhos  253<H>  12-24      CARDIAC MARKERS ( 24 Dec 2022 09:13 )  x     / x     / 164 U/L / x     / x              RADIOLOGY & ADDITIONAL TESTS:    Imaging Personally Reviewed:    Consultant(s) Notes Reviewed:    SURGERY:  This is a 57-year-old male with past medical history of leukemia currently on treatment (last chemotherapy 8 days ago) who presents with RUQ pain for 1 day with signs of cholecystitis and possible choledocholithiasis.     -No acute surgical intervention. Pt is a poor surgical candidate given that he is currently on chemotherapy. For acute cholecystitis would recommend IR consult for percutaneous cholecystostomy tube  -GI consult - pt with elevated t bili - appears chronically elevated however higher than his baseline at this time  -IV abx - zosyn  -will follow    A Surgery, [id number]    Care Discussed with Consultants/Other Providers:

## 2022-12-25 NOTE — PROGRESS NOTE ADULT - ASSESSMENT
This is a 57-year-old male with past medical history of leukemia currently on treatment (last chemotherapy 8 days ago) who presents with RUQ pain for 1 day with signs of cholecystitis and possible choledocholithiasis.     -No acute surgical intervention. Pt is a poor surgical candidate given that he is currently on chemotherapy. For acute cholecystitis would recommend IR consult for percutaneous cholecystostomy tube  -GI consult - pt with elevated t bili - appears chronically elevated however higher than his baseline at this time  -IV abx - zosyn  -will follow    [facility], [id number]
56 y/o M with PMH of primary myelofibrosis (on active chemotherapy; last cycle: 12/16) , T2DM, HTN, HLD presents to the ED complaining of having 10/10, sharp, non-radiating, RUQ abdominal pain that started last night while sleeping. RUQ and CT abd/pelvis concerning for acute cholecystitis and WBC 70. Pt admitted to medicine for further management.

## 2022-12-25 NOTE — CONSULT NOTE ADULT - SUBJECTIVE AND OBJECTIVE BOX
HPI:  Adrian Leonard is a57 y/o M with PMH of primary myelofibrosis (on active chemotherapy; last cycle: 12/16) , T2DM (oral meds), HTN, HLD presents to the ED complaining of having 10/10, sharp, non-radiating, RUQ abdominal pain that started last night while sleeping found to have acute cholecystitis. GI consulted for slightly elevated bilirubin coma pred to baseline and c/f choledocholithiasis.     Mr. Leonard presents to the hospital after having 10/10, sharp, non-radiating, RUQ abdominal pain that starting Friday evenign with pain so bad that it awoke him from his sleep. The pain was accompanied by nausea and  1 episode of non-bloody, non-bilious emesis. Notably, patient also states experience similar pain in the past (> 6 months ago) that would present with heavy meals but resolve on its own over a day. Denies any fevers, chills although given poor PO intake presented to the hospital for further evaluation    Vitals in the ED ntoable for T 97.4 F, HR 81, /54, RR 20, SpO2 100% on RA. Labs notable for a wbc count of 70K, with a bilirubin of 3 from a b/l of 2-2.5 with ALP of 253 AST 48 ALT 16. Workup including CTAP and RUQ US with signs suggesting cholecystitis. No billiary dilation with no stones noted in the bile ducts on either imaging.  He was started on empiric abx with overall improvement in his symptoms. Surgery consulted although pt overall poor surgical candidate given comorbidities. IR consulted for perc hortencia although rec'd supportive care.    Pt overall feeling better after starting abx with decreasing abdominal pain. No fevers, chills, nausea, vomitting with improving abdominal carmencita.       Allergies:  No Known Allergies      Home Medications:  bisoprolol 5 mg oral tablet: 1 tab(s) orally once a day (24 Dec 2022 15:57)  furosemide 20 mg oral tablet: 1 tab(s) orally once a day (24 Dec 2022 15:57)  hydrOXYzine hydrochloride 10 mg oral tablet: 1 tab(s) orally 3 times a day, As Needed (24 Dec 2022 15:57)  ibuprofen 800 mg oral tablet: 1 tab(s) orally 3 times a day, As Needed (24 Dec 2022 15:57)  Jakafi 15 mg oral tablet: 1 tab(s) orally 2 times a day (24 Dec 2022 15:57)  lactulose 10 g/15 mL oral syrup: 15 milliliter(s) orally once a day (24 Dec 2022 15:57)  meclizine 12.5 mg oral tablet: 1 tab(s) orally 3 times a day (24 Dec 2022 15:57)  meloxicam 7.5 mg oral tablet: 1 tab(s) orally once a day (24 Dec 2022 15:57)  metFORMIN 1000 mg oral tablet: 1 tab(s) orally 2 times a day (24 Dec 2022 15:57)  prochlorperazine 10 mg oral tablet: 1 tab(s) orally every 6 hours, As Needed (24 Dec 2022 15:57)  rosuvastatin 20 mg oral tablet: 1 tab(s) orally once a day (at bedtime) (24 Dec 2022 15:57)  SEROquel 25 mg oral tablet: 1 tab(s) orally once a day (at bedtime) (24 Dec 2022 15:57)  Triamcinolone Acetonide in Absorbase: 0.1% Apply topically to affected area  (24 Dec 2022 15:57)      Hospital Medications:  acetaminophen     Tablet .. 650 milliGRAM(s) Oral every 6 hours PRN  atorvastatin 80 milliGRAM(s) Oral at bedtime  calamine/zinc oxide Lotion 1 Application(s) Topical two times a day PRN  dextrose 5%. 1000 milliLiter(s) IV Continuous <Continuous>  dextrose 5%. 1000 milliLiter(s) IV Continuous <Continuous>  dextrose 50% Injectable 25 Gram(s) IV Push once  dextrose 50% Injectable 12.5 Gram(s) IV Push once  dextrose 50% Injectable 25 Gram(s) IV Push once  dextrose Oral Gel 15 Gram(s) Oral once PRN  glucagon  Injectable 1 milliGRAM(s) IntraMuscular once  influenza   Vaccine 0.5 milliLiter(s) IntraMuscular once  insulin lispro (ADMELOG) corrective regimen sliding scale   SubCutaneous every 6 hours  meclizine 12.5 milliGRAM(s) Oral three times a day  melatonin 3 milliGRAM(s) Oral at bedtime PRN  morphine  - Injectable 2 milliGRAM(s) IV Push every 4 hours PRN  nicotine - 21 mG/24Hr(s) Patch 1 Patch Transdermal daily  piperacillin/tazobactam IVPB.. 3.375 Gram(s) IV Intermittent every 8 hours  polyethylene glycol 3350 17 Gram(s) Oral daily  prochlorperazine   Tablet 10 milliGRAM(s) Oral every 6 hours PRN  QUEtiapine 25 milliGRAM(s) Oral at bedtime      PMHX/PSHX:    HTN (hypertension)  DM (diabetes mellitus)  HLD  Arthritis  Myeloproliferative disease  Smoking  History of myeloproliferative disorder  H/O eye surgery  History of eye surgery    Family history:    Family history of heart disease  Family history of lung cancer (Uncle)  FH: type 2 diabetes mellitus    Social History:   Tob: Active smoker   EtOH: Denies  Illicit Drugs: Denies    ROS: Complete and normal except as mentioned above    PHYSICAL EXAM:     GENERAL:  No acute distress  HEENT:  NCAT, no scleral icterus   CHEST:  no respiratory distress  HEART:  Regular rate and rhythm  ABDOMEN:  Soft, non-tender, non-distended, normoactive bowel sounds,  no masses  EXTREMITIES: No edema  SKIN:  No rash/erythema/ecchymoses/petechiae/wounds/abscess/warm/dry  NEURO:  Alert and oriented x 3, no asterixis    Vital Signs:  Vital Signs Last 24 Hrs  T(C): 36.4 (25 Dec 2022 11:15), Max: 37.7 (25 Dec 2022 05:10)  T(F): 97.6 (25 Dec 2022 11:15), Max: 99.8 (25 Dec 2022 05:10)  HR: 90 (25 Dec 2022 12:50) (74 - 90)  BP: 142/82 (25 Dec 2022 12:50) (99/56 - 142/82)  BP(mean): 67 (25 Dec 2022 05:10) (67 - 71)  RR: 18 (25 Dec 2022 12:50) (17 - 18)  SpO2: 96% (25 Dec 2022 11:15) (94% - 99%)    Parameters below as of 25 Dec 2022 11:15  Patient On (Oxygen Delivery Method): room air    LABS:                        6.9    74.83 )-----------( 56       ( 25 Dec 2022 11:30 )             21.5     Mean Cell Volume: 121.5 fL (12-25-22 @ 11:30)    12-25    134<L>  |  98  |  21  ----------------------------<  126<H>  5.2   |  25  |  0.54    Ca    8.8      25 Dec 2022 06:30  Phos  2.2     12-25  Mg     2.00     12-25    TPro  x   /  Alb  x   /  TBili  3.2<H>  /  DBili  2.6<H>  /  AST  x   /  ALT  x   /  AlkPhos  x   12-25    LIVER FUNCTIONS - ( 25 Dec 2022 06:30 )  Alb: 3.0 g/dL / Pro: 5.7 g/dL / ALK PHOS: 203 U/L / ALT: 13 U/L / AST: 44 U/L / GGT: x           PT/INR - ( 25 Dec 2022 06:30 )   PT: 16.9 sec;   INR: 1.45 ratio         PTT - ( 25 Dec 2022 06:30 )  PTT:32.3 sec                            6.9    74.83 )-----------( 56       ( 25 Dec 2022 11:30 )             21.5                         7.5    78.10 )-----------( 54       ( 25 Dec 2022 06:30 )             23.0                         10.3   70.16 )-----------( 45       ( 24 Dec 2022 09:13 )             31.4       Imaging:  < from: Upper Endoscopy (07.31.20 @ 12:32) >  Findings:       The Z-line was irregular and was found 41 cm from the incisors. Biopsies        were taken with a cold forceps for histology.       Theexam of the esophagus was otherwise normal.       The entire examined stomach was normal. Biopsies were taken with a cold        forceps for histology.       Scattered inflammation characterized by erythema and a thickened fold        was found in the duodenal bulb. Biopsies were taken with a cold forceps        for histology.       The exam of the duodenum was otherwise normal.                                                                                   Impression:          - Z-line irregular, 41 cm from the incisors. Biopsied.                       - Normal stomach. Biopsied.                       - Duodenitis with thickend fold. Biopsied.  Recommendation:      - [facility].    < end of copied text >  < from: Colonoscopy (07.31.20 @ 12:30) >                                                 Findings:       The perianal and digital rectal examinations were normal.       A 20 mm polyp was found in the cecum. The polyp was sessile.        Preparations were made for mucosal resection. Orise wasinjected with        adequate lift of the lesion from the muscularis propria. Snare mucosal        resection (piecemeal) with suction (via the working channel) retrieval        was performed. Resection and retrieval were complete. The margins were       treated with soft coag. To close a defect after polypectomy, one        hemostatic clip was successfully placed (MR conditional). There was no        bleeding at the end of the procedure.       Two sessile polyps were found in the sigmoid colon and in the descending        colon. The polyps were 5 mm in size. These polyps were removed with a        cold biopsy forceps. Resection and retrieval were complete.       Two sessile polyps were found in the sigmoid colon and in the cecum. The        polyps were 6 mm in size. These polyps were removed with a cold snare.        Resection and retrieval were complete.       Internal hemorrhoids were found during retroflexion.       The exam was otherwise normal throughout the examined colon.       The terminal ileum appeared normal.                                                                                   Impression:          - One 20 mm polyp (Is) in the cecum s/p EMR (piecemeal).                        Clipped (MR conditional) was placed.                       - Two 5 mm polyps in the sigmoid colon and in the                        descending colon, removed with a cold biopsy forceps.                        Resected and retrieved.                       - Two 6 mm polyps in the sigmoid colon and in the cecum,                        removed with a cold snare. Resected and retrieved.                       - Internal hemorrhoids.                       - The examined portion of the ileum was normal.    < end of copied text >  < from: CT Abdomen and Pelvis w/ IV Cont (12.24.22 @ 12:09) >  FINDINGS:  LOWER CHEST: Partially imaged port catheter tip is at the cavoatrial   junction .    LIVER: Hepatomegaly 2 27 cm, unchanged  BILE DUCTS: Normal caliber.  GALLBLADDER: Cholelithiasis. Edematous changes are seen surrounding the   gallbladder suggesting pericholecystic fluid and slight prominence of the   wall  SPLEEN: Splenomegaly up to 19 cm, not significantly changed in the   heterogeneous enhancement morphology.  PANCREAS: Within normal limits.  ADRENALS: Within normal limits.  KIDNEYS/URETERS: Within normal limits.    BLADDER: Circumferential bladder wall thickening.  REPRODUCTIVE ORGANS: Prostate within normal limits.    BOWEL: No bowel obstruction. Appendix is normal. Unchanged 2.7 cm small   bowel lipoma (2:88).  PERITONEUM: No ascites.  VESSELS: Atherosclerotic changes.  RETROPERITONEUM/LYMPH NODES: No lymphadenopathy.  ABDOMINAL WALL: Tiny fat-containing of the hernia  BONES: Within normal limits diffusely heterogeneous sclerotic appearance   reflective of patient's known myeloproliferative disorder..    IMPRESSION:    The patient is developed pericholecystic fluid and edematous changes of   the gallbladder wall with cholelithiasis may represent acute cholecystitis    Unchanged hepatosplenomegaly      < end of copied text >  < from: US Abdomen Upper Quadrant Right (12.24.22 @ 10:42) >  Dependent hyperechogenicity noted within the gallbladder lumen suggesting   stones versus sludge. Gallbladder wall thickening up to 5 mm with   pericholecystic edema. Positive sonographic Parker sign per technologist   report. In thegallbladder fundus, there is region of ring down artifact   (1.55-18), possible focal adenomyomatosis. Correlate clinically for   cholecystitis.      < end of copied text >       HPI:  Adrian Leonard is a57 y/o M with PMH of primary myelofibrosis (on active chemotherapy; last cycle: 12/16) , T2DM (oral meds), HTN, HLD presents to the ED complaining of having 10/10, sharp, non-radiating, RUQ abdominal pain that started last night while sleeping found to have acute cholecystitis. GI consulted for slightly elevated bilirubin coma pred to baseline and c/f choledocholithiasis.     Mr. Leonard presents to the hospital after having 10/10, sharp, non-radiating, RUQ abdominal pain that starting Friday evenign with pain so bad that it awoke him from his sleep. The pain was accompanied by nausea and  1 episode of non-bloody, non-bilious emesis. Notably, patient also states experience similar pain in the past (> 6 months ago) that would present with heavy meals but resolve on its own over a day. Denies any fevers, chills although given poor PO intake presented to the hospital for further evaluation    Vitals in the ED ntoable for T 97.4 F, HR 81, /54, RR 20, SpO2 100% on RA. Labs notable for a wbc count of 70K, with a bilirubin of 3 from a b/l of 2-2.5 with ALP of 253 AST 48 ALT 16. Workup including CTAP and RUQ US with signs suggesting cholecystitis. No billiary dilation with no stones noted in the bile ducts on either imaging.  He was started on empiric abx with overall improvement in his symptoms. Surgery consulted although pt overall poor surgical candidate given comorbidities. IR consulted for perc hortencia although rec'd supportive care.    Pt overall feeling better after starting abx with decreasing abdominal pain although still episodes of severe abdominal pain after the pain medications wear off. Continues with low grade fevers although no chills, nausea, vomiting.     Allergies:  No Known Allergies      Home Medications:  bisoprolol 5 mg oral tablet: 1 tab(s) orally once a day (24 Dec 2022 15:57)  furosemide 20 mg oral tablet: 1 tab(s) orally once a day (24 Dec 2022 15:57)  hydrOXYzine hydrochloride 10 mg oral tablet: 1 tab(s) orally 3 times a day, As Needed (24 Dec 2022 15:57)  ibuprofen 800 mg oral tablet: 1 tab(s) orally 3 times a day, As Needed (24 Dec 2022 15:57)  Jakafi 15 mg oral tablet: 1 tab(s) orally 2 times a day (24 Dec 2022 15:57)  lactulose 10 g/15 mL oral syrup: 15 milliliter(s) orally once a day (24 Dec 2022 15:57)  meclizine 12.5 mg oral tablet: 1 tab(s) orally 3 times a day (24 Dec 2022 15:57)  meloxicam 7.5 mg oral tablet: 1 tab(s) orally once a day (24 Dec 2022 15:57)  metFORMIN 1000 mg oral tablet: 1 tab(s) orally 2 times a day (24 Dec 2022 15:57)  prochlorperazine 10 mg oral tablet: 1 tab(s) orally every 6 hours, As Needed (24 Dec 2022 15:57)  rosuvastatin 20 mg oral tablet: 1 tab(s) orally once a day (at bedtime) (24 Dec 2022 15:57)  SEROquel 25 mg oral tablet: 1 tab(s) orally once a day (at bedtime) (24 Dec 2022 15:57)  Triamcinolone Acetonide in Absorbase: 0.1% Apply topically to affected area  (24 Dec 2022 15:57)  [facility] Medications:  acetaminophen     Tablet .. 650 milliGRAM(s) Oral every 6 hours PRN  atorvastatin 80 milliGRAM(s) Oral at bedtime  calamine/zinc oxide Lotion 1 Application(s) Topical two times a day PRN  dextrose 5%. 1000 milliLiter(s) IV Continuous <Continuous>  dextrose 5%. 1000 milliLiter(s) IV Continuous <Continuous>  dextrose 50% Injectable 25 Gram(s) IV Push once  dextrose 50% Injectable 12.5 Gram(s) IV Push once  dextrose 50% Injectable 25 Gram(s) IV Push once  dextrose Oral Gel 15 Gram(s) Oral once PRN  glucagon  Injectable 1 milliGRAM(s) IntraMuscular once  influenza   Vaccine 0.5 milliLiter(s) IntraMuscular once  insulin lispro (ADMELOG) corrective regimen sliding scale   SubCutaneous every 6 hours  meclizine 12.5 milliGRAM(s) Oral three times a day  melatonin 3 milliGRAM(s) Oral at bedtime PRN  morphine  - Injectable 2 milliGRAM(s) IV Push every 4 hours PRN  nicotine - 21 mG/24Hr(s) Patch 1 Patch Transdermal daily  piperacillin/tazobactam IVPB.. 3.375 Gram(s) IV Intermittent every 8 hours  polyethylene glycol 3350 17 Gram(s) Oral daily  prochlorperazine   Tablet 10 milliGRAM(s) Oral every 6 hours PRN  QUEtiapine 25 milliGRAM(s) Oral at bedtime      PMHX/PSHX:    HTN (hypertension)  DM (diabetes mellitus)  HLD  Arthritis  Myeloproliferative disease  Smoking  History of myeloproliferative disorder  H/O eye surgery  History of eye surgery    Family history:  No fam hx of billary disease  Family history of heart disease  Family history of lung cancer (Uncle)  FH: type 2 diabetes mellitus    Social History:   Tob: Active smoker   EtOH: Denies  Illicit Drugs: Denies    ROS: Complete and normal except as mentioned above    PHYSICAL EXAM:     GENERAL:  No acute distress  HEENT:  +scleral icterus   CHEST:  no respiratory distress  HEART:  Regular rate and rhythm  ABDOMEN:  Soft, +TTP in the RUQ with +Parker sign, distended, normoactive bowel sounds,  no masses  EXTREMITIES: No edema  NEURO:  Alert and oriented x 3    Vital Signs:  Vital Signs Last 24 Hrs  T(C): 36.4 (25 Dec 2022 11:15), Max: 37.7 (25 Dec 2022 05:10)  T(F): 97.6 (25 Dec 2022 11:15), Max: 99.8 (25 Dec 2022 05:10)  HR: 90 (25 Dec 2022 12:50) (74 - 90)  BP: 142/82 (25 Dec 2022 12:50) (99/56 - 142/82)  BP(mean): 67 (25 Dec 2022 05:10) (67 - 71)  RR: 18 (25 Dec 2022 12:50) (17 - 18)  SpO2: 96% (25 Dec 2022 11:15) (94% - 99%)    Parameters below as of 25 Dec 2022 11:15  Patient On (Oxygen Delivery Method): room air    LABS:                        6.9    74.83 )-----------( 56       ( 25 Dec 2022 11:30 )             21.5     Mean Cell Volume: 121.5 fL (12-25-22 @ 11:30)    12-25    134<L>  |  98  |  21  ----------------------------<  126<H>  5.2   |  25  |  0.54    Ca    8.8      25 Dec 2022 06:30  Phos  2.2     12-25  Mg     2.00     12-25    TPro  x   /  Alb  x   /  TBili  3.2<H>  /  DBili  2.6<H>  /  AST  x   /  ALT  x   /  AlkPhos  x   12-25    LIVER FUNCTIONS - ( 25 Dec 2022 06:30 )  Alb: 3.0 g/dL / Pro: 5.7 g/dL / ALK PHOS: 203 U/L / ALT: 13 U/L / AST: 44 U/L / GGT: x           PT/INR - ( 25 Dec 2022 06:30 )   PT: 16.9 sec;   INR: 1.45 ratio         PTT - ( 25 Dec 2022 06:30 )  PTT:32.3 sec                            6.9    74.83 )-----------( 56       ( 25 Dec 2022 11:30 )             21.5                         7.5    78.10 )-----------( 54       ( 25 Dec 2022 06:30 )             23.0                         10.3   70.16 )-----------( 45       ( 24 Dec 2022 09:13 )             31.4       Imaging:  < from: Upper Endoscopy (07.31.20 @ 12:32) >  Findings:       The Z-line was irregular and was found 41 cm from the incisors. Biopsies        were taken with a cold forceps for histology.       Theexam of the esophagus was otherwise normal.       The entire examined stomach was normal. Biopsies were taken with a cold        forceps for histology.       Scattered inflammation characterized by erythema and a thickened fold        was found in the duodenal bulb. Biopsies were taken with a cold forceps        for histology.       The exam of the duodenum was otherwise normal.                                                                                   Impression:          - Z-line irregular, 41 cm from the incisors. Biopsied.                       - Normal stomach. Biopsied.                       - Duodenitis with thickend fold. Biopsied.  Recommendation:      - [facility].    < end of copied text >  < from: Colonoscopy (07.31.20 @ 12:30) >                                                 Findings:       The perianal and digital rectal examinations were normal.       A 20 mm polyp was found in the cecum. The polyp was sessile.        Preparations were made for mucosal resection. Orise wasinjected with        adequate lift of the lesion from the muscularis propria. Snare mucosal        resection (piecemeal) with suction (via the working channel) retrieval        was performed. Resection and retrieval were complete. The margins were       treated with soft coag. To close a defect after polypectomy, one        hemostatic clip was successfully placed (MR conditional). There was no        bleeding at the end of the procedure.       Two sessile polyps were found in the sigmoid colon and in the descending        colon. The polyps were 5 mm in size. These polyps were removed with a        cold biopsy forceps. Resection and retrieval were complete.       Two sessile polyps were found in the sigmoid colon and in the cecum. The        polyps were 6 mm in size. These polyps were removed with a cold snare.        Resection and retrieval were complete.       Internal hemorrhoids were found during retroflexion.       The exam was otherwise normal throughout the examined colon.       The terminal ileum appeared normal.                                                                                   Impression:          - One 20 mm polyp (Is) in the cecum s/p EMR (piecemeal).                        Clipped (MR conditional) was placed.                       - Two 5 mm polyps in the sigmoid colon and in the                        descending colon, removed with a cold biopsy forceps.                        Resected and retrieved.                       - Two 6 mm polyps in the sigmoid colon and in the cecum,                        removed with a cold snare. Resected and retrieved.                       - Internal hemorrhoids.                       - The examined portion of the ileum was normal.    < end of copied text >  < from: CT Abdomen and Pelvis w/ IV Cont (12.24.22 @ 12:09) >  FINDINGS:  LOWER CHEST: Partially imaged port catheter tip is at the cavoatrial   junction .    LIVER: Hepatomegaly 2 27 cm, unchanged  BILE DUCTS: Normal caliber.  GALLBLADDER: Cholelithiasis. Edematous changes are seen surrounding the   gallbladder suggesting pericholecystic fluid and slight prominence of the   wall  SPLEEN: Splenomegaly up to 19 cm, not significantly changed in the   heterogeneous enhancement morphology.  PANCREAS: Within normal limits.  ADRENALS: Within normal limits.  KIDNEYS/URETERS: Within normal limits.    BLADDER: Circumferential bladder wall thickening.  REPRODUCTIVE ORGANS: Prostate within normal limits.    BOWEL: No bowel obstruction. Appendix is normal. Unchanged 2.7 cm small   bowel lipoma (2:88).  PERITONEUM: No ascites.  VESSELS: Atherosclerotic changes.  RETROPERITONEUM/LYMPH NODES: No lymphadenopathy.  ABDOMINAL WALL: Tiny fat-containing of the hernia  BONES: Within normal limits diffusely heterogeneous sclerotic appearance   reflective of patient's known myeloproliferative disorder..    IMPRESSION:    The patient is developed pericholecystic fluid and edematous changes of   the gallbladder wall with cholelithiasis may represent acute cholecystitis    Unchanged hepatosplenomegaly      < end of copied text >  < from: US Abdomen Upper Quadrant Right (12.24.22 @ 10:42) >  Dependent hyperechogenicity noted within the gallbladder lumen suggesting   stones versus sludge. Gallbladder wall thickening up to 5 mm with   pericholecystic edema. Positive sonographic Parker sign per technologist   report. In thegallbladder fundus, there is region of ring down artifact   (1.55-18), possible focal adenomyomatosis. Correlate clinically for   cholecystitis.      < end of copied text >       HPI:  Adrian Leonard is a57 y/o M with PMH of primary myelofibrosis (on active chemotherapy; last cycle: 12/16) , T2DM (oral meds), HTN, HLD presents to the ED complaining of having 10/10, sharp, non-radiating, RUQ abdominal pain that started last night while sleeping found to have acute cholecystitis. GI consulted for slightly elevated bilirubin compared to baseline and c/f choledocholithiasis.     Mr. Leonard presents to the hospital after having 10/10, sharp, non-radiating, RUQ abdominal pain that starting Friday evenign with pain so bad that it awoke him from his sleep. The pain was accompanied by nausea and  1 episode of non-bloody, non-bilious emesis. Notably, patient also states experience similar pain in the past (> 6 months ago) that would present with heavy meals but resolve on its own over a day. Denies any fevers, chills although given poor PO intake presented to the hospital for further evaluation    Vitals in the ED ntoable for T 97.4 F, HR 81, /54, RR 20, SpO2 100% on RA. Labs notable for a wbc count of 70K, with a bilirubin of 3 from a b/l of 2-2.5 with ALP of 253 AST 48 ALT 16. Workup including CTAP and RUQ US with signs suggesting cholecystitis. No billiary dilation with no stones noted in the bile ducts on either imaging.  He was started on empiric abx with overall improvement in his symptoms. Surgery consulted although pt overall poor surgical candidate given comorbidities. IR consulted for perc hortencia although rec'd supportive care.    Pt overall feeling better after starting abx with decreasing abdominal pain although still episodes of severe abdominal pain after the pain medications wear off. Continues with low grade fevers although no chills, nausea, vomiting.     Allergies:  No Known Allergies      Home Medications:  bisoprolol 5 mg oral tablet: 1 tab(s) orally once a day (24 Dec 2022 15:57)  furosemide 20 mg oral tablet: 1 tab(s) orally once a day (24 Dec 2022 15:57)  hydrOXYzine hydrochloride 10 mg oral tablet: 1 tab(s) orally 3 times a day, As Needed (24 Dec 2022 15:57)  ibuprofen 800 mg oral tablet: 1 tab(s) orally 3 times a day, As Needed (24 Dec 2022 15:57)  Jakafi 15 mg oral tablet: 1 tab(s) orally 2 times a day (24 Dec 2022 15:57)  lactulose 10 g/15 mL oral syrup: 15 milliliter(s) orally once a day (24 Dec 2022 15:57)  meclizine 12.5 mg oral tablet: 1 tab(s) orally 3 times a day (24 Dec 2022 15:57)  meloxicam 7.5 mg oral tablet: 1 tab(s) orally once a day (24 Dec 2022 15:57)  metFORMIN 1000 mg oral tablet: 1 tab(s) orally 2 times a day (24 Dec 2022 15:57)  prochlorperazine 10 mg oral tablet: 1 tab(s) orally every 6 hours, As Needed (24 Dec 2022 15:57)  rosuvastatin 20 mg oral tablet: 1 tab(s) orally once a day (at bedtime) (24 Dec 2022 15:57)  SEROquel 25 mg oral tablet: 1 tab(s) orally once a day (at bedtime) (24 Dec 2022 15:57)  Triamcinolone Acetonide in Absorbase: 0.1% Apply topically to affected area  (24 Dec 2022 15:57)  [facility] Medications:  acetaminophen     Tablet .. 650 milliGRAM(s) Oral every 6 hours PRN  atorvastatin 80 milliGRAM(s) Oral at bedtime  calamine/zinc oxide Lotion 1 Application(s) Topical two times a day PRN  dextrose 5%. 1000 milliLiter(s) IV Continuous <Continuous>  dextrose 5%. 1000 milliLiter(s) IV Continuous <Continuous>  dextrose 50% Injectable 25 Gram(s) IV Push once  dextrose 50% Injectable 12.5 Gram(s) IV Push once  dextrose 50% Injectable 25 Gram(s) IV Push once  dextrose Oral Gel 15 Gram(s) Oral once PRN  glucagon  Injectable 1 milliGRAM(s) IntraMuscular once  influenza   Vaccine 0.5 milliLiter(s) IntraMuscular once  insulin lispro (ADMELOG) corrective regimen sliding scale   SubCutaneous every 6 hours  meclizine 12.5 milliGRAM(s) Oral three times a day  melatonin 3 milliGRAM(s) Oral at bedtime PRN  morphine  - Injectable 2 milliGRAM(s) IV Push every 4 hours PRN  nicotine - 21 mG/24Hr(s) Patch 1 Patch Transdermal daily  piperacillin/tazobactam IVPB.. 3.375 Gram(s) IV Intermittent every 8 hours  polyethylene glycol 3350 17 Gram(s) Oral daily  prochlorperazine   Tablet 10 milliGRAM(s) Oral every 6 hours PRN  QUEtiapine 25 milliGRAM(s) Oral at bedtime      PMHX/PSHX:    HTN (hypertension)  DM (diabetes mellitus)  HLD  Arthritis  Myeloproliferative disease  Smoking  History of myeloproliferative disorder  H/O eye surgery  History of eye surgery    Family history:  No fam hx of billary disease  Family history of heart disease  Family history of lung cancer (Uncle)  FH: type 2 diabetes mellitus    Social History:   Tob: Active smoker   EtOH: Denies  Illicit Drugs: Denies    ROS: Complete and normal except as mentioned above    PHYSICAL EXAM:     GENERAL:  No acute distress  HEENT:  +scleral icterus   CHEST:  no respiratory distress  HEART:  Regular rate and rhythm  ABDOMEN:  Soft, +TTP in the RUQ with +Parker sign, distended, normoactive bowel sounds,  no masses  EXTREMITIES: No edema  NEURO:  Alert and oriented x 3    Vital Signs:  Vital Signs Last 24 Hrs  T(C): 36.4 (25 Dec 2022 11:15), Max: 37.7 (25 Dec 2022 05:10)  T(F): 97.6 (25 Dec 2022 11:15), Max: 99.8 (25 Dec 2022 05:10)  HR: 90 (25 Dec 2022 12:50) (74 - 90)  BP: 142/82 (25 Dec 2022 12:50) (99/56 - 142/82)  BP(mean): 67 (25 Dec 2022 05:10) (67 - 71)  RR: 18 (25 Dec 2022 12:50) (17 - 18)  SpO2: 96% (25 Dec 2022 11:15) (94% - 99%)    Parameters below as of 25 Dec 2022 11:15  Patient On (Oxygen Delivery Method): room air    LABS:                        6.9    74.83 )-----------( 56       ( 25 Dec 2022 11:30 )             21.5     Mean Cell Volume: 121.5 fL (12-25-22 @ 11:30)    12-25    134<L>  |  98  |  21  ----------------------------<  126<H>  5.2   |  25  |  0.54    Ca    8.8      25 Dec 2022 06:30  Phos  2.2     12-25  Mg     2.00     12-25    TPro  x   /  Alb  x   /  TBili  3.2<H>  /  DBili  2.6<H>  /  AST  x   /  ALT  x   /  AlkPhos  x   12-25    LIVER FUNCTIONS - ( 25 Dec 2022 06:30 )  Alb: 3.0 g/dL / Pro: 5.7 g/dL / ALK PHOS: 203 U/L / ALT: 13 U/L / AST: 44 U/L / GGT: x           PT/INR - ( 25 Dec 2022 06:30 )   PT: 16.9 sec;   INR: 1.45 ratio         PTT - ( 25 Dec 2022 06:30 )  PTT:32.3 sec                            6.9    74.83 )-----------( 56       ( 25 Dec 2022 11:30 )             21.5                         7.5    78.10 )-----------( 54       ( 25 Dec 2022 06:30 )             23.0                         10.3   70.16 )-----------( 45       ( 24 Dec 2022 09:13 )             31.4       Imaging:  < from: Upper Endoscopy (07.31.20 @ 12:32) >  Findings:       The Z-line was irregular and was found 41 cm from the incisors. Biopsies        were taken with a cold forceps for histology.       Theexam of the esophagus was otherwise normal.       The entire examined stomach was normal. Biopsies were taken with a cold        forceps for histology.       Scattered inflammation characterized by erythema and a thickened fold        was found in the duodenal bulb. Biopsies were taken with a cold forceps        for histology.       The exam of the duodenum was otherwise normal.                                                                                   Impression:          - Z-line irregular, 41 cm from the incisors. Biopsied.                       - Normal stomach. Biopsied.                       - Duodenitis with thickend fold. Biopsied.  Recommendation:      - [facility].    < end of copied text >  < from: Colonoscopy (07.31.20 @ 12:30) >                                                 Findings:       The perianal and digital rectal examinations were normal.       A 20 mm polyp was found in the cecum. The polyp was sessile.        Preparations were made for mucosal resection. Orise wasinjected with        adequate lift of the lesion from the muscularis propria. Snare mucosal        resection (piecemeal) with suction (via the working channel) retrieval        was performed. Resection and retrieval were complete. The margins were       treated with soft coag. To close a defect after polypectomy, one        hemostatic clip was successfully placed (MR conditional). There was no        bleeding at the end of the procedure.       Two sessile polyps were found in the sigmoid colon and in the descending        colon. The polyps were 5 mm in size. These polyps were removed with a        cold biopsy forceps. Resection and retrieval were complete.       Two sessile polyps were found in the sigmoid colon and in the cecum. The        polyps were 6 mm in size. These polyps were removed with a cold snare.        Resection and retrieval were complete.       Internal hemorrhoids were found during retroflexion.       The exam was otherwise normal throughout the examined colon.       The terminal ileum appeared normal.                                                                                   Impression:          - One 20 mm polyp (Is) in the cecum s/p EMR (piecemeal).                        Clipped (MR conditional) was placed.                       - Two 5 mm polyps in the sigmoid colon and in the                        descending colon, removed with a cold biopsy forceps.                        Resected and retrieved.                       - Two 6 mm polyps in the sigmoid colon and in the cecum,                        removed with a cold snare. Resected and retrieved.                       - Internal hemorrhoids.                       - The examined portion of the ileum was normal.    < end of copied text >  < from: CT Abdomen and Pelvis w/ IV Cont (12.24.22 @ 12:09) >  FINDINGS:  LOWER CHEST: Partially imaged port catheter tip is at the cavoatrial   junction .    LIVER: Hepatomegaly 2 27 cm, unchanged  BILE DUCTS: Normal caliber.  GALLBLADDER: Cholelithiasis. Edematous changes are seen surrounding the   gallbladder suggesting pericholecystic fluid and slight prominence of the   wall  SPLEEN: Splenomegaly up to 19 cm, not significantly changed in the   heterogeneous enhancement morphology.  PANCREAS: Within normal limits.  ADRENALS: Within normal limits.  KIDNEYS/URETERS: Within normal limits.    BLADDER: Circumferential bladder wall thickening.  REPRODUCTIVE ORGANS: Prostate within normal limits.    BOWEL: No bowel obstruction. Appendix is normal. Unchanged 2.7 cm small   bowel lipoma (2:88).  PERITONEUM: No ascites.  VESSELS: Atherosclerotic changes.  RETROPERITONEUM/LYMPH NODES: No lymphadenopathy.  ABDOMINAL WALL: Tiny fat-containing of the hernia  BONES: Within normal limits diffusely heterogeneous sclerotic appearance   reflective of patient's known myeloproliferative disorder..    IMPRESSION:    The patient is developed pericholecystic fluid and edematous changes of   the gallbladder wall with cholelithiasis may represent acute cholecystitis    Unchanged hepatosplenomegaly      < end of copied text >  < from: US Abdomen Upper Quadrant Right (12.24.22 @ 10:42) >  Dependent hyperechogenicity noted within the gallbladder lumen suggesting   stones versus sludge. Gallbladder wall thickening up to 5 mm with   pericholecystic edema. Positive sonographic Parker sign per technologist   report. In thegallbladder fundus, there is region of ring down artifact   (1.55-18), possible focal adenomyomatosis. Correlate clinically for   cholecystitis.      < end of copied text >

## 2022-12-25 NOTE — CHART NOTE - NSCHARTNOTEFT_GEN_A_CORE
Patient seen and evaluated at bedside due to RN notification that patient had removed IV and was requesting to AMA. 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 Discussed with MAR; made plan to reach out to emergency contact, Rohit Sam. Writer revisited bedside. Patient now expressing understanding that he has a gallbladder problem and that it has to come out. Writer reiterated that he was a poor surgical candidate due to cancer and was at high risk of infection requiring IV antibiotics. Patient endorsed diet of milk and cookies. In the middle of discussing clear liquid diet, Rohit Sam arrived, legal caregiver. In the middle of discussing with Rohit Sam and RN, patient started walking down the adam. Writer activated code MONIQUE. Patient seen and evaluated at bedside due to RN notification that patient had removed own IV and was requesting to AMA. 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 Discussed with MAR; made plan to reach out to emergency contact, Rohit Sam. Writer revisited bedside. Patient now expressing understanding that he has a gallbladder problem and that it has to come out. Writer reiterated that he was a poor surgical candidate due to cancer and was at high risk of infection requiring IV antibiotics. Patient endorsed diet of milk and cookies. In the middle of discussing clear liquid diet, Rohit aSm arrived, legal caregiver. In the middle of discussing with Rohit Sam and RN, patient started walking down the adam. Writer activated code MONIQUE. Patient left AMA. Discussed with MAR plan to notify day team, who could then notify day attending. Patient seen and evaluated at bedside due to RN notification that patient had removed own IV and was requesting to AMA. 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 Discussed with MAR; made plan to reach out to emergency contact, Rohit Sam. Writer revisited bedside. Patient now expressing understanding that he has a gallbladder problem and that it has to come out. Writer reiterated that he was a poor surgical candidate due to cancer and was at high risk of infection requiring IV antibiotics. Patient endorsed diet of milk and cookies. In the middle of discussing clear liquid diet, Rohit Sam arrived, legal caregiver. In the middle of discussing with Rohit Sam and RN, patient started walking down the adam. Writer activated code MONIQUE. Patient left AMA. Discussed with MAR; received instruction to notify day team, who could then notify day attending. Patient seen and evaluated at bedside due to RN notification that patient had removed own IV and was requesting to AMA. 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 Discussed with MAR; made plan to reach out to emergency contact, Rohit Sam. Writer revisited bedside. Patient now expressing understanding that he has a gallbladder problem and that it has to come out. Writer reiterated that he was a poor surgical candidate due to cancer and was at high risk of infection requiring IV antibiotics. Patient endorsed diet of milk and cookies. In the middle of discussing clear liquid diet, Rohit Sam arrived, legal caregiver. In the middle of discussing with Rohit Sam and RN, patient started walking down the adam. Writer activated code MONIQUE. Patient left AMA. Discussed with MAR, who notified overnight attending; plan to notify day team.    Patient stated to providers that he wanted to leave the hospital against medical advice. Patient was A&O x 3 and had full capacity to make independent decisions. Writer and MONIQUE team discussed at length the risks of signing out AMA, including but not limited to harm, injury, pain, or death; the risks, benefits and alternatives to treatment, as well as the attendant risks of refusing treatment at this time. Providers offered to answer any questions and fully answered any such questions. Patient fully understood what had been explained and verbalized understanding. Attending was notified and aware of decision for AMA. Patient signed form to sign out AMA and accepted responsibility for any and all results of this decision.

## 2022-12-25 NOTE — CONSULT NOTE ADULT - ATTENDING COMMENTS
DATE OF SERVICE: 12-24-22 @ 17:03    57M with Leukemia oh chemotherapy, last tx last week, who presents with RUQ abdominal pain. He has had the pain prior, but usually resolves, this is continuous. He denies fever/chills.    Vitals stable  Labs WBC 70 (previous 120), troponin elevated, bili 3.0, Alp 253  US shows stones vs sludge, 5mm gb wall, +pericholecystic fluid, cbd 5mm    Abdomen soft, + RUQ TTP, + piper's, no rebound/guarding    Patient is a poor surgical candidate at this time given elevated troponin and current chemotherapy, as such we recommend nonoperative treatment given his overall clinical picture  Recommend IR eval for percutaneous cholecystostomy   Continue IV abx  NPO  He has chronic elevation of bilirubin, but today is above his baseline and in the setting of biliary disease would rule out choledocholithiasis, recommend MRCP for further eval and consider GI consult  Heme/onc eval  We will follow
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 Cytopenias likely related to therapy, can hold while inpatient and being treated for cholecystitis. Will f/u on discharge at [facility].
58 y/o M with PMH of primary myelofibrosis (on active chemotherapy; last cycle: 12/16) , T2DM (oral meds), HTN, HLD presents to the ED complaining of having 10/10, sharp, non-radiating, RUQ abdominal pain found to have found to have acute cholecystitis. GI consulted for slightly elevated bilirubin coma pred to baseline and c/f choledocholithiasis.     Recommendations:  -Follow up MRI/MRCP   -Continue empiric abx

## 2022-12-25 NOTE — PROVIDER CONTACT NOTE (OTHER) - ASSESSMENT
no s/s distress
Pt is AOx4. Pt started to become agitated and uncooperative. Pt refusing tele monitor and pulled his IV out while Zosyn IVPB still running. Pt wants to leave and sign AMA form.

## 2022-12-25 NOTE — CONSULT NOTE ADULT - SUBJECTIVE AND OBJECTIVE BOX
Hematology Consult Note  ***recs not final until attending attestation***    Romulo Otoole MD PGY-4  Reachable on TEAMS    HPI:  56 y/o M with PMH of primary myelofibrosis (on active chemotherapy; last cycle: 12/16) , T2DM (oral meds), HTN, HLD presents to the ED complaining of having 10/10, sharp, non-radiating, RUQ abdominal pain that started last night while sleeping. Pt states that the pain was so bad that it awoke him from his sleep. The pain was accompanied by nausea and  1 episode of non-bloody, non-bilious emesis since yesterday. Pt did not take any medications for symptom relief prior to ED arrival. Of note, pt states experience similar pain in the past (> 6 months ago) that would present with heavy meals but resolve on its own over a day. Currently, denies fever, headache, CP, SOB, palpitations, blurry vision or dysuria. Active smoker (> 40 years; currently smoking 1 pack per week), denies alcohol or recreational drug use.       ED Course:  Vitals: T 97.4 F, HR 81, /54, RR 20, SpO2 100% on RA  received aspirin 324 mg, morphine 4 mg IV x 2, zosyn, NS 1L bolus x 2    CTAP and RUQ US with signs suggesting cholecystitis (24 Dec 2022 14:38)      Allergies    No Known Allergies    Intolerances        MEDICATIONS  (STANDING):  atorvastatin 80 milliGRAM(s) Oral at bedtime  dextrose 5%. 1000 milliLiter(s) (100 mL/Hr) IV Continuous <Continuous>  dextrose 5%. 1000 milliLiter(s) (50 mL/Hr) IV Continuous <Continuous>  dextrose 50% Injectable 25 Gram(s) IV Push once  dextrose 50% Injectable 12.5 Gram(s) IV Push once  dextrose 50% Injectable 25 Gram(s) IV Push once  glucagon  Injectable 1 milliGRAM(s) IntraMuscular once  influenza   Vaccine 0.5 milliLiter(s) IntraMuscular once  insulin lispro (ADMELOG) corrective regimen sliding scale   SubCutaneous every 6 hours  meclizine 12.5 milliGRAM(s) Oral three times a day  nicotine - 21 mG/24Hr(s) Patch 1 Patch Transdermal daily  piperacillin/tazobactam IVPB.. 3.375 Gram(s) IV Intermittent every 8 hours  polyethylene glycol 3350 17 Gram(s) Oral daily  QUEtiapine 25 milliGRAM(s) Oral at bedtime    MEDICATIONS  (PRN):  acetaminophen     Tablet .. 650 milliGRAM(s) Oral every 6 hours PRN Mild Pain (1 - 3), Moderate Pain (4 - 6)  calamine/zinc oxide Lotion 1 Application(s) Topical two times a day PRN Rash and/or Itching  dextrose Oral Gel 15 Gram(s) Oral once PRN Blood Glucose LESS THAN 70 milliGRAM(s)/deciliter  melatonin 3 milliGRAM(s) Oral at bedtime PRN Insomnia  morphine  - Injectable 2 milliGRAM(s) IV Push every 4 hours PRN Severe Pain (7 - 10)  prochlorperazine   Tablet 10 milliGRAM(s) Oral every 6 hours PRN nausea      PAST MEDICAL & SURGICAL HISTORY:  HTN (hypertension)      DM (diabetes mellitus)      Arthritis      Myeloproliferative disease      Smoking      HTN (hypertension)      T2DM (type 2 diabetes mellitus)      Primary myelofibrosis      H/O eye surgery      History of eye surgery          FAMILY HISTORY:  Family history of heart disease  Early family history of heart disease in father (MI, CABG in 50s) and sister (MI in 40s)      Family history of lung cancer (Uncle)  Uncle with lung cancer (hx smoking)    FH: type 2 diabetes mellitus        SOCIAL HISTORY: No EtOH, no tobacco    REVIEW OF SYSTEMS:    CONSTITUTIONAL: No weakness, fevers or chills  EYES/ENT: No visual changes;  No vertigo or throat pain   NECK: No pain or stiffness  RESPIRATORY: No cough, wheezing, hemoptysis; No shortness of breath  CARDIOVASCULAR: No chest pain or palpitations  GASTROINTESTINAL: No abdominal or epigastric pain. No nausea, vomiting, or hematemesis; No diarrhea or constipation. No melena or hematochezia.  GENITOURINARY: No dysuria, frequency or hematuria  NEUROLOGICAL: No numbness or weakness  SKIN: No itching, burning, rashes, or lesions   All other review of systems is negative unless indicated above.        T(F): 97.6 (12-25-22 @ 11:15), Max: 99.8 (12-25-22 @ 05:10)  HR: 87 (12-25-22 @ 11:15)  BP: 100/53 (12-25-22 @ 11:15)  RR: 17 (12-25-22 @ 11:15)  SpO2: 96% (12-25-22 @ 11:15)  Wt(kg): --    Constitutional: NAD  Eyes: EOMI, sclera non-icteric  Neck: supple  Respiratory: no inc wob  Cardiovascular: RRR,   Gastrointestinal: soft, NTND, no masses palpable,  Extremities: no cyanosis, no clubbing                          6.9    74.83 )-----------( 56       ( 25 Dec 2022 11:30 )             21.5       12-25    134<L>  |  98  |  21  ----------------------------<  126<H>  5.2   |  25  |  0.54    Ca    8.8      25 Dec 2022 06:30  Phos  2.2     12-25  Mg     2.00     12-25    TPro  x   /  Alb  x   /  TBili  3.2<H>  /  DBili  2.6<H>  /  AST  x   /  ALT  x   /  AlkPhos  x   12-25      Haptoglobin, Serum: <20 mg/dL (12-25 @ 11:30)  Lactate Dehydrogenase, Serum: 1369 U/L (12-25 @ 11:30)  Magnesium, Serum: 2.00 mg/dL (12-25 @ 06:30)  Phosphorus Level, Serum: 2.2 mg/dL (12-25 @ 06:30)      RADIOLOGY & ADDITIONAL TESTS:  Studies reviewed.     Hematology Consult Note  ***recs not final until attending attestation***    Romulo Otoole MD PGY-4  Reachable on TEAMS    HPI:  58 y/o M with PMH of primary myelofibrosis (on active chemotherapy; last cycle: 12/16) , T2DM (oral meds), HTN, HLD presents to the ED complaining of having 10/10, sharp, non-radiating, RUQ abdominal pain that started last night while sleeping. Pt states that the pain was so bad that it awoke him from his sleep. The pain was accompanied by nausea and  1 episode of non-bloody, non-bilious emesis since yesterday. Pt did not take any medications for symptom relief prior to ED arrival. Of note, pt states experience similar pain in the past (> 6 months ago) that would present with heavy meals but resolve on its own over a day. Currently, denies fever, headache, CP, SOB, palpitations, blurry vision or dysuria. Active smoker (> 40 years; currently smoking 1 pack per week), denies alcohol or recreational drug use.       ED Course:  Vitals: T 97.4 F, HR 81, /54, RR 20, SpO2 100% on RA  received aspirin 324 mg, morphine 4 mg IV x 2, zosyn, NS 1L bolus x 2    CTAP and RUQ US with signs suggesting cholecystitis (24 Dec 2022 14:38)      Allergies    No Known Allergies    Intolerances        MEDICATIONS  (STANDING):  atorvastatin 80 milliGRAM(s) Oral at bedtime  dextrose 5%. 1000 milliLiter(s) (100 mL/Hr) IV Continuous <Continuous>  dextrose 5%. 1000 milliLiter(s) (50 mL/Hr) IV Continuous <Continuous>  dextrose 50% Injectable 25 Gram(s) IV Push once  dextrose 50% Injectable 12.5 Gram(s) IV Push once  dextrose 50% Injectable 25 Gram(s) IV Push once  glucagon  Injectable 1 milliGRAM(s) IntraMuscular once  influenza   Vaccine 0.5 milliLiter(s) IntraMuscular once  insulin lispro (ADMELOG) corrective regimen sliding scale   SubCutaneous every 6 hours  meclizine 12.5 milliGRAM(s) Oral three times a day  nicotine - 21 mG/24Hr(s) Patch 1 Patch Transdermal daily  piperacillin/tazobactam IVPB.. 3.375 Gram(s) IV Intermittent every 8 hours  polyethylene glycol 3350 17 Gram(s) Oral daily  QUEtiapine 25 milliGRAM(s) Oral at bedtime    MEDICATIONS  (PRN):  acetaminophen     Tablet .. 650 milliGRAM(s) Oral every 6 hours PRN Mild Pain (1 - 3), Moderate Pain (4 - 6)  calamine/zinc oxide Lotion 1 Application(s) Topical two times a day PRN Rash and/or Itching  dextrose Oral Gel 15 Gram(s) Oral once PRN Blood Glucose LESS THAN 70 milliGRAM(s)/deciliter  melatonin 3 milliGRAM(s) Oral at bedtime PRN Insomnia  morphine  - Injectable 2 milliGRAM(s) IV Push every 4 hours PRN Severe Pain (7 - 10)  prochlorperazine   Tablet 10 milliGRAM(s) Oral every 6 hours PRN nausea      PAST MEDICAL & SURGICAL HISTORY:  HTN (hypertension)      DM (diabetes mellitus)      Arthritis      Myeloproliferative disease      Smoking      HTN (hypertension)      T2DM (type 2 diabetes mellitus)      Primary myelofibrosis      H/O eye surgery      History of eye surgery          FAMILY HISTORY:  Family history of heart disease  Early family history of heart disease in father (MI, CABG in 50s) and sister (MI in 40s)      Family history of lung cancer (Uncle)  Uncle with lung cancer (hx smoking)    FH: type 2 diabetes mellitus        SOCIAL HISTORY: No EtOH, no tobacco    REVIEW OF SYSTEMS:    CONSTITUTIONAL: No weakness, fevers or chills  EYES/ENT: No visual changes;  No vertigo or throat pain   NECK: No pain or stiffness  RESPIRATORY: No cough, wheezing, hemoptysis; No shortness of breath  CARDIOVASCULAR: No chest pain or palpitations  GASTROINTESTINAL: No abdominal or epigastric pain. No nausea, vomiting, or hematemesis; No diarrhea or constipation. No melena or hematochezia.  GENITOURINARY: No dysuria, frequency or hematuria  NEUROLOGICAL: No numbness or weakness  SKIN: No itching, burning, rashes, or lesions   All other review of systems is negative unless indicated above.        T(F): 97.6 (12-25-22 @ 11:15), Max: 99.8 (12-25-22 @ 05:10)  HR: 87 (12-25-22 @ 11:15)  BP: 100/53 (12-25-22 @ 11:15)  RR: 17 (12-25-22 @ 11:15)  SpO2: 96% (12-25-22 @ 11:15)  Wt(kg): --    Constitutional: NAD  Eyes: EOMI, sclera non-icteric  Neck: supple  Respiratory: no inc wob  Cardiovascular: RRR,   Gastrointestinal: soft, NTND, no masses palpable, + blood stools  Extremities: no cyanosis, no clubbing                          6.9    74.83 )-----------( 56       ( 25 Dec 2022 11:30 )             21.5       12-25    134<L>  |  98  |  21  ----------------------------<  126<H>  5.2   |  25  |  0.54    Ca    8.8      25 Dec 2022 06:30  Phos  2.2     12-25  Mg     2.00     12-25    TPro  x   /  Alb  x   /  TBili  3.2<H>  /  DBili  2.6<H>  /  AST  x   /  ALT  x   /  AlkPhos  x   12-25      Haptoglobin, Serum: <20 mg/dL (12-25 @ 11:30)  Lactate Dehydrogenase, Serum: 1369 U/L (12-25 @ 11:30)  Magnesium, Serum: 2.00 mg/dL (12-25 @ 06:30)  Phosphorus Level, Serum: 2.2 mg/dL (12-25 @ 06:30)      RADIOLOGY & ADDITIONAL TESTS:  Studies reviewed.

## 2022-12-25 NOTE — PROVIDER CONTACT NOTE (CRITICAL VALUE NOTIFICATION) - ASSESSMENT
Alert and oriented x4. No c/o pain and discomfort at this time. VSS. OOB with standby assist. NPO at this time. No signs of distress noted at this time. Safety is maintained.
VS stable, afebrile
Pt is AOx4; zosyn IVPB currently running. Pt is denying pain or any abdominal discomfort. Pt has no fever.

## 2022-12-25 NOTE — PROGRESS NOTE ADULT - SUBJECTIVE AND OBJECTIVE BOX
SUBJECTIVE:   Seen and examined at bedside. no acute events overnight. reports ongoing RUQ abd pain.     OBJECTIVE: T(C): 37.7 (12-25-22 @ 05:10), Max: 37.7 (12-25-22 @ 05:10)  HR: 87 (12-25-22 @ 05:10) (74 - 87)  BP: 100/52 (12-25-22 @ 05:10) (99/56 - 109/70)  RR: 17 (12-25-22 @ 05:10) (16 - 18)  SpO2: 94% (12-25-22 @ 05:10) (93% - 100%)  Wt(kg): --  I&O's Summary    I&O's Detail    Physical Exam  General: NAD  Resp: nonlabored   Abdomen: soft, mildly tender in RUQ, nondistended  Vasc: WWP    MEDICATIONS  (STANDING):  atorvastatin 80 milliGRAM(s) Oral at bedtime  dextrose 5%. 1000 milliLiter(s) (100 mL/Hr) IV Continuous <Continuous>  dextrose 5%. 1000 milliLiter(s) (50 mL/Hr) IV Continuous <Continuous>  dextrose 50% Injectable 25 Gram(s) IV Push once  dextrose 50% Injectable 12.5 Gram(s) IV Push once  dextrose 50% Injectable 25 Gram(s) IV Push once  glucagon  Injectable 1 milliGRAM(s) IntraMuscular once  influenza   Vaccine 0.5 milliLiter(s) IntraMuscular once  insulin lispro (ADMELOG) corrective regimen sliding scale   SubCutaneous three times a day before meals  insulin lispro (ADMELOG) corrective regimen sliding scale   SubCutaneous at bedtime  meclizine 12.5 milliGRAM(s) Oral three times a day  nicotine - 21 mG/24Hr(s) Patch 1 Patch Transdermal daily  piperacillin/tazobactam IVPB.. 3.375 Gram(s) IV Intermittent every 8 hours  polyethylene glycol 3350 17 Gram(s) Oral daily  QUEtiapine 25 milliGRAM(s) Oral at bedtime    MEDICATIONS  (PRN):  acetaminophen     Tablet .. 650 milliGRAM(s) Oral every 6 hours PRN Mild Pain (1 - 3), Moderate Pain (4 - 6)  calamine/zinc oxide Lotion 1 Application(s) Topical two times a day PRN Rash and/or Itching  dextrose Oral Gel 15 Gram(s) Oral once PRN Blood Glucose LESS THAN 70 milliGRAM(s)/deciliter  melatonin 3 milliGRAM(s) Oral at bedtime PRN Insomnia  morphine  - Injectable 2 milliGRAM(s) IV Push every 4 hours PRN Severe Pain (7 - 10)  prochlorperazine   Tablet 10 milliGRAM(s) Oral every 6 hours PRN nausea      LABS:                        7.5    78.10 )-----------( 54       ( 25 Dec 2022 06:30 )             23.0     12-25    134<L>  |  98  |  21  ----------------------------<  126<H>  5.2   |  25  |  0.54    Ca    8.8      25 Dec 2022 06:30  Phos  2.2     12-25  Mg     2.00     12-25    TPro  5.7<L>  /  Alb  3.0<L>  /  TBili  3.3<H>  /  DBili  x   /  AST  44<H>  /  ALT  13  /  AlkPhos  203<H>  12-25    PT/INR - ( 25 Dec 2022 06:30 )   PT: 16.9 sec;   INR: 1.45 ratio         PTT - ( 25 Dec 2022 06:30 )  PTT:32.3 sec

## 2022-12-25 NOTE — CONSULT NOTE ADULT - CONSULT REASON
Acute cholecystitis
cholecystitis, possible choledocholithiasis
Elevated Bilirubin, c/f choledocholithiases
whether to continue jakafi and hydrea

## 2022-12-25 NOTE — PROVIDER CONTACT NOTE (CRITICAL VALUE NOTIFICATION) - BACKGROUND
56 y/o M w/ PMHx of primary myelofibrosis on active chemotherapy; last cycle 12/16, T2DM, HTN, HLD, presents to ED complaining of having 10/10, sharp, non-radiating RUQ abdominal pain while sleeping. RUQ and CT abd/pelvis concerning for acute cholecystitis and WBC 70. Pt admitted for further work up.
Admitted with Cholecystitis
pt was admitted for RUQ pain associated with Nausea and Vomiting. Admitting diagnosis resulted to be Cholecystitis. HX of DM, HTN, Leukemia, Arthritis.

## 2022-12-25 NOTE — PROVIDER CONTACT NOTE (OTHER) - BACKGROUND
58 y/o M w/ PMHx of primary myelofibrosis on active chemo last cycle 12/16, T2DM, HTN, HLD presents to ED complaining of having 10/10 sharp, non radiating pain to RUQ abd. Admitting Dx: cholecystitis
Cancer, Abd pain, T2DM, HTN

## 2022-12-25 NOTE — PROGRESS NOTE ADULT - ATTENDING COMMENTS
DATE OF SERVICE: 12-25-22 @ 10:28    Attempted to see and examine today, however pt would not allow me to examine today  Reportedly pain improved  Continue abx, IR consult for perc hortencia if continued  GI eval  MRCP pending

## 2022-12-25 NOTE — PROVIDER CONTACT NOTE (OTHER) - REASON
Pt is refusing tele monitor and pulled IV out; refusing new IV to be placed.
Patient refusing to comply with NPO orders

## 2022-12-25 NOTE — RAPID RESPONSE TEAM SUMMARY - NSSITUATIONBACKGROUNDRRT_GEN_ALL_CORE
57 year old man with PMH of primary myelofibrosis (on active chemotherapy; last cycle: 12/16) , T2DM, HTN, HLD presented with right upper quadrant abdominal pain and admitted with concern for cholecystitis. Patient requesting to leave against medical advice. Patient emergency contact (Rohit Sam) arrived as well and on scene as patient requested. Patient unsatisfied with not being able to advance diet, discomfort in bed, and being in hospital in general. Alert and oriented x 4. Verbalizes that if he leaves he is aware that his pain is likely to worsen and that he could get sicker and die. Friend and emergency contact in agreement with patient.     Patient signed AMA form and left against medical advice. 57 year old man with PMH of primary myelofibrosis (on active chemotherapy; last cycle: 12/16) , T2DM, HTN, HLD presented with right upper quadrant abdominal pain and admitted with concern for cholecystitis. Patient requesting to leave against medical advice. Patient emergency contact (Rohit Sam) arrived as well and on scene as patient requested. Patient unsatisfied with not being able to advance diet, discomfort in bed, and being in hospital in general. Extensive discussion with patient on risks and benefits had with patient by covering provider (Dr. Fisher) prior to MONIQUE activation. Patient now in hallway actively attempting to leave. Repeated discussion on risks and benefits and patient still insisting on leaving.    Alert and oriented x 4. Verbalizes that if he leaves he is aware that his pain is likely to worsen and that he could get sicker and die. Friend and emergency contact in agreement with patient.     Patient signed AMA form and left against medical advice. 57 year old man with PMH of primary myelofibrosis (on active chemotherapy; last cycle: 12/16) , T2DM, HTN, HLD presented with right upper quadrant abdominal pain and admitted with concern for cholecystitis. Patient requesting to leave against medical advice. Patient emergency contact (Rohit Sam) arrived as well and on scene as patient requested. Patient unsatisfied with not being able to advance diet, discomfort in bed, and being in hospital in general. Extensive discussion with patient on risks and benefits had with patient by covering provider (Dr. Fisher) prior to MONIQUE activation. Patient now in hallway actively attempting to leave. Repeated discussion on risks and benefits and patient still insisting on leaving.    Alert and oriented x 4. Verbalizes that if he leaves he is aware that his pain is likely to worsen and that he could get sicker and die. Friend and emergency contact in agreement with patient.     Patient signed AMA form and left against medical advice.    [facility] notified. 57 year old man with PMH of primary myelofibrosis (on active chemotherapy; last cycle: 12/16) , T2DM, HTN, HLD presented with right upper quadrant abdominal pain and admitted with concern for cholecystitis. Patient requesting to leave against medical advice. Patient emergency contact (Rohit Sam) arrived as well and on scene as patient requested. Patient unsatisfied with not being able to advance diet, discomfort in bed, and being in hospital in general. Extensive discussion with patient on risks and benefits had with patient by covering provider (Dr. Fisher) prior to MONIQUE activation. Patient now in hallway actively attempting to leave. Repeated discussion on risks and benefits and patient still insisting on leaving.    Alert and oriented x 4. Aware that he has malignancy and that he is admitted for possible cholecystitis and currently on IV antibiotics. Patient verbalizes that if he leaves he is aware that his pain is likely to worsen and that he could get sicker and die. Friend and emergency contact in agreement with patient.     Patient signed AMA form and left against medical advice.    [facility] notified.

## 2022-12-25 NOTE — PROGRESS NOTE ADULT - PROBLEM SELECTOR PLAN 5
On home: metformin 1000 BID  - F/u A1c in AM; ISS q6h, FS On home: metformin 1000 BID  - F/u A1c (4.0); ISS q6h

## 2022-12-25 NOTE — CONSULT NOTE ADULT - ASSESSMENT
57M with hx of myelofibrosis on chemo (last tx 1 week ago) who presents with RUQ abdominal pain with findings c/f cholecystitis vs. choledocholithiasis no intervention per surgery or IR. Hematology consulted for continuation of jakafi and hydrea in setting of cholecystitis    #myelofibrosis  - 7/2020 BMBx found hypercellularity with myeloid predominant trilineage hematopoiesis with maturation and marked megakaryocytosis with atypical morphology and clustering, and increased reticulin fibers (focal MF-2), overall consistent with myeloproliferative neoplasm with focal myelofibrosis. JAK2 positive, SF3B1, DNMT3A and ASXL1 mutations.   - new acute anemia and thrombocytopenia, haptoglobin low, LDH 1k but improved from prior, indirect bili within normal limits suggesting against hemolysis, would check iron studies, likely 2/2 cholecystitis and jakafi/hydrea  - hold jakafi/hydrea for now  - on discharge, please include hematology oncology f/u at [facility] in discharge note 57M with hx of myelofibrosis on chemo (last tx 1 week ago) who presents with RUQ abdominal pain with findings c/f cholecystitis vs. choledocholithiasis no intervention per surgery or IR. Hematology consulted for continuation of jakafi and hydrea in setting of cholecystitis    #myelofibrosis  - 7/2020 BMBx found hypercellularity with myeloid predominant trilineage hematopoiesis with maturation and marked megakaryocytosis with atypical morphology and clustering, and increased reticulin fibers (focal MF-2), overall consistent with myeloproliferative neoplasm with focal myelofibrosis. JAK2 positive, SF3B1, DNMT3A and ASXL1 mutations.   - new acute anemia and thrombocytopenia, haptoglobin low, LDH 1k but improved from prior, indirect bili within normal limits suggesting against hemolysis, would check iron studies, likely 2/2 cholecystitis and jakafi/hydrea  - hold jakafi/hydrea for now  - pt also mentioning having bloody stools, would involve GI for comment  - on discharge, please include hematology oncology f/u at [facility] in discharge note

## 2022-12-25 NOTE — CONSULT NOTE ADULT - ASSESSMENT
58 y/o M with PMH of primary myelofibrosis (on active chemotherapy; last cycle: 12/16) , T2DM (oral meds), HTN, HLD presents to the ED complaining of having 10/10, sharp, non-radiating, RUQ abdominal pain found to have found to have acute cholecystitis. GI consulted for slightly elevated bilirubin coma pred to baseline and c/f choledocholithiasis.     #Acute Cholecystitis   #Elevated Bilirubin  Presenting with acute onset RUQ abdominal pain with associated nausea and vomiting found to have signs of acute with signs suggesting cholecystitis on  CTAP and RUQUS. Labs notable for a wbc count of 70K, ALP of 253 AST 48 ALT 16 with slightly elevated bilirubin of 3 from a b/l of 2-2.5.  IR and ACS consulted but rec'd supportive care given underlying comorbidities with symptoms overall improving with abx. Review of RUQUS and CT AP with nor bile ducts with no signs of any underlying stones. Suspect that mildly elevated bilirubin likely reactive in the setting of acute cholecystitis. Can obtain MRI/MRCP fur further evaluation of billary tree to asses for any underlying choledocholithiasis.     Recommendations:  -Follow up MRI/MRCP   -Continue empriric abx   -Daily LFTs/INR and fractioned bilirubin lvels  -Diet  per primary team     All recommendations are tentative until note is attested by attending.     Giuseppe Morales, PGY-4  Gastroenterology/Hepatology Fellow  Available on [employer] Teams   [phone] (Long Range Pager)  [phone] (Short Range Pager [facility])    After 5pm, please contact the on-call GI fellow. [phone]         58 y/o M with PMH of primary myelofibrosis (on active chemotherapy; last cycle: 12/16) , T2DM (oral meds), HTN, HLD presents to the ED complaining of having 10/10, sharp, non-radiating, RUQ abdominal pain found to have found to have acute cholecystitis. GI consulted for slightly elevated bilirubin coma pred to baseline and c/f choledocholithiasis.     #Acute Cholecystitis   #Elevated Bilirubin  Presenting with acute onset RUQ abdominal pain with associated nausea and vomiting found to have signs of acute with signs suggesting cholecystitis on  CTAP and RUQUS. Labs notable for a wbc count of 70K, ALP of 253 AST 48 ALT 16 with slightly elevated bilirubin of 3 from a b/l of 2-2.5.  IR and ACS consulted but rec'd supportive care given underlying comorbidities with symptoms overall improving with abx. Review of RUQUS and CT AP with nor bile ducts with no signs of any underlying stones. Suspect that mildly elevated bilirubin likely reactive in the setting of acute cholecystitis. Can obtain MRI/MRCP fur further evaluation of billary tree to asses for any underlying choledocholithiasis.  Further recs to follow based on above.     Recommendations:  -Follow up MRI/MRCP   -Continue empiric abx   -Daily LFTs/INR and fractioned bilirubin levels  -Diet per primary team     All recommendations are tentative until note is attested by attending.     Giuseppe Morales, PGY-4  Gastroenterology/Hepatology Fellow  Available on [employer] Teams   [phone] (Long Range Pager)  [phone] (Short Range Pager [facility])    After 5pm, please contact the on-call GI fellow. [phone]

## 2022-12-25 NOTE — PROVIDER CONTACT NOTE (CRITICAL VALUE NOTIFICATION) - NS PROVIDER READ BACK TO LAB
Ventricular Rate : 69   Atrial Rate : 69   P-R Interval : 116   QRS Duration : 98   Q-T Interval : 386   QTC Calculation(Bezet) : 413   P Axis : 72   R Axis : 69   T Axis : 33   Diagnosis : Normal sinus rhythm~Normal ECG~No previous ECGs available~Confirmed by Chandu Laird ([id number]) on 1/8/2017 3:28:55 PM     
yes

## 2022-12-25 NOTE — PROVIDER CONTACT NOTE (OTHER) - ACTION/TREATMENT ORDERED:
MD aware, will speak to patient regarding plan and treatment
Debbie Fisher MD at bedside to talk to pt.

## 2022-12-26 NOTE — DISCHARGE NOTE PROVIDER - NSDCFUSCHEDAPPT_GEN_ALL_CORE_FT
Martha Hillman  [facility]  [facility]  Scheduled Appointment: 12/28/2022    Justin Moctezuma  [facility]  INTMED [address]  Scheduled Appointment: 01/11/2023    Edward Domínguez  [facility]  [facility]  Scheduled Appointment: 01/12/2023    Edward Domínguez  [facility]  [facility]  Scheduled Appointment: 01/12/2023    Dwight Fitch  [facility]  CARDIOLOGY [address]   Scheduled Appointment: 01/13/2023    Martha Hillman  [facility]  [facility]  Scheduled Appointment: 02/02/2023

## 2022-12-26 NOTE — DISCHARGE NOTE PROVIDER - NSDCMRMEDTOKEN_GEN_ALL_CORE_FT
#1 Rolling walker: #1 Rolling Walker    ICD10 C95.90  bisoprolol 5 mg oral tablet: 1 tab(s) orally once a day  furosemide 20 mg oral tablet: 1 tab(s) orally once a day  hydroxyurea 500 mg oral capsule: 2 tab(s) orally 2 times a day  hydrOXYzine hydrochloride 10 mg oral tablet: 1 tab(s) orally 3 times a day, As Needed  ibuprofen 800 mg oral tablet: 1 tab(s) orally 3 times a day, As Needed  Jakafi 15 mg oral tablet: 1 tab(s) orally 2 times a day  lactulose 10 g/15 mL oral syrup: 15 milliliter(s) orally once a day  meclizine 12.5 mg oral tablet: 1 tab(s) orally 3 times a day  meloxicam 7.5 mg oral tablet: 1 tab(s) orally once a day  metFORMIN 1000 mg oral tablet: 1 tab(s) orally 2 times a day  prochlorperazine 10 mg oral tablet: 1 tab(s) orally every 6 hours, As Needed  rosuvastatin 20 mg oral tablet: 1 tab(s) orally once a day (at bedtime)  SEROquel 25 mg oral tablet: 1 tab(s) orally once a day (at bedtime)  Triamcinolone Acetonide in Absorbase: 0.1% Apply topically to affected area

## 2022-12-26 NOTE — DISCHARGE NOTE PROVIDER - HOSPITAL COURSE
58 y/o M with PMH of primary myelofibrosis (on active chemotherapy; last cycle: 12/16) , T2DM (oral meds), HTN, HLD presents to the ED complaining of having 10/10, sharp, non-radiating, RUQ abdominal pain that started last night while sleeping. Pt states that the pain was so bad that it awoke him from his sleep. The pain was accompanied by nausea and  1 episode of non-bloody, non-bilious emesis since yesterday. Pt did not take any medications for symptom relief prior to ED arrival. Of note, pt states experience similar pain in the past (> 6 months ago) that would present with heavy meals but resolve on its own over a day. Currently, denies fever, headache, CP, SOB, palpitations, blurry vision or dysuria. Active smoker (> 40 years; currently smoking 1 pack per week), denies alcohol or recreational drug use.       In the ED: Vitals: T 97.4 F, HR 81, /54, RR 20, SpO2 100% on RA s/p aspirin 324 mg, morphine 4 mg IV x 2, zosyn, NS 1L bolus x 2 w/ CTAP and RUQ US with signs suggesting cholecystitis. Per surgery, pt not a good surgical candidate as pt is on active chemotherapy. Per IR, not a candidate for percutaneous drainage at this time. Pt admitted to medicine for further management.     In the medicine unit, pt was seen and monitored routinely. Pt's home medications were restarted and pain was controlled with a combination of morphine and acetaminophen. Additionally, pt received IV antibiotics (Zosyn) and was seen by the oncology and the gastroenterology team. Pt pending MRCP for characterization of possible biliary tree dilation but overnight staff notified pt upset with NPO status and would like to AMA. Pt was assessed at bedside by MAR after CODE MONIQUE activated along with night float resident and guardian at bedside. Pt was alert and orientated x4, aware of the risks of leaving, and deemed to have capacity. Pt AMA'ed.

## 2022-12-29 NOTE — ED PROVIDER NOTE - EKG ADDITIONAL INFORMATION FREE TEXT
Sinus tachycardia, left axis deviation, incomplete left bundle branch block, T wave inversion in aVL

## 2022-12-29 NOTE — ED PROVIDER NOTE - OBJECTIVE STATEMENT
58 y/o M w/ pmh of primary myelofibrosis, T2DM, HTN, HLD p/w RUQ pain x days. Patient AMA 3 days ago from [facility] after being found to have cholecystitis.  Was not a surgical candidate or candidate for IR.  Plan was for MRCP Patient mentation improving after 2 L IV fluid, before mumbling words, now responding in full sentences.  Blood pressure still in the 70s after 2 L, started on low-dose Levophed.  Patient on 4 L nasal cannula satting 98%.  DuoNebs and steroids given, wheezing mildly improved.  D-dimer and troponin elevated, given persistent hypotension and hypoxia, will empirically treat for PE.  Will send patient to CTA once stable, ICU consulted and agrees to ICU admission. 58 y/o M w/ pmh of primary myelofibrosis, T2DM, HTN, HLD p/w RUQ pain x days. Patient AMA 3 days ago from [facility] after being found to have cholecystitis.  Was not a surgical candidate or candidate for IR.  Plan was for MRCP, However patient decided to AMA.  States that right upper quadrant pain has persisted, states he became weak today and fell, denies any head trauma.  Also endorses accompanying shortness of breath, denies chest pain fever cough chills.  Patient last received chemotherapy 2 weeks ago

## 2022-12-29 NOTE — ED ADULT TRIAGE NOTE - CHIEF COMPLAINT QUOTE
c/o buttock pain s/p fall down 2 steps due to leg weakness denies head injury or loc c/o b/l le swelling x 2 days per pt's family member ama from [facility] on xmas day after being dx;d with cholelithiasis denies abdominal pain or n/v hx leukemia and bone marrow disease per ems pt was 89% sat on r/a

## 2022-12-29 NOTE — ED ADULT NURSE NOTE - NSIMPLEMENTINTERV_GEN_ALL_ED
Implemented All Fall Risk Interventions:  Mallie to call system. Call bell, personal items and telephone within reach. Instruct patient to call for assistance. Room bathroom lighting operational. Non-slip footwear when patient is off stretcher. Physically safe environment: no spills, clutter or unnecessary equipment. Stretcher in lowest position, wheels locked, appropriate side rails in place. Provide visual cue, wrist band, yellow gown, etc. Monitor gait and stability. Monitor for mental status changes and reorient to person, place, and time. Review medications for side effects contributing to fall risk. Reinforce activity limits and safety measures with patient and family.

## 2022-12-29 NOTE — ED ADULT NURSE NOTE - CHIEF COMPLAINT QUOTE
c/o buttock pain s/p fall down 2 steps due to leg weakness denies head injury or loc c/o b/l le swelling x 2 days per pt's family member ama from [facility] on xmas day after being dx;d with cholelithiasis denies abdominal pain or n/v hx leukemia and bone marrow disease per ems pt was 89% sat on r/a

## 2022-12-29 NOTE — ED PROVIDER NOTE - WR ORDER STATUS 1
Quality 226: Preventive Care And Screening: Tobacco Use: Screening And Cessation Intervention: Tobacco Screening not Performed for Unknown Reasons Quality 130: Documentation Of Current Medications In The Medical Record: Current Medications Documented Quality 337: Tuberculosis Prevention For Psoriasis And Psoriatic Arthritis Patients On A Biological Immune Response Modifier: No documentation of negative or managed positive TB screen Quality 402: Tobacco Use And Help With Quitting Among Adolescents: Tobacco Screening OR Tobacco Cessation Intervention not Performed Reason Not Otherwise Specified Quality 410: Psoriasis Clinical Response To Oral Systemic Or Biologic Mediations: Psoriasis Assessment Tool NOT Documented Quality 431: Preventive Care And Screening: Unhealthy Alcohol Use - Screening: Unhealthy alcohol use screening not performed, reason not otherwise specified Quality 137: Melanoma: Continuity Of Care - Recall System: Recall system not utilized, reason not otherwise specified Detail Level: Generalized Quality 47: Advance Care Plan: Advance Care Planning discussed and documented; advance care plan or surrogate decision maker documented in the medical record. Quality 138: Melanoma: Coordination Of Care: Treatment plan not communicated, reason not otherwise specified. Performed

## 2022-12-29 NOTE — ED PROVIDER NOTE - CLINICAL SUMMARY MEDICAL DECISION MAKING FREE TEXT BOX
Right upper quadrant pain likely secondary to cholecystitis.  Will treat pain with IV morphine get CT and likely transfer for MRCP as GI is not available here.  Shortness of breath likely secondary to fluid overload versus PE, will get CTA, patient hemodynamically stable at this time.  Will treat with Lasix.  Weakness likely secondary to cancer versus infection, will get labs.  No signs of head trauma. Right upper quadrant pain likely secondary to cholecystitis.  Will treat pain with IV morphine get CT and likely transfer for MRCP as GI is not available here.  Shortness of breath likely secondary to fluid overload versus PE, will get CTA, patient hemodynamically stable at this time. Weakness likely secondary to cancer versus infection, will get labs.  No signs of head trauma.

## 2022-12-29 NOTE — ED PROVIDER NOTE - PROGRESS NOTE DETAILS
CT shows no PE, less inflammation in gallbladder.  Patient became hypotensive and had increasing pain in the upper quadrant, had to give morphine IV fluids draw blood cultures and lactate and cover with broad-spectrum antibiotics.  Lactate elevated to 3.6.  Patient became increasingly hypoxic, had to up to 5 L nasal cannula.  No indication for BiPAP as not tachypneic.  White blood cell count 120 5K, increased from prior, with blasts.  Concern for blast crisis versus hyper leukocytosis.  No ICU beds available here, no heme-onc or GI available here.  Spoke to ED at [facility] for stat transfer.  Patient consents.  No ICU attending available at [facility] to speak to

## 2022-12-29 NOTE — ED PROVIDER NOTE - PHYSICAL EXAMINATION
General: appears uncomforrtable holding RUQ  HEENT: NCAT, Neck supple without meningismus, PERRL, no conjunctival injection  Lungs: CTAB, No wheeze or crackles, No retractions, No increased work of breathing  Heart: S1S2 RRR, No M/R/G, Pules equal Bilaterally in upper and lower extremities distally  Abd: soft, RUQ TTP, No guarding, No rebound.  No hernias, no palpable masses.  Extrem: FROM in all joints, no gross deformities appreciated, 2+ pitting edema, No ulcers. Cap refil < 2sec.  Skin: No rash noted, warm dry.  Neuro:  Grossly normal.  No difficulty ambulating. No focal deficits.  Psychiatric: Appropriate mood and affect.

## 2022-12-29 NOTE — ED PROVIDER NOTE - NS ED ROS FT
CONST: no fevers, no chills  EYES: no pain, no vision changes  ENT: no sore throat, no ear pain, no change in hearing  CV: no chest pain, no leg swelling  RESP: (+) shortness of breath, no cough  ABD: (+) abdominal pain, no nausea, no vomiting, no diarrhea  : no dysuria, no flank pain, no hematuria  MSK: no back pain, no extremity pain  NEURO: no headache or additional neurologic complaints  HEME: no easy bleeding  SKIN:  no rash

## 2022-12-29 NOTE — ED ADULT NURSE NOTE - OBJECTIVE STATEMENT
patient alert and oriented x4. pt loud and verbally abusive towards staff. c/o buttock pain s/p fall down 2 steps due to leg weakness denies head injury or loc c/o b/l le swelling x 2 days per pt's family member ama from [facility] on xmas day after being dx;d with cholelithiasis. denies abdominal pain or n/v. hx leukemia and bone marrow disease per ems pt was 89% sat on r/a. patient alert and oriented x4. pt loud and verbally abusive towards staff. c/o buttock pain s/p fall down 2 steps due to leg weakness denies head injury or loc c/o b/l le swelling x 2 days per pt's family member ama from [facility] on xmas day after being dx;d with cholelithiasis. denies abdominal pain or n/v. hx leukemia and bone marrow disease per ems pt was 89% sat on r/a. pt moved to monitor bed 8. pt placed on NC 2L, sat improved to 97%. pt medicated for pain. patient alert and oriented x4. pt loud and verbally abusive towards staff. c/o buttock pain s/p fall down 2 steps due to leg weakness denies head injury or loc c/o b/l le swelling x 2 days per pt's family member ama from [facility] on xmas day after being dx;d with cholelithiasis. denies abdominal pain or n/v. hx leukemia and bone marrow disease per ems pt was 89% sat on r/a. pt moved to monitor bed 8. pt placed on NC 2L, sat improved to 97%. pt medicated for pain. chemo chest wall port to right chest. glucose monitoring device to right bicep.

## 2022-12-30 NOTE — ED ADULT NURSE NOTE - CHIEF COMPLAINT QUOTE
Pt is transferred from [facility]. Pt with PMH of Leukemia, primary myelofibrosis had signed out from [facility] on 12/24 after being admitted for cholecystitis, went to [facility] Yesterday after a fall c/o abdominal pain, back pain. Pt has elevated troponin, lactate 8.4, elevated WBC. Pt was hypotensive at the hospital 82/40 and was given 2 liters of NS.  last dose of Morphine 2 mg given at 0355 . Pt is sent here for hem onc service and possible ICU consult.

## 2022-12-30 NOTE — ED PROVIDER NOTE - CLINICAL SUMMARY MEDICAL DECISION MAKING FREE TEXT BOX
57 year old male with history of primary myelofibrosis (on active chemotherapy; last cycle: 12/16) , T2DM (oral meds), HTN, HLD, recent admission for acute cholecystis (not deemed surgical or IR candidate), left AMA prior to MRCP rec'd by GI, presenting as transfer from [place] ED for shortness of breath, weakness, and elevated WBC. Appears volume overloaded here, hypoxic to 89% on RA requiring 3-4L NC, on exam abdomen soft but  mostly in RUQ, will admit to medicine for resumed GI w/u with MRCP, spoke with heme/onc fellow on call overnight, no urgent recs, will see pt in AM.

## 2022-12-30 NOTE — H&P ADULT - PROBLEM SELECTOR PLAN 1
- On admission,   - - On admission,  (70s during last admission)  - Heme/Onc said low concern for blast crisis  [ ] f/u peripheral smear   [ ] f/u differential for blasts acute on chronic systolic CHF exacerbation: worsening SOB and LE edema  On home: furosemide 20 mg QD; Bisoprolol 5 mg QD  On PE; 3+ b/l LE pitting edema  Echo (10/22): EF: 45%; with mod pHTN, mild global left ventricular systolic dysfunction   Strict Is and Os, Daily standing weights  - IV Lasix 40 daily   [ ] f/u HF consult

## 2022-12-30 NOTE — CONSULT NOTE ADULT - ASSESSMENT
INCOMPLETE  56 yo male with known medical history of myelofibrosis with recent bone marrow showing progression with additional transformation to MDS-RS-T 56 yo male with known medical history of myelofibrosis with recent bone marrow showing progression with additional transformation to MDS-RS-T now presenting with back pain, weakness found to have leukocytosis and elevated uric acid on admission. Hematology consulted for further work up.     # Myelofibrosis stage 2   # MDS with ringed sideroblasts   - Follows at [facility] with Dr Hillman. Was initially diagnosed with myelofibrosis in 202o with JAK2 positive, SF3B1, DNMT3A and ASXL1 mutations. Recently was found to have leukocytosis with increasing peripheral blast. Repeat bone marrow biopsy on 11/15 showing progression of MF with now MDS-RST. Per outpatient charts, he was suppose to start on chemotherapy but does not appear to have started. He was also evaluated by BMT and found to have higher risk than benefit and not a candidate. When he was last seen here, he was off of hydrea and Jakifi due to concerns of cholecystitis. He left AMA so unclear if he continued his medications.   - Labs on admission showing leukocytosis of 117k with ANC of 50K, Macrocytic Anemia with hbg of 7.6, and thrombocytopenia f 132 (improved from prior 54 on 12/25). Blast seen on 12/29 equivalent to 21%, however, of 2.2% (concerning for lab error).   - Labs also significant for high normal of 5.1 at admission, on repeat elevated potassium of 7.0 (not hemolyzed), corrected calcium of 9.3, uric acid of 15.4 at admission (repeat labs from 1450 post rasburicase was not placed on ice and will be falsely low given rasburicase will continue to be active in tube), phos of 2.5, LDH of 4039, Bilirubin of 5.6, with direct of 4.7; Elevated lactate of 7.5, Cr normal with 0.64 at admission.   - Peripheral smear showing multiple immature myeloid cells, many of which are concerning for >20% blasts cells, No schistocytes, multiple nucleated red blood cells.   - S/P rasburicase 6mg 12/30 am.   - Recommend Flow cytometry (given to nurse in ED), trend TLS labs q 6 hours.   - Additionally, would recommend on am labs Hepatitis panel and Hep B total Core, HIV labs for potential treatment.   - Recommend to consult nephrology given current picture with hyperkalemia and high concern for TLS   - Recommend IVF; Cardiology also consulted and concerned for EF of 40%; can do gentle hydration with close monitoring.   - Will hold off on hydrea for now given the hyperkalemia and hydrea would cause lysis of wbc and increase K. Can start low dose hydrea for cytoreduction once potassium controlled.   - Will hold off on resuming Jakifi   - continue allopurinol   - Will plan for bone marrow biopsy at bedside on tuesday;   - Overall, patient is non cooperative and has left AMA several times on recent admissions. Unclear if he would be a candidate for any escalated chemotherapy treatment. Will continue work up and will hope he agrees with participation     # HF   - per cardiology     # Acute cholecystitis  - CT imaging at admission with improved imaging.   - Empiric antibiotics with Zosyn  - GI consulted   - Blood cultures/urine cultures pending   - Lactate elevated <7 this am -->recommend repeat     Starla Caceres MD   PGY5 heme onc fellow

## 2022-12-30 NOTE — H&P ADULT - ATTENDING COMMENTS
57 M PMH primary myelofibrosis (on active chemotherapy; last cycle: 12/16) , T2DM (oral meds), HTN, HLD, recent admission 12/24-12/26 for acute cholecystis (not deemed surgical or IR candidate), left AMA presents as transfer from [facility] for shortness of breath, weakness, and elevated WBC c/f TLS vs blast crisis.    Patient seen leaning forward, reported SOB  Lungs with bibasilar crackles  3+ pitting LE edema    Noted elevated LDH and uric acid but no significant BRIANNA, hyperphos or hyperkalemia now, less likely TLS, s/p rasburicase. Appreciate renal recs  Trend TLS labs and lactate q6  Start lasix 40 IVP daily, strict I/Os, HF consult. Check TTE  Persistent lactate elevation: possible in setting of volume overload vs infection vs decreased clearance through liver: trend, alana  Has acute on chronic leukocytosis from underlying myelofibrosis but cannot r/o infection although he is afebrile, less likely blast crisis per Heme  Continue Zosyn empirically. F/u Bcx  CTA C/A/P: no PE, + mild interstitial lung edema, = cholelithiasis and nonspecific gallbladder wall edema improved compared with prior exam  Hyperbilirubinemia noted at 5.6. continues to report RUQ pain but remains afebrile, no N/V, no significant abd TTP. Check bili profile, MRI Abd once stable from respiratory standpoint  PT eval 57 M PMH primary myelofibrosis (on active chemotherapy; last cycle: 12/16) , T2DM (oral meds), HTN, HLD, recent admission 12/24-12/26 for acute cholecystis (not deemed surgical or IR candidate), left AMA presents as transfer from [facility] for shortness of breath, weakness, and elevated WBC c/f TLS vs blast crisis but noted to have acute on chronic CHF exacerbation.    Patient seen leaning forward, reported SOB  Lungs with bibasilar crackles  3+ pitting LE edema    Noted elevated LDH and uric acid but no significant BRIANNA, hyperphos or hyperkalemia now, less likely TLS, s/p rasburicase. Appreciate renal recs  Trend TLS labs and lactate q6  Start lasix 40 IVP daily, strict I/Os, HF consult. Last TTE 10/2022 with EF: 45%  Persistent lactate elevation: possible in setting of volume overload vs infection vs decreased clearance through liver: trend, alana  Has acute on chronic leukocytosis from underlying myelofibrosis but cannot r/o infection although he is afebrile, less likely blast crisis per Heme  Continue Zosyn empirically. F/u Bcx  CTA C/A/P: no PE, + mild interstitial lung edema, = cholelithiasis and nonspecific gallbladder wall edema improved compared with prior exam  Hyperbilirubinemia noted at 5.6. continues to report RUQ pain but remains afebrile, no N/V, no significant abd TTP. Check bili profile, MRI Abd once stable from respiratory standpoint  PT eval

## 2022-12-30 NOTE — CONSULT NOTE ADULT - PROBLEM SELECTOR RECOMMENDATION 9
Pt. with history of primary myelofibrosis currently on Chemotherapy with Dacogen last dose on 12/12/22 presenting with abdominal pain. Nephrology consulted for concern for TLS. At this time low suspicion for TLS given absence of her hyperphosphatemia, hyperkalemia, or hypocalcemia. Pt. with elevated uric acid levels (rasburicase as per the Heme/onc team) and elevated LDH levels. Pt. currently on IVF, however with clinical signs of volume overload (LE edema decreased breath sounds and reports shortness of breath); would consider reducing rate of IVF or stopping all together. Pt. with SCr at baseline right now. Monitor TLS labs as per Heme/onc recommendations. Management of rest as per the primary team.     If any questions, please feel free to contact me     Ren Dahl  Nephrology Fellow  [facility] Pager: [phone] Pt. with history of primary myelofibrosis currently on Chemotherapy with Dacogen last dose on 12/12/22 presenting with abdominal pain. Nephrology consulted for concern for TLS. At this time low suspicion for TLS given absence of hyperphosphatemia, hyperkalemia, or hypocalcemia. Pt. with elevated uric acid levels (rasburicase as per the Heme/onc team) and elevated LDH levels. Pt. currently on IVF, however with clinical signs of volume overload (LE edema decreased breath sounds and reports shortness of breath); would consider reducing rate of IVF or holding Pt. with SCr at baseline right now. Monitor TLS labs as per Heme/onc recommendations. Management of rest as per the primary team.     If any questions, please feel free to contact me     Ren Dahl  Nephrology Fellow  [facility] Pager: [phone]

## 2022-12-30 NOTE — H&P ADULT - ASSESSMENT
57 year old male with history of primary myelofibrosis (on active chemotherapy; last cycle: 12/16) , T2DM (oral meds), HTN, HLD, recent admission 12/24-12/26 for acute cholecystis (not deemed surgical or IR candidate), left AMA prior to MRCP rec'd by GI, presenting as transfer from [place] ED for shortness of breath, weakness, and elevated WBC since yesterday, concern for TLD, blast crisis, and cholecystitis.

## 2022-12-30 NOTE — ED ADULT TRIAGE NOTE - CHIEF COMPLAINT QUOTE
Pt is transferred from [facility]. Pt with PMH of Leukemia, primary myelofibrosis had signed out from [facility] on 12/24 after being admitted for cholecystitis, went to [facility] Yesterday after a fall c/o abdominal pain, back pain. Pt has elevated troponin, lactate 8.4, elevated WBC. Pt was hypotensive at the hospital 82/40 and was given 2 liters of NS.  last dose of Morphine 2 mg given at 0355 . Pt is sent here for hem onc service and possible ICU consult.

## 2022-12-30 NOTE — H&P ADULT - NSHPLABSRESULTS_GEN_ALL_CORE
.  LABS:                         7.6    113.32 )-----------( 132      ( 30 Dec 2022 06:13 )             25.6         132<L>  |  96<L>  |  44<H>  ----------------------------<  279<H>  5.1   |  20<L>  |  0.64    Ca    8.4      30 Dec 2022 06:13  Phos  2.5       Mg     1.9         TPro  4.9<L>  /  Alb  2.5<L>  /  TBili  5.6<H>  /  DBili  x   /  AST  85<H>  /  ALT  14  /  AlkPhos  184<H>      PT/INR - ( 30 Dec 2022 06:13 )   PT: 29.3 sec;   INR: 2.50 ratio         PTT - ( 30 Dec 2022 06:13 )  PTT:39.8 sec  Urinalysis Basic - ( 30 Dec 2022 00:45 )    Color: Varsha / Appearance: Clear / S.015 / pH: x  Gluc: x / Ketone: Negative  / Bili: Small / Urobili: 4 mg/dL   Blood: x / Protein: 30 mg/dL / Nitrite: Negative   Leuk Esterase: Negative / RBC: 0-2 /HPF / WBC 3-5   Sq Epi: x / Non Sq Epi: Negative / Bacteria: Many      Serum Pro-Brain Natriuretic Peptide: 16219 pg/mL ( @ 06:13)    Lactate, Blood: 7.5 mmol/L ( @ 03:19)  Lactate, Blood: 8.4 mmol/L ( @ 00:20)      RADIOLOGY, EKG & ADDITIONAL TESTS: Reviewed.

## 2022-12-30 NOTE — ED PROVIDER NOTE - PHYSICAL EXAMINATION
Gen - NAD; well-appearing; A+Ox3   HEENT - NCAT, EOMI, moist mucous membranes  Neck - supple, no palpable lymphadenopathy  Resp - CTAB, no increased WOB  CV -  RRR, no m/r/g  Abd - soft, mildly distended, ; no guarding or rebound  Back - no midline, paraspinous, or CVA tenderness  MSK - FROM of b/l UE and LE, no gross deformities  Extrem - no LE edema/erythema/tenderness  Neuro - no focal motor or sensation deficits  Skin - warm, well perfused

## 2022-12-30 NOTE — ED ADULT NURSE NOTE - OBJECTIVE STATEMENT
Pt received rm 11, as a transfer from [facility]. Pt had signed out from [facility] on 12/24 after being admitted for cholecystitis, went to [facility] Yesterday after a fall c/o abdominal pain, back pain. pmhx Leukemia, primary myelofibrosis. Pt a&ox4, poor historian. Pt states "I fell down a few steps a couple days ago." Pt denies hitting head/ LOC. As per pt last chemo was "a few ago, I think I am do for it again this week." Pt has right chemo port, unaccessed at this time. Pt denies cp, SOB, n/v, n/t, fever/chills, headache, dizziness. Breathing even, unlabored. Abdomen soft, nontender, nondistended. Pulses equal, cap refill < 3secs, bilaterally. 20g to right ac, and right hand from [facility]; no signs of infiltration. Labs collected and sent. Fall precautions in place. Pt received rm 11, as a transfer from [facility]. Pt had signed out from [facility] on 12/24 after being admitted for cholecystitis, went to [facility] Yesterday after a fall c/o abdominal pain, back pain. pmhx Leukemia, primary myelofibrosis. Pt a&ox4, poor historian. Pt states "I fell down a few steps a couple days ago." Pt denies hitting head/ LOC. As per pt last chemo was "a few ago, I think I am do for it again this week." Pt has right chemo port, unaccessed at this time. Pt denies cp, SOB, n/v, n/t, fever/chills, headache, dizziness. Breathing even, unlabored. Abdomen soft, nontender, nondistended. Pulses equal, cap refill < 3secs, bilaterally. Bilateral leg swelling present, right leg +3 pitting edema, left leg +2. 20g to right ac, and right hand from [facility]; no signs of infiltration. Labs collected and sent. Fall precautions in place.

## 2022-12-30 NOTE — H&P ADULT - VTE RISK ASSESSMENT
Ob/Gyn Interval Note     Patient presented to L&D for decreased fetal movement. BPP was done on admission and was found to be 8/8 w/ JOSE 10.1 cm. Shortly following BPP she had a variable deceleration lasting 1 min with albert 80s. Patient was kept for an additional 2 hours to monitor for any recurrent decelerations. Fetal heart tracing remained reassuring for 2 hours. She reports good fetal movement and denies contractions, loss of fluid, or vaginal bleeding. Patient was discharged home. Precautions were given. Patient is aware that she should return to the hospital if she has worsening contractions, LOF, VB or decreased fetal movements. She voices understanding. Patient is aware that she should return to hospital if she experiences unrelenting headache, vision changes, RUQ pain. She voices understanding. Her next OB appointment is 1/13/2022.      Vitals:    01/03/22 1629   BP: 138/68   Pulse: 83   Resp: 18   Temp: 97.5 °F (36.4 °C)   TempSrc: Oral   SpO2: 99%       Delilah Crespo  OB/GYN Resident  [facility]  1/3/2022 8:26 PM
<<--- Click to launch

## 2022-12-30 NOTE — CONSULT NOTE ADULT - NS ATTEST RISK PROBLEM GEN_ALL_CORE FT
Patient has myelofibrosis with concern for acute leukemia, which carries a risk for infection, bleeding, and other life-threatening complications.

## 2022-12-30 NOTE — CONSULT NOTE ADULT - SUBJECTIVE AND OBJECTIVE BOX
Hematology Consult Note    HPI:      PAST MEDICAL & SURGICAL HISTORY:  HTN (hypertension)      DM (diabetes mellitus)      Arthritis      Myeloproliferative disease      Smoking      HTN (hypertension)      T2DM (type 2 diabetes mellitus)      Primary myelofibrosis      H/O eye surgery      History of eye surgery          FAMILY HISTORY:  Family history of heart disease  Early family history of heart disease in father (MI, CABG in 50s) and sister (MI in 40s)      Family history of lung cancer (Uncle)  Uncle with lung cancer (hx smoking)    FH: type 2 diabetes mellitus        MEDICATIONS  (STANDING):  allopurinol 300 milliGRAM(s) Oral daily  piperacillin/tazobactam IVPB... 3.375 Gram(s) IV Intermittent every 8 hours  rasburicase IVPB 6 milliGRAM(s) IV Intermittent once  sodium chloride 0.9%. 1000 milliLiter(s) (120 mL/Hr) IV Continuous <Continuous>    MEDICATIONS  (PRN):      Allergies    No Known Allergies    Intolerances        SOCIAL HISTORY: No EtOH, no tobacco    Height (cm): 182.9 (12-30 @ 04:31), 182.9 (12-29 @ 18:10)  Weight (kg): 45.5 (12-29 @ 18:10)  BMI (kg/m2): 13.6 (12-30 @ 04:31), 13.6 (12-29 @ 18:10)  BSA (m2): 1.59 (12-30 @ 04:31), 1.59 (12-29 @ 18:10)    T(F): 98 (12-30-22 @ 06:21), Max: 98.8 (12-30-22 @ 03:40)  HR: 111 (12-30-22 @ 07:21)  BP: 101/73 (12-30-22 @ 07:21)  RR: 18 (12-30-22 @ 06:21)  SpO2: 96% (12-30-22 @ 06:21)  Wt(kg): --    GENERAL: NAD, well-developed  HEAD:  Atraumatic, Normocephalic  EYES: EOMI, PERRLA, conjunctiva and sclera clear  NECK: Supple, No JVD  CHEST/LUNG: Clear to auscultation bilaterally; No wheeze  HEART: Regular rate and rhythm; No murmurs, rubs, or gallops  ABDOMEN: Soft, Nontender, Nondistended; Bowel sounds present  EXTREMITIES:  2+ Peripheral Pulses, No clubbing, cyanosis, or edema  NEUROLOGY: non-focal  SKIN: No rashes or lesions                          7.6    113.32 )-----------( 132      ( 30 Dec 2022 06:13 )             25.6       12-30    132<L>  |  96<L>  |  44<H>  ----------------------------<  279<H>  5.1   |  20<L>  |  0.64    Ca    8.4      30 Dec 2022 06:13  Phos  2.5     12-30  Mg     1.9     12-29    TPro  4.9<L>  /  Alb  2.5<L>  /  TBili  5.6<H>  /  DBili  x   /  AST  85<H>  /  ALT  14  /  AlkPhos  184<H>  12-30      Uric Acid, Serum: 15.4 mg/dL (12-30 @ 06:13)  Phosphorus Level, Serum: 2.5 mg/dL (12-30 @ 06:13)  Lactate Dehydrogenase, Serum: 4039 U/L (12-30 @ 06:13)  Magnesium, Serum: 1.9 mg/dL (12-29 @ 22:13)  Phosphorus Level, Serum: 1.9 mg/dL (12-29 @ 22:13)       Hematology Consult Note    HPI:  57 year old male with history of primary myelofibrosis (on active chemotherapy; last cycle: 12/16) , T2DM (oral meds), HTN, HLD, recent admission 12/24-12/26 for acute cholecystis (not deemed surgical or IR candidate), left AMA prior to MRCP rec'd by GI, presenting as transfer from [place] ED for shortness of breath, weakness, and elevated WBC since yesterday. Per patient, states he also continues to have RUQ pain, is willing now to stay for work up. States his pain is actually better with food intake. Denies fevers, vomiting, diarrhea. Received zosyn, 2L IVF, and morphine @ OSH. Has oncological care @ [facility].     Limited history by patient as he is non cooperative. Denies knowing when his last dose of hydroxeurea or Jakify was.       PAST MEDICAL & SURGICAL HISTORY:  HTN (hypertension)      DM (diabetes mellitus)      Arthritis      Myeloproliferative disease      Smoking      HTN (hypertension)      T2DM (type 2 diabetes mellitus)      Primary myelofibrosis      H/O eye surgery      History of eye surgery          FAMILY HISTORY:  Family history of heart disease  Early family history of heart disease in father (MI, CABG in 50s) and sister (MI in 40s)      Family history of lung cancer (Uncle)  Uncle with lung cancer (hx smoking)    FH: type 2 diabetes mellitus        MEDICATIONS  (STANDING):  allopurinol 300 milliGRAM(s) Oral daily  piperacillin/tazobactam IVPB... 3.375 Gram(s) IV Intermittent every 8 hours  rasburicase IVPB 6 milliGRAM(s) IV Intermittent once  sodium chloride 0.9%. 1000 milliLiter(s) (120 mL/Hr) IV Continuous <Continuous>    MEDICATIONS  (PRN):      Allergies    No Known Allergies    Intolerances        SOCIAL HISTORY: No EtOH, no tobacco    Height (cm): 182.9 (12-30 @ 04:31), 182.9 (12-29 @ 18:10)  Weight (kg): 45.5 (12-29 @ 18:10)  BMI (kg/m2): 13.6 (12-30 @ 04:31), 13.6 (12-29 @ 18:10)  BSA (m2): 1.59 (12-30 @ 04:31), 1.59 (12-29 @ 18:10)    T(F): 98 (12-30-22 @ 06:21), Max: 98.8 (12-30-22 @ 03:40)  HR: 111 (12-30-22 @ 07:21)  BP: 101/73 (12-30-22 @ 07:21)  RR: 18 (12-30-22 @ 06:21)  SpO2: 96% (12-30-22 @ 06:21)  Wt(kg): --    GENERAL: cachectic  HEAD:  Atraumatic, Normocephalic  NECK: Supple, No JVD  CHEST/LUNG: on room air  HEART: tachycardic  ABDOMEN: soft  EXTREMITIES:  muscle wasting, lower extremity +4 edema   NEUROLOGY: non-focal  SKIN: No rashes or lesions                          7.6    113.32 )-----------( 132      ( 30 Dec 2022 06:13 )             25.6       12-30    132<L>  |  96<L>  |  44<H>  ----------------------------<  279<H>  5.1   |  20<L>  |  0.64    Ca    8.4      30 Dec 2022 06:13  Phos  2.5     12-30  Mg     1.9     12-29    TPro  4.9<L>  /  Alb  2.5<L>  /  TBili  5.6<H>  /  DBili  x   /  AST  85<H>  /  ALT  14  /  AlkPhos  184<H>  12-30      Uric Acid, Serum: 15.4 mg/dL (12-30 @ 06:13)  Phosphorus Level, Serum: 2.5 mg/dL (12-30 @ 06:13)  Lactate Dehydrogenase, Serum: 4039 U/L (12-30 @ 06:13)  Magnesium, Serum: 1.9 mg/dL (12-29 @ 22:13)  Phosphorus Level, Serum: 1.9 mg/dL (12-29 @ 22:13)

## 2022-12-30 NOTE — H&P ADULT - PROBLEM SELECTOR PLAN 6
Diet: NPO for now   DVT: SQH, SCDs  Dispo: pending clinical improvement Diet: CC, DASH  DVT: SQH, SCDs  Dispo: pending clinical improvement

## 2022-12-30 NOTE — CONSULT NOTE ADULT - ATTENDING COMMENTS
57M with PMH of primary myelofibrosis (on decitabine, last on 12/16/22; on Jakafi and hydroxyurea), DM, HTN, HLD, and recent admission for cholecystitis (not surgical candidate, left AMA prior to MRCP), who presents as transfer from [facility] with SOB, weakness, and leukocytosis. Hematology consulted given history of MF.     In regards to his acute cholecystitis, re-evaluated by Surgery and IR at [facility] with no plan for intervention; on antibiotics for conservative management. His labs on presentation are notable for elevated uric acid (15), s/p 6 mg of rasburicase. His CBC yesterday had 21% blasts, 21% myelocytes, and 25% metamyelocytes reported.     # Myelofibrosis: Follows with Dr. Domínguez at [facility]. He is on decitabine, last given on 12/16/22, as well as Jakafi 15 mg BID and hydroxyurea. He has baseline leukocytosis (100-200K), most likely secondary to underlying MF and infection, but he now has new immature cells (21% blasts), worsening anemia (baseline appears to be 12-13), and thrombocytopenia (now improving, normal at baseline), concerning for possible transformation to acute leukemia. He also has new hyperkalemia (K 7 - not hemolyzed) with normal renal function, concerning for possible TLS.   - Monitor CBC with differential daily. Supportive transfusions to maintain Hgb > 7 and plt > 10.  - Monitor TLS labs (CMP, Phos, LDH, uric acid) q6h. Please consult Renal given new hyperkalemia. Continue allopurinol; may need further rasburicase pending repeat uric acid level. Please start gentle IV hydration.   - Monitor PT, PTT, INR, fibrinogen, and D-dimer daily. Supportive transfusions to maintain fibrinogen > 150  - Check reticulocyte count, and haptoglobin to rule out hemolysis. No schistocytes on peripheral smear.   - Follow up peripheral flow cytometry  - Hold Jakafi and hydroxyurea for now given concern for ongoing TLS but may restart hydroxyurea over the weekend for cytoreduction.  - Continue management of acute cholecystitis per GI and primary team.

## 2022-12-30 NOTE — CONSULT NOTE ADULT - ATTENDING COMMENTS
Agree with above  Elevated liver chemistries in the setting of normal appearing bile ducts on CT/US. Recommend MRCP when clinically improved for further assessment.  Trend CMP

## 2022-12-30 NOTE — CONSULT NOTE ADULT - SUBJECTIVE AND OBJECTIVE BOX
Patient seen and evaluated at bedside    Chief Complaint:    HPI:  57 year old male with history of primary myelofibrosis (on active chemotherapy; last cycle: 12/16) , T2DM (oral meds), HTN, HLD, recent admission 12/24-12/26 for acute cholecystis (not deemed surgical or IR candidate), left AMA prior to MRCP rec'd by GI, presenting as transfer from [place] ED for shortness of breath, weakness, and elevated WBC since yesterday. Per patient, states he also continues to have RUQ pain, is willing now to stay for work up. States his pain is actually better with food intake. Denies fevers, vomiting, diarrhea. Received zosyn, 2L IVF, and morphine @ OSH. Has oncological care @ [facility].     Interval Events: Per heme, pt with TLS and needs IVF, however volume overloaded, cardiology consulted. Pt reports PATRICK, orthopnea, PND, LE edema, and noncompliance with medication. Denies any active chest pain, palpitations, at this time.      (30 Dec 2022 10:07)      PMHx:   HTN (hypertension)    DM (diabetes mellitus)    Arthritis    Myeloproliferative disease    Smoking    History of myeloproliferative disorder    HTN (hypertension)    T2DM (type 2 diabetes mellitus)    Primary myelofibrosis        PSHx:   No significant past surgical history    H/O eye surgery    History of eye surgery        Allergies:  No Known Allergies      Home Meds:    Current Medications:   allopurinol 300 milliGRAM(s) Oral daily  atorvastatin 20 milliGRAM(s) Oral at bedtime  dextrose 50% Injectable 25 Gram(s) IV Push once  dextrose 50% Injectable 12.5 Gram(s) IV Push once  dextrose 50% Injectable 25 Gram(s) IV Push once  dextrose 50% Injectable 50 milliLiter(s) IV Push once  dextrose Oral Gel 15 Gram(s) Oral once  furosemide   Injectable 40 milliGRAM(s) IV Push daily  glucagon  Injectable 1 milliGRAM(s) IntraMuscular once  heparin   Injectable 5000 Unit(s) SubCutaneous every 12 hours  insulin lispro (ADMELOG) corrective regimen sliding scale   SubCutaneous every 6 hours  insulin regular  human recombinant 10 Unit(s) IV Push once  lactulose Syrup 10 Gram(s) Oral daily  piperacillin/tazobactam IVPB... 3.375 Gram(s) IV Intermittent every 8 hours  QUEtiapine 25 milliGRAM(s) Oral at bedtime PRN  sodium chloride 0.9%. 1000 milliLiter(s) IV Continuous <Continuous>  sodium zirconium cyclosilicate 10 Gram(s) Oral once      FAMILY HISTORY:  Family history of heart disease  Early family history of heart disease in father (MI, CABG in 50s) and sister (MI in 40s)      Family history of lung cancer (Uncle)  Uncle with lung cancer (hx smoking)    FH: type 2 diabetes mellitus        Social History:  Smoking History:denies  Alcohol Use:denies  Drug Use:denies    REVIEW OF SYSTEMS:  Constitutional:     [ x] negative [ ] fevers [ ] chills [ ] weight loss [ ] weight gain  HEENT:                  [ x] negative [ ] dry eyes [ ] eye irritation [ ] postnasal drip [ ] nasal congestion  CV:                         [x ] negative  [ ] chest pain [ ] orthopnea [ ] palpitations [ ] murmur  Resp:                    as above [ ] negative [ ] cough [ ] shortness of breath [ ] dyspnea [ ] wheezing [ ] sputum [ ]hemoptysis  GI:                          above[ ] negative [ ] nausea [ ] vomiting [ ] diarrhea [ ] constipation [ ] abd pain [ ] dysphagia   :                        [x ] negative [ ] dysuria [ ] nocturia [ ] hematuria [ ] increased urinary frequency  Musculoskeletal: [x ] negative [ ] back pain [ ] myalgias [ ] arthralgias [ ] fracture  Skin:                       [x ] negative [ ] rash [ ] itch  Neurological:        [ x] negative [ ] headache [ ] dizziness [ ] syncope [ ] weakness [ ] numbness  Psychiatric:           [x ] negative [ ] anxiety [ ] depression  Endocrine:            [x ] negative [ ] diabetes [ ] thyroid problem  Heme/Lymph:      [x ] negative [ ] anemia [ ] bleeding problem  Allergic/Immune: [ x] negative [ ] itchy eyes [ ] nasal discharge [ ] hives [ ] angioedema    [x ] All other systems negative        Physical Exam:  T(F): 97.8 (12-30), Max: 98.8 (12-30)  HR: 104 (12-30) (98 - 111)  BP: 101/61 (12-30) (91/36 - 118/67)  RR: 17 (12-30)  SpO2: 99% (12-30)  GENERAL: No acute distress, well-developed  HEAD:  Atraumatic, Normocephalic  ENT: EOMI, PERRLA, conjunctiva and sclera clear, Neck supple, No JVD, moist mucosa  CHEST/LUNG: Clear to auscultation bilaterally; No wheeze, equal breath sounds bilaterally   BACK: No spinal tenderness  HEART: Regular rate and rhythm; No murmurs, rubs, or gallops  ABDOMEN: Soft, Nontender, Nondistended; Bowel sounds present  EXTREMITIES:  No clubbing, cyanosis, or edema  PSYCH: Nl behavior, nl affect  NEUROLOGY: AAOx3, non-focal, cranial nerves intact  SKIN: Normal color, No rashes or lesions  LINES:    Cardiovascular Diagnostic Testing:    ECG: Personally reviewed: sinus rhythm with a left axis deviation. nonspeicific ST and T wave changes    Echo: Personally reviewed:  Study Date: 10/25/2022  Location: [address] [place], [place] [id number]: Karen Ray RDCS  Study quality: Technically fair  Referring Physician: Noel Canchola MD  Blood Pressure: 165/83 mmHg  Height: 183 cm  Weight: 86 kg  BSA: 2.1 m2  Heart Rate: 105 mmHg  ------------------------------------------------------------------------  PROCEDURE: Transthoracic echocardiogram with 2-D, M-Mode  and complete spectral and color flow Doppler. Verbal  consent was obtained for injection of  Ultrasonic Enhancing  Agent following a discussion of risks and benefits.  Following intravenous injection of Ultrasonic Enhancing  Agent, harmonic imaging was performed.  INDICATION: Neoplasm of unspecified behavior of other  specified sites (D49.89)  ------------------------------------------------------------------------  Dimensions:    Normal Values:  LA:     4.7    2.0 - 4.0 cm  Ao:     3.3    2.0 - 3.8 cm  SEPTUM: 1.1    0.6 - 1.2 cm  PWT:    1.2    0.6 - 1.1 cm  LVIDd:  5.5    3.0 - 5.6 cm  LVIDs:  4.0    1.8 - 4.0 cm  Derived variables:  LVMI: 124 g/m2  RWT: 0.43  Fractional short: 27 %  EF (Marie Rule): 45 %Doppler Peak Velocity (m/sec):  AoV=1.4  ------------------------------------------------------------------------  Observations:  Mitral Valve: Normal mitral valve. Minimal mitral  regurgitation.  Aortic Valve/Aorta: Calcified trileaflet aortic valve with  normal opening. No aortic valve regurgitation seen. Peak  left ventricular outflow tract gradient equals 5 mm Hg,  mean gradient is equal to 2 mm Hg, LVOT velocity time  integral equals 18 cm.  Aortic Root: 3.3 cm.Sinotubular Junction: 3 cm. Ascending  Aorta: 3.5 cm.  The aortic arch measure 3.7 cm which is  dilated. Inn the proximal  descending aorta there is a  large protruding atheroma/ calcification measuring 2.1 cm.  Would suggest additional imaging with CT for further  evaluation.  Left Atrium: Moderately dilated left atrium.  LA volume  index = 42 cc/m2.  Left Ventricle: Mild global left ventricular systolic  dysfunction. Endocardial visualization enhanced with  intravenous injection of Ultrasonic Enhancing Agent  (Lumason). LVEF calculated using biplane Marie's method  is 45%. Mild globalhypokinesis.  Normal left ventricular  internal dimensions and wall thicknesses.  Right Heart: Moderate right atrial enlargement. Right  ventricular enlargement with decreased right ventricular  systolic function. The RV basal diameter is 4.9 cm, andthe  mid diameter is 4.1 cm. Normal tricuspid valve. Mild  tricuspid regurgitation. Normal pulmonic valve.  Pericardium/Pleura: No pericardial effusion seen.  Hemodynamic: IVC is dilated and fixed. Estimated right  ventricular systolic pressure equals 59 mm Hg, assuming  right atrial pressure equals 15 mm Hg, consistent with  moderate pulmonary hypertension.  ------------------------------------------------------------------------  Conclusions:  1. Normal mitral valve. Minimal mitral regurgitation.  2. Aortic Root: 3.3 cm.Sinotubular Junction: 3 cm.  Ascending Aorta: 3.5 cm.  The aortic arch measure 3.7 cm  which is dilated. Inn the proximal  descending aorta there  is a large protruding atheroma/ calcification measuring 2.1  cm. Would suggest additional imaging with CT for further  evaluation.  3. Moderately dilated left atrium.  LA volume index = 42  cc/m2.Mild global left ventricular systolic dysfunction.  Endocardial visualization enhanced with intravenous  injection of Ultrasonic Enhancing Agent (Lumason). LVEF  calculated using biplane Marie's method is 45%.Mild  global hypokinesis.  Unable to perform strain imaging due to poor image quality.    4. Moderate right atrial enlargement. Right ventricular  enlargement with decreased right ventricular systolic  function. The RV basal diameter is 4.9 cm, and the mid  diameter is 4.1 cm. Normal tricuspid valve. Mild tricuspid  regurgitation.Estimated pulmonary artery systolic pressure  equals 59  mm Hg, assuming right atrial pressure equals 15  mm Hg, consistent with moderate pulmonary pressuresIVC is  dilated and fixed.  5. No pericardial effusion seen.      CXR: Personally reviewed    Labs: Personally reviewed                        7.7    117.69 )-----------( 132      ( 30 Dec 2022 14:50 )             26.5     12-30    129<L>  |  94<L>  |  49<H>  ----------------------------<  279<H>  7.0<HH>   |  18<L>  |  0.85    Ca    8.2<L>      30 Dec 2022 14:50  Phos  2.5     12-30  Mg     1.70     12-30    TPro  5.0<L>  /  Alb  2.6<L>  /  TBili  6.3<H>  /  DBili  x   /  AST  89<H>  /  ALT  19  /  AlkPhos  180<H>  12-30    PT/INR - ( 30 Dec 2022 06:13 )   PT: 29.3 sec;   INR: 2.50 ratio         PTT - ( 30 Dec 2022 06:13 )  PTT:39.8 sec  Serum Pro-Brain Natriuretic Peptide: 14732 pg/mL (12-30 @ 06:13)  Serum Pro-Brain Natriuretic Peptide: 8978 pg/mL (12-29 @ 22:13)         complains of pain/discomfort

## 2022-12-30 NOTE — CONSULT NOTE ADULT - CONSULT REASON
Leukocytosis in setting of myelofibrosis,
r/o choledocholithiasis
TLS
Volume overload and needs IVF for TLS

## 2022-12-30 NOTE — H&P ADULT - NSHPREVIEWOFSYSTEMS_GEN_ALL_CORE
REVIEW OF SYSTEMS:    CONSTITUTIONAL: No weakness, fevers or chills  EYES/ENT: No visual changes;  No vertigo or throat pain   NECK: No pain or stiffness  RESPIRATORY: No cough, wheezing, hemoptysis; +sob  CARDIOVASCULAR: No chest pain or palpitations  GASTROINTESTINAL: No abdominal or epigastric pain. No nausea, vomiting, or hematemesis; No diarrhea or constipation. No melena or hematochezia.  GENITOURINARY: No dysuria, frequency or hematuria  NEUROLOGICAL: No numbness or weakness  SKIN: No itching, burning, rashes, or lesions

## 2022-12-30 NOTE — CHART NOTE - NSCHARTNOTEFT_GEN_A_CORE
Patient seen and evaluated at bedside. RN had notified writer that patient was restless and pulling off continuous pulse ox. Per RN, patient also refusing vitals. At bedside with RN and aide present, patient reclining in bed in no acute distress, yelling for a nurse. Patient endorsed at one point wanting to leave, then focused discussion on wanting to make a bowel movement. He did not want to use bedpan (had tried). Per RN and aide, patient too great a fall risk to use toilet or commode at this time. Patient agreeable to try bedpan. Discussed with RN no Seroquel at this; and plan to draw labs, including TLS labs that had not been drawn while patient in the ED, and to give correctional insulin (FS in 300s). Discussed with RN and ANM plan to continue observation and redirection. Discussed with MAR plan to follow up lab results. in ASU:

## 2022-12-30 NOTE — H&P ADULT - PROBLEM SELECTOR PLAN 3
CTAP/RUQ U/S with nondistended gallbladder with cholelithiasis with mild GB wall edema improved since 12/24  Last admission: Surg consult, recs appreciated: poor sx candidate  Last admission: IR consult, recs appreciated: conservative management   Last admission: GI wanted MRCP and abx but pt signed out AMA  [ ] f/u repeat GI consult  c/w Zosyn myelofibrosis with recent bone marrow showing progression with additional transformation to MDS-RS-T    Plan: Follows with [facility] (Dr. Domínguez); last chemotherapy cycle (12/12)   - On home Jakafi 15 mg BID; Hydroxyurea; Decitabine  - Heme/onc consult  - TLS+ labs (CBC, CMP, UA, Ph, LDH, VBG-lactate) Q6H  - received rasburicase 6 IVP x 1 dose, and started on Allopurinol 300 mg QD  - nephrology if worsening

## 2022-12-30 NOTE — CONSULT NOTE ADULT - ASSESSMENT
57 year old male with history of primary myelofibrosis (on active chemotherapy; last cycle: 12/16) , T2DM (oral meds), HTN, HLD, recent admission 12/24-12/26 for acute cholecystis (not deemed surgical or IR candidate), left AMA prior to MRCP rec'd by GI, presenting as transfer from [place] ED for shortness of breath, weakness, and elevated WBC since yesterday concern for TLS    # Volume Overload Likely 2/2 Cirrhosis +/- HFmrEF and decreased RV function   -LVEF=45%. RV dysfunction as well.   -pt is overloaded on exam. if he absolutely needs IVF, would give aggressive diuresis if ok with hematology. Pt is at significant risk of pulmonary failure if IVF given alone and if patient is not diuresed  -Close hemodynamic and respiratory monitoring recommended  -Would consult JANELLE Avila, Cardiology Fellow, F-1    For all New Consults and Questions:  www.   Login: cardfellows    *** Note not final until signed by attending

## 2022-12-30 NOTE — H&P ADULT - PROBLEM SELECTOR PLAN 2
·  Plan: Follows with [facility] (Dr. Domínguez); last chemotherapy cycle (12/12)   - On home Jakafi 15 mg BID; Hydroxyurea; Decitabine  - Heme/onc consult  - CTM. myelofibrosis with recent bone marrow showing progression with additional transformation to MDS-RS-T    Plan: Follows with [facility] (Dr. Domínguez); last chemotherapy cycle (12/12)   - On home Jakafi 15 mg BID; Hydroxyurea; Decitabine  - Heme/onc consult  - TLS+ labs (CBC, CMP, UA, Ph, LDH, VBG-lactate) Q6H  - received rasburicase 6 IVP x 1 dose, and started on Allopurinol 300 mg QD  - nephrology if worsening - On admission,  (70s during last admission)  - Heme/Onc said low concern for blast crisis  [ ] f/u peripheral smear   [ ] f/u differential for blasts

## 2022-12-30 NOTE — ED PROVIDER NOTE - PROGRESS NOTE DETAILS
-Maricarmen Menjivar D.O: heme consulted, repeat labs ordered, at this time no further need of imaging, patient remains hemodynamically stable and can be admitted to medicine floor.

## 2022-12-30 NOTE — CONSULT NOTE ADULT - SUBJECTIVE AND OBJECTIVE BOX
HPI:  58 y/o M with PMH of primary myelofibrosis (on active chemotherapy; last cycle: ) , T2DM (oral meds), HTN, HLD w/ recent ED presentation for shart RUQ abd pain, found to have elevated bili compared to baseline (3, compared to baseline 2-2.5).  Workup including CTAP and RUQ US with signs suggesting cholecystitis. No billiary dilation with no stones noted in the bile ducts on either imaging.  He was started on empiric abx with overall improvement in his symptoms. Surgery consulted although pt overall poor surgical candidate given comorbidities. IR consulted for perc hortencia although rec'd supportive care. GI consulted and recommended MRI/MRCP. Patient then left from ED AMA.   Patient now represents to OSH for ongoing symptoms. Reporting sharp, non-radiating RUQ abd pain, has some nausea and vomiting associated with it. There, found to have WBC>100 c/f blast crisis, [facility].   On presentation, patient tachcyardic. Labs w/ , INR 2.5, T bili 5.6, CT A/P showing no biliary dilation. Started on abx, seen by heme/onc, and admitted to medicine.           Allergies:  No Known Allergies      Home Medications:    Hospital Medications:  allopurinol 300 milliGRAM(s) Oral daily  atorvastatin 20 milliGRAM(s) Oral at bedtime  dextrose 50% Injectable 25 Gram(s) IV Push once  dextrose 50% Injectable 12.5 Gram(s) IV Push once  dextrose 50% Injectable 25 Gram(s) IV Push once  dextrose Oral Gel 15 Gram(s) Oral once  furosemide    Tablet 20 milliGRAM(s) Oral daily  glucagon  Injectable 1 milliGRAM(s) IntraMuscular once  heparin   Injectable 5000 Unit(s) SubCutaneous every 12 hours  insulin lispro (ADMELOG) corrective regimen sliding scale   SubCutaneous every 6 hours  lactulose Syrup 10 Gram(s) Oral daily  piperacillin/tazobactam IVPB... 3.375 Gram(s) IV Intermittent every 8 hours  QUEtiapine 25 milliGRAM(s) Oral at bedtime PRN  rasburicase IVPB 6 milliGRAM(s) IV Intermittent once  sodium chloride 0.9%. 1000 milliLiter(s) IV Continuous <Continuous>      PMHX/PSHX:  HTN (hypertension)    DM (diabetes mellitus)    Arthritis    Myeloproliferative disease    Smoking    History of myeloproliferative disorder    HTN (hypertension)    T2DM (type 2 diabetes mellitus)    Primary myelofibrosis    No significant past surgical history    H/O eye surgery    History of eye surgery        Family history:  No pertinent family history in first degree relatives    Family history of heart disease    Family history of lung cancer (Uncle)    No pertinent family history in first degree relatives    FH: type 2 diabetes mellitus        Denies family history of colon cancer/polyps, stomach cancer/polyps, pancreatic cancer/masses, liver cancer/disease, ovarian cancer and endometrial cancer.    Social History:   Tob: Denies  EtOH: Denies  Illicit Drugs: Denies    ROS:     General:  No wt loss, fevers, chills, night sweats, fatigue  Eyes:  Good vision, no reported pain  ENT:  No sore throat, pain, runny nose, dysphagia  CV:  No pain, palpitations, hypo/hypertension  Pulm:  No dyspnea, cough, tachypnea, wheezing  GI:  see HPI  :  No pain, bleeding, incontinence, nocturia  Muscle:  No pain, weakness  Neuro:  No weakness, tingling, memory problems  Psych:  No fatigue, insomnia, mood problems, depression  Endocrine:  No polyuria, polydipsia, cold/heat intolerance  Heme:  No petechiae, ecchymosis, easy bruisability  Skin:  No rash, tattoos, scars, edema    PHYSICAL EXAM:     GENERAL:  No acute distress  HEENT:  NCAT, + scleral icterus   CHEST:  no respiratory distress  HEART:  Regular rate and rhythm  ABDOMEN:  distended, mildly ttp in RUQ, no r/g  EXTREMITIES: No edema  SKIN:  No rash/erythema/ecchymoses/petechiae/wounds/abscess/warm/dry  NEURO:  Alert and oriented x 3, no asterixis    Vital Signs:  Vital Signs Last 24 Hrs  T(C): 36.7 (30 Dec 2022 06:21), Max: 37.1 (30 Dec 2022 03:40)  T(F): 98 (30 Dec 2022 06:21), Max: 98.8 (30 Dec 2022 03:40)  HR: 109 (30 Dec 2022 09:08) (98 - 111)  BP: 106/55 (30 Dec 2022 09:08) (91/36 - 118/67)  BP(mean): 60 (29 Dec 2022 23:55) (60 - 60)  RR: 17 (30 Dec 2022 09:08) (17 - 27)  SpO2: 96% (30 Dec 2022 09:08) (92% - 98%)    Parameters below as of 30 Dec 2022 09:08  Patient On (Oxygen Delivery Method): room air      Daily Height in cm: 182.88 (30 Dec 2022 04:31)    Daily     LABS:                        7.6    113.32 )-----------( 132      ( 30 Dec 2022 06:13 )             25.6     Mean Cell Volume: 119.6 fL (- @ 06:13)        132<L>  |  96<L>  |  44<H>  ----------------------------<  279<H>  5.1   |  20<L>  |  0.64    Ca    8.4      30 Dec 2022 06:13  Phos  2.5     12  Mg     1.9         TPro  4.9<L>  /  Alb  2.5<L>  /  TBili  5.6<H>  /  DBili  x   /  AST  85<H>  /  ALT  14  /  AlkPhos  184<H>  30    LIVER FUNCTIONS - ( 30 Dec 2022 06:13 )  Alb: 2.5 g/dL / Pro: 4.9 g/dL / ALK PHOS: 184 U/L / ALT: 14 U/L / AST: 85 U/L / GGT: x           PT/INR - ( 30 Dec 2022 06:13 )   PT: 29.3 sec;   INR: 2.50 ratio         PTT - ( 30 Dec 2022 06:13 )  PTT:39.8 sec  Urinalysis Basic - ( 30 Dec 2022 00:45 )    Color: Varsha / Appearance: Clear / S.015 / pH: x  Gluc: x / Ketone: Negative  / Bili: Small / Urobili: 4 mg/dL   Blood: x / Protein: 30 mg/dL / Nitrite: Negative   Leuk Esterase: Negative / RBC: 0-2 /HPF / WBC 3-5   Sq Epi: x / Non Sq Epi: Negative / Bacteria: Many      Amylase Serum--      Lipase serum7       Ammonia--  Amylase Serum--      Lipase serum32       Ammonia--                          7.6    113.32 )-----------( 132      ( 30 Dec 2022 06:13 )             25.6                         8.5    119.82 )-----------( 133      ( 29 Dec 2022 22:13 )             28.5       Imaging:  CT A/P 22  FINDINGS:  CHEST:  LUNGS AND LARGE AIRWAYS: Patent central airways. Smooth intralobular   septal thickening at the lung apices and lung bases. No lung   consolidation, mass, or suspicious nodule. Bibasilar dependent   subsegmental atelectasis.  PLEURA: No pleural effusion.  VESSELS: Adequate but not optimal pulmonary arterial opacification.   Evaluation further degraded by motion. No pulmonary embolism in the main   pulmonary arteries, lobar branches, or segmental branches. Subsegmental   branches are poorly evaluated. Main pulmonary artery is upper limits of   normal in caliber. Thoracic aorta is normal in diameter. Atherosclerotic   calcifications of the thoracic aorta, great vessels, and coronary   arteries.  HEART: Heart is enlarged. No pericardial effusion.  MEDIASTINUM AND DONNIE: Scattered nonspecific prominent subcentimeter   mediastinal and hilar lymph nodes.  CHEST WALL AND LOWER NECK: Right chest wall MediPort. Coarse   calcification in theleft thyroid lobe. Symmetric bilateral gynecomastia.    ABDOMEN AND PELVIS:  LIVER: Hepatomegaly.  BILE DUCTS: Normal caliber.  GALLBLADDER: Contracted gallbladder with cholelithiasis. Mild nonspecific   gallbladder wall edema.  SPLEEN: Splenomegaly with spleen measuring up to 20 cm in craniocaudal   dimension with heterogeneous enhancement similar to the prior exams.  PANCREAS: Within normal limits.  ADRENALS: Within normal limits.  KIDNEYS/URETERS: Kidneys enhance symmetrically without hydronephrosis or   focal renal parenchymal lesion.    BLADDER: Mild circumferential thickening of the urinary bladder walls.  REPRODUCTIVE ORGANS: Mild median hypertrophy of the prostate gland.    BOWEL: Evaluation of the bowel is degraded by motion. No bowel   obstruction. Appendix is normal.  PERITONEUM: No ascites, pneumoperitoneum, or loculated collection. No   mesenteric lymphadenopathy.  VESSELS: Atherosclerotic calcifications of the aortoiliac tree. Normal   caliber abdominal aorta.  RETROPERITONEUM/LYMPH NODES: No lymphadenopathy.  ABDOMINAL WALL: Generalized soft tissue edema. Mildly enlarged bilateral   inguinal lymph nodes, nonspecific.  BONES: Degenerative changes of the spine. Mild diffuse sclerosis of the   osseous structures likely dueto known myelofibrosis.    IMPRESSION:  No pulmonary embolism in the main pulmonary arteries, lobar branches, or   segmental branches. Subsegmental branches are poorly evaluated.    Mild interstitial lung edema.    Under distended gallbladder with cholelithiasis and mild nonspecific   gallbladder wall edema which is improved compared with prior exam from   2022.    Hepatosplenomegaly with heterogeneous enhancement of the spleen similar   to the prior exams.    Mild circumferential thickening of the urinary bladder walls similar to   the prior exam.    RUQ US 22  FINDINGS:    Liver: Enlarged. Increased echogenicity.  Bile ducts: Normal caliber. Common bile duct measures 3 mm.  Gallbladder: Nondistended. Cholelithiasis. Mild gallbladder wall   thickening which is nonspecific and can be related to liver disease.  Pancreas: Visualized portions are within normal limits.  Right kidney: Not imaged..  Ascites: None.  IVC: Not imaged.    IMPRESSION:  *  Nondistended gallbladder with cholelithiasis. Mild gallbladder wall   thickening is nonspecific and can be related to liver disease.  *  No biliary ductal dilatation.  *  Hepatomegaly.

## 2022-12-30 NOTE — ED PROVIDER NOTE - OBJECTIVE STATEMENT
57 year old male with history of primary myelofibrosis (on active chemotherapy; last cycle: 12/16) , T2DM (oral meds), HTN, HLD, recent admission for acute cholecystis (not deemed surgical or IR candidate), left AMA prior to MRCP rec'd by GI, presenting as transfer from [place] ED for shortness of breath, weakness, and elevated WBC. Per patient, states he also continues to have RUQ pain, is willing now to stay for work up, denies fevers, vomiting, diarrhea. Received zosyn, 2L IVF, and morphine @ OSH. Has oncological care @ [facility].

## 2022-12-30 NOTE — CONSULT NOTE ADULT - ATTENDING COMMENTS
Patient was seen and evaluated.  difficult historian was reluctant to offer additional information.  At this time there is no evidence of active tumor lysis syndrome Patient was seen and evaluated.  difficult historian was reluctant to offer additional information.  At this time there is no evidence of active tumor lysis syndrome by lab criteria however uric acid is elevated.  no objection to rasburicase.  If concern for TLS increases the goal is to have about 100 cc /hr of urine output which can be achieved with a combination of fluids and lasix -- in this patient's case he is already showing signs of hypervolemia so would have to be cautious with IVF.

## 2022-12-30 NOTE — H&P ADULT - PROBLEM SELECTOR PLAN 4
On home: furosemide 20 mg QD; Bisoprolol 5 mg QD  On PE; 2+ b/l LE pitting edema  Echo (10/22): EF: 45%; with mod pHTN, mild global left ventricular systolic dysfunction   - Hold home meds is/o hypotension (BP: 101/63). On home: furosemide 20 mg QD; Bisoprolol 5 mg QD  On PE; 3+ b/l LE pitting edema  Echo (10/22): EF: 45%; with mod pHTN, mild global left ventricular systolic dysfunction   Strict Is and Os, Daily standing weights  - IV Lasix 40 daily   [ ] f/u HF consult CTAP/RUQ U/S with nondistended gallbladder with cholelithiasis with mild GB wall edema improved since 12/24  Last admission: Surg consult, recs appreciated: poor sx candidate  Last admission: IR consult, recs appreciated: conservative management   Last admission: GI wanted MRCP and abx but pt signed out AMA  [ ] f/u repeat GI consult  c/w Zosyn

## 2022-12-30 NOTE — H&P ADULT - NSHPPHYSICALEXAM_GEN_ALL_CORE
VITALS:   T(C): 36.7 (12-30-22 @ 06:21), Max: 37.1 (12-30-22 @ 03:40)  HR: 109 (12-30-22 @ 09:08) (98 - 111)  BP: 106/55 (12-30-22 @ 09:08) (91/36 - 118/67)  RR: 17 (12-30-22 @ 09:08) (17 - 27)  SpO2: 96% (12-30-22 @ 09:08) (92% - 98%)    GENERAL: NAD, lying in bed comfortably  HEAD:  Atraumatic, Normocephalic  EYES: EOMI, PERRLA, conjunctiva and sclera clear  ENT: Moist mucous membranes  NECK: Supple, No JVD  CHEST/LUNG: Clear to auscultation bilaterally; No rales, rhonchi, wheezing, decreased breath sounds at bases  HEART: Regular rate and rhythm; No mrg  ABDOMEN: BSx4; Tender in RUQ and epigastric, nondistended, mildly bloated   EXTREMITIES:  2+ Peripheral Pulses, brisk capillary refill. No clubbing, cyanosis, 3+ lower extremity pitting edema to knees  NERVOUS SYSTEM:  A&Ox3, no focal deficits   SKIN: No rashes or lesions  psych: normal behavior, normal affect VITALS:   T(C): 36.7 (12-30-22 @ 06:21), Max: 37.1 (12-30-22 @ 03:40)  HR: 109 (12-30-22 @ 09:08) (98 - 111)  BP: 106/55 (12-30-22 @ 09:08) (91/36 - 118/67)  RR: 17 (12-30-22 @ 09:08) (17 - 27)  SpO2: 96% (12-30-22 @ 09:08) (92% - 98%)    GENERAL: NAD, lying in bed comfortably  HEAD:  Atraumatic, Normocephalic  EYES: EOMI, PERRLA, conjunctiva and sclera clear  ENT: Moist mucous membranes  NECK: Supple, No JVD  CHEST/LUNG: crackles and decreased breath sounds at bases  HEART: Regular rate and rhythm; No mrg  ABDOMEN: BSx4; Tender in RUQ and epigastric, nondistended, mildly bloated   EXTREMITIES:  2+ Peripheral Pulses, brisk capillary refill. No clubbing, cyanosis, 3+ lower extremity pitting edema to knees  NERVOUS SYSTEM:  A&Ox3, no focal deficits   SKIN: No rashes or lesions  psych: normal behavior, normal affect

## 2022-12-30 NOTE — H&P ADULT - NSHPSOCIALHISTORY_GEN_ALL_CORE
Pt states he lives with his friend at home. Does not report other contacts. Pt says has a hard time walking. Patient denies alcohol use. Patient smokes 4 packs a day for 40 years, but states quit few months ago. Patient denies recreational drugs.

## 2022-12-30 NOTE — H&P ADULT - HISTORY OF PRESENT ILLNESS
57 year old male with history of primary myelofibrosis (on active chemotherapy; last cycle: 12/16) , T2DM (oral meds), HTN, HLD, recent admission 12/24-12/26 for acute cholecystis (not deemed surgical or IR candidate), left AMA prior to MRCP rec'd by GI, presenting as transfer from [place] ED for shortness of breath, weakness, and elevated WBC since yesterday. Per patient, states he also continues to have RUQ pain, is willing now to stay for work up. States his pain is actually better with food intake. Denies fevers, vomiting, diarrhea. Received zosyn, 2L IVF, and morphine @ OSH. Has oncological care @ [facility].

## 2022-12-30 NOTE — CHART NOTE - NSCHARTNOTEFT_GEN_A_CORE
Pt. on repeat BMP noted to have a serum potassium of 7.0, however on VBG serum potassium WNL at 5.1   Pseudohyperkalemia is a common occurrence in patients with severe leukocytosis such as this patient   Would recommend to monitor serum potassium levels as per VBG rather than BMP.   Continue with IVF and diuresis, Cardiology noted reviewed. Would recommend increasing diuretics to IV lasix 40mg BID.   Monitor UOP, goal >100cc/hr.    If any questions, please feel free to contact me     Ren Dahl  Nephrology Fellow  [facility] Pager: [phone]

## 2022-12-30 NOTE — CONSULT NOTE ADULT - SUBJECTIVE AND OBJECTIVE BOX
[facility] DIVISION OF KIDNEY DISEASES AND HYPERTENSION -- [phone]  -- INITIAL CONSULT NOTE  --------------------------------------------------------------------------------  HPI:  Pt. is a 57 year old male with PMH of primary myelofibrosis (on active chemotherapy with Dacogen; last dose 12/12) DM, HTN, HLD, and CHF who presented to the hospital as a transfer from [facility] after presenting with worsening abdominal pain and shortness of breath. The nephrology team was consulted for concern for TLS. On review of [place] pt noted to be hyperuricemic with serum uric acid of 15.4 (received 6mg rasburicase) and elevated LDH levels. Pt. was seen and examined in the ED today; pt himself is a poor historian. He states he was diagnosed with his malignancy about one year ago and receives his chemotherapy at the [facility]. He reported his abdominal pain has been present for many months but worsened recently. He admitted to shortness of breath, worse on exertion and significant worsening LE edema. He admitted to history of heart disease but does not know the etiology of extent of it. He denied any fevers, chills, vomiting, or diarrhea.     PAST HISTORY  --------------------------------------------------------------------------------  PAST MEDICAL & SURGICAL HISTORY:  HTN (hypertension)      DM (diabetes mellitus)      Arthritis      Myeloproliferative disease      Smoking      HTN (hypertension)      T2DM (type 2 diabetes mellitus)      Primary myelofibrosis      H/O eye surgery      History of eye surgery        FAMILY HISTORY:  Family history of heart disease  Early family history of heart disease in father (MI, CABG in 50s) and sister (MI in 40s)      Family history of lung cancer (Uncle)  Uncle with lung cancer (hx smoking)    FH: type 2 diabetes mellitus      PAST SOCIAL HISTORY:    ALLERGIES & MEDICATIONS  --------------------------------------------------------------------------------  Allergies    No Known Allergies    Intolerances      Standing Inpatient Medications  allopurinol 300 milliGRAM(s) Oral daily  atorvastatin 20 milliGRAM(s) Oral at bedtime  dextrose 50% Injectable 25 Gram(s) IV Push once  dextrose 50% Injectable 12.5 Gram(s) IV Push once  dextrose 50% Injectable 25 Gram(s) IV Push once  dextrose Oral Gel 15 Gram(s) Oral once  furosemide   Injectable 40 milliGRAM(s) IV Push daily  glucagon  Injectable 1 milliGRAM(s) IntraMuscular once  heparin   Injectable 5000 Unit(s) SubCutaneous every 12 hours  insulin lispro (ADMELOG) corrective regimen sliding scale   SubCutaneous every 6 hours  lactulose Syrup 10 Gram(s) Oral daily  piperacillin/tazobactam IVPB... 3.375 Gram(s) IV Intermittent every 8 hours  sodium chloride 0.9%. 1000 milliLiter(s) IV Continuous <Continuous>    PRN Inpatient Medications  QUEtiapine 25 milliGRAM(s) Oral at bedtime PRN      REVIEW OF SYSTEMS    All other systems were reviewed and are negative, except as noted.    VITALS/PHYSICAL EXAM  --------------------------------------------------------------------------------  T(C): 36.8 (12-30-22 @ 09:54), Max: 37.1 (12-30-22 @ 03:40)  HR: 104 (12-30-22 @ 11:33) (98 - 111)  BP: 101/61 (12-30-22 @ 11:33) (91/36 - 118/67)  RR: 17 (12-30-22 @ 11:33) (17 - 27)  SpO2: 99% (12-30-22 @ 11:33) (92% - 99%)  Wt(kg): --  Height (cm): 182.9 (12-30-22 @ 04:31)  Weight (kg): 45.5 (12-29-22 @ 18:10)  BMI (kg/m2): 13.6 (12-30-22 @ 04:31)  BSA (m2): 1.59 (12-30-22 @ 04:31)      Physical Exam:  	Gen: ill appearing  	HEENT: MMM  	Pulm: decreased breath sounds in the bases  	CV: S1S2  	Abd: Soft, +BS   	Ext: pitting LE edema B/L  	Neuro: Awake and alert  	Skin: Warm and dry    LABS/STUDIES  --------------------------------------------------------------------------------              7.6    113.32 >-----------<  132      [12-30-22 @ 06:13]              25.6     132  |  96  |  44  ----------------------------<  279      [12-30-22 @ 06:13]  5.1   |  20  |  0.64        Ca     8.4     [12-30-22 @ 06:13]      Mg     1.9     [12-29-22 @ 22:13]      Phos  2.5     [12-30-22 @ 06:13]    TPro  4.9  /  Alb  2.5  /  TBili  5.6  /  DBili  4.7  /  AST  85  /  ALT  14  /  AlkPhos  184  [12-30-22 @ 06:13]    PT/INR: PT 29.3 , INR 2.50       [12-30-22 @ 06:13]  PTT: 39.8       [12-30-22 @ 06:13]    Uric acid 15.4      [12-30-22 @ 06:13]        [12-29-22 @ 22:13]  LDH 4039      [12-30-22 @ 06:13]    Creatinine Trend:  SCr 0.64 [12-30 @ 06:13]  SCr 0.85 [12-29 @ 22:13]  SCr 0.54 [12-25 @ 06:30]  SCr 0.52 [12-24 @ 09:13]  SCr 0.41 [12-12 @ 10:31]    Urinalysis - [12-30-22 @ 11:35]      Color Yellow / Appearance Clear / SG 1.041 / pH 6.0      Gluc Negative / Ketone Negative  / Bili Small / Urobili <2 mg/dL       Blood Negative / Protein 30 mg/dL / Leuk Est Negative / Nitrite Negative      RBC NA / WBC 1 / Hyaline  / Gran  / Sq Epi  / Non Sq Epi  / Bacteria       Iron 42, TIBC 509, %sat 8      [10-25-22 @ 09:40]  Ferritin 268      [10-25-22 @ 09:40]  TSH 3.87      [02-19-22 @ 06:45]  Lipid: chol 89, , HDL 16, LDL --      [02-19-22 @ 07:47]    HBsAb Reactive      [10-25-22 @ 15:01]  HBsAg Nonreact      [10-25-22 @ 15:01]  HBcAb Nonreact      [10-25-22 @ 15:01]  HCV 0.09, Nonreact      [10-26-22 @ 07:16]  HIV Nonreact      [10-25-22 @ 15:01]

## 2022-12-30 NOTE — CONSULT NOTE ADULT - ATTENDING COMMENTS
The patient was seen and examined with the Cardiology Consultation Teaching Service.     He is a 57-year-old man with primary myelofibrosis recently found to have increasing leukocytosis and peripheral blast forms, who recently presented with right upper quadrant pain concerning for cholecystitis but left against medical advice prior to definitive treatment. He now presents with increasing dyspnea and weakness, as well as right upper quadrant pain.     We are asked to assist with management of the patient's volume, given that he will require intravenous fluids for treatment of possible tumor lysis syndrome.     The patient has significant dyspnea and orthopnea    No chest pain  No palpitations or dizziness    PMH/PSH:  Primary myelofibrosis on chemotherapy  Diabetes  Hypertension  Dyslipidemia    Chronically ill-appearing man in mild respiratory distress  Alert and oriented  Afebrile  Tachycardia  BP is low normal  JVP is elevated  Bilateral rales and decreased breath sounds at bases  Normal heart sounds  Extremities are warm and perfused  No peripheral edema     Severe leukocytosis 113K  Macrocytic anemia  Thrombocytopenia  PT 29, INR 2.5, PTT 40  Hyponatremia, hypochloremia  Metabolic acidosis with AG 16  AST 85, alkaline phosphatase 184  Total bilirubin 5.6, direct bilirubin 4.7  LDH 4039  Uric acid 15  Lactate 7.5    Hs-troponin 120  Pro-BNP 12,163    ECG demonstrated IVCD, LVH left axis    Echocardiography in 10/2022 demonstrated mild global LV dysfunction, LVEF 45%, aortic valve sclerosis, dilated aortic arch with protruding atheroma in the descending aorta, moderate LA enlargement; pulmonary pressures are moderately elevated     CTA Chest PE demonstrates no pulmonary embolism and mild interstitial edema    Impression and Recommendations:   57-year-old man with primary myelofibrosis recently found to have increasing leukocytosis and peripheral blast forms, who recently presented with right upper quadrant pain concerning for cholecystitis but left against medical advice prior to definitive treatment. He now presents with increasing dyspnea and weakness, as well as right upper quadrant pain.     The patient unfortunately is in a very difficult clinical situation. Chest CT reveals interstitial edema, albeit mild, but the treatment recommendations are for intravenous fluids. The patient is quite symptomatic from a dyspnea perspective. Unfortunately, I think the only option is for very close monitoring of the patient's respiratory status and oxygenation while fluid is administered. He likely needs to receive intravenous diuretics to improve his dyspnea currently, and certainly if he becomes hypoxic or develops worsening respiratory distress.     Diuretics can be either standing or done on a PRN basis, but the patient should be reassessed frequently. I would start with 40mg IV furosemide given his renal function, with possible titration if he is unresponsive. During the patient's last hospitalization, there were concerns about the use of diuretics for this patient due to likely intravascular depletion, so I would make sure hematology is on board with this plan. I do think this patient's current presentation is different in that his pulmonary symptoms are prominent.      Will continue to follow with you.    Vasu Bell MD  Cardiology  [phone]

## 2022-12-30 NOTE — CHART NOTE - NSCHARTNOTEFT_GEN_A_CORE
56y/o M with Hx of primary myelofibrosis (on  jakafi/hydrea-was on hold per Heme note on the 12/25/22 for cholecystitis ; last cycle: 12/16) , 7/2020 BMBx found hypercellularity with myeloid predominant trilineage hematopoiesis with maturation and marked megakaryocytosis with atypical morphology and clustering, and increased reticulin fibers (focal MF-2), overall consistent with myeloproliferative neoplasm with focal myelofibrosis. JAK2 positive, SF3B1, DNMT3A and ASXL1 mutations.   Patient was d/c from the hospital 5 days after he treated for cholecystitis, with ABx planned for biliary dact dilation but patient signed AMAed.   Now he presented to  ED for not feeling well with worsening abdominal pain and fall.   Lab with .8 (range 80-70, last admission was 120K), Hb 8.5  (no repeat lab, no diff, results from [facility] ED), CMP with mildly elevated K 5.6, low Phos, elevated LFTs. Per ED's attending patient is in NAD, VS with BP well preserved, HR  108, afebrile.      Impression:  Primary myelofibrosis (on  jakafi/hydrea-was on hold per Heme note on the 12/25/22 for cholecystitis ; last cycle: 12/16) , 7/2020 BMBx found hypercellularity with myeloid predominant trilineage hematopoiesis with maturation and marked megakaryocytosis with atypical morphology and clustering, and increased reticulin fibers (focal MF-2), overall consistent with myeloproliferative neoplasm with focal myelofibrosis. JAK2 positive, SF3B1, DNMT3A and ASXL1 mutations. Now with worsening abdominal pain likely d/t not fully teated cholecystitis and elevated WBC-similar to prior admission 5 days ago, less likely to be blast crisis. Hb and PLT stable, HD stable.   Plan:  -Please obtain  CBC+ diff+ peripheral smear , CMP   -TLS lab (low suspicion) can start on allopurinol +IVF   -Obtain G6PD; If UA>8 can assess for rasburicase    -Correct K  -CT A/P, US RUQ   -GI/Gen surge to evaluate may need acute intervention    Team will follow up with you    Donya Mix MD.   PGY4 HEMONC fellow 56y/o M with Hx of primary myelofibrosis (on  jakafi/hydrea-was on hold per Heme note on the 12/25/22 for cholecystitis ; last cycle: 12/16) , 7/2020 BMBx found hypercellularity with myeloid predominant trilineage hematopoiesis with maturation and marked megakaryocytosis with atypical morphology and clustering, and increased reticulin fibers (focal MF-2), overall consistent with myeloproliferative neoplasm with focal myelofibrosis. JAK2 positive, SF3B1, DNMT3A and ASXL1 mutations.   Patient was d/c from the hospital 5 days after he treated for cholecystitis, with ABx planned for biliary dact dilation but patient signed AMAed.   Now he presented to  ED for not feeling well with worsening abdominal pain and fall.   Lab with .8 (range 80-70, last admission was 120K), Hb 8.5  (no repeat lab, no diff, results from [facility] ED), CMP with mildly elevated K 5.6, low Phos, elevated LFTs. Per ED's attending patient is in NAD, VS with BP well preserved, HR  108, afebrile.      CT C/A/P : No pulmonary embolism in the main pulmonary arteries, lobar branches, or segmental branches. Subsegmental branches are poorly evaluated.  Mild interstitial lung edema. Under distended gallbladder with cholelithiasis and mild nonspecific gallbladder wall edema which is improved compared with prior exam from 12/24/2022.  Hepatosplenomegaly with heterogeneous enhancement of the spleen similar to the prior exams.  Mild circumferential thickening of the urinary bladder walls similar to the prior exam.    Impression:  Primary myelofibrosis (on  jakafi/hydrea-was on hold per Heme note on the 12/25/22 for cholecystitis ; last cycle: 12/16) , 7/2020 BMBx found hypercellularity with myeloid predominant trilineage hematopoiesis with maturation and marked megakaryocytosis with atypical morphology and clustering, and increased reticulin fibers (focal MF-2), overall consistent with myeloproliferative neoplasm with focal myelofibrosis. JAK2 positive, SF3B1, DNMT3A and ASXL1 mutations. Now with worsening abdominal pain likely d/t not fully teated cholecystitis ?and elevated WBC-similar to prior admission 5 days ago, less likely to be blast crisis. Hb and PLT stable, HD stable.   Plan:  -Please obtain  CBC+ diff+ peripheral smear , CMP   -TLS lab (low suspicion) can start on allopurinol +IVF   -Obtain G6PD; If UA>8 can assess for rasburicase    -Correct K  -sepsis w/up  -May get US RUQ / MRCP  -GI/Gen surge to evaluate may need acute intervention    Team will follow up with you    Donya Mix MD.   PGY4 HEMONC fellow

## 2022-12-30 NOTE — CONSULT NOTE ADULT - ASSESSMENT
56 y/o M with PMH of primary myelofibrosis (on active chemotherapy; last cycle: 12/16) , T2DM (oral meds), HTN, HLD w/ recent ED admission for RUQ abd pain, found to have elevated bili compared to baseline (3, compared to 2-2.5) and imaging c/f cholecystitis, seen by [facility] to r/o choledocholithiasis and recommended MRI/MRCP, patient left AMA. Now represented to OSH for ongoing abd pain, found to have labs c/f blast crisis (, lactate 7), transferred to [facility] for further heme/onc management; noted to have worsening hyperbilirubinemia (T bili 5.6) and imaging (CT, US) w/o any biliary dilation    # blast crisis - being followed by heme/onc, likely contributing to abd pain  #Acute Cholecystitis - seen on imaging 12/25/22  #Elevated Bilirubin - likely multifactorial from cholecystitis, possibly hemolysis, no evidence of choledocholithiasis and nl CBD on CT and US. Can get MRI/MRCP to r/o choledocholithiasis    Recommendations;  - f/u heme/onc recs  - trend CMP, INR  - MRI/MRCP  - rest of care per primary team    GI will continue to follow.     All recommendations are tentative until note is attested by attending.     Masoud Staley, PGY6  Gastroenterology/Hepatology Fellow  During weekdays: Available on [employer] Teams or pager:[phone] ([facility] Short Range Pager); [phone] (Long Range Pager)  Overnight/Weekend: Please page the on-call GI fellow       56 y/o M with PMH of primary myelofibrosis (on active chemotherapy; last cycle: 12/16) , T2DM (oral meds), HTN, HLD w/ recent ED admission for RUQ abd pain, found to have elevated bili compared to baseline (3, compared to 2-2.5) and imaging c/f cholecystitis, seen by [facility] to r/o choledocholithiasis and recommended MRI/MRCP, patient left AMA. Now represented to OSH for ongoing abd pain, found to have labs c/f blast crisis (, lactate 7), transferred to [facility] for further heme/onc management; noted to have worsening hyperbilirubinemia (T bili 5.6) and imaging (CT, US) w/o any biliary dilation    # blast crisis - being followed by heme/onc, likely contributing to abd pain  #Acute Cholecystitis? - seen on imaging 12/25/22  #Elevated Bilirubin - likely multifactorial from cholecystitis, possibly hemolysis, no evidence of choledocholithiasis and nl CBD on CT and US. Can get MRI/MRCP to r/o choledocholithiasis    Recommendations;  - f/u heme/onc recs  - trend CMP, INR  - MRI/MRCP  - rest of care per primary team    GI will continue to follow.     All recommendations are tentative until note is attested by attending.     Masoud Staley, PGY6  Gastroenterology/Hepatology Fellow  During weekdays: Available on [employer] Teams or pager:[phone] ([facility] Short Range Pager); [phone] (Long Range Pager)  Overnight/Weekend: Please page the on-call GI fellow

## 2022-12-30 NOTE — ED PROVIDER NOTE - ATTENDING CONTRIBUTION TO CARE
I have personally performed a face to face medical and diagnostic evaluation of the patient. I have discussed with and reviewed the Resident's note and agree with the History, ROS, Physical Exam and MDM unless otherwise indicated. A brief summary of my personal evaluation and impression can be found below.    57y M w/ PMHx of primary myelofibrosis (on active chemotherapy; last cycle: 12/16) , T2DM (oral meds), HTN, HLD, recent admission for acute cholecystis (not deemed surgical or IR candidate), left AMA prior to MRCP rec'd by GI, presenting as transfer from [place] ED for shortness of breath, weakness, and elevated WBC. Per patient, states he also continues to have RUQ pain, is willing now to stay for work up, denies fevers, vomiting, diarrhea. Received zosyn, 2L IVF, and morphine @ OSH. Has oncological care @ [facility].     On exam: Tachycardic but VS other wise WNL, +tenderness w/ guarding to RUQ, 2+ pitting edema B/L LE, lungs CTAB no JVD. Patient normotensive, received 2L IVF at OSH at this time will hold on continuation of IVF to prevent acute overload. Will repeat labs, pain control, heme consult and TBA. At time of assessment patient does not require ICU consult but will continue to closely monitor. I have personally performed a face to face medical and diagnostic evaluation of the patient. I have discussed with and reviewed the Resident's note and agree with the History, ROS, Physical Exam and MDM unless otherwise indicated. A brief summary of my personal evaluation and impression can be found below.    57y M w/ PMHx of primary myelofibrosis (on active chemotherapy; last cycle: 12/16) , T2DM (oral meds), HTN, HLD, recent admission for acute cholecystis (not deemed surgical or IR candidate), left AMA prior to MRCP rec'd by GI, presenting as transfer from [place] ED for shortness of breath, weakness, and elevated WBC. Per patient, states he also continues to have RUQ pain, is willing now to stay for work up, denies fevers, vomiting, diarrhea. Received zosyn, 2L IVF, and morphine @ OSH. Has oncological care @ [facility].     On exam: Tachycardic but VS other wise WNL, +tenderness w/ guarding to RUQ, 2+ pitting edema B/L LE, lungs CTAB no JVD. Patient normotensive, received IVF resuscitation at OSH, at this time will hold on continuation of IVF as to avoid acute fluid overload. Patient at this time in no acute respiratory distress, no JVD, no rales/crackles on exam, satting 97% on NC. Will be mindful of fluid status. Will repeat labs, pain control, heme consult and TBA. At time of assessment patient does not require ICU consult but will continue to closely monitor.

## 2022-12-31 NOTE — DISCHARGE NOTE FOR THE EXPIRED PATIENT - HOSPITAL COURSE
57 M PMH primary myelofibrosis (on active chemotherapy; last cycle: 12/16) , T2DM (oral meds), HTN, HLD, recent admission 12/24-12/26 for acute cholecystis (not deemed surgical or IR candidate), left AMA presents as transfer from [facility] for shortness of breath, weakness, and elevated WBC c/f TLS vs blast crisis but noted to have acute on chronic CHF exacerbation.    Noted to be in TLS given rasburicase and fluids. No clear source of infection although unable to be completely ruled out and covered empirically. HF exacerbation diuresed with lasix. Noted to have presumed pseudohyperkalemia followed on VBG and multiple metabolic derangements in the setting of TLS. Despite temporizing measures became altered and RRT called for AMS and airway concern. Subsequently arrested following intubation. For full details see RRT note however in brief Following 12min of CPR and 3 epi and 1 bicarb patient and initiation of levophed was unable to be resuscitated.    On physical exam, patient did not respond to verbal or noxious stimuli.  No spontaneous respirations.  Absent heart and breath sounds.  Absent radial and carotid pulses.   Pupils are fixed and dilated, no corneal reflex.  EKG rhythm strip shows asystole.   Patient pronounced dead at 0101.  Attending of record notified.  Legal guardian was contacted twice with message left to MICU and to medical floor. Medical examiner office was notified discussed with Jenni  and considered not a reportable case and provided with [facility] case number [id number].     57 M PMH primary myelofibrosis (on active chemotherapy; last cycle: 12/16) , T2DM (oral meds), HTN, HLD, recent admission 12/24-12/26 for acute cholecystis (not deemed surgical or IR candidate), left AMA presents as transfer from [facility] for shortness of breath, weakness, and elevated WBC c/f TLS vs blast crisis but noted to have acute on chronic CHF exacerbation.    Noted to be in TLS given rasburicase and fluids. No clear source of infection although unable to be completely ruled out and covered empirically. HF exacerbation diuresed with lasix. Noted to have presumed pseudohyperkalemia followed on VBG and multiple metabolic derangements in the setting of TLS. Despite temporizing measures became altered and RRT called for AMS and airway concern. Subsequently arrested following intubation. For full details see RRT note however in brief Following 12min of CPR and 3 epi and 1 bicarb patient and initiation of levophed was unable to be resuscitated.    On physical exam, patient did not respond to verbal or noxious stimuli.  No spontaneous respirations.  Absent heart and breath sounds.  Absent radial and carotid pulses.   Pupils are fixed and dilated, no corneal reflex.  EKG rhythm strip shows asystole.   Patient pronounced dead at 0101.  Attending of record notified.  Legal guardian was contacted with message left to MICU and to medical floor. Night team was available to receive call back, with plan for day team to follow up if needed. Medical examiner office was notified discussed with Jenni  and considered not a reportable case and provided with [facility] case number [id number].

## 2022-12-31 NOTE — CHART NOTE - NSCHARTNOTEFT_GEN_A_CORE
MICU team called by the RRT team for acute hypoxic respiratory failure and encephalopathy. On arrival patient was acutely obtunded and requiring a NRB mask to maintain saturation. On physical exam patient was grossly volume overloaded. Anesthesia was called overhead and the patient was intubated by them with sedative medications. MICU team discussed with the overnight renal fellow on starting Bumex gtt to help with the volume status. Following intubation the patient went into asystole and was pulseless on exam. Code blue called overhead and CPR was initiated. CPR performed for 12 minutes, total of 3 epinephrines and 1 amp of HCO3 was given. Following the CPR, no ROSC was achieved and the patient was declared  at 101am.     Most likely cause of death for the patient was acute hypoxic respiratory failure due to volume overload possible c/b sepsis from previous cholecystitis. TLS unlikely due to normal creatinine, phosphate and potassium.

## 2022-12-31 NOTE — CHART NOTE - NSCHARTNOTEFT_GEN_A_CORE
Patient seen and evaluated at bedside. RN notified writer in person that patient was having difficulty breathing. (Writer had not left unit since last seeing patient.) RNs working on getting pulse ox to read. Patient had endorsed to nurse feeling hot. On writer's interview, patient endorsed difficulty breathing and denied pain, including abdominal pain. On exam, patient reclining in bed, more calm than previously, interactive, following commands, wearing nasal cannula, not wearing clothes or blanket, regular rapid heart rate, cool to touch, 1-2+ pitting edema to thighs b/l, diffuse inspiratory wheezing b/l, abdomen soft nontender somewhat full, suprapubic region nontender. During exam, writer requested NRB and Duonebs, and RN called rapid for AMS. Writer increased O2 to 6LNC and asked for rectal temp. Rapid response proceeded.

## 2022-12-31 NOTE — PROVIDER CONTACT NOTE (OTHER) - ASSESSMENT
Patient is A&Ox4, agitated, aggressive and uncooperative, denying any chest pain, shortness of breath, dizziness or general pain at this time. Patient yelling that he wants to go home.

## 2022-12-31 NOTE — PROVIDER CONTACT NOTE (OTHER) - BACKGROUND
58 yo male with history of primary myelofibrosis, T2DM, HTN, HLD, recent admission for acute cholecystis, presenting from [facility] for new acute leukemia and shortness of breath. 56 yo male with history of primary myelofibrosis, T2DM, HTN, HLD, recent admission for acute cholecystis, presenting from [facility] for concern of new acute leukemia and shortness of breath.

## 2022-12-31 NOTE — RAPID RESPONSE TEAM SUMMARY - NSSITUATIONBACKGROUNDRRT_GEN_ALL_CORE
RRT AND CODE BLUE     57 M PMH primary myelofibrosis (on active chemotherapy; last cycle: 12/16) , T2DM (oral meds), HTN, HLD, recent admission 12/24-12/26 for acute cholecystis (not deemed surgical or IR candidate), left AMA presents as transfer from [facility] for shortness of breath, weakness, and elevated WBC c/f TLS vs blast crisis but noted to have acute on chronic CHF exacerbation.    RRT called for altered mental status. With ongoing metabolic derangements likely in the setting of TLS receiving fluids and rasburicase noted to be altered by bedside nurse and hypoxic. placed on nonrebreather. No hypoglycemia on POCG. Repeat VBG and chemistry drawn noted to have pseudohyperkalemia previous. Wheezing appreciated given duoneb. With worsening mental status and accessory muscle use a trial of bipap was foregone and a decision was made to proceed with intubation. Anesthesia called to bedside. Intubation conducted with propofol 100 rocuronium 100 and etomidate 10. Following intubation noted to have PEA arrest. Following 12min of CPR and 3 epi and 1 bicarb patient and initiation of levophed was unable to be resuscitated.    On physical exam, patient did not respond to verbal or noxious stimuli.  No spontaneous respirations.  Absent heart and breath sounds.  Absent radial and carotid pulses.   Pupils are fixed and dilated, no corneal reflex.  EKG rhythm strip shows asystole.   Patient pronounced dead at 0101.  Attending notified.  Legal guardian was contacted twice with message left to MICU and to medical floor. Medical examiner was notified.  RRT AND CODE BLUE     57 M PMH primary myelofibrosis (on active chemotherapy; last cycle: 12/16) , T2DM (oral meds), HTN, HLD, recent admission 12/24-12/26 for acute cholecystis (not deemed surgical or IR candidate), left AMA presents as transfer from [facility] for shortness of breath, weakness, and elevated WBC c/f TLS vs blast crisis but noted to have acute on chronic CHF exacerbation.    RRT called for altered mental status. With ongoing metabolic derangements likely in the setting of TLS receiving fluids and rasburicase noted to be altered by bedside nurse and hypoxic. placed on nonrebreather. No hypoglycemia on POCG. Repeat VBG and chemistry drawn noted to have pseudohyperkalemia previous. Wheezing appreciated given duoneb. With worsening mental status and accessory muscle use a trial of bipap was foregone and a decision was made to proceed with intubation. Anesthesia called to bedside. Intubation conducted with propofol 100 rocuronium 100 and etomidate 10. Following intubation noted to have PEA arrest. Following 12min of CPR and 3 epi and 1 bicarb patient and initiation of levophed was unable to be resuscitated.    On physical exam, patient did not respond to verbal or noxious stimuli.  No spontaneous respirations.  Absent heart and breath sounds.  Absent radial and carotid pulses.   Pupils are fixed and dilated, no corneal reflex.  EKG rhythm strip shows asystole.   Patient pronounced dead at 0101.  Attending of record notified.  Legal guardian was contacted twice with message left to MICU and to medical floor. Medical examiner office was notified discussed with Jenni  and considered not a reportable case and provided with [facility] case number [id number].

## 2022-12-31 NOTE — PROVIDER CONTACT NOTE (OTHER) - ACTION/TREATMENT ORDERED:
Provider notified, came to assess patient at bedside, educated patient on importance of pulse oximetry monitoring, continued to encourage use.

## 2023-01-01 NOTE — ED ADULT NURSE NOTE - NSIMPLEMENTINTERV_GEN_ALL_ED
Cristiano
Implemented All Universal Safety Interventions:  [place] to call system. Call bell, personal items and telephone within reach. Instruct patient to call for assistance. Room bathroom lighting operational. Non-slip footwear when patient is off stretcher. Physically safe environment: no spills, clutter or unnecessary equipment. Stretcher in lowest position, wheels locked, appropriate side rails in place.

## 2023-01-01 NOTE — ED PROVIDER NOTE - IV ALTEPLASE INCLUSION HIDDEN
RECORD     [x] Admission Note          [] Progress Note          [] Discharge Summary     Nish Guzman is a well-appearing male infant born on 2025 at 12:10 AM via vaginal, spontaneous. His mother is a 21 y.o.  [id number] . Prenatal serologies were negative except for Rubella non immune. GBS was positive and intrapartum GBS prophylaxis was adequate. ROM occurred 0h 06m  prior to delivery. Prenatal course  was notable for + chlamydia 2023 with APRIL 2023 . Delivery was uncomplicated. Elective IOL. Presentation was Vertex. APGAR scores were 8 and 9 at one and five minutes, respectively. Birth Weight: 3.095 kg (6 lb 13.2 oz). Birth Length: 0.502 m (1' 7.75\"). Birth Head Circumference: 33 cm (13\").  History     Mother's Prenatal Labs  ABO / Rh Lab Results   Component Value Date/Time    ABORH O POSITIVE 2023 09:37 AM       HIV Lab Results   Component Value Date/Time    HIVEXTERN Non-reactive 2023 12:00 AM       RPR / TP-PA Lab Results   Component Value Date/Time    RPREXTERN Non-reactive 2023 12:00 AM       Rubella Lab Results   Component Value Date/Time    RUBEXTERN Non-immune (<0.90) 2023 12:00 AM       HBsAg Lab Results   Component Value Date/Time    HEPBEXTERN Negative 2023 12:00 AM       C. Trachomatis Lab Results   Component Value Date/Time    CTRACHEXT Positive 2023 12:00 AM       N. Gonorrhoeae Lab Results   Component Value Date/Time    GONEXTERN Negative 2023 12:00 AM       Group B Strep Lab Results   Component Value Date/Time    GBSEXTERN Positive 2023 12:00 AM           Mother's Medical History  History reviewed. No pertinent past medical history.      No current outpatient medications     Labor Events   Labor: No    Steroids: None   Antibiotics During Labor: Yes   Rupture Date/Time: 2023 12:04 AM   Rupture Type: AROM   Amniotic Fluid Description: Clear    Amniotic Fluid Odor: None    Labor
show

## 2023-01-04 LAB
CULTURE RESULTS: SIGNIFICANT CHANGE UP
LDH PNL SERPL: 3256 IU/L — HIGH (ref 121–224)
LDH1 SERPL-CCNC: 25 % — SIGNIFICANT CHANGE UP (ref 17–32)
LDH3 SERPL-CCNC: 23 % — SIGNIFICANT CHANGE UP (ref 17–27)
LDH3 SERPL-CCNC: 37 % — SIGNIFICANT CHANGE UP (ref 25–40)
LDH4 SERPL-CCNC: 11 % — SIGNIFICANT CHANGE UP (ref 5–13)
LDH5 SERPL-CCNC: 4 % — SIGNIFICANT CHANGE UP (ref 4–20)
SPECIMEN SOURCE: SIGNIFICANT CHANGE UP

## 2023-01-05 LAB — HLX FLT3 FINAL REPORT: SIGNIFICANT CHANGE UP

## 2023-01-11 ENCOUNTER — APPOINTMENT (OUTPATIENT)
Dept: INTERNAL MEDICINE | Facility: CLINIC | Age: 58
End: 2023-01-11

## 2023-01-12 ENCOUNTER — APPOINTMENT (OUTPATIENT)
Dept: HEMATOLOGY ONCOLOGY | Facility: CLINIC | Age: 58
End: 2023-01-12

## 2023-01-13 ENCOUNTER — APPOINTMENT (OUTPATIENT)
Dept: CARDIOLOGY | Facility: CLINIC | Age: 58
End: 2023-01-13

## 2023-01-17 LAB — CHROM ANALY OVERALL INTERP SPEC-IMP: SIGNIFICANT CHANGE UP

## 2023-01-23 LAB — CHROM ANALY INTERPHASE BLD FISH-IMP: SIGNIFICANT CHANGE UP

## 2023-02-02 ENCOUNTER — APPOINTMENT (OUTPATIENT)
Dept: HEMATOLOGY ONCOLOGY | Facility: CLINIC | Age: 58
End: 2023-02-02

## 2023-05-22 NOTE — ED PROCEDURE NOTE - NS ED PROC PERFORMED BY1 FT
Health Maintenance Due   Topic Date Due   • Hepatitis B Vaccine (1 of 3 - 3-dose series) Never done   • HPV Vaccine (1 - 2-dose series) Never done   • DTaP/Tdap/Td Vaccine (1 - Tdap) Never done   • COVID-19 Vaccine (2 - Booster for Moderna series) 01/27/2022       Patient is due for topics as listed above but is not proceeding with Immunization(s) COVID-19, Dtap/Tdap/Td, Hep B and HPV at this time.    Albert

## 2023-09-04 NOTE — ED ADULT TRIAGE NOTE - CHIEF COMPLAINT QUOTE
Spoke with attending covering for Magdalena Bashir, informed them patient is wheelchair bound at facility and will not be able to do ambulating O2 test. Was told to wean as tolerated and to add PT/OT orders. Also informed them of patient choking with swabs-- told to hold oral medications until orders are placed for further swallow eval. Will continue to monitor. Pt c/o bleeding from mouth. States he had teeth removal this morning, having continuation of bleeding from mouth since. Was seen here recently for same presentation. Denies chest pain, SOB/trouble breathing. PMHx Leukemia, DM, HTN Pt c/o bleeding from mouth. States he had teeth removal this morning, having continuation of bleeding from mouth since. Was seen here recently for same presentation. Denies blood thinner use. Denies chest pain, SOB/trouble breathing. PMHx Leukemia, DM, HTN

## 2023-09-07 NOTE — H&P ADULT - NSICDXPASTSURGICALHX_GEN_ALL_CORE_FT
SUBJECTIVE: Pt seen and examined at bedside this am by surgery team. Patient is lying comfortably in bed with no complaints. Denies pain, fever, nausea, vomiting, chest pain, and shortness of breath. No leaks from wound vac or ostomy appliances.    MEDICATIONS  (STANDING):  chlorhexidine 2% Cloths 1 Application(s) Topical <User Schedule>  cholestyramine Powder (Sugar-Free) 4 Gram(s) Oral two times a day  diphenoxylate/atropine 2 Tablet(s) Oral every 6 hours  glucagon  Injectable 1 milliGRAM(s) IntraMuscular once  heparin   Injectable 5000 Unit(s) SubCutaneous every 8 hours  levothyroxine Injectable 40 MICROGram(s) IV Push <User Schedule>  lipid, fat emulsion (Fish Oil and Plant Based) 20% Infusion 1 Gm/kG/Day (41.67 mL/Hr) IV Continuous <Continuous>  loperamide 4 milliGRAM(s) Oral every 6 hours  metoprolol tartrate 25 milliGRAM(s) Oral every 12 hours  nystatin Powder 1 Application(s) Topical three times a day  octreotide  Injectable 100 MICROGram(s) IV Push every 8 hours  pantoprazole  Injectable 40 milliGRAM(s) IV Push daily  Parenteral Nutrition - Adult 1 Each (104 mL/Hr) TPN Continuous <Continuous>  Parenteral Nutrition - Adult 1 Each (104 mL/Hr) TPN Continuous <Continuous>  venlafaxine XR. 300 milliGRAM(s) Oral daily    MEDICATIONS  (PRN):  benzocaine/menthol Lozenge 2 Lozenge Oral every 4 hours PRN Sore Throat  melatonin 5 milliGRAM(s) Oral at bedtime PRN Sleep      Vital Signs Last 24 Hrs  T(C): 36.9 (07 Sep 2023 13:48), Max: 36.9 (07 Sep 2023 13:48)  T(F): 98.4 (07 Sep 2023 13:48), Max: 98.4 (07 Sep 2023 13:48)  HR: 82 (07 Sep 2023 15:03) (76 - 96)  BP: 108/55 (07 Sep 2023 15:03) (107/54 - 135/63)  BP(mean): 75 (07 Sep 2023 15:03) (75 - 91)  RR: 18 (07 Sep 2023 15:03) (17 - 29)  SpO2: 96% (07 Sep 2023 15:03) (89% - 98%)    Parameters below as of 07 Sep 2023 15:03  Patient On (Oxygen Delivery Method): room air        Physical Exam  General: Patient is doing well and lying in bed comfortably  Constitutional: alert and oriented   Pulm: Nonlabored breathing, no respiratory distress  CV: Regular rate and rhythm, normal sinus rhythm  Abd:  soft, nontender, nondistended. No rebound, no guarding. Midline wound covered with wound vac, appropriate seal serous output. LUQ ECF with blood tinged output, likely 2/2 irritation from catheter. JOYCE drain with bilious output. End ileostomy, ppp with liquid output, mostly serous.   Extremities: warm, well perfused, no edema      I&O's Detail    06 Sep 2023 07:01  -  07 Sep 2023 07:00  --------------------------------------------------------  IN:    Fat Emulsion (Fish Oil &amp; Plant Based) 20% Infusion: 583.4 mL    TPN (Total Parenteral Nutrition): 1608 mL  Total IN: 2191.4 mL    OUT:    Bulb (mL): 155 mL    Drain (mL): 200 mL    Drain (mL): 250 mL    Ileostomy (mL): 0 mL    VAC (Vacuum Assisted Closure) System (mL): 150 mL    Voided (mL): 1600 mL  Total OUT: 2355 mL    Total NET: -163.6 mL      07 Sep 2023 07:01  -  07 Sep 2023 15:12  --------------------------------------------------------  IN:    TPN (Total Parenteral Nutrition): 409 mL  Total IN: 409 mL    OUT:    Bulb (mL): 90 mL    Drain (mL): 10 mL    Drain (mL): 0 mL    Ileostomy (mL): 50 mL    VAC (Vacuum Assisted Closure) System (mL): 50 mL    Voided (mL): 500 mL  Total OUT: 700 mL    Total NET: -291 mL        LABS:                        8.3    13.67 )-----------( 240      ( 07 Sep 2023 07:01 )             26.9     09-07    135  |  97  |  38<H>  ----------------------------<  107<H>  4.8   |  31  |  2.17<H>    Ca    8.4      07 Sep 2023 07:01  Phos  3.7     09-07  Mg     1.9     09-07        Urinalysis Basic - ( 07 Sep 2023 07:01 )    Color: x / Appearance: x / SG: x / pH: x  Gluc: 107 mg/dL / Ketone: x  / Bili: x / Urobili: x   Blood: x / Protein: x / Nitrite: x   Leuk Esterase: x / RBC: x / WBC x   Sq Epi: x / Non Sq Epi: x / Bacteria: x     PAST SURGICAL HISTORY:  History of eye surgery

## 2023-10-09 NOTE — PATIENT PROFILE ADULT - NSPROHMDIABETHBA1C_GEN_A_NUR
No acute events. Percocet given for pain control. Tolerating diet well. Saline locked. PT/OT evals ordered. Voiding freely. VSS. plan for discharge to [facility] pending insurance auth. Problem: Diabetes/Glucose Control  Goal: Glucose maintained within prescribed range  Description: INTERVENTIONS:  - Monitor Blood Glucose as ordered  - Assess for signs and symptoms of hyperglycemia and hypoglycemia  - Administer ordered medications to maintain glucose within target range  - Assess barriers to adequate nutritional intake and initiate nutrition consult as needed  - Instruct patient on self management of diabetes  Outcome: Progressing     Problem: Patient Centered Care  Goal: Patient preferences are identified and integrated in the patient's plan of care  Description: Interventions:  - What would you like us to know as we care for you?  I am usually on the 3rd floor  - Provide timely, complete, and accurate information to patient/family  - Incorporate patient and family knowledge, values, beliefs, and cultural backgrounds into the planning and delivery of care  - Encourage patient/family to participate in care and decision-making at the level they choose  - Honor patient and family perspectives and choices  Outcome: Progressing     Problem: SKIN/TISSUE INTEGRITY - ADULT  Goal: Skin integrity remains intact  Description: INTERVENTIONS  - Assess and document risk factors for pressure ulcer development  - Assess and document skin integrity  - Monitor for areas of redness and/or skin breakdown  - Initiate interventions, skin care algorithm/standards of care as needed  Outcome: Progressing     Problem: MUSCULOSKELETAL - ADULT  Goal: Return mobility to safest level of function  Description: INTERVENTIONS:  - Assess patient stability and activity tolerance for standing, transferring and ambulating w/ or w/o assistive devices  - Assist with transfers and ambulation using safe patient handling equipment as needed  - Ensure adequate protection for wounds/incisions during mobilization  - Obtain PT/OT consults as needed  - Advance activity as appropriate  - Communicate ordered activity level and limitations with patient/family  Outcome: Progressing  Goal: Maintain proper alignment of affected body part  Description: INTERVENTIONS:  - Support and protect limb and body alignment per provider's orders  - Instruct and reinforce with patient and family use of appropriate assistive device and precautions (e.g. spinal or hip dislocation precautions)  Outcome: Progressing     Problem: PAIN - ADULT  Goal: Verbalizes/displays adequate comfort level or patient's stated pain goal  Description: INTERVENTIONS:  - Encourage pt to monitor pain and request assistance  - Assess pain using appropriate pain scale  - Administer analgesics based on type and severity of pain and evaluate response  - Implement non-pharmacological measures as appropriate and evaluate response  - Consider cultural and social influences on pain and pain management  - Manage/alleviate anxiety  - Utilize distraction and/or relaxation techniques  - Monitor for opioid side effects  - Notify MD/LIP if interventions unsuccessful or patient reports new pain  - Anticipate increased pain with activity and pre-medicate as appropriate  Outcome: Progressing     Problem: SAFETY ADULT - FALL  Goal: Free from fall injury  Description: INTERVENTIONS:  - Assess pt frequently for physical needs  - Identify cognitive and physical deficits and behaviors that affect risk of falls.   - [place] fall precautions as indicated by assessment.  - Educate pt/family on patient safety including physical limitations  - Instruct pt to call for assistance with activity based on assessment  - Modify environment to reduce risk of injury  - Provide assistive devices as appropriate  - Consider OT/PT consult to assist with strengthening/mobility  - Encourage toileting schedule  Outcome: Progressing     Problem: DISCHARGE PLANNING  Goal: Discharge to home or other facility with appropriate resources  Description: INTERVENTIONS:  - Identify barriers to discharge w/pt and caregiver  - Include patient/family/discharge partner in discharge planning  - Arrange for needed discharge resources and transportation as appropriate  - Identify discharge learning needs (meds, wound care, etc)  - Arrange for interpreters to assist at discharge as needed  - Consider post-discharge preferences of patient/family/discharge partner  - Complete POLST form as appropriate  - Assess patient's ability to be responsible for managing their own health  - Refer to Case Management Department for coordinating discharge planning if the patient needs post-hospital services based on physician/LIP order or complex needs related to functional status, cognitive ability or social support system  Outcome: Progressing unknown

## 2024-01-25 NOTE — PROGRESS NOTE ADULT - PROBLEM SELECTOR PLAN 7
2ppd smoker. smoking cessation counseling provided at bedside   - Step 1 nicotine patch ordered No. GURMEET screening performed.  STOP BANG Legend: 0-2 = LOW Risk; 3-4 = INTERMEDIATE Risk; 5-8 = HIGH Risk

## 2024-04-10 NOTE — ED PROVIDER NOTE - NSCAREINITIATED _GEN_ER
Patient is scheduled for breast biopsy, appointment made and confirmed for 4/25 at 8 am. Spoke to , reviewed pre and post instructions, medications, allergies and answered all questions.         HermannRN,BSN    Dong Armenta(Resident)

## 2024-06-05 NOTE — ED ADULT NURSE NOTE - NS ED NURSE LEVEL OF CONSCIOUSNESS MENTAL STATUS
PRIMARY CARE VISIT    Patient name and address: Elizabeth RHODES Rux [address]  [facility], [place] [address]  MRN number: [id number]   YOB: 1949   Patient Home phone: [phone] (home) [phone] (work)     Reason for visit:   Chief Complaint   Patient presents with   • Office Visit   • Hypertension           History of Present Illness    75 lady       10/20/21  Right knee // replacement \\   dr hood.  //   At [facility]   Has done okay so far       Left knee pending  \"not now\"      HTN / LEG EDEMA skin swelling   Needs to take the TRIAM / HCTZ 75 / 50 A DAY        rash on the chest  //   LIDEX  As needed      Over on exam : diet and exercise can assist with lowering weight and also improving cardiovascular fitness      Hip and back pain  //  Better now        Note has had lumbar injections in 2010  Not interested in more injections at this time     cts both hands   Splint for now   surgery later if needed   ==> now for neuro since condition is progressive and both hands all 5 fingers       covid completed by jul 16 2021 Feb 19 19   Dr hemal Leroy had a colonoscopy performed at [facility] Benign hyperplastic polyp(s) were removed.   No further screening      Review of Systems       Review of Systems   Constitutional:  Negative for activity change, appetite change and fatigue.   HENT:  Negative for congestion, dental problem, drooling, ear discharge, ear pain and sore throat.    Eyes:  Negative for discharge, redness and itching.   Respiratory:  Negative for apnea, cough, choking, chest tightness and shortness of breath.    Cardiovascular:  Negative for chest pain and leg swelling.   Gastrointestinal:  Negative for abdominal distention, abdominal pain, constipation and diarrhea.   Endocrine: Negative for cold intolerance and heat intolerance.   Genitourinary:  Negative for decreased urine volume, difficulty urinating, dysuria and urgency.   Musculoskeletal:  Negative for arthralgias, back  pain and gait problem.        Djd both knees  right has been done  left not now    Skin:  Negative for color change, pallor, rash and wound.   Allergic/Immunologic: Negative for food allergies.   Neurological:  Negative for dizziness, seizures, facial asymmetry, speech difficulty, light-headedness, numbness and headaches.   Hematological:  Negative for adenopathy. Does not bruise/bleed easily.   Psychiatric/Behavioral:  Negative for agitation, behavioral problems, confusion, decreased concentration, dysphoric mood and hallucinations. The patient is not hyperactive.    All other systems reviewed and are negative.       Allergies  ALLERGIES:   Allergen Reactions   • Bactrim Ds RASH   • Strawberry   (Food Or Med) RASH   • Sulfacetamide RASH   • Sulfa Antibiotics Other (See Comments)   • Sulfadiazine Other (See Comments)       Current Meds  Current Outpatient Medications   Medication Sig Dispense Refill   • triamterene-hydrochlorothiazide (MAXZIDE) 75-50 MG per tablet Take 1 tablet by mouth daily. 90 tablet 3   • allopurinol (ZYLOPRIM) 100 MG tablet Take 1 tablet by mouth daily. 90 tablet 3   • atorvastatin (LIPITOR) 40 MG tablet Take 1 tablet by mouth at bedtime. 90 tablet 3   • fluocinonide (LIDEX) 0.05 % cream Apply topically 2 times daily. 60 g 3   • Vitamin A 3 mg (10,000 units) tablet Take 1 tablet by mouth daily. 30 tablet    • coenzyme Q10 100 MG capsule Take 100 mg by mouth daily.     • DISPENSE Take 1 Anti-Oxidant extract by mouth daily     • calcium carbonate-vitamin D (CALTRATE+D) 600-400 MG-UNIT per tablet Take 1 tablet by mouth daily.       No current facility-administered medications for this visit.       Active Problems  PROB@    Past Medical History  [unfilled]    Surgical History  Past Surgical History:   Procedure Laterality Date   • Back surgery      lumbar fusion 2008   • Hysterectomy     • Joint replacement      R TKA 10/20/21       Family History  Family History   Problem Relation Age of  Onset   • Cancer Mother    • COPD Father        Social History  Social History     Socioeconomic History   • Marital status: /Civil Union     Spouse name: Not on file   • Number of children: Not on file   • Years of education: Not on file   • Highest education level: Not on file   Occupational History   • Not on file   Tobacco Use   • Smoking status: Never   • Smokeless tobacco: Never   Vaping Use   • Vaping status: never used   Substance and Sexual Activity   • Alcohol use: Not Currently     Comment: once a year   • Drug use: Never   • Sexual activity: Yes   Other Topics Concern   •  Service No   • Blood Transfusions Not Asked   • Caffeine Concern Not Asked   • Occupational Exposure Not Asked   • Hobby Hazards Not Asked   • Sleep Concern Not Asked   • Stress Concern Not Asked   • Weight Concern Not Asked   • Special Diet Not Asked   • Back Care Not Asked   • Exercise Not Asked   • Bike Helmet Not Asked   • Seat Belt Yes   • Self-Exams Not Asked   Social History Narrative   • Not on file     Social Determinants of Health     Financial Resource Strain: Not on file   Food Insecurity: Not on file   Transportation Needs: No Transportation Needs (3/13/2019)    PRAPARE - Transportation    • Lack of Transportation (Medical): No    • Lack of Transportation (Non-Medical): No   Physical Activity: Unknown (12/12/2018)    Exercise Vital Sign    • Days of Exercise per Week: 0 days    • Minutes of Exercise per Session: Not on file   Stress: Low Risk  (4/8/2021)    Stress    • How Stressed: Not on file   Social Connections: Not on file   Interpersonal Safety: Not on file (11/4/2022)        Review  Patient's medications, allergies, past medical, surgical, social and family histories were reviewed and updated as appropriate.     Vitals  Visit Vitals  /65 (BP Location: LFA - Left forearm, Patient Position: Sitting)   Pulse 86   Temp 97.9 °F (36.6 °C)   Ht 5' 5.5\" (1.664 m)   Wt 124.5 kg (274 lb 5.8 oz)   SpO2 96%    BMI 44.96 kg/m²       Physical    Physical Exam  Vitals reviewed.   Constitutional:       Appearance: She is well-developed.      Comments: Over on exam    HENT:      Head: Normocephalic and atraumatic.      Right Ear: External ear normal.      Left Ear: External ear normal.      Nose: Nose normal.      Neck: Normal range of motion and neck supple.   Eyes:      Conjunctiva/sclera: Conjunctivae normal.      Pupils: Pupils are equal, round, and reactive to light.   Cardiovascular:      Rate and Rhythm: Normal rate and regular rhythm.      Heart sounds: Normal heart sounds.      Comments: LEG EDEMA minimal   Pulmonary:      Effort: Pulmonary effort is normal.      Breath sounds: Normal breath sounds.   Abdominal:      General: Bowel sounds are normal.      Palpations: Abdomen is soft.   Musculoskeletal:         General: Normal range of motion.      Comments: Right knee healing up finally   Some compression stocking on right lower leg    Skin:     General: Skin is warm and dry.   Psychiatric:         Behavior: Behavior normal.         Thought Content: Thought content normal.         Judgment: Judgment normal.         Results/Data    Hemoglobin A1C (%)   Date Value   03/01/2024 5.5        Lab Services on 03/01/2024   Component Date Value Ref Range Status   • Sodium 03/01/2024 140  135 - 145 mmol/L Final   • Potassium 03/01/2024 3.8  3.4 - 5.1 mmol/L Final   • Chloride 03/01/2024 106  97 - 110 mmol/L Final   • Carbon Dioxide 03/01/2024 29  21 - 32 mmol/L Final   • Anion Gap 03/01/2024 9  7 - 19 mmol/L Final   • Glucose 03/01/2024 98  70 - 99 mg/dL Final   • BUN 03/01/2024 20  6 - 20 mg/dL Final   • Creatinine 03/01/2024 0.72  0.51 - 0.95 mg/dL Final   • Glomerular Filtration Rate 03/01/2024 86  >=60 Final    eGFR results = or >60 mL/min/1.73m2 = Normal kidney function. Estimated GFR calculated using the CKD-EPI-R (2021) equation that does not include race in the creatinine calculation.   • BUN/Cr 03/01/2024 28 (H)  7 -  25 Final   • Calcium 03/01/2024 9.6  8.4 - 10.2 mg/dL Final   • Bilirubin, Total 03/01/2024 0.8  0.2 - 1.0 mg/dL Final   • GOT/AST 03/01/2024 27  <=37 Units/L Final   • GPT/ALT 03/01/2024 30  <64 Units/L Final   • Alkaline Phosphatase 03/01/2024 92  45 - 117 Units/L Final   • Albumin 03/01/2024 3.9  3.6 - 5.1 g/dL Final   • Protein, Total 03/01/2024 7.7  6.4 - 8.2 g/dL Final   • Globulin 03/01/2024 3.8  2.0 - 4.0 g/dL Final   • A/G Ratio 03/01/2024 1.0  1.0 - 2.4 Final   • Hemoglobin A1C 03/01/2024 5.5  4.5 - 5.6 % Final      Diabetic Screening  Non Diabetic:             <5.7%  Increased Risk:           5.7-6.4%  Diagnostic For Diabetes:  >6.4%    Diabetic Control  A1C%       eAG mg/dL  6.0            126  6.5            140  7.0            154  7.5            169  8.0            183  8.5            197  9.0            212  9.5            226  10.0           240   • Cholesterol 03/01/2024 157  <=199 mg/dL Final      Desirable         <200  Borderline High   200 to 239  High              >=240   • Triglycerides 03/01/2024 58  <=149 mg/dL Final      Normal            <150  Borderline High   150 to 199  High              200 to 499  Very High         >=500   • HDL 03/01/2024 79  >=50 mg/dL Final      Low              <40  Borderline Low   40 to 49  Near Optimal     50 to 59  Optimal          >=60   • LDL 03/01/2024 66  <=129 mg/dL Final      Optimal           <100  Near Optimal      100 to 129  Borderline High   130 to 159  High              160 to 189  Very High         >=190   • Non-HDL Cholesterol 03/01/2024 78  mg/dL Final      Therapeutic Target:  CHD and risk equivalents  <130  Multiple risk factors     <160  0 to 1 risk factor        <190   • Cholesterol/ HDL Ratio 03/01/2024 2.0  <=4.4 Final   • TSH 03/01/2024 0.924  0.350 - 5.000 mcUnits/mL Final    Findings most consistent with euthyroid state, no additional testing suggested. TSH may be normal in patients with thyroid dysfunction and pituitary disease.  Clinical correlation recommended.    (Reflex TSH algorithm is not recommended in hospitalized patients. A variety of drugs, as well as serious acute and chronic illnesses may alter thyroid function tests. Commonly implicated drugs include glucocorticoids, dopamine, carbamazepine, iodine, amiodarone, lithium and heparin.)   • WBC 03/01/2024 5.4  4.2 - 11.0 K/mcL Final   • RBC 03/01/2024 4.45  4.00 - 5.20 mil/mcL Final   • HGB 03/01/2024 13.7  12.0 - 15.5 g/dL Final   • HCT 03/01/2024 41.1  36.0 - 46.5 % Final   • MCV 03/01/2024 92.4  78.0 - 100.0 fl Final   • MCH 03/01/2024 30.8  26.0 - 34.0 pg Final   • MCHC 03/01/2024 33.3  32.0 - 36.5 g/dL Final   • RDW-CV 03/01/2024 14.2  11.0 - 15.0 % Final   • RDW-SD 03/01/2024 47.8  39.0 - 50.0 fL Final   • PLT 03/01/2024 194  140 - 450 K/mcL Final   • NRBC 03/01/2024 0  <=0 /100 WBC Final   • Neutrophil, Percent 03/01/2024 62  % Final   • Lymphocytes, Percent 03/01/2024 28  % Final   • Mono, Percent 03/01/2024 8  % Final   • Eosinophils, Percent 03/01/2024 2  % Final   • Basophils, Percent 03/01/2024 0  % Final   • Immature Granulocytes 03/01/2024 0  % Final   • Absolute Neutrophils 03/01/2024 3.3  1.8 - 7.7 K/mcL Final   • Absolute Lymphocytes 03/01/2024 1.5  1.0 - 4.0 K/mcL Final   • Absolute Monocytes 03/01/2024 0.4  0.3 - 0.9 K/mcL Final   • Absolute Eosinophils  03/01/2024 0.1  0.0 - 0.5 K/mcL Final   • Absolute Basophils 03/01/2024 0.0  0.0 - 0.3 K/mcL Final   • Absolute Immature Granulocytes 03/01/2024 0.0  0.0 - 0.2 K/mcL Final   • Beta 2 Microglobulin 03/01/2024 3.6 (H)  1.2 - 3.5 mg/L Final   • LD, Total 03/01/2024 197  82 - 240 Units/L Final   • M Protein 1 03/01/2024 0.6 (H)  <=0.0 g/dL Final   • Protein, Total 03/01/2024 7.6  6.4 - 8.2 g/dL Final   • Total Globulin 03/01/2024 3.4  2.1 - 4.2 g/dL Final   • Albumin 03/01/2024 4.2  3.5 - 4.9 g/dL Final   • Alpha 1 03/01/2024 0.4  0.2 - 0.4 g/dL Final   • Alpha 2 03/01/2024 0.8  0.5 - 0.9 g/dL Final   • Beta  03/01/2024 0.9  0.7 - 1.2 g/dL Final   • Gamma 03/01/2024 1.3  0.7 - 1.7 g/dL Final   • Serum Protein Electrophoresis Path* 03/01/2024   A monoclonal protein is present in the gamma region and measures 0.6 gm/dL.    Refer to immunofixation for identification.        Final   • Microalbumin, Urine 03/01/2024 1.33  mg/dL Final   • Creatinine, Urine 03/01/2024 182.00  mg/dL Final   • Microalbumin/ Creatinine Ratio 03/01/2024 7.3  <30.0 mg/g Final   • Kappa, Free 03/01/2024 6.85 (H)  0.33 - 1.94 mg/dL Final   • Lambda, Free 03/01/2024 1.29  0.57 - 2.63 mg/dL Final   • Kappa/ Lambda Ratio 03/01/2024 5.31 (H)  0.26 - 1.65 Final   • Serum Immunofixation Pathology Int* 03/01/2024   IgG kappa monoclonal protein is present.        Final   • Immunoglobulin G 03/01/2024 1,570  700 - 1,600 mg/dL Final   • Immunoglobulin A 03/01/2024 124  70 - 400 mg/dL Final   • Immunoglobulin M 03/01/2024 29 (L)  40 - 230 mg/dL Final         Assessment  Elizabeth was seen today for office visit and hypertension.    Diagnoses and all orders for this visit:    Blurry vision  -     SERVICE TO OPHTHALMOLOGY    Hypertension, essential, benign  -     triamterene-hydrochlorothiazide (MAXZIDE) 75-50 MG per tablet; Take 1 tablet by mouth daily.    Pain in both feet  -     SERVICE TO PODIATRY        PLAN        Right knee replacement done on 10/20/21 at [facility].  //  did okay with the  surgery  6-5-24     Severe djd of both knees:  left knee to be done when right knee heals up  6-5-24     BP / LEG EDEMA ==>  Triam/HCTZ    75/50  mg per day and low salt diet  Improved   6-5-24     CKD 3 creat went up // no more nsaids==> now on tramadol ==> 6-5-24     Cholesterol came down from 240 and  on 1-29-09 to 175 and 100 LDL on Jul 17 2009. Cholesterol was 201 and  on 8 Feb 2010. Try to walk and low cholesterol diet (no eggs sausage or soriano). and pravastatin 40 mg every night 6-5-24     Body mass index (BMI) of 40.0 to 44.9 in adult (CMS/[facility])  ==>  Overwgt due to excess calorie accumulation.  : watching diet and getting some exercise which will improve over all cardiovascular fitness also    6-5-24     neck rash : better no more jewelry around the neck   //  Lidex as needed for rash  10-28-23      Urine infection. Cleared up  //  But some blood in the urine   Check ultrasound kidneys  /  4-20-21   Unremarkable sonographic appearance of the kidneys and urinary bladder  without shadowing renal calculi or hydronephrosis. 10-28-23      Insomnia   Short course ambien since can not sleep at all   4-9-21  //  BETTER NOW   10-28-23      Sinusitis : Nasal congestion: fexofenadine for congestion    3-12-20     11/28/23   Small bowel obstruction  Risk factors: Partial hysterectomy  Did not require surgery  Resolved conservative measues     COLONOSCOPY WAS 1-. one polyp 4 mm ascending colon : Dr Brown. Next 2-15-13 : 2 polyps removed; high fiber diet; repeat colonoscopy in 5 years (2018): Dr Meng 11-15-17 //  ==>  enrrique flynn 9 feb 18 You had a colonoscopy performed at [facility] Benign hyperplastic polyp(s) were removed. Further Information: These polyps are not precancerous. Based on the exam results, your age and history, no further colonoscopy screening is recommended.      Yearly gyne 5-     Mammogram: needs yearly      Flu shot 11-09-10 and 12 sep 11. and 12 sept 12 and 9-11-13 and 9-16-14 and 11-20-15 & 11-28-16 & 15 nov 2017     Pneumo shot 12-12-12 & 15 nov 2017     RETURN TO OFFICE in 3 months     RETURN TO OFFICE  No follow-ups on file.    Lm Camp MD   Awake/Alert

## 2024-09-09 NOTE — DISCHARGE NOTE PROVIDER - NSDCHOSPICE_GEN_A_CORE
No Quality 137: Melanoma: Continuity Of Care - Recall System: Patient information entered into a recall system that includes: target date for the next exam specified AND a process to follow up with patients regarding missed or unscheduled appointments Detail Level: Detailed

## 2024-12-10 NOTE — ED ADULT NURSE NOTE - HISTORY OF COVID-19 VACCINATION
Caller reporting the following red-flag symptom(s): Pt is 31w6d and having decreased fetal movements    Per the system red-flag symptom policy, patient was instructed to:  speak with a Registered Nurse    Action:  Patient warm transferred to a Registered Nurse    Yes

## 2025-04-14 NOTE — ED PROVIDER NOTE - NEURO NEGATIVE STATEMENT, MLM
no loss of consciousness, no gait abnormality, no headache, no sensory deficits, and no weakness.
Speaking Coherently